# Patient Record
Sex: FEMALE | Race: WHITE | NOT HISPANIC OR LATINO | Employment: UNEMPLOYED | URBAN - METROPOLITAN AREA
[De-identification: names, ages, dates, MRNs, and addresses within clinical notes are randomized per-mention and may not be internally consistent; named-entity substitution may affect disease eponyms.]

---

## 2017-01-10 ENCOUNTER — HOSPITAL ENCOUNTER (OUTPATIENT)
Dept: RADIOLOGY | Facility: HOSPITAL | Age: 57
Discharge: HOME | End: 2017-01-10
Attending: INTERNAL MEDICINE
Payer: MEDICAID

## 2017-01-10 ENCOUNTER — OFFICE VISIT (OUTPATIENT)
Dept: INTERNAL MEDICINE | Facility: CLINIC | Age: 57
End: 2017-01-10
Payer: MEDICAID

## 2017-01-10 VITALS
BODY MASS INDEX: 35.71 KG/M2 | SYSTOLIC BLOOD PRESSURE: 110 MMHG | WEIGHT: 209.19 LBS | TEMPERATURE: 99 F | HEART RATE: 98 BPM | HEIGHT: 64 IN | OXYGEN SATURATION: 98 % | DIASTOLIC BLOOD PRESSURE: 84 MMHG

## 2017-01-10 DIAGNOSIS — J40 BRONCHITIS: Primary | ICD-10-CM

## 2017-01-10 DIAGNOSIS — J40 BRONCHITIS: ICD-10-CM

## 2017-01-10 PROCEDURE — 99999 PR PBB SHADOW E&M-EST. PATIENT-LVL III: CPT | Mod: PBBFAC,,, | Performed by: PHYSICIAN ASSISTANT

## 2017-01-10 PROCEDURE — 71020 XR CHEST PA AND LATERAL: CPT | Mod: TC

## 2017-01-10 PROCEDURE — 71020 XR CHEST PA AND LATERAL: CPT | Mod: 26,,, | Performed by: RADIOLOGY

## 2017-01-10 PROCEDURE — 99213 OFFICE O/P EST LOW 20 MIN: CPT | Mod: S$PBB,,, | Performed by: PHYSICIAN ASSISTANT

## 2017-01-10 RX ORDER — ALBUTEROL SULFATE 90 UG/1
2 AEROSOL, METERED RESPIRATORY (INHALATION) EVERY 6 HOURS PRN
Qty: 18 G | Refills: 0 | Status: SHIPPED | OUTPATIENT
Start: 2017-01-10 | End: 2017-02-22 | Stop reason: SDUPTHER

## 2017-01-10 RX ORDER — MONTELUKAST SODIUM 10 MG/1
10 TABLET ORAL NIGHTLY
Qty: 30 TABLET | Refills: 0 | Status: SHIPPED | OUTPATIENT
Start: 2017-01-10 | End: 2017-02-09

## 2017-01-10 RX ORDER — EPINEPHRINE 0.3 MG/.3ML
INJECTION INTRAMUSCULAR
COMMUNITY
Start: 2017-01-03 | End: 2018-08-16

## 2017-01-10 NOTE — PROGRESS NOTES
Subjective:       Patient ID: Monica Joy is a 56 y.o. female.    Chief Complaint: URI and Cough    URI    This is a new problem. The current episode started 1 to 4 weeks ago. The problem has been waxing and waning. There has been no fever. Associated symptoms include chest pain, congestion, coughing and wheezing. Pertinent negatives include no abdominal pain, headaches, rhinorrhea, sinus pain, sore throat or swollen glands. She has tried nothing for the symptoms.     Review of Systems   Constitutional: Negative for chills, fatigue and fever.   HENT: Positive for congestion. Negative for ear discharge, postnasal drip, rhinorrhea and sore throat.    Respiratory: Positive for cough and wheezing. Negative for chest tightness and shortness of breath.    Cardiovascular: Positive for chest pain.   Gastrointestinal: Negative for abdominal pain.   Neurological: Negative for headaches.       Objective:      Physical Exam   Constitutional: She appears well-developed and well-nourished. No distress.   HENT:   Head: Normocephalic and atraumatic.   Right Ear: Tympanic membrane and ear canal normal.   Left Ear: Tympanic membrane and ear canal normal.   Nose: Mucosal edema and rhinorrhea present.   Mouth/Throat: No posterior oropharyngeal edema or posterior oropharyngeal erythema. No tonsillar exudate.   Neck: Neck supple.   Cardiovascular: Normal rate and regular rhythm.    Pulmonary/Chest: Effort normal. No respiratory distress. She has wheezes. She has no rales. She exhibits no tenderness.   Abdominal: Soft. There is no tenderness.   Lymphadenopathy:     She has no cervical adenopathy.   Skin: She is not diaphoretic.   Nursing note and vitals reviewed.      Assessment:       1. Bronchitis        Plan:       Bronchitis  -     X-Ray Chest PA And Lateral; Future; Expected date: 1/10/17    Other orders  -     albuterol 90 mcg/actuation inhaler; Inhale 2 puffs into the lungs every 6 (six) hours as needed for Wheezing.  Dispense:  18 g; Refill: 0  -     montelukast (SINGULAIR) 10 mg tablet; Take 1 tablet (10 mg total) by mouth every evening.  Dispense: 30 tablet; Refill: 0

## 2017-01-10 NOTE — MR AVS SNAPSHOT
[facility]  [address] [place] [id number]  Phone: [phone]  Fax: [phone]                  Monica Joy   1/10/2017 11:20 AM   Office Visit    Description:  Female : 1960   Provider:  Felix Aquino III, PA-C   Department:  [facility]           Reason for Visit     URI     Cough           Diagnoses this Visit        Comments    Bronchitis    -  Primary            To Do List           Goals (5 Years of Data)     None       These Medications        Disp Refills Start End    albuterol 90 mcg/actuation inhaler 18 g 0 1/10/2017 1/10/2018    Inhale 2 puffs into the lungs every 6 (six) hours as needed for Wheezing. - Inhalation    Pharmacy: [facility] [address] [place] [address] AT [place] [facility] Ph #: [phone]       montelukast (SINGULAIR) 10 mg tablet 30 tablet 0 1/10/2017 2017    Take 1 tablet (10 mg total) by mouth every evening. - Oral    Pharmacy: [facility] [address] [place] [address] AVE  AT [place] [facility] [place] Ph #: [phone]         [facility] On Call     [facility] On Call Nurse Care Line -  Assistance  Registered nurses in the [facility] Center provide clinical advisement, health education, appointment booking, and other advisory services.  Call for this free service at [phone].             Medications           Message regarding Medications     Verify the changes and/or additions to your medication regime listed below are the same as discussed with your clinician today.  If any of these changes or additions are incorrect, please notify your healthcare provider.        START taking these NEW medications        Refills    albuterol 90 mcg/actuation inhaler 0    Sig: Inhale 2 puffs into the lungs every 6 (six) hours as needed for Wheezing.    Class: Normal    Route: Inhalation    montelukast (SINGULAIR) 10 mg tablet 0    Sig: Take 1 tablet (10 mg total)  "by mouth every evening.    Class: Normal    Route: Oral      STOP taking these medications     levoFLOXacin (LEVAQUIN) 500 MG tablet Take 1 tablet (500 mg total) by mouth once daily.           Verify that the below list of medications is an accurate representation of the medications you are currently taking.  If none reported, the list may be blank. If incorrect, please contact your healthcare provider. Carry this list with you in case of emergency.           Current Medications     cyanocobalamin, vitamin B-12, (VITAMIN B-12) 1,000 mcg Subl Place under the tongue.    albuterol 90 mcg/actuation inhaler Inhale 2 puffs into the lungs every 6 (six) hours as needed for Wheezing.    EPIPEN 2-RAIMUNDO 0.3 mg/0.3 mL AtIn     meloxicam (MOBIC) 15 MG tablet TAKE 1 TABLET BY MOUTH DAILY    montelukast (SINGULAIR) 10 mg tablet Take 1 tablet (10 mg total) by mouth every evening.           Clinical Reference Information           Vital Signs - Last Recorded  Most recent update: 1/10/2017 11:26 AM by Nathalie Nava MA    BP Pulse Temp Ht    110/84 (BP Location: Left arm, Patient Position: Sitting, BP Method: Manual) 98 99.2 °F (37.3 °C) (Tympanic) 5' 4" (1.626 m)    Wt SpO2 BMI    94.9 kg (209 lb 3.5 oz) 98% 35.91 kg/m2      Blood Pressure          Most Recent Value    BP  110/84      Allergies as of 1/10/2017     Amoxicillin    Medrol [Methylprednisolone]    Oxycodone    Percodan Maude    Ultram  [Tramadol]    Augmentin [Amoxicillin-pot Clavulanate]    Sulfa (Sulfonamide Antibiotics)    Doxycycline    Lortab  [Hydrocodone-acetaminophen]    Percocet  [Oxycodone-acetaminophen]    Shellfish Containing Products    Tetracycline    Vibramycin  [Doxycycline Calcium]    Codeine    Hydrocodone      Immunizations Administered on Date of Encounter - 1/10/2017     None      Orders Placed During Today's Visit     Future Labs/Procedures Expected by Expires    X-Ray Chest PA And Lateral  1/10/2017 1/10/2018      Instructions      Continue " with new medicines until gone. May take tylenol PRN fever. Increase fluids and rest. Call the clinic if not better in 3 to 5 days. Suggest togo to the Emergency Room if symptoms get much worse. Otherwise follow up with your PCP as scheduled.

## 2017-01-10 NOTE — PATIENT INSTRUCTIONS
Continue with new medicines until gone. May take tylenol PRN fever. Increase fluids and rest. Call the clinic if not better in 3 to 5 days. Suggest togo to the Emergency Room if symptoms get much worse. Otherwise follow up with your PCP as scheduled.

## 2017-02-22 ENCOUNTER — OFFICE VISIT (OUTPATIENT)
Dept: INTERNAL MEDICINE | Facility: CLINIC | Age: 57
End: 2017-02-22
Payer: MEDICAID

## 2017-02-22 VITALS
TEMPERATURE: 100 F | OXYGEN SATURATION: 96 % | HEIGHT: 64 IN | WEIGHT: 212.5 LBS | HEART RATE: 90 BPM | BODY MASS INDEX: 36.28 KG/M2 | DIASTOLIC BLOOD PRESSURE: 82 MMHG | SYSTOLIC BLOOD PRESSURE: 122 MMHG

## 2017-02-22 DIAGNOSIS — J06.9 VIRAL UPPER RESPIRATORY TRACT INFECTION: Primary | ICD-10-CM

## 2017-02-22 PROCEDURE — 99213 OFFICE O/P EST LOW 20 MIN: CPT | Mod: S$PBB,,, | Performed by: PHYSICIAN ASSISTANT

## 2017-02-22 PROCEDURE — 99213 OFFICE O/P EST LOW 20 MIN: CPT | Mod: PBBFAC | Performed by: PHYSICIAN ASSISTANT

## 2017-02-22 PROCEDURE — 99999 PR PBB SHADOW E&M-EST. PATIENT-LVL III: CPT | Mod: PBBFAC,,, | Performed by: PHYSICIAN ASSISTANT

## 2017-02-22 RX ORDER — PROMETHAZINE HYDROCHLORIDE AND DEXTROMETHORPHAN HYDROBROMIDE 6.25; 15 MG/5ML; MG/5ML
5 SYRUP ORAL 3 TIMES DAILY PRN
Qty: 240 ML | Refills: 0 | Status: SHIPPED | OUTPATIENT
Start: 2017-02-22 | End: 2017-03-04

## 2017-02-22 RX ORDER — ALBUTEROL SULFATE 90 UG/1
2 AEROSOL, METERED RESPIRATORY (INHALATION) EVERY 6 HOURS PRN
Qty: 18 G | Refills: 0 | Status: SHIPPED | OUTPATIENT
Start: 2017-02-22 | End: 2018-08-16

## 2017-02-22 RX ORDER — MONTELUKAST SODIUM 10 MG/1
10 TABLET ORAL NIGHTLY
Qty: 30 TABLET | Refills: 5 | Status: SHIPPED | OUTPATIENT
Start: 2017-02-22 | End: 2018-08-16

## 2017-02-22 NOTE — MR AVS SNAPSHOT
[facility]  [address] [place] [address]  Phone: [phone]  Fax: [phone]                  Monica Joy   2017 10:40 AM   Office Visit    Description:  Female : 1960   Provider:  Felix Aquino III, PA-C   Department:  [facility]           Reason for Visit     flu like symptoms           Diagnoses this Visit        Comments    Viral upper respiratory tract infection    -  Primary            To Do List           Goals (5 Years of Data)     None       These Medications        Disp Refills Start End    montelukast (SINGULAIR) 10 mg tablet 30 tablet 5 2017     Take 1 tablet (10 mg total) by mouth every evening. - Oral    [facility] [address] [facility] [place] [facility] [place] Ph #: [phone]       albuterol 90 mcg/actuation inhaler 18 g 0 2017    Inhale 2 puffs into the lungs every 6 (six) hours as needed for Wheezing. - Inhalation    Pharm[facility] [address] [facility] [place] [facility] [place] [facility] #: [phone]       promethazine-dextromethorphan (PROMETHAZINE-DM) 6.25-15 mg/5 mL Syrp 240 mL 0 2017 3/4/2017    Take 5 mLs by mouth 3 (three) times daily as needed. - Oral    Pharmacy: [facility] [address] [facility] [place] [facility] [place] Ph #: [phone]         [facility]     [facility] On Call Nurse Care Line -  Assistance  Registered nurses in the [facility] provide clinical advisement, health education, appointment booking, and other advisory services.  Call for this free service at [phone].             Medications           Message regarding Medications     Verify the changes and/or additions to your medication regime listed below are the same as discussed with your clinician today.  If any of these changes or additions are incorrect, please notify your  "healthcare provider.        START taking these NEW medications        Refills    montelukast (SINGULAIR) 10 mg tablet 5    Sig: Take 1 tablet (10 mg total) by mouth every evening.    Class: Normal    Route: Oral    promethazine-dextromethorphan (PROMETHAZINE-DM) 6.25-15 mg/5 mL Syrp 0    Sig: Take 5 mLs by mouth 3 (three) times daily as needed.    Class: Normal    Route: Oral           Verify that the below list of medications is an accurate representation of the medications you are currently taking.  If none reported, the list may be blank. If incorrect, please contact your healthcare provider. Carry this list with you in case of emergency.           Current Medications     cyanocobalamin, vitamin B-12, (VITAMIN B-12) 1,000 mcg Subl Place under the tongue.    meloxicam (MOBIC) 15 MG tablet TAKE 1 TABLET BY MOUTH DAILY    albuterol 90 mcg/actuation inhaler Inhale 2 puffs into the lungs every 6 (six) hours as needed for Wheezing.    EPIPEN 2-RAIMUNDO 0.3 mg/0.3 mL AtIn     montelukast (SINGULAIR) 10 mg tablet Take 1 tablet (10 mg total) by mouth every evening.    promethazine-dextromethorphan (PROMETHAZINE-DM) 6.25-15 mg/5 mL Syrp Take 5 mLs by mouth 3 (three) times daily as needed.           Clinical Reference Information           Your Vitals Were     BP Pulse Temp Height    122/82 (BP Location: Left arm, Patient Position: Sitting, BP Method: Manual) 90 99.5 °F (37.5 °C) (Tympanic) 5' 4" (1.626 m)    Weight SpO2 BMI    96.4 kg (212 lb 8.4 oz) 96% 36.48 kg/m2      Blood Pressure          Most Recent Value    BP  122/82      Allergies as of 2/22/2017     Amoxicillin    Medrol [Methylprednisolone]    Oxycodone    Percodan Maude    Ultram  [Tramadol]    Augmentin [Amoxicillin-pot Clavulanate]    Levaquin [Levofloxacin]    Sulfa (Sulfonamide Antibiotics)    Doxycycline    Lortab  [Hydrocodone-acetaminophen]    Percocet  [Oxycodone-acetaminophen]    Shellfish Containing Products    Tetracycline    Vibramycin  [Doxycycline " Calcium]    Codeine    Hydrocodone      Immunizations Administered on Date of Encounter - 2/22/2017     None      Instructions      Continue with antibiotics and new medicines until gone. May take tylenol PRN fever. Increase fluids and rest. Call the clinic if not better in 3 to 5 days. Suggest togo to the Emergency Room if symptoms get much worse. Otherwise follow up with your PCP as scheduled.        Language Assistance Services     ATTENTION: Language assistance services are available, free of charge. Please call [phone].      ATENCIÓN: Si habla kathy, tiene a raman disposición servicios gratuitos de asistencia lingüística. Llame al [phone].     GERMAIN Ý: N?u b?n nói Ti?ng Vi?t, có các d?ch v? h? tr? ngôn ng? mi?n phí dành cho b?n. G?i s? [phone].         [facility] complies with applicable Federal civil rights laws and does not discriminate on the basis of race, color, national origin, age, disability, or sex.

## 2017-02-22 NOTE — PROGRESS NOTES
Subjective:       Patient ID: Monica Joy is a 56 y.o. female.    Chief Complaint: flu like symptoms    URI    This is a new problem. The current episode started in the past 7 days. The problem has been waxing and waning. The maximum temperature recorded prior to her arrival was 100.4 - 100.9 F. Associated symptoms include congestion, coughing, headaches, rhinorrhea, sneezing, a sore throat and swollen glands. Pertinent negatives include no abdominal pain, chest pain or ear pain. She has tried nothing for the symptoms.     Review of Systems   Constitutional: Positive for chills, fatigue and fever.   HENT: Positive for congestion, rhinorrhea, sneezing and sore throat. Negative for dental problem, ear pain, facial swelling, nosebleeds, postnasal drip, sinus pressure and trouble swallowing.    Respiratory: Positive for cough. Negative for chest tightness and shortness of breath.    Cardiovascular: Negative for chest pain.   Gastrointestinal: Negative for abdominal pain.   Neurological: Positive for headaches.       Objective:      Physical Exam   Constitutional: She appears well-developed and well-nourished. No distress.   HENT:   Head: Normocephalic and atraumatic.   Right Ear: Tympanic membrane and ear canal normal.   Left Ear: Tympanic membrane and ear canal normal.   Nose: Mucosal edema and rhinorrhea present.   Mouth/Throat: Oropharynx is clear and moist. No tonsillar exudate.   Neck: Neck supple.   Cardiovascular: Normal rate and regular rhythm.  Exam reveals no gallop and no friction rub.    No murmur heard.  Pulmonary/Chest: Effort normal and breath sounds normal. No respiratory distress. She has no wheezes. She has no rales. She exhibits no tenderness.   Lymphadenopathy:     She has no cervical adenopathy.   Skin: She is not diaphoretic.   Nursing note and vitals reviewed.      Assessment:       1. Viral upper respiratory tract infection        Plan:       Viral upper respiratory tract infection    Other  orders  -     montelukast (SINGULAIR) 10 mg tablet; Take 1 tablet (10 mg total) by mouth every evening.  Dispense: 30 tablet; Refill: 5  -     albuterol 90 mcg/actuation inhaler; Inhale 2 puffs into the lungs every 6 (six) hours as needed for Wheezing.  Dispense: 18 g; Refill: 0  -     promethazine-dextromethorphan (PROMETHAZINE-DM) 6.25-15 mg/5 mL Syrp; Take 5 mLs by mouth 3 (three) times daily as needed.  Dispense: 240 mL; Refill: 0

## 2017-03-20 ENCOUNTER — HOSPITAL ENCOUNTER (OUTPATIENT)
Dept: RADIOLOGY | Facility: HOSPITAL | Age: 57
Discharge: HOME | End: 2017-03-20
Attending: INTERNAL MEDICINE
Payer: MEDICAID

## 2017-03-20 ENCOUNTER — OFFICE VISIT (OUTPATIENT)
Dept: INTERNAL MEDICINE | Facility: CLINIC | Age: 57
End: 2017-03-20
Payer: MEDICAID

## 2017-03-20 VITALS
BODY MASS INDEX: 35.98 KG/M2 | TEMPERATURE: 99 F | DIASTOLIC BLOOD PRESSURE: 88 MMHG | HEART RATE: 84 BPM | SYSTOLIC BLOOD PRESSURE: 122 MMHG | WEIGHT: 210.75 LBS | OXYGEN SATURATION: 97 % | HEIGHT: 64 IN

## 2017-03-20 DIAGNOSIS — R06.2 WHEEZING: Primary | ICD-10-CM

## 2017-03-20 DIAGNOSIS — R06.2 WHEEZING: ICD-10-CM

## 2017-03-20 DIAGNOSIS — J40 BRONCHITIS: ICD-10-CM

## 2017-03-20 PROCEDURE — 99213 OFFICE O/P EST LOW 20 MIN: CPT | Mod: S$PBB,,, | Performed by: PHYSICIAN ASSISTANT

## 2017-03-20 PROCEDURE — 71020 XR CHEST PA AND LATERAL: CPT | Mod: 26,,, | Performed by: RADIOLOGY

## 2017-03-20 PROCEDURE — 99999 PR PBB SHADOW E&M-EST. PATIENT-LVL IV: CPT | Mod: PBBFAC,,, | Performed by: PHYSICIAN ASSISTANT

## 2017-03-20 PROCEDURE — 71020 XR CHEST PA AND LATERAL: CPT | Mod: TC

## 2017-03-20 RX ORDER — PREDNISONE 10 MG/1
TABLET ORAL
Qty: 18 TABLET | Refills: 0 | Status: SHIPPED | OUTPATIENT
Start: 2017-03-20 | End: 2017-04-05

## 2017-03-20 NOTE — PROGRESS NOTES
Subjective:       Patient ID: Monica Joy is a 56 y.o. female.    Chief Complaint: URI    Cough   This is a new problem. The current episode started 1 to 4 weeks ago. The problem has been gradually worsening. The problem occurs every few minutes. The cough is non-productive. Associated symptoms include shortness of breath and wheezing. Pertinent negatives include no chest pain, chills, fever, headaches, nasal congestion or postnasal drip. Nothing aggravates the symptoms. She has tried a beta-agonist inhaler and OTC cough suppressant (singular) for the symptoms. The treatment provided mild relief. Her past medical history is significant for bronchitis and pneumonia.     Review of Systems   Constitutional: Negative for chills and fever.   HENT: Positive for congestion. Negative for postnasal drip.    Respiratory: Positive for cough, chest tightness, shortness of breath and wheezing.    Cardiovascular: Negative for chest pain.   Gastrointestinal: Negative for abdominal pain.   Neurological: Negative for headaches.       Objective:      Physical Exam   Constitutional: She appears well-developed and well-nourished.   HENT:   Head: Normocephalic and atraumatic.   Right Ear: External ear normal.   Left Ear: External ear normal.   Nose: Nose normal.   Mouth/Throat: Oropharynx is clear and moist. No oropharyngeal exudate.   Neck: Neck supple.   Cardiovascular: Normal rate and regular rhythm.    Pulmonary/Chest: She has wheezes. She has rhonchi.   Lymphadenopathy:     She has no cervical adenopathy.   Nursing note and vitals reviewed.      Assessment:       1. Wheezing    2. Bronchitis        Plan:       Wheezing  -     X-Ray Chest PA And Lateral; Future; Expected date: 3/20/17  -     Complete PFT with bronchodilator; Future  -     Ambulatory consult to Pulmonology    Bronchitis  -     X-Ray Chest PA And Lateral; Future; Expected date: 3/20/17  -     Ambulatory consult to Pulmonology    Other orders  -     predniSONE  (DELTASONE) 10 MG tablet; Take 3 daily for 3 days, then 2 daily for three days, then 1 daily for three days.  Dispense: 18 tablet; Refill: 0       Patient feels like she is not allergic to steroids and desires to try them again. She understands the risk and understands the consequences of a potential reaction.

## 2017-03-20 NOTE — MR AVS SNAPSHOT
[facility]  [address] [facility] [address]  Phone: [phone]  Fax: [phone]                  Monica Joy   3/20/2017 1:00 PM   Office Visit    Description:  Female : 1960   Provider:  Felix Aquino III, PA-C   Department:  [facility]           Reason for Visit     URI           Diagnoses this Visit        Comments    Wheezing    -  Primary     Bronchitis                To Do List           Future Appointments        Provider Department Dept Phone    2017 2:20 PM Karen Villafana DO [facility] [phone]    2017 3:20 PM PULMONARY LAB, [facility] [phone]    2017 8:00 AM Issac Gautam MD [facility] - Pulmonary Services [phone]      Goals (5 Years of Data)     None       These Medications        Disp Refills Start End    predniSONE (DELTASONE) 10 MG tablet 18 tablet 0 3/20/2017     Take 3 daily for 3 days, then 2 daily for three days, then 1 daily for three days.    [facility] [address] [place] [address] AT [facility] Ph #: [phone]         [facility] On Call     [facility] On Call Nurse Care Line - 24/7 Assistance  Registered nurses in the [facility] On Call Center provide clinical advisement, health education, appointment booking, and other advisory services.  Call for this free service at [phone].             Medications           Message regarding Medications     Verify the changes and/or additions to your medication regime listed below are the same as discussed with your clinician today.  If any of these changes or additions are incorrect, please notify your healthcare provider.        START taking these NEW medications        Refills    predniSONE (DELTASONE) 10 MG tablet 0    Sig: Take 3 daily for 3 days, then 2 daily for three days, then 1 daily for three days.    Class: Normal           Verify that the below list of  "medications is an accurate representation of the medications you are currently taking.  If none reported, the list may be blank. If incorrect, please contact your healthcare provider. Carry this list with you in case of emergency.           Current Medications     albuterol 90 mcg/actuation inhaler Inhale 2 puffs into the lungs every 6 (six) hours as needed for Wheezing.    cyanocobalamin, vitamin B-12, (VITAMIN B-12) 1,000 mcg Subl Place under the tongue.    EPIPEN 2-RAIMUNDO 0.3 mg/0.3 mL AtIn     meloxicam (MOBIC) 15 MG tablet TAKE 1 TABLET BY MOUTH DAILY    montelukast (SINGULAIR) 10 mg tablet Take 1 tablet (10 mg total) by mouth every evening.    predniSONE (DELTASONE) 10 MG tablet Take 3 daily for 3 days, then 2 daily for three days, then 1 daily for three days.           Clinical Reference Information           Your Vitals Were     BP Pulse Temp Height    122/88 (BP Location: Left arm, Patient Position: Sitting, BP Method: Manual) 84 99.1 °F (37.3 °C) (Tympanic) 5' 4" (1.626 m)    Weight SpO2 BMI    95.6 kg (210 lb 12.2 oz) 97% 36.18 kg/m2      Blood Pressure          Most Recent Value    BP  122/88      Allergies as of 3/20/2017     Amoxicillin    Medrol [Methylprednisolone]    Oxycodone    Percodan Maude    Ultram  [Tramadol]    Augmentin [Amoxicillin-pot Clavulanate]    Levaquin [Levofloxacin]    Sulfa (Sulfonamide Antibiotics)    Doxycycline    Lortab  [Hydrocodone-acetaminophen]    Percocet  [Oxycodone-acetaminophen]    Shellfish Containing Products    Tetracycline    Vibramycin  [Doxycycline Calcium]    Codeine    Hydrocodone      Immunizations Administered on Date of Encounter - 3/20/2017     None      Orders Placed During Today's Visit      Normal Orders This Visit    Ambulatory consult to Pulmonology     Future Labs/Procedures Expected by Expires    X-Ray Chest PA And Lateral  3/20/2017 3/20/2018    Complete PFT with bronchodilator  As directed 3/20/2018      Instructions      Continue with new medicines " until gone. May take tylenol PRN fever. Increase fluids and rest. Call the clinic if not better in 3 to 5 days. Suggest togo to the Emergency Room if symptoms get much worse. Otherwise follow up with your PCP as scheduled.        Language Assistance Services     ATTENTION: Language assistance services are available, free of charge. Please call [phone].      ATENCIÓN: Si habla español, tiene a raman disposición servicios gratuitos de asistencia lingüística. Llame al [phone].     GERMAIN Ý: N?u b?n nói Ti?ng Vi?t, có các d?ch v? h? tr? ngôn ng? mi?n phí dành cho b?n. G?i s? [phone].         [facility] complies with applicable Federal civil rights laws and does not discriminate on the basis of race, color, national origin, age, disability, or sex.

## 2017-04-05 ENCOUNTER — PROCEDURE VISIT (OUTPATIENT)
Dept: PULMONOLOGY | Facility: CLINIC | Age: 57
End: 2017-04-05
Payer: MEDICAID

## 2017-04-05 ENCOUNTER — OFFICE VISIT (OUTPATIENT)
Dept: INTERNAL MEDICINE | Facility: CLINIC | Age: 57
End: 2017-04-05
Payer: MEDICAID

## 2017-04-05 ENCOUNTER — HOSPITAL ENCOUNTER (OUTPATIENT)
Dept: RADIOLOGY | Facility: HOSPITAL | Age: 57
Discharge: HOME | End: 2017-04-05
Attending: INTERNAL MEDICINE
Payer: MEDICAID

## 2017-04-05 VITALS
HEIGHT: 64 IN | OXYGEN SATURATION: 97 % | HEART RATE: 96 BPM | WEIGHT: 216.06 LBS | SYSTOLIC BLOOD PRESSURE: 128 MMHG | DIASTOLIC BLOOD PRESSURE: 86 MMHG | TEMPERATURE: 100 F | BODY MASS INDEX: 36.89 KG/M2

## 2017-04-05 DIAGNOSIS — R07.9 RIGHT-SIDED CHEST PAIN: ICD-10-CM

## 2017-04-05 DIAGNOSIS — R07.9 RIGHT-SIDED CHEST PAIN: Primary | ICD-10-CM

## 2017-04-05 DIAGNOSIS — R05.9 COUGH: ICD-10-CM

## 2017-04-05 DIAGNOSIS — R06.2 WHEEZING: ICD-10-CM

## 2017-04-05 PROCEDURE — 94726 PLETHYSMOGRAPHY LUNG VOLUMES: CPT | Mod: PBBFAC

## 2017-04-05 PROCEDURE — 94726 PLETHYSMOGRAPHY LUNG VOLUMES: CPT | Mod: 26,S$PBB,, | Performed by: INTERNAL MEDICINE

## 2017-04-05 PROCEDURE — 94729 DIFFUSING CAPACITY: CPT | Mod: PBBFAC

## 2017-04-05 PROCEDURE — 94060 EVALUATION OF WHEEZING: CPT | Mod: PBBFAC

## 2017-04-05 PROCEDURE — 99213 OFFICE O/P EST LOW 20 MIN: CPT | Mod: S$PBB,,, | Performed by: INTERNAL MEDICINE

## 2017-04-05 PROCEDURE — 94060 EVALUATION OF WHEEZING: CPT | Mod: 26,S$PBB,, | Performed by: INTERNAL MEDICINE

## 2017-04-05 PROCEDURE — 94729 DIFFUSING CAPACITY: CPT | Mod: 26,S$PBB,, | Performed by: INTERNAL MEDICINE

## 2017-04-05 PROCEDURE — 71020 XR CHEST PA AND LATERAL: CPT | Mod: 26,,, | Performed by: RADIOLOGY

## 2017-04-05 PROCEDURE — 99999 PR PBB SHADOW E&M-EST. PATIENT-LVL III: CPT | Mod: PBBFAC,,, | Performed by: INTERNAL MEDICINE

## 2017-04-05 RX ORDER — KETOROLAC TROMETHAMINE 30 MG/ML
60 INJECTION, SOLUTION INTRAMUSCULAR; INTRAVENOUS
Status: COMPLETED | OUTPATIENT
Start: 2017-04-05 | End: 2017-04-05

## 2017-04-05 RX ADMIN — KETOROLAC TROMETHAMINE 60 MG: 60 INJECTION, SOLUTION INTRAMUSCULAR at 04:04

## 2017-04-05 NOTE — PATIENT INSTRUCTIONS
Pleurisy  You have pain in your chest. Your healthcare provider has told you that you have pleurisy, or pleuritis. Pleurisy is swelling (inflammation) of the pleura. The pleura are two layers of thin smooth tissue that surround the lungs and line the chest.  What are the symptoms of pleurisy?     Pleurisy is inflammation of the pleura. The pleura cover the lungs and line the chest.   Pleurisy usually causes sharp chest pain. It is usually worse when you take a deep breath, cough, or sneeze.  What causes pleurisy?  Many things can cause pleurisy. A common cause is a viral infection like the flu or pneumonia. Serious lung problems that can cause it include:  · A blood clot in the lung (pulmonary embolism)  · Air between the pleura (pneumothorax)  Serious heart problems that can cause pleurisy include:  · Heart attack  · Inflammation of the covering of the heart (pericarditis)  How is pleurisy diagnosed?  Your healthcare provider examines you and asks you about your symptoms and health history. He or she will first check you for the serious causes of chest pain. You may have:  · Lab tests  · Imaging tests such as chest X-ray, CT scan, or ultrasound  · EKG  How is pleurisy treated?  Treatment depends on what is causing the pleurisy. Serious conditions are treated in the hospital. You may need medicines to decrease the inflammation and pain.     Call 911  Call 911 if any of these occur:  · Trouble breathing  · Chest pain that gets worse  Call your healthcare provider  Call your healthcare provider right away if you have:  · A fever of 100.4°F (38°C) or higher, or as directed by your healthcare provider   Date Last Reviewed: 11/1/2016  © 0207-9967 [employer]. [address] [place] [address], [place] [address]. All rights reserved. This information is not intended as a substitute for professional medical care. Always follow your healthcare professional's instructions.

## 2017-04-05 NOTE — MR AVS SNAPSHOT
[facility]  [address] [facility] [address]  Phone: [phone]  Fax: [phone]                  Monica Joy   2017 2:20 PM   Office Visit    Description:  Female : 1960   Provider:  Karen Villafana DO   Department:  [facility]           Reason for Visit     Establish Care           Diagnoses this Visit        Comments    Right-sided chest pain    -  Primary     Cough                To Do List           Future Appointments        Provider Department Dept Phone    2017 8:00 AM Issac Gautam MD [facility] [phone]      Goals (5 Years of Data)     None      [facility]     [facility] Nurse Care Line -  Assistance  Unless otherwise directed by your provider, please contact [facility], our nurse care line that is available for  assistance.     Registered nurses in the [facility] On Call Center provide: appointment scheduling, clinical advisement, health education, and other advisory services.  Call: [phone] (toll free)               Medications           Message regarding Medications     Verify the changes and/or additions to your medication regime listed below are the same as discussed with your clinician today.  If any of these changes or additions are incorrect, please notify your healthcare provider.        These medications were administered today        Dose Freq    ketorolac injection 60 mg 60 mg Clinic/HOD 1 time    Sig: Inject 2 mLs (60 mg total) into the muscle one time.    Class: Normal    Route: Intramuscular      STOP taking these medications     predniSONE (DELTASONE) 10 MG tablet Take 3 daily for 3 days, then 2 daily for three days, then 1 daily for three days.           Verify that the below list of medications is an accurate representation of the medications you are currently taking.  If none reported, the list may be blank. If incorrect, please contact your healthcare  "provider. Carry this list with you in case of emergency.           Current Medications     albuterol 90 mcg/actuation inhaler Inhale 2 puffs into the lungs every 6 (six) hours as needed for Wheezing.    cyanocobalamin, vitamin B-12, (VITAMIN B-12) 1,000 mcg Subl Place under the tongue.    EPIPEN 2-RAIMUNDO 0.3 mg/0.3 mL AtIn     meloxicam (MOBIC) 15 MG tablet TAKE 1 TABLET BY MOUTH DAILY    montelukast (SINGULAIR) 10 mg tablet Take 1 tablet (10 mg total) by mouth every evening.           Clinical Reference Information           Your Vitals Were     BP Pulse Temp Height Weight SpO2    128/86 (BP Location: Left arm, Patient Position: Sitting, BP Method: Manual) 96 99.8 °F (37.7 °C) (Tympanic) 5' 4" (1.626 m) 98 kg (216 lb 0.8 oz) 97%    BMI                37.09 kg/m2          Blood Pressure          Most Recent Value    BP  128/86      Allergies as of 4/5/2017     Amoxicillin    Medrol [Methylprednisolone]    Oxycodone    Percodan Maude    Ultram  [Tramadol]    Augmentin [Amoxicillin-pot Clavulanate]    Levaquin [Levofloxacin]    Sulfa (Sulfonamide Antibiotics)    Doxycycline    Lortab  [Hydrocodone-acetaminophen]    Percocet  [Oxycodone-acetaminophen]    Shellfish Containing Products    Tetracycline    Vibramycin  [Doxycycline Calcium]    Codeine    Hydrocodone      Immunizations Administered on Date of Encounter - 4/5/2017     None      Orders Placed During Today's Visit     Future Labs/Procedures Expected by Expires    X-Ray Chest PA And Lateral  4/5/2017 4/5/2018      Administrations This Visit     ketorolac injection 60 mg     Admin Date Action Dose Route Administered By             04/05/2017 Given 60 mg Intramuscular Larrine EDI Garcia LPN                      Instructions      Pleurisy  You have pain in your chest. Your healthcare provider has told you that you have pleurisy, or pleuritis. Pleurisy is swelling (inflammation) of the pleura. The pleura are two layers of thin smooth tissue that surround the lungs and " line the chest.  What are the symptoms of pleurisy?     Pleurisy is inflammation of the pleura. The pleura cover the lungs and line the chest.   Pleurisy usually causes sharp chest pain. It is usually worse when you take a deep breath, cough, or sneeze.  What causes pleurisy?  Many things can cause pleurisy. A common cause is a viral infection like the flu or pneumonia. Serious lung problems that can cause it include:  · A blood clot in the lung (pulmonary embolism)  · Air between the pleura (pneumothorax)  Serious heart problems that can cause pleurisy include:  · Heart attack  · Inflammation of the covering of the heart (pericarditis)  How is pleurisy diagnosed?  Your healthcare provider examines you and asks you about your symptoms and health history. He or she will first check you for the serious causes of chest pain. You may have:  · Lab tests  · Imaging tests such as chest X-ray, CT scan, or ultrasound  · EKG  How is pleurisy treated?  Treatment depends on what is causing the pleurisy. Serious conditions are treated in the hospital. You may need medicines to decrease the inflammation and pain.     Call 911  Call 911 if any of these occur:  · Trouble breathing  · Chest pain that gets worse  Call your healthcare provider  Call your healthcare provider right away if you have:  · A fever of 100.4°F (38°C) or higher, or as directed by your healthcare provider   Date Last Reviewed: 11/1/2016  © 3952-8467 [employer]. [address] [place] [address]. All rights reserved. This information is not intended as a substitute for professional medical care. Always follow your healthcare professional's instructions.             Language Assistance Services     ATTENTION: Language assistance services are available, free of charge. Please call [phone].      ATENCIÓN: Si habla kathy, tiene a raman disposición servicios gratuitos de asistencia lingüística. Llame al [phone].     [facility] Ý: N?u b?n  nói Ti?ng Vi?t, có các d?ch v? h? tr? ngôn ng? mi?n phí dành cho b?n. G?i s? [phone].         [facility] complies with applicable Federal civil rights laws and does not discriminate on the basis of race, color, national origin, age, disability, or sex.

## 2017-04-11 ENCOUNTER — OFFICE VISIT (OUTPATIENT)
Dept: INTERNAL MEDICINE | Facility: CLINIC | Age: 57
End: 2017-04-11
Payer: MEDICAID

## 2017-04-11 VITALS
DIASTOLIC BLOOD PRESSURE: 82 MMHG | HEIGHT: 64 IN | HEART RATE: 85 BPM | WEIGHT: 216.94 LBS | OXYGEN SATURATION: 96 % | SYSTOLIC BLOOD PRESSURE: 130 MMHG | TEMPERATURE: 98 F | BODY MASS INDEX: 37.04 KG/M2

## 2017-04-11 DIAGNOSIS — M54.31 SCIATICA OF RIGHT SIDE: Primary | ICD-10-CM

## 2017-04-11 PROCEDURE — 99999 PR PBB SHADOW E&M-EST. PATIENT-LVL V: CPT | Mod: PBBFAC,,, | Performed by: PHYSICIAN ASSISTANT

## 2017-04-11 PROCEDURE — 99215 OFFICE O/P EST HI 40 MIN: CPT | Mod: PBBFAC | Performed by: PHYSICIAN ASSISTANT

## 2017-04-11 PROCEDURE — 99213 OFFICE O/P EST LOW 20 MIN: CPT | Mod: S$PBB,,, | Performed by: PHYSICIAN ASSISTANT

## 2017-04-11 RX ORDER — CYCLOBENZAPRINE HCL 10 MG
10 TABLET ORAL 3 TIMES DAILY PRN
Qty: 30 TABLET | Refills: 0 | Status: SHIPPED | OUTPATIENT
Start: 2017-04-11 | End: 2017-04-18

## 2017-04-11 RX ORDER — NABUMETONE 500 MG/1
500 TABLET, FILM COATED ORAL 2 TIMES DAILY
Qty: 30 TABLET | Refills: 0 | Status: SHIPPED | OUTPATIENT
Start: 2017-04-11 | End: 2017-04-18 | Stop reason: SDUPTHER

## 2017-04-11 NOTE — PROGRESS NOTES
Subjective:       Patient ID: Monica Joy is a 56 y.o. female.    Chief Complaint: Back Pain    Back Pain   This is a recurrent problem. The current episode started in the past 7 days. The problem occurs constantly. The problem has been gradually worsening since onset. The pain is present in the lumbar spine and sacro-iliac. The quality of the pain is described as aching and burning. The pain radiates to the right knee and right thigh. The pain is moderate. The pain is the same all the time. The symptoms are aggravated by position. Stiffness is present all day. Associated symptoms include leg pain, numbness, tingling and weakness. Pertinent negatives include no fever. Risk factors include obesity, menopause and sedentary lifestyle. She has tried NSAIDs for the symptoms. The treatment provided mild relief.     Review of Systems   Constitutional: Negative for chills, fatigue and fever.   Musculoskeletal: Positive for back pain.   Neurological: Positive for tingling, weakness and numbness.       Objective:      Physical Exam   Constitutional: She appears well-developed and well-nourished. No distress.   Cardiovascular: Normal rate and regular rhythm.    Pulmonary/Chest: Effort normal and breath sounds normal.   Musculoskeletal:        Lumbar back: She exhibits decreased range of motion, tenderness, pain and spasm.        Back:    Skin: She is not diaphoretic.   Nursing note and vitals reviewed.      Assessment:       1. Sciatica of right side        Plan:       Sciatica of right side  -     Cancel: Ambulatory Referral to Physical/Occupational Therapy  -     Cancel: Ambulatory Referral to Physical/Occupational Therapy  -     Ambulatory Referral to Physical/Occupational Therapy    Other orders  -     nabumetone (RELAFEN) 500 MG tablet; Take 1 tablet (500 mg total) by mouth 2 (two) times daily.  Dispense: 30 tablet; Refill: 0  -     cyclobenzaprine (FLEXERIL) 10 MG tablet; Take 1 tablet (10 mg total) by mouth 3  (three) times daily as needed for Muscle spasms.  Dispense: 30 tablet; Refill: 0

## 2017-04-11 NOTE — PATIENT INSTRUCTIONS
Continue with your medicine until gone. Rest and use warm moist heat as needed. Follow up if not better in 2 weeks. Suggest to go to the ER if you become much worse.

## 2017-04-11 NOTE — MR AVS SNAPSHOT
[facility]  [address] [facility] [address]  Phone: [phone]  Fax: [phone]                  Monica Joy   2017 11:00 AM   Office Visit    Description:  Female : 1960   Provider:  Felix Aquino III, PA-C   Department:  [facility]           Reason for Visit     Back Pain           Diagnoses this Visit        Comments    Sciatica of right side    -  Primary            To Do List           Future Appointments        Provider Department Dept Phone    2017 8:00 AM Issac Gautam MD [facility] [phone]      Goals (5 Years of Data)     None       These Medications        Disp Refills Start End    nabumetone (RELAFEN) 500 MG tablet 30 tablet 0 2017     Take 1 tablet (500 mg total) by mouth 2 (two) times daily. - Oral    Pharmacy: [facility] [address] [place] [address] AT [address] Ph #: [phone]       cyclobenzaprine (FLEXERIL) 10 MG tablet 30 tablet 0 2017    Take 1 tablet (10 mg total) by mouth 3 (three) times daily as needed for Muscle spasms. - Oral    Pharmacy: [facility] [address] [place] [address] AT [address] Ph #: [phone]         [facility]     [facility] Nurse Care Line -  Assistance  Unless otherwise directed by your provider, please contact [facility], our nurse care line that is available for 24/ assistance.     Registered nurses in the [facility] Center provide: appointment scheduling, clinical advisement, health education, and other advisory services.  Call: [phone] (toll free)               Medications           Message regarding Medications     Verify the changes and/or additions to your medication regime listed below are the same as discussed with your clinician today.  If any of these changes or additions are incorrect, please notify your healthcare  "provider.        START taking these NEW medications        Refills    nabumetone (RELAFEN) 500 MG tablet 0    Sig: Take 1 tablet (500 mg total) by mouth 2 (two) times daily.    Class: Normal    Route: Oral    cyclobenzaprine (FLEXERIL) 10 MG tablet 0    Sig: Take 1 tablet (10 mg total) by mouth 3 (three) times daily as needed for Muscle spasms.    Class: Normal    Route: Oral      STOP taking these medications     meloxicam (MOBIC) 15 MG tablet TAKE 1 TABLET BY MOUTH DAILY           Verify that the below list of medications is an accurate representation of the medications you are currently taking.  If none reported, the list may be blank. If incorrect, please contact your healthcare provider. Carry this list with you in case of emergency.           Current Medications     albuterol 90 mcg/actuation inhaler Inhale 2 puffs into the lungs every 6 (six) hours as needed for Wheezing.    cyanocobalamin, vitamin B-12, (VITAMIN B-12) 1,000 mcg Subl Place under the tongue.    cyclobenzaprine (FLEXERIL) 10 MG tablet Take 1 tablet (10 mg total) by mouth 3 (three) times daily as needed for Muscle spasms.    EPIPEN 2-RAIMUNDO 0.3 mg/0.3 mL AtIn     montelukast (SINGULAIR) 10 mg tablet Take 1 tablet (10 mg total) by mouth every evening.    nabumetone (RELAFEN) 500 MG tablet Take 1 tablet (500 mg total) by mouth 2 (two) times daily.           Clinical Reference Information           Your Vitals Were     BP Pulse Temp Height    130/82 (BP Location: Right arm, Patient Position: Sitting, BP Method: Manual) 85 98.3 °F (36.8 °C) (Tympanic) 5' 4" (1.626 m)    Weight SpO2 BMI    98.4 kg (216 lb 14.9 oz) 96% 37.24 kg/m2      Blood Pressure          Most Recent Value    BP  130/82      Allergies as of 4/11/2017     Amoxicillin    Medrol [Methylprednisolone]    Oxycodone    Percodan Maude    Ultram  [Tramadol]    Augmentin [Amoxicillin-pot Clavulanate]    Levaquin [Levofloxacin]    Sulfa (Sulfonamide Antibiotics)    Doxycycline    Lortab  " [Hydrocodone-acetaminophen]    Percocet  [Oxycodone-acetaminophen]    Shellfish Containing Products    Tetracycline    Vibramycin  [Doxycycline Calcium]    Codeine    Hydrocodone      Immunizations Administered on Date of Encounter - 4/11/2017     None      Orders Placed During Today's Visit      Normal Orders This Visit    Ambulatory Referral to Physical/Occupational Therapy       Instructions    Continue with your medicine until gone. Rest and use warm moist heat as needed. Follow up if not better in 2 weeks. Suggest to go to the ER if you become much worse.        Language Assistance Services     ATTENTION: Language assistance services are available, free of charge. Please call [phone].      ATENCIÓN: Si habla kathy, tiene a raman disposición servicios gratuitos de asistencia lingüística. Llame al [phone].     CHÚ Ý: N?u b?n nói Ti?ng Vi?t, có các d?ch v? h? tr? ngôn ng? mi?n phí dành cho b?n. G?i s? [phone].         [facility] complies with applicable Federal civil rights laws and does not discriminate on the basis of race, color, national origin, age, disability, or sex.

## 2017-04-13 ENCOUNTER — OFFICE VISIT (OUTPATIENT)
Dept: PULMONOLOGY | Facility: CLINIC | Age: 57
End: 2017-04-13
Payer: MEDICAID

## 2017-04-13 ENCOUNTER — TELEPHONE (OUTPATIENT)
Dept: PULMONOLOGY | Facility: CLINIC | Age: 57
End: 2017-04-13

## 2017-04-13 VITALS
HEART RATE: 82 BPM | WEIGHT: 216.94 LBS | RESPIRATION RATE: 18 BRPM | BODY MASS INDEX: 37.04 KG/M2 | DIASTOLIC BLOOD PRESSURE: 63 MMHG | HEIGHT: 64 IN | OXYGEN SATURATION: 98 % | SYSTOLIC BLOOD PRESSURE: 110 MMHG

## 2017-04-13 DIAGNOSIS — J45.20 MILD INTERMITTENT ASTHMA WITHOUT COMPLICATION: Primary | ICD-10-CM

## 2017-04-13 DIAGNOSIS — Z88.9 MULTIPLE ALLERGIES: Chronic | ICD-10-CM

## 2017-04-13 DIAGNOSIS — G47.30 SLEEP-DISORDERED BREATHING: ICD-10-CM

## 2017-04-13 PROBLEM — J45.40 MODERATE PERSISTENT ASTHMA WITHOUT COMPLICATION: Status: ACTIVE | Noted: 2017-04-13

## 2017-04-13 PROBLEM — J30.89 NON-SEASONAL ALLERGIC RHINITIS: Status: ACTIVE | Noted: 2017-04-13

## 2017-04-13 PROBLEM — J30.89 NON-SEASONAL ALLERGIC RHINITIS: Status: RESOLVED | Noted: 2017-04-13 | Resolved: 2017-04-13

## 2017-04-13 PROCEDURE — 99999 PR PBB SHADOW E&M-EST. PATIENT-LVL III: CPT | Mod: PBBFAC,,, | Performed by: INTERNAL MEDICINE

## 2017-04-13 PROCEDURE — 99205 OFFICE O/P NEW HI 60 MIN: CPT | Mod: S$PBB,,, | Performed by: INTERNAL MEDICINE

## 2017-04-13 PROCEDURE — 99213 OFFICE O/P EST LOW 20 MIN: CPT | Mod: PBBFAC | Performed by: INTERNAL MEDICINE

## 2017-04-13 RX ORDER — FLUTICASONE FUROATE AND VILANTEROL 100; 25 UG/1; UG/1
1 POWDER RESPIRATORY (INHALATION) DAILY
Qty: 30 EACH | Refills: 11 | Status: SHIPPED | OUTPATIENT
Start: 2017-04-13 | End: 2018-08-16

## 2017-04-13 RX ORDER — LEVOCETIRIZINE DIHYDROCHLORIDE 5 MG/1
5 TABLET, FILM COATED ORAL NIGHTLY
Qty: 30 TABLET | Refills: 11 | Status: SHIPPED | OUTPATIENT
Start: 2017-04-13 | End: 2018-08-16

## 2017-04-13 NOTE — PATIENT INSTRUCTIONS
Fluticasone; Vilanterol inhalation powder  What is this medicine?  FLUTICASONE; VILANTEROL (floo TIK a sone; vye LORENZO ter ol) inhalation is a combination of two medicines that decrease inflammation and help to open up the airways of your lungs. It is for chronic obstructive pulmonary disease (COPD), including chronic bronchitis or emphysema. It is also used for asthma in adults to help control symptoms. Do NOT use for an acute asthma attack or COPD attack.  How should I use this medicine?  This medicine is inhaled through the mouth. It is used once per day. Follow the directions on the prescription label. Do not use a spacer device with this inhaler. Take your medicine at regular intervals. Do not take your medicine more often than directed. Do not stop taking except on your doctor's advice. Make sure that you are using your inhaler correctly. Ask you doctor or health care provider if you have any questions.  A special MedGuide will be given to you by the pharmacist with each prescription and refill. Be sure to read this information carefully each time.  Talk to your pediatrician regarding the use of this medicine in children. Special care may be needed. This medicine is not approved for use in children under 18 years of age.  What side effects may I notice from receiving this medicine?  Side effects that you should report to your doctor or health care professional as soon as possible:  · allergic reactions like skin rash or hives, swelling of the face, lips, or tongue  · breathing problems right after inhaling your medicine  · changes in vision  · chest pain  · fast, irregular heartbeat  · feeling faint or lightheaded, falls  · fever or chills  · nausea, vomiting  · tiredness  Side effects that usually do not require medical attention (Report these to your doctor or health care professional if they continue or are bothersome.):  · cough  · headache  · nervousness  · sore throat  · tremor  What may interact with  this medicine?  Do not take this medicine with any of the following medications:  · cisapride  · dofetilide  · dronedarone  · MAOIs like Carbex, Eldepryl, Marplan, Nardil, and Parnate  · pimozide  · thioridazine  · ziprasidone  This medicine may also interact with the following medications:  · antiviral medicines for HIV or AIDS  · beta-blockers like metoprolol and propranolol  · certain medicines for depression, anxiety, or psychotic disturbances  · diuretics  · medicines for colds  · medicines for fungal infections like ketoconazole and itraconazole  · other medicines for breathing problems  · other medicines that prolong the QT interval (cause an abnormal heart rhythm)  What if I miss a dose?  If you miss a dose, use it as soon as you can. If it is almost time for your next dose, use only that dose and continue with your regular schedule. Do not use double or extra doses.  Where should I keep my medicine?  Keep out of the reach of children.  Store at room temperature between 15 and 30 degrees C (59 and 86 degrees F). Store in a dry place away from direct heat or sunlight. Throw away 6 weeks after you remove the inhaler from the foil tray, or after the dose indicator reads 0, whichever comes first. Throw away any unopened packages after the expiration date.  What should I tell my health care provider before I take this medicine?  They need to know if you have any of these conditions:  · bone problems  · immune system problems  · diabetes  · heart disease or irregular heartbeat  · high blood pressure  · infection  · pheochromocytoma  · seizures  · thyroid disease  · an unusual or allergic reaction to fluticasone, vilanterol, milk proteins, corticosteroids, other medicines, foods, dyes, or preservatives  · pregnant or trying to get pregnant  · breast-feeding  What should I watch for while using this medicine?  Visit your doctor or health care professional for regular checkups. Tell your doctor or health care  professional if your symptoms do not get better.  If your symptoms get worse or if you need your short-acting inhalers more often, call your doctor right away. Do not use this medicine more than every 24 hours.  If you are going to have surgery tell your doctor or health care professional that you are using this medicine. Try not to come in contact with people with the chicken pox or measles. If you do, call your doctor.  Date Last Reviewed:   NOTE:This sheet is a summary. It may not cover all possible information. If you have questions about this medicine, talk to your doctor, pharmacist, or health care provider. Copyright© 2016 [employer]

## 2017-04-13 NOTE — ASSESSMENT & PLAN NOTE
Asthma ROS: not taking medications regularly as instructed, no medication side effects noted, no significant ongoing wheezing or shortness of breath.   New concerns: None.   Exam: appears well, vitals normal, no respiratory distress, acyanotic, normal RR, chest clear, no wheezing, crepitations, rhonchi, normal symmetric air entry.   Assessment:  Asthma poorly controlled.   Plan: START BREO. CONTINUE ALBUTEROL. Orders as documented in EMR.Re evaluate in 1 month

## 2017-04-13 NOTE — ASSESSMENT & PLAN NOTE
levocetirizine (XYZAL) 5 MG tablet; Take 1 tablet (5 mg total) by mouth every evening.  Dispense: 30 tablet; Refill: 11

## 2017-04-13 NOTE — TELEPHONE ENCOUNTER
----- Message from Nj Hdz sent at 4/13/2017  2:48 PM CDT -----  [facility]

## 2017-04-13 NOTE — ASSESSMENT & PLAN NOTE
My recommendation at this point would be to set up a home sleep study through [employer].  We have discussed weight loss and how this may improve his situation.  .

## 2017-04-13 NOTE — PROGRESS NOTES
New patient    Subjective:      Patient ID: Monica Joy is a 56 y.o. female.    Patient Active Problem List   Diagnosis    Severe obesity (BMI 35.0-35.9 with comorbidity)    OA (osteoarthritis)    Chronic urticaria    Peripheral arterial occlusive disease    Polymenorrhea    Mild intermittent asthma without complication    Multiple allergies    Sleep-disordered breathing       Problem list has been reviewed.    she has been referred by Karen Villafana DO for evaluation and management for   Chief Complaint   Patient presents with    Wheezing       Chief Complaint: Wheezing      HPI:  She reports episodes of bronchitis since she was an infant. Episodes occur at least once per year in the fall or spring. She reports associated low grade fever, wheezing, non productive cough and hoarseness. She reports usual treatment with antibiotics , inhalers and glucocorticoids.  9 - 10 years ago  she developed anaphylactic reaction to a spinal steroid injection. She reports that she was recently treated with oral steroids in conjunction with oral antihistamine to which she did not react. PFT and CXR reviewed with pateint who voiced understanding. Both PFT and CXR are normal. Bronchial challenge with methacholine discussed with patient as a confirmatory test for asthma. Pateint decline the test stating the listed potential complications of the test. She has multiple allergies to seafood, multiple pharmaceutical products. She has a history of chronic urticaria and pernicious anemia.  Patient does have new pets. Patient does not have a history of asthma. Patient does have a history of environmental allergens. Patient has traveled recently. Patient does not have a history of smoking. Patient has had a previous chest x-ray. Patient has not had a PPD done.       Current Disease Severity  weekly daytime asthma symptoms.   weekly nighttime asthma symptoms.   less than or equal to 2 days per week.   Number of urgent/emergent  visit in last year: 0  Current limitations in activity from asthma: none.   Number of days of school or work missed in the last month: not applicable.     Asthma Classification (General Symptom Frequency):  Mild Intermittent (< 2 x wk)  Mild Persistent (> 2 x wk, < daily)  Moderate Persistent (daily; almost daily inhaler)  Severe Persistent (continual; limited activities)    Snoring / Sleep Apnea:     Patient has observed restless sleep, loud snoring.   Patient reports non restful' sleep.  she reports morning headache.   shedenies impairment of activity during wakefulness.  she reports day time napping ; duration 2 Hours  [place] sleepiness score was 4.  Neck circumference is 15.  she reports recent weight gain.  Mallampati score 3  Cardiovascular risk factors: Obesity  Bed time is 1100  Wake time is 0700  Sleep onset is within  45 Minutes.  Sleep maintenance difficulties related to frequent night time awakening and difficulty falling asleep  Wake after sleep onset occurs more than five times a night.  Nocturia occurs two times a night,   Sleep aids : No  Dry mouth : Yes,   Sleep walking: No,   Sleep talking : No,   Sleep eating:No  Vivid Dreams : Yes,   Cataplexy : No,   Hypnogogic hallucinations:  No    COMORBIDITIES:  BP Readings from Last 3 Encounters:   04/13/17 110/63   04/11/17 130/82   04/05/17 128/86       CARDIAC RISK FACTORS:  obesity      [place] Questionnaire (validated KAM screening questionnaire)  Positive -- Snoring/apnea  Positive -- Fatigue    Body mass index is 37.24 kg/(m^2).  (>25 is overweight, >30 is obese)  Blood Pressure = normal blood pressure    (PreHTN 120-139/80-89, Stg1 140-159/90-99, Stg2 >160/>100)  [place] = two of three KAM categories are positive (high risk is 2-3 positive categories)     [place] Sleepiness Scale   EPWORTH SLEEPINESS SCALE 4/13/2017   Sitting and reading 1   Watching TV 0   Sitting, inactive in a public place (e.g. a theatre or a meeting) 0   As a passenger in a car  for an hour without a break 3   Lying down to rest in the afternoon when circumstances permit 0   Sitting and talking to someone 0   Sitting quietly after a lunch without alcohol 0   In a car, while stopped for a few minutes in traffic 0   Total score 4       Reference: Pipe SMITH. A new method for measuring daytime sleepiness: The [place]  Sleepiness Scale. Sleep ; 14(6):540-5.    STOP-Bang Questionnaire (validated KAM screening questionnaire)  Negative unless checked off.  [x] Snoring    [x]  Tired/Fatigued/Sleepy  [] Obstruction (apneas/choking)  [] Pressure (HTN)  [x] BMI >35  [x] Age >50  [] Neck >40 cm  [] Gender male   STOP-Bang = 4 (low risk 0-2,high risk 3-8)    References:   STOP Questionnaire   A Tool to Screen Patients for Obstructive Sleep Apnea: EDGARDO NielsonR.C.P.C., RAYNA Ontiveros.B.B.S., Chayo Ballard M.D.,Isabella Roy, Ph.D., RAYNA Bean.B.B.S.,_ Reggie Delcid.,_ Nicanor Crespo M.D., Lane Dahl, F.R.C.P.C.; Anesthesiology 2008; 108:812-21 Copyright © 2008, the American Society of Anesthesiologists, Inc. [employer]      Neck circumference 36 cm [?KAM risk if >43cm (17in) male or >41cm (15.5 in) female]    Sleep position Supine = frequent    Non-supine = frequent  Mouth breathing during sleep - possibly   Previous Report Reviewed: office notes and radiology reports     Past Medical History: The following portions of the patient's history were reviewed and updated as appropriate:   She  has a past surgical history that includes Thyroidectomy, partial; Wrist surgery; Joint replacement; Fracture surgery;  section; Tubal ligation; and Novasure Endometrial Ablation (2016).  Her family history includes Atrial fibrillation in her mother; Diabetes in her brother and father; Heart disease in her brother and father; Hyperlipidemia in her father; Stroke in her father. There is no history of Breast cancer, Colon cancer, or Ovarian  cancer.  She  reports that she has never smoked. She has never used smokeless tobacco. She reports that she does not drink alcohol or use illicit drugs.  She has a current medication list which includes the following prescription(s): albuterol, cyanocobalamin (vitamin b-12), cyclobenzaprine, epipen 2-yojana, montelukast, nabumetone, fluticasone-vilanterol, and levocetirizine.  She is allergic to amoxicillin; medrol [methylprednisolone]; oxycodone; percodan filipe; ultram  [tramadol]; augmentin [amoxicillin-pot clavulanate]; levaquin [levofloxacin]; sulfa (sulfonamide antibiotics); doxycycline; lortab  [hydrocodone-acetaminophen]; percocet  [oxycodone-acetaminophen]; shellfish containing products; tetracycline; vibramycin  [doxycycline calcium]; codeine; and hydrocodone..    Review of Systems   Constitutional: Positive for weight gain and fatigue. Negative for fever and night sweats.   HENT: Positive for sore throat, trouble swallowing and congestion. Negative for nosebleeds, rhinorrhea and hearing loss.    Eyes: Negative for redness.   Respiratory: Positive for snoring, cough, wheezing and dyspnea on extertion.    Cardiovascular: Negative for chest pain, palpitations and leg swelling.   Genitourinary: Negative for hematuria.   Musculoskeletal: Positive for arthralgias, back pain and myalgias.   Skin: Negative for rash.   Gastrointestinal: Negative for nausea, vomiting, abdominal pain and acid reflux.   Neurological: Negative for dizziness and headaches.   Hematological: Negative for adenopathy. Excessive bruising.   Psychiatric/Behavioral: Negative for confusion. The patient is not nervous/anxious.       Occupational History:  Retired . Denies occupational exposure to asbestos, silica and petrochemicals.    Avocational Exposures:  None currently    Pet Exposures:  She has no pets. She is allergic to cats and birds.    Objective:     Vitals:    04/13/17 0802   BP: 110/63   Pulse: 82   Resp: 18   SpO2: 98%  "  Weight: 98.4 kg (216 lb 14.9 oz)   Height: 5' 4" (1.626 m)     body mass index is 37.24 kg/(m^2).     Physical Exam   Constitutional: She is oriented to person, place, and time. She appears well-developed and well-nourished.   HENT:   Head: Normocephalic and atraumatic.   Mallampati 3   Eyes: EOM are normal. Pupils are equal, round, and reactive to light.   Neck: Normal range of motion. No tracheal deviation present. No thyromegaly present.   15"   Cardiovascular: Normal rate and regular rhythm.    Pulmonary/Chest: Effort normal. No respiratory distress. She has no wheezes.   Abdominal: Soft. Bowel sounds are normal. She exhibits no distension.   Musculoskeletal: Normal range of motion. She exhibits no edema or deformity.   Neurological: She is alert and oriented to person, place, and time. No cranial nerve deficit. Coordination normal.   Skin: Skin is warm and dry.   Psychiatric: She has a normal mood and affect. Her behavior is normal.   Vitals reviewed.      Personal Diagnostic Review    Pulmonary function tests: FEV1: 2.38  (88 % predicted), FVC:  3.21 (93 % predicted), FEV1/FVC:  74, T.38 (109 % predicted), RV/TLVC: 40 (109 % predicted), DLCO: 21.0 (83 % predicted):Normal      X-Ray Chest PA And Lateral: Findings: The cardiac and mediastinal silhouettes are within normal limits.   The lungs are clear bilaterally. Mild thoracic scoliosis noted    Assessment:     1. Mild intermittent asthma without complication Active   2. Multiple allergies Active   3. Sleep-disordered breathing Active     Outpatient Encounter Prescriptions as of 2017   Medication Sig Dispense Refill    albuterol 90 mcg/actuation inhaler Inhale 2 puffs into the lungs every 6 (six) hours as needed for Wheezing. 18 g 0    cyanocobalamin, vitamin B-12, (VITAMIN B-12) 1,000 mcg Subl Place under the tongue.      cyclobenzaprine (FLEXERIL) 10 MG tablet Take 1 tablet (10 mg total) by mouth 3 (three) times daily as needed for Muscle " spasms. 30 tablet 0    EPIPEN 2-RAIMUNDO 0.3 mg/0.3 mL AtIn       montelukast (SINGULAIR) 10 mg tablet Take 1 tablet (10 mg total) by mouth every evening. 30 tablet 5    nabumetone (RELAFEN) 500 MG tablet Take 1 tablet (500 mg total) by mouth 2 (two) times daily. 30 tablet 0    fluticasone-vilanterol (BREO ELLIPTA) 100-25 mcg/dose diskus inhaler Inhale 1 puff into the lungs once daily. 30 each 11    levocetirizine (XYZAL) 5 MG tablet Take 1 tablet (5 mg total) by mouth every evening. 30 tablet 11    [DISCONTINUED] meloxicam (MOBIC) 15 MG tablet TAKE 1 TABLET BY MOUTH DAILY       No facility-administered encounter medications on file as of 4/13/2017.      Orders Placed This Encounter   Procedures    Home Sleep Studies     Standing Status:   Future     Standing Expiration Date:   4/14/2018       Plan:     Discussed diagnosis, its evaluation, treatment and usual course. All questions answered.    Mild intermittent asthma without complication  Asthma ROS: not taking medications regularly as instructed, no medication side effects noted, no significant ongoing wheezing or shortness of breath.   New concerns: None.   Exam: appears well, vitals normal, no respiratory distress, acyanotic, normal RR, chest clear, no wheezing, crepitations, rhonchi, normal symmetric air entry.   Assessment:  Asthma poorly controlled.   Plan: START BREO. CONTINUE ALBUTEROL. Orders as documented in EMR.Re evaluate in 1 month    Multiple allergies  levocetirizine (XYZAL) 5 MG tablet; Take 1 tablet (5 mg total) by mouth every evening.  Dispense: 30 tablet; Refill: 11          Sleep-disordered breathing    My recommendation at this point would be to set up a home sleep study through [facility].  We have discussed weight loss and how this may improve his situation.  .      TIME SPENT WITH PATIENT: Time spent: 60 minutes in face to face  discussion concerning diagnosis, prognosis, review of lab and test results, benefits of treatment as well as  management of disease, counseling of patient and coordination of care between various health  care providers . Greater than half the time spent was used for coordination of care and counseling of patient.     Return in about 1 month (around 5/13/2017) for Asthma, Allergies.

## 2017-04-13 NOTE — LETTER
April 13, 2017      Karen Villafana DO  [address] [place] [address] [address] [id number]           [facility]  [address] [place] [address] [address]  [place] [address]  Phone: [phone]  Fax: [phone]          Patient: Monica Joy   MR Number: [id number]   YOB: 1960   Date of Visit: 4/13/2017       Dear Dr. Karen Villafana:    Thank you for referring Monica Joy to me for evaluation. Attached you will find relevant portions of my assessment and plan of care.    If you have questions, please do not hesitate to call me. I look forward to following Monica Joy along with you.    Sincerely,    Issac Gautam MD    Enclosure  CC:  No Recipients    If you would like to receive this communication electronically, please contact [email or web address] or [phone] to request more information on [employer] access.    For providers and/or their staff who would like to refer a patient to [facility], please contact us through our one-stop-shop provider referral line, Chandler Rowland, at [phone].    If you feel you have received this communication in error or would no longer like to receive these types of communications, please e-mail [email or web address]

## 2017-04-16 LAB
POST FEF 25 75: 2.46 L/S (ref 1.95–3.19)
POST FET 100: 9.42 S
POST FEV1 FVC: 79 %
POST FEV1: 2.52 L (ref 2.41–3)
POST FIF 50: 4.26 L/S
POST FVC: 3.21 L (ref 3.11–3.8)
POST PEF: 5.12 L/S (ref 5.71–7.43)
PRE DLCO: 21.04 ML/MMHG/MIN (ref 21.31–29.6)
PRE ERV: 0.5 L
PRE FEF 25 75: 1.8 L/S (ref 1.95–3.19)
PRE FET 100: 10.66 S
PRE FEV1 FVC: 74 %
PRE FEV1: 2.38 L (ref 2.41–3)
PRE FIF 50: 4.11 L/S
PRE FRC PL: 2.63 L (ref 1.76–2.71)
PRE FVC: 3.21 L (ref 3.11–3.8)
PRE KROGHS K: 4.14 1/MIN
PRE PEF: 5.9 L/S (ref 5.71–7.43)
PRE RV: 2.13 L (ref 1.45–2.15)
PRE SVC: 3.25 L
PRE TLC: 5.38 L (ref 4.55–5.32)
PREDICTED DLCO: 25.45 ML/MMHG/MIN (ref 21.31–29.6)
PREDICTED FEV1 FVC: 79.12 % (ref 74.22–84.02)
PREDICTED FEV1: 2.7 L (ref 2.41–3)
PREDICTED FRC N2: 2.24 L (ref 1.76–2.71)
PREDICTED FRC PL: 2.24 L (ref 1.76–2.71)
PREDICTED FVC: 3.45 L (ref 3.11–3.8)
PREDICTED RV: 1.8 L (ref 1.45–2.15)
PREDICTED SVC: 3.11 L
PREDICTED TLC: 4.93 L (ref 4.55–5.32)
PROVOCATION PROTOCOL: ABNORMAL

## 2017-04-18 ENCOUNTER — OFFICE VISIT (OUTPATIENT)
Dept: INTERNAL MEDICINE | Facility: CLINIC | Age: 57
End: 2017-04-18
Payer: MEDICAID

## 2017-04-18 VITALS
BODY MASS INDEX: 37.23 KG/M2 | TEMPERATURE: 99 F | DIASTOLIC BLOOD PRESSURE: 78 MMHG | SYSTOLIC BLOOD PRESSURE: 110 MMHG | HEIGHT: 64 IN | HEART RATE: 92 BPM | WEIGHT: 218.06 LBS | OXYGEN SATURATION: 97 %

## 2017-04-18 DIAGNOSIS — M54.30 SCIATICA, UNSPECIFIED LATERALITY: Primary | ICD-10-CM

## 2017-04-18 PROCEDURE — 99213 OFFICE O/P EST LOW 20 MIN: CPT | Mod: PBBFAC | Performed by: PHYSICIAN ASSISTANT

## 2017-04-18 PROCEDURE — 99213 OFFICE O/P EST LOW 20 MIN: CPT | Mod: S$PBB,,, | Performed by: PHYSICIAN ASSISTANT

## 2017-04-18 PROCEDURE — 99999 PR PBB SHADOW E&M-EST. PATIENT-LVL III: CPT | Mod: PBBFAC,,, | Performed by: PHYSICIAN ASSISTANT

## 2017-04-18 RX ORDER — NABUMETONE 500 MG/1
500 TABLET, FILM COATED ORAL 2 TIMES DAILY
Qty: 30 TABLET | Refills: 0 | Status: SHIPPED | OUTPATIENT
Start: 2017-04-18 | End: 2017-04-24 | Stop reason: SDUPTHER

## 2017-04-18 RX ORDER — GABAPENTIN 300 MG/1
300 CAPSULE ORAL NIGHTLY
Qty: 30 CAPSULE | Refills: 3 | Status: SHIPPED | OUTPATIENT
Start: 2017-04-18 | End: 2017-05-15

## 2017-04-18 NOTE — MR AVS SNAPSHOT
[facility]  [address] [facility] [address]  Phone: [phone]  Fax: [phone]                  Monica Joy   2017 3:00 PM   Office Visit    Description:  Female : 1960   Provider:  Felix Aquino III, PA-C   Department:  [facility]           Reason for Visit     Knee Pain           Diagnoses this Visit        Comments    Sciatica, unspecified laterality    -  Primary            To Do List           Future Appointments        Provider Department Dept Phone    5/15/2017 3:00 PM Issac Gautam MD [facility] [phone]      Goals (5 Years of Data)     None       These Medications        Disp Refills Start End    nabumetone (RELAFEN) 500 MG tablet 30 tablet 0 2017     Take 1 tablet (500 mg total) by mouth 2 (two) times daily. - Oral    Pharmacy: [email or web address] [address] [place] [address] [facility] [place] [address] #: [phone]       gabapentin (NEURONTIN) 300 MG capsule 30 capsule 3 2017    Take 1 capsule (300 mg total) by mouth every evening. - Oral    Pharmacy: [facility] [address] [place] [address] AVE  [address] #: [phone]         [facility]     [facility] On [facility] Nurse Care Line - 24/7 Assistance  Unless otherwise directed by your provider, please contact [facility], our nurse care line that is available for 24/7 assistance.     Registered nurses in the [facility] provide: appointment scheduling, clinical advisement, health education, and other advisory services.  Call: [phone] (toll free)               Medications           Message regarding Medications     Verify the changes and/or additions to your medication regime listed below are the same as discussed with your clinician today.  If any of these changes or additions are incorrect, please notify your healthcare provider.        START taking  "these NEW medications        Refills    gabapentin (NEURONTIN) 300 MG capsule 3    Sig: Take 1 capsule (300 mg total) by mouth every evening.    Class: Normal    Route: Oral      STOP taking these medications     cyclobenzaprine (FLEXERIL) 10 MG tablet Take 1 tablet (10 mg total) by mouth 3 (three) times daily as needed for Muscle spasms.           Verify that the below list of medications is an accurate representation of the medications you are currently taking.  If none reported, the list may be blank. If incorrect, please contact your healthcare provider. Carry this list with you in case of emergency.           Current Medications     cyanocobalamin, vitamin B-12, (VITAMIN B-12) 1,000 mcg Subl Place under the tongue.    nabumetone (RELAFEN) 500 MG tablet Take 1 tablet (500 mg total) by mouth 2 (two) times daily.    albuterol 90 mcg/actuation inhaler Inhale 2 puffs into the lungs every 6 (six) hours as needed for Wheezing.    EPIPEN 2-RAIMUNDO 0.3 mg/0.3 mL AtIn     fluticasone-vilanterol (BREO ELLIPTA) 100-25 mcg/dose diskus inhaler Inhale 1 puff into the lungs once daily.    gabapentin (NEURONTIN) 300 MG capsule Take 1 capsule (300 mg total) by mouth every evening.    levocetirizine (XYZAL) 5 MG tablet Take 1 tablet (5 mg total) by mouth every evening.    montelukast (SINGULAIR) 10 mg tablet Take 1 tablet (10 mg total) by mouth every evening.           Clinical Reference Information           Your Vitals Were     BP Pulse Temp Height    110/78 (BP Location: Left arm, Patient Position: Sitting, BP Method: Manual) 92 98.7 °F (37.1 °C) (Tympanic) 5' 4" (1.626 m)    Weight SpO2 BMI    98.9 kg (218 lb 0.6 oz) 97% 37.43 kg/m2      Blood Pressure          Most Recent Value    BP  110/78      Allergies as of 4/18/2017     Amoxicillin    Medrol [Methylprednisolone]    Oxycodone    Percodan Maude    Ultram  [Tramadol]    Augmentin [Amoxicillin-pot Clavulanate]    Levaquin [Levofloxacin]    Sulfa (Sulfonamide Antibiotics)    " Doxycycline    Lortab  [Hydrocodone-acetaminophen]    Percocet  [Oxycodone-acetaminophen]    Shellfish Containing Products    Tetracycline    Vibramycin  [Doxycycline Calcium]    Codeine    Hydrocodone      Immunizations Administered on Date of Encounter - 4/18/2017     None      Language Assistance Services     ATTENTION: Language assistance services are available, free of charge. Please call [phone].      ATENCIÓN: Si habla español, tiene a raman disposición servicios gratuitos de asistencia lingüística. Llame al [phone].     CHÚ Ý: N?u b?n nói Ti?ng Vi?t, có các d?ch v? h? tr? ngôn ng? mi?n phí dành cho b?n. G?i s? [phone].         [facility] complies with applicable Federal civil rights laws and does not discriminate on the basis of race, color, national origin, age, disability, or sex.

## 2017-04-19 NOTE — PROGRESS NOTES
Subjective:       Patient ID: Monica Joy is a 56 y.o. female.    Chief Complaint: Knee Pain (right)    Knee Pain    There was no injury mechanism. The pain is present in the left hip, left thigh and left leg. The quality of the pain is described as aching. The pain is moderate. The pain has been constant since onset. Associated symptoms include numbness and tingling. Pertinent negatives include no inability to bear weight, loss of motion, loss of sensation or muscle weakness. The symptoms are aggravated by movement and palpation. She has tried NSAIDs (flexeril) for the symptoms. The treatment provided no relief.     Review of Systems   Constitutional: Negative for chills, fatigue and fever.   Respiratory: Negative for chest tightness and shortness of breath.    Cardiovascular: Negative for chest pain.   Gastrointestinal: Negative for abdominal pain.   Neurological: Positive for tingling and numbness.       Objective:      Physical Exam   Constitutional: She appears well-developed and well-nourished. No distress.   Cardiovascular: Normal rate and regular rhythm.    Pulmonary/Chest: Effort normal and breath sounds normal.   Musculoskeletal:        Right knee: Tenderness found.        Lumbar back: She exhibits tenderness. She exhibits no swelling and no edema.        Legs:  Skin: She is not diaphoretic.   Nursing note and vitals reviewed.      Assessment:       1. Sciatica, unspecified laterality        Plan:       Sciatica, unspecified laterality  persistent problem    Other orders  -     nabumetone (RELAFEN) 500 MG tablet; Take 1 tablet (500 mg total) by mouth 2 (two) times daily.  Dispense: 30 tablet; Refill: 0  -     gabapentin (NEURONTIN) 300 MG capsule; Take 1 capsule (300 mg total) by mouth every evening.  Dispense: 30 capsule; Refill: 3    Suggest to go to PT as already prescribed in the last visit. Go to the ER if things get worse.

## 2017-04-21 ENCOUNTER — TELEPHONE (OUTPATIENT)
Dept: INTERNAL MEDICINE | Facility: CLINIC | Age: 57
End: 2017-04-21

## 2017-04-21 NOTE — TELEPHONE ENCOUNTER
----- Message from Jayro Bustamante sent at 4/21/2017  4:18 PM CDT -----  Contact: Pt  Pt request refill on Cyclobenzaprine and Nabumetone, pt informed that the message may not be seen until Monday, ...    [facility] [id number] - [place], [place] - [address]  [address] [facility] [address]  Phone: [phone] Fax: [phone]    Please contact pt at [phone]

## 2017-04-21 NOTE — TELEPHONE ENCOUNTER
----- Message from Lana Maldonado sent at 4/21/2017  8:05 AM CDT -----  Contact: pt   Pt needs a refill  eyclodenza prime 10mg and nabmetone 500mg,, [facility] [address], Please call pt back at [phone]

## 2017-04-21 NOTE — TELEPHONE ENCOUNTER
Patient notified priyanka is gone for the day. She is going to [place]. She will call Monday morning with a pharmacy over there and we can send the rx.

## 2017-04-24 ENCOUNTER — TELEPHONE (OUTPATIENT)
Dept: INTERNAL MEDICINE | Facility: CLINIC | Age: 57
End: 2017-04-24

## 2017-04-24 RX ORDER — CYCLOBENZAPRINE HCL 10 MG
10 TABLET ORAL 3 TIMES DAILY PRN
Qty: 30 TABLET | Refills: 0 | Status: SHIPPED | OUTPATIENT
Start: 2017-04-24 | End: 2017-05-04

## 2017-04-24 RX ORDER — NABUMETONE 500 MG/1
500 TABLET, FILM COATED ORAL 2 TIMES DAILY
Qty: 30 TABLET | Refills: 0 | Status: SHIPPED | OUTPATIENT
Start: 2017-04-24 | End: 2017-05-15

## 2017-04-24 NOTE — TELEPHONE ENCOUNTER
----- Message from Mita Menendez sent at 4/24/2017  9:43 AM CDT -----  Contact: pt- [phone]  Still in [place] and need medication refilled [facility] in [place] (anti-inflamatory)  Pt is waiting on RX to leave town.

## 2017-04-25 ENCOUNTER — OFFICE VISIT (OUTPATIENT)
Dept: SLEEP MEDICINE | Facility: CLINIC | Age: 57
End: 2017-04-25
Payer: MEDICAID

## 2017-04-25 DIAGNOSIS — G47.33 OSA (OBSTRUCTIVE SLEEP APNEA): ICD-10-CM

## 2017-04-25 PROCEDURE — 99211 OFF/OP EST MAY X REQ PHY/QHP: CPT | Mod: PBBFAC,PO

## 2017-04-25 PROCEDURE — 95806 SLEEP STUDY UNATT&RESP EFFT: CPT | Mod: PBBFAC,PO | Performed by: INTERNAL MEDICINE

## 2017-04-25 PROCEDURE — 99499 UNLISTED E&M SERVICE: CPT | Mod: S$PBB,,, | Performed by: INTERNAL MEDICINE

## 2017-04-25 PROCEDURE — 99999 PR PBB SHADOW E&M-EST. PATIENT-LVL I: CPT | Mod: PBBFAC,,,

## 2017-04-25 PROCEDURE — 95806 SLEEP STUDY UNATT&RESP EFFT: CPT | Mod: 26,S$PBB,, | Performed by: INTERNAL MEDICINE

## 2017-04-25 NOTE — PROGRESS NOTES
Assessment and Recommendations  Monitor duration was 7 hours 58 minutes.  Excluded respiratory signal 0 minutes excluded O2 signal 1 minute.  Lowest oxygen saturation was 82%.  Average heart rate was 90 bpm. Maximum heart rate 174 bpm. This is likely artifact.  Snoring was recorded above 50 dB for 100% of the time.  Apnea-hypopnea index/respiratory event index : 6.1 events per hour. Total events 49.  This is based on the AASM 3% scoring rule.  AHI based on the CMS 4% rule was 3.6 events per hour.  Obstructive sleep apnea is detected based on AASM 3% scoring rule.  Because of insurance payor requirements, repeat testing will be required to satisfy 4% rule (AHI of 5.0 with 30  events).  Patient's clinical pretest is high. She also has vivid dreams and possible insomnia based on difficulty falling  asleep.  Consider in lab polysomnography or Repeat Home Sleep Study with 2 Night Protocol.  Weight loss and exercise

## 2017-04-25 NOTE — MR AVS SNAPSHOT
[facility]  [address] [facility] [place] [address]  Phone: [phone]                  Monica Quinteroy   2017 8:00 AM   Office Visit    Description:  Female : 1960   Provider:  SARWAT GARCIA   Department:  [facility]           Diagnoses this Visit        Comments    Sleep-disordered breathing                To Do List           Future Appointments        Provider Department Dept Phone    5/15/2017 3:00 PM Issac Gautam MD [facility] - Pulmonary Services [phone]      Goals (5 Years of Data)     None      [facility]     [facility] Nurse Care Line -  Assistance  Unless otherwise directed by your provider, please contact [facility], our nurse care line that is available for  assistance.     Registered nurses in the [facility] Center provide: appointment scheduling, clinical advisement, health education, and other advisory services.  Call: [phone] (toll free)               Medications           Message regarding Medications     Verify the changes and/or additions to your medication regime listed below are the same as discussed with your clinician today.  If any of these changes or additions are incorrect, please notify your healthcare provider.             Verify that the below list of medications is an accurate representation of the medications you are currently taking.  If none reported, the list may be blank. If incorrect, please contact your healthcare provider. Carry this list with you in case of emergency.           Current Medications     albuterol 90 mcg/actuation inhaler Inhale 2 puffs into the lungs every 6 (six) hours as needed for Wheezing.    cyanocobalamin, vitamin B-12, (VITAMIN B-12) 1,000 mcg Subl Place under the tongue.    cyclobenzaprine (FLEXERIL) 10 MG tablet Take 1 tablet (10 mg total) by mouth 3 (three) times daily as needed for Muscle spasms.    EPIPEN 2-RAIMUNDO 0.3 mg/0.3 mL AtIn     fluticasone-vilanterol  (BREO ELLIPTA) 100-25 mcg/dose diskus inhaler Inhale 1 puff into the lungs once daily.    gabapentin (NEURONTIN) 300 MG capsule Take 1 capsule (300 mg total) by mouth every evening.    levocetirizine (XYZAL) 5 MG tablet Take 1 tablet (5 mg total) by mouth every evening.    montelukast (SINGULAIR) 10 mg tablet Take 1 tablet (10 mg total) by mouth every evening.    nabumetone (RELAFEN) 500 MG tablet Take 1 tablet (500 mg total) by mouth 2 (two) times daily.           Clinical Reference Information           Allergies as of 4/25/2017     Amoxicillin    Medrol [Methylprednisolone]    Oxycodone    Percodan Maude    Ultram  [Tramadol]    Augmentin [Amoxicillin-pot Clavulanate]    Levaquin [Levofloxacin]    Sulfa (Sulfonamide Antibiotics)    Doxycycline    Lortab  [Hydrocodone-acetaminophen]    Percocet  [Oxycodone-acetaminophen]    Shellfish Containing Products    Tetracycline    Vibramycin  [Doxycycline Calcium]    Codeine    Hydrocodone      Immunizations Administered on Date of Encounter - 4/25/2017     None      Orders Placed During Today's Visit      Normal Orders This Visit    Home Sleep Studies       Language Assistance Services     ATTENTION: Language assistance services are available, free of charge. Please call [phone].      ATENCIÓN: Si habla kathy, tiene a raman disposición servicios gratuitos de asistencia lingüística. Llame al [phone].     GERMAIN Ý: N?u b?n nói Ti?ng Vi?t, có các d?ch v? h? tr? ngôn ng? mi?n phí dành cho b?n. G?i s? [phone].         [facility] complies with applicable Federal civil rights laws and does not discriminate on the basis of race, color, national origin, age, disability, or sex.

## 2017-05-12 ENCOUNTER — TELEPHONE (OUTPATIENT)
Dept: PULMONOLOGY | Facility: CLINIC | Age: 57
End: 2017-05-12

## 2017-05-15 ENCOUNTER — OFFICE VISIT (OUTPATIENT)
Dept: PULMONOLOGY | Facility: CLINIC | Age: 57
End: 2017-05-15
Payer: MEDICAID

## 2017-05-15 VITALS
DIASTOLIC BLOOD PRESSURE: 62 MMHG | HEART RATE: 112 BPM | BODY MASS INDEX: 37.63 KG/M2 | HEIGHT: 64 IN | SYSTOLIC BLOOD PRESSURE: 120 MMHG | RESPIRATION RATE: 18 BRPM | WEIGHT: 220.44 LBS | OXYGEN SATURATION: 94 %

## 2017-05-15 DIAGNOSIS — E66.01 SEVERE OBESITY (BMI 35.0-35.9 WITH COMORBIDITY): Chronic | ICD-10-CM

## 2017-05-15 DIAGNOSIS — Z88.9 MULTIPLE ALLERGIES: ICD-10-CM

## 2017-05-15 DIAGNOSIS — J45.20 MILD INTERMITTENT ASTHMA WITHOUT COMPLICATION: Primary | Chronic | ICD-10-CM

## 2017-05-15 DIAGNOSIS — G47.30 SLEEP-DISORDERED BREATHING: Chronic | ICD-10-CM

## 2017-05-15 PROCEDURE — 99213 OFFICE O/P EST LOW 20 MIN: CPT | Mod: PBBFAC | Performed by: INTERNAL MEDICINE

## 2017-05-15 PROCEDURE — 99999 PR PBB SHADOW E&M-EST. PATIENT-LVL III: CPT | Mod: PBBFAC,,, | Performed by: INTERNAL MEDICINE

## 2017-05-15 PROCEDURE — 99214 OFFICE O/P EST MOD 30 MIN: CPT | Mod: S$PBB,,, | Performed by: INTERNAL MEDICINE

## 2017-05-15 NOTE — ASSESSMENT & PLAN NOTE
Asthma ROS: not taking medications regularly as instructed, no medication side effects noted, no significant ongoing wheezing or shortness of breath.   New concerns: None.   Exam: appears well, vitals normal, no respiratory distress, acyanotic, normal RR, chest clear, no wheezing, crepitations, rhonchi, normal symmetric air entry.   Assessment:  Asthma poorly controlled.   Plan: STOP BREO. CONTINUE ALBUTEROL. Orders as documented in EMR.

## 2017-05-15 NOTE — ASSESSMENT & PLAN NOTE
Continue levocetirizine (XYZAL) 5 MG tablet; Take 1 tablet (5 mg total) by mouth every evening.  Dispense: 30 tablet; Refill: 11

## 2017-05-15 NOTE — PATIENT INSTRUCTIONS
Lung Anatomy  Your lungs take air in to give your body oxygen, which the body needs to work. Your lungs, like all the tissues in your body, are made up of billions of tiny specialized cells. Old lung cells die and are replaced by new, identical lung cells. This natural process helps ensure healthy lungs.    Date Last Reviewed: 11/1/2016  © 0845-6637 [employer]. [address] [place] [address]. All rights reserved. This information is not intended as a substitute for professional medical care. Always follow your healthcare professional's instructions.

## 2017-05-15 NOTE — PROGRESS NOTES
Established patient.     Subjective:      Patient ID: Monica Joy is a 56 y.o. female.    Patient Active Problem List   Diagnosis    Severe obesity (BMI 35.0-35.9 with comorbidity)    OA (osteoarthritis)    Chronic urticaria    Peripheral arterial occlusive disease    Polymenorrhea    Mild intermittent asthma without complication    Multiple allergies    Sleep disorder breathing       Problem list has been reviewed.    Chief Complaint: Asthma      HPI:  Her asthma is well controlled. She is currently not on a controller.   Her current respiratory therapy regimen is albuterol inhaler or BREO which provides relief. Her insurance refuses to pay for her BREO. She has not used BREO. She is adherent with his regimen.  Patient does have new pets. Patient does not have a history of asthma. Patient does have a history of environmental allergens. Patient has traveled recently. Patient does not have a history of smoking. Home sleep study revealed marginal KAM with AHI of 6.1 / hour of sleep. I will encourage weight loss at this time..     Past Medical History: The following portions of the patient's history were reviewed and updated as appropriate:   She  has a past surgical history that includes Thyroidectomy, partial; Wrist surgery; Joint replacement; Fracture surgery;  section; Tubal ligation; and Novasure Endometrial Ablation (2016).  Her family history includes Atrial fibrillation in her mother; Diabetes in her brother and father; Heart disease in her brother and father; Hyperlipidemia in her father; Stroke in her father. There is no history of Breast cancer, Colon cancer, or Ovarian cancer.  She  reports that she has never smoked. She has never used smokeless tobacco. She reports that she does not drink alcohol or use illicit drugs.  She has a current medication list which includes the following prescription(s): albuterol, cyanocobalamin (vitamin b-12), epipen 2-yojana, fluticasone-vilanterol,  "levocetirizine, and montelukast.  She is allergic to amoxicillin; medrol [methylprednisolone]; oxycodone; percodan filipe; ultram  [tramadol]; augmentin [amoxicillin-pot clavulanate]; levaquin [levofloxacin]; sulfa (sulfonamide antibiotics); doxycycline; lortab  [hydrocodone-acetaminophen]; percocet  [oxycodone-acetaminophen]; shellfish containing products; tetracycline; vibramycin  [doxycycline calcium]; codeine; and hydrocodone..    Review of Systems   Constitutional: Positive for weight gain and fatigue. Negative for fever and night sweats.   HENT: Positive for sore throat, trouble swallowing and congestion. Negative for nosebleeds, rhinorrhea and hearing loss.    Eyes: Negative for redness.   Respiratory: Positive for snoring, cough, wheezing and dyspnea on extertion.    Cardiovascular: Negative for chest pain, palpitations and leg swelling.   Genitourinary: Negative for hematuria.   Musculoskeletal: Positive for arthralgias, back pain and myalgias.   Skin: Negative for rash.   Gastrointestinal: Negative for nausea, vomiting, abdominal pain and acid reflux.   Neurological: Negative for dizziness and headaches.   Hematological: Negative for adenopathy. Excessive bruising.   Psychiatric/Behavioral: Negative for confusion. The patient is not nervous/anxious.       Objective:     Vitals:    05/15/17 1518   BP: 120/62   Pulse: (!) 112   Resp: 18   SpO2: (!) 94%   Weight: 100 kg (220 lb 7.4 oz)   Height: 5' 4" (1.626 m)   PainSc: 0-No pain     body mass index is 37.84 kg/(m^2).     Physical Exam   Constitutional: She is oriented to person, place, and time. She appears well-developed and well-nourished.   HENT:   Head: Normocephalic and atraumatic.   Mallampati 3   Eyes: EOM are normal. Pupils are equal, round, and reactive to light.   Neck: Normal range of motion. No tracheal deviation present. No thyromegaly present.   15"   Cardiovascular: Normal rate and regular rhythm.    Pulmonary/Chest: Effort normal. No " respiratory distress. She has no wheezes.   Abdominal: Soft. Bowel sounds are normal. She exhibits no distension.   Musculoskeletal: Normal range of motion. She exhibits no edema or deformity.   Neurological: She is alert and oriented to person, place, and time. No cranial nerve deficit. Coordination normal.   Skin: Skin is warm and dry.   Psychiatric: She has a normal mood and affect. Her behavior is normal.   Vitals reviewed.      Personal Diagnostic Review      Home sleep study:  Monitor duration was 7 hours 58 minutes.  Excluded respiratory signal 0 minutes excluded O2 signal 1 minute.  Lowest oxygen saturation was 82%.  Average heart rate was 90 bpm. Maximum heart rate 174 bpm. This is likely artifact.  Snoring was recorded above 50 dB for 100% of the time.  Apnea-hypopnea index/respiratory event index : 6.1 events per hour. Total events 49.  This is based on the AASM 3% scoring rule.  AHI based on the CMS 4% rule was 3.6 events per hour.  Obstructive sleep apnea is detected based on AASM 3% scoring rule.  Because of insurance pay or requirements, repeat testing will be required to satisfy 4% rule (AHI of 5.0 with 30  Events).    Pulmonary function tests: FEV1: 2.38  (88 % predicted), FVC:  3.21 (93 % predicted), FEV1/FVC:  74, T.38 (109 % predicted), RV/TLVC: 40 (109 % predicted), DLCO: 21.0 (83 % predicted):Normal      X-Ray Chest PA And Lateral: Findings: The cardiac and mediastinal silhouettes are within normal limits.   The lungs are clear bilaterally. Mild thoracic scoliosis noted    Assessment:     1. Mild intermittent asthma without complication Stable   2. Sleep-disordered breathing Stable   3. Severe obesity (BMI 35.0-35.9 with comorbidity) Stable   4. Multiple allergies      Outpatient Encounter Prescriptions as of 5/15/2017   Medication Sig Dispense Refill    albuterol 90 mcg/actuation inhaler Inhale 2 puffs into the lungs every 6 (six) hours as needed for Wheezing. 18 g 0    cyanocobalamin,  vitamin B-12, (VITAMIN B-12) 1,000 mcg Subl Place under the tongue.      EPIPEN 2-RAIMUNDO 0.3 mg/0.3 mL AtIn       fluticasone-vilanterol (BREO ELLIPTA) 100-25 mcg/dose diskus inhaler Inhale 1 puff into the lungs once daily. 30 each 11    levocetirizine (XYZAL) 5 MG tablet Take 1 tablet (5 mg total) by mouth every evening. 30 tablet 11    montelukast (SINGULAIR) 10 mg tablet Take 1 tablet (10 mg total) by mouth every evening. 30 tablet 5    [DISCONTINUED] gabapentin (NEURONTIN) 300 MG capsule Take 1 capsule (300 mg total) by mouth every evening. 30 capsule 3    [DISCONTINUED] nabumetone (RELAFEN) 500 MG tablet Take 1 tablet (500 mg total) by mouth 2 (two) times daily. 30 tablet 0     No facility-administered encounter medications on file as of 5/15/2017.      No orders of the defined types were placed in this encounter.      Plan:     Discussed diagnosis, its evaluation, treatment and usual course. All questions answered.    Mild intermittent asthma without complication  Asthma ROS: not taking medications regularly as instructed, no medication side effects noted, no significant ongoing wheezing or shortness of breath.   New concerns: None.   Exam: appears well, vitals normal, no respiratory distress, acyanotic, normal RR, chest clear, no wheezing, crepitations, rhonchi, normal symmetric air entry.   Assessment:  Asthma poorly controlled.   Plan: STOP BREO. CONTINUE ALBUTEROL. Orders as documented in EMR.    Severe obesity (BMI 35.0-35.9 with comorbidity)  Complications associated with obesity reviewed. These include but are not limited to HTN, CAD,CHF, Respiratory disease, Insulin resistance syndrome, hyperlipidemia, atrial fibrillation, cancer  (endometrial, breast, kidney, colorectal, esophageal, renal, pancreatic and gall bladder cancer),GERD and NAFLD. Weight loss approaches reviewd with patient. Combination modality that includes eating behaviour changes, moderate excercise, adjuncvtive pharmacological  therapy reviewed.  Patient expressed and verbalized understanding.    Multiple allergies  Continue levocetirizine (XYZAL) 5 MG tablet; Take 1 tablet (5 mg total) by mouth every evening.  Dispense: 30 tablet; Refill: 11    Sleep disorder breathing  Mild KAM. AHI 6.1 events per hour. Weight los encouraged evaluate in 1 year.     TIME SPENT WITH PATIENT: Time spent: 45 minutes in face to face  discussion concerning diagnosis, prognosis, review of lab and test results, benefits of treatment as well as management of disease, counseling of patient and coordination of care between various health  care providers . Greater than half the time spent was used for coordination of care and counseling of patient.     Return in about 6 months (around 11/15/2017).

## 2017-06-22 ENCOUNTER — OFFICE VISIT (OUTPATIENT)
Dept: URGENT CARE | Facility: CLINIC | Age: 57
End: 2017-06-22
Payer: MEDICAID

## 2017-06-22 ENCOUNTER — PATIENT MESSAGE (OUTPATIENT)
Dept: URGENT CARE | Facility: CLINIC | Age: 57
End: 2017-06-22

## 2017-06-22 VITALS
RESPIRATION RATE: 18 BRPM | OXYGEN SATURATION: 98 % | BODY MASS INDEX: 35.89 KG/M2 | TEMPERATURE: 99 F | DIASTOLIC BLOOD PRESSURE: 76 MMHG | WEIGHT: 223.31 LBS | HEART RATE: 94 BPM | SYSTOLIC BLOOD PRESSURE: 120 MMHG | HEIGHT: 66 IN

## 2017-06-22 DIAGNOSIS — N39.0 ACUTE UTI: Primary | ICD-10-CM

## 2017-06-22 PROBLEM — D51.0 PERNICIOUS ANEMIA: Status: ACTIVE | Noted: 2017-06-22

## 2017-06-22 PROBLEM — M25.559 HIP PAIN: Status: ACTIVE | Noted: 2017-06-22

## 2017-06-22 PROBLEM — D17.9 LIPOMA: Status: ACTIVE | Noted: 2017-06-22

## 2017-06-22 PROBLEM — Z86.010 HX OF COLONIC POLYPS: Status: ACTIVE | Noted: 2017-06-22

## 2017-06-22 PROBLEM — Z86.0100 HX OF COLONIC POLYPS: Status: ACTIVE | Noted: 2017-06-22

## 2017-06-22 LAB
BILIRUB SERPL-MCNC: NEGATIVE MG/DL
BLOOD URINE, POC: ABNORMAL
COLOR, POC UA: ABNORMAL
GLUCOSE UR QL STRIP: NORMAL
KETONES UR QL STRIP: NEGATIVE
LEUKOCYTE ESTERASE URINE, POC: ABNORMAL
NITRITE, POC UA: NEGATIVE
PH, POC UA: 5
PROTEIN, POC: ABNORMAL
SPECIFIC GRAVITY, POC UA: 1.01
UROBILINOGEN, POC UA: NORMAL

## 2017-06-22 PROCEDURE — 81002 URINALYSIS NONAUTO W/O SCOPE: CPT | Mod: PBBFAC,PO | Performed by: NURSE PRACTITIONER

## 2017-06-22 PROCEDURE — 99999 PR PBB SHADOW E&M-EST. PATIENT-LVL III: CPT | Mod: PBBFAC,,, | Performed by: NURSE PRACTITIONER

## 2017-06-22 PROCEDURE — 99213 OFFICE O/P EST LOW 20 MIN: CPT | Mod: PBBFAC,PO | Performed by: NURSE PRACTITIONER

## 2017-06-22 PROCEDURE — 99214 OFFICE O/P EST MOD 30 MIN: CPT | Mod: S$PBB,,, | Performed by: NURSE PRACTITIONER

## 2017-06-22 PROCEDURE — 87086 URINE CULTURE/COLONY COUNT: CPT

## 2017-06-22 RX ORDER — MELOXICAM 15 MG/1
TABLET ORAL
Refills: 5 | COMMUNITY
Start: 2017-04-20 | End: 2018-08-16

## 2017-06-22 RX ORDER — NITROFURANTOIN 25; 75 MG/1; MG/1
100 CAPSULE ORAL 2 TIMES DAILY
Qty: 14 CAPSULE | Refills: 0 | Status: SHIPPED | OUTPATIENT
Start: 2017-06-22 | End: 2017-06-29

## 2017-06-22 NOTE — PATIENT INSTRUCTIONS
"  Bladder Infection, Female (Adult)    Urine is normally doesn't have any bacteria in it. But bacteria can get into the urinary tract from the skin around the rectum. Or they can travel in the blood from elsewhere in the body. Once they are in your urinary tract, they can cause infection in the urethra (urethritis), the bladder (cystitis), or the kidneys (pyelonephritis).  The most common place for an infection is in the bladder. This is called a bladder infection. This is one of the most common infections in women. Most bladder infections are easily treated. They are not serious unless the infection spreads to the kidney.  The phrases "bladder infection," "UTI," and "cystitis" are often used to describe the same thing. But they are not always the same. Cystitis is an inflammation of the bladder. The most common cause of cystitis is an infection.  Symptoms  The infection causes inflammation in the urethra and bladder. This causes many of the symptoms. The most common symptoms of a bladder infection are:  · Pain or burning when urinating  · Having to urinate more often than usual  · Urgent need to urinate  · Only a small amount of urine comes out  · Blood in urine  · Abdominal discomfort. This is usually in the lower abdomen above the pubic bone.  · Cloudy urine  · Strong- or bad-smelling urine  · Unable to urinate (urinary retention)  · Unable to hold urine in (urinary incontinence)  · Fever  · Loss of appetite  · Confusion (in older adults)  Causes  Bladder infections are not contagious. You can't get one from someone else, from a toilet seat, or from sharing a bath.  The most common cause of bladder infections is bacteria from the bowels. The bacteria get onto the skin around the opening of the urethra. From there, they can get into the urine and travel up to the bladder, causing inflammation and infection. This usually happens because of:  · Wiping improperly after urinating. Always wipe from front to " back.  · Bowel incontinence  · Pregnancy  · Procedures such as having a catheter inserted  · Older age  · Not emptying your bladder. This can allow bacteria a chance to grow in your urine.  · Dehydration  · Constipation  · Sex  · Use of a diaphragm for birth control   Treatment  Bladder infections are diagnosed by a urine test. They are treated with antibiotics and usually clear up quickly without complications. Treatment helps prevent a more serious kidney infection.  Medicines  Medicines can help in the treatment of a bladder infection:  · Take antibiotics until they are used up, even if you feel better. It is important to finish them to make sure the infection has cleared.  · You can use acetaminophen or ibuprofen for pain, fever, or discomfort, unless another medicine was prescribed. If you have chronic liver or kidney disease, talk with your healthcare provider before using these medicines. Also talk with your provider if you've ever had a stomach ulcer or gastrointestinal bleeding, or are taking blood-thinner medicines.  · If you are given phenazopydridine to reduce burning with urination, it will cause your urine to become a bright orange color. This can stain clothing.  Care and prevention  These self-care steps can help prevent future infections:  · Drink plenty of fluids to prevent dehydration and flush out your bladder. Do this unless you must restrict fluids for other health reasons, or your doctor told you not to.  · Proper cleaning after going to the bathroom is important. Wipe from front to back after using the toilet to prevent the spread of bacteria.  · Urinate more often. Don't try to hold urine in for a long time.  · Wear loose-fitting clothes and cotton underwear. Avoid tight-fitting pants.  · Improve your diet and prevent constipation. Eat more fresh fruit and vegetables, and fiber, and less junk and fatty foods.  · Avoid sex until your symptoms are gone.  · Avoid caffeine, alcohol, and spicy  foods. These can irritate your bladder.  · Urinate right after intercourse to flush out your bladder.  · If you use birth control pills and have frequent bladder infections, discuss it with your doctor.  Follow-up care  Call your healthcare provider if all symptoms are not gone after 3 days of treatment. This is especially important if you have repeat infections.  If a culture was done, you will be told if your treatment needs to be changed. If directed, you can call to find out the results.  If X-rays were done, you will be told if the results will affect your treatment.  Call 911  Call 911 if any of the following occur:  · Trouble breathing  · Hard to wake up or confusion  · Fainting or loss of consciousness  · Rapid heart rate  When to seek medical advice  Call your healthcare provider right away if any of these occur:  · Fever of 100.4ºF (38.0ºC) or higher, or as directed by your healthcare provider  · Symptoms are not better by the third day of treatment  · Back or belly (abdominal) pain that gets worse  · Repeated vomiting, or unable to keep medicine down  · Weakness or dizziness  · Vaginal discharge  · Pain, redness, or swelling in the outer vaginal area (labia)  Date Last Reviewed: 10/1/2016  © 1617-1059 [employer]. [address] [place] [address], [place] [address]. All rights reserved. This information is not intended as a substitute for professional medical care. Always follow your healthcare professional's instructions.        Nitrofurantoin tablets or capsules  What is this medicine?  NITROFURANTOIN (sumit viveros) is an antibiotic. It is used to treat urinary tract infections.  How should I use this medicine?  Take this medicine by mouth with a glass of water. Follow the directions on the prescription label. Take this medicine with food or milk. Take your doses at regular intervals. Do not take your medicine more often than directed. Do not stop taking except on your doctor's  advice.  Talk to your pediatrician regarding the use of this medicine in children. While this drug may be prescribed for selected conditions, precautions do apply.  What side effects may I notice from receiving this medicine?  Side effects that you should report to your doctor or health care professional as soon as possible:  · allergic reactions like skin rash or hives, swelling of the face, lips, or tongue  · chest pain  · cough  · difficulty breathing  · dizziness, drowsiness  · fever or infection  · joint aches or pains  · pale or blue-tinted skin  · redness, blistering, peeling or loosening of the skin, including inside the mouth  · tingling, burning, pain, or numbness in hands or feet  · unusual bleeding or bruising  · unusually weak or tired  · yellowing of eyes or skin  Side effects that usually do not require medical attention (report to your doctor or health care professional if they continue or are bothersome):  · dark urine  · diarrhea  · headache  · loss of appetite  · nausea or vomiting  · temporary hair loss  What may interact with this medicine?  · antacids containing magnesium trisilicate  · probenecid  · quinolone antibiotics like ciprofloxacin, lomefloxacin, norfloxacin and ofloxacin  · sulfinpyrazone  What if I miss a dose?  If you miss a dose, take it as soon as you can. If it is almost time for your next dose, take only that dose. Do not take double or extra doses.  Where should I keep my medicine?  Keep out of the reach of children.  Store at room temperature between 15 and 30 degrees C (59 and 86 degrees F). Protect from light. Throw away any unused medicine after the expiration date.  What should I tell my health care provider before I take this medicine?  They need to know if you have any of these conditions:  · anemia  · diabetes  · glucose-6-phosphate dehydrogenase deficiency  · kidney disease  · liver disease  · lung disease  · other chronic illness  · an unusual or allergic reaction to  nitrofurantoin, other antibiotics, other medicines, foods, dyes or preservatives  · pregnant or trying to get pregnant  · breast-feeding  What should I watch for while using this medicine?  Tell your doctor or health care professional if your symptoms do not improve or if you get new symptoms. Drink several glasses of water a day. If you are taking this medicine for a long time, visit your doctor for regular checks on your progress.  If you are diabetic, you may get a false positive result for sugar in your urine with certain brands of urine tests. Check with your doctor.  Date Last Reviewed:   NOTE:This sheet is a summary. It may not cover all possible information. If you have questions about this medicine, talk to your doctor, pharmacist, or health care provider. Copyright© 2016 [employer]

## 2017-06-22 NOTE — PROGRESS NOTES
Subjective:       Patient ID: Monica Joy is a 56 y.o. female.    Chief Complaint: Urinary Frequency and Dysuria    Urinary Frequency    This is a new problem. The current episode started in the past 7 days. The problem has been waxing and waning. The quality of the pain is described as burning. There has been no fever. Associated symptoms include frequency and bubble bath use. Pertinent negatives include no chills, discharge, flank pain, hematuria, hesitancy, nausea, urgency or vomiting. She has tried nothing for the symptoms. There is no history of recurrent UTIs.   Dysuria    Associated symptoms include frequency and bubble bath use. Pertinent negatives include no chills, discharge, flank pain, hematuria, hesitancy, nausea, urgency or vomiting. There is no history of recurrent UTIs.     Review of Systems   Constitutional: Negative for chills, diaphoresis, fatigue and fever.   Gastrointestinal: Negative for nausea and vomiting.   Genitourinary: Positive for dysuria, frequency and pelvic pain. Negative for difficulty urinating, flank pain, hematuria, hesitancy, urgency, vaginal bleeding, vaginal discharge and vaginal pain.   Musculoskeletal: Negative for back pain and myalgias.   Neurological: Negative for dizziness and weakness.       Objective:      Physical Exam   Constitutional: She is oriented to person, place, and time. She appears well-developed and well-nourished. No distress.   Abdominal: Soft. Normal appearance. There is no tenderness. There is no CVA tenderness.   Neurological: She is alert and oriented to person, place, and time.   Skin: Skin is warm and dry. She is not diaphoretic.       Assessment:       1. Acute UTI        Plan:   Monica was seen today for urinary frequency and dysuria.    Diagnoses and all orders for this visit:    Acute UTI  -     POCT urine dipstick without microscope  -     nitrofurantoin, macrocrystal-monohydrate, (MACROBID) 100 MG capsule; Take 1 capsule (100 mg total) by  mouth 2 (two) times daily.  -     Urine culture      -     Diagnosis and treatment discussed, AVS provided  -     Follow up with PCP or ER immediately for worsening, new or no improvement of symptoms.   -     Patient understands and agrees with plan

## 2017-06-23 LAB — BACTERIA UR CULT: NO GROWTH

## 2017-06-26 ENCOUNTER — OFFICE VISIT (OUTPATIENT)
Dept: URGENT CARE | Facility: CLINIC | Age: 57
End: 2017-06-26
Payer: MEDICAID

## 2017-06-26 ENCOUNTER — LAB VISIT (OUTPATIENT)
Dept: LAB | Facility: HOSPITAL | Age: 57
End: 2017-06-26
Attending: NURSE PRACTITIONER
Payer: MEDICAID

## 2017-06-26 VITALS
SYSTOLIC BLOOD PRESSURE: 130 MMHG | TEMPERATURE: 100 F | OXYGEN SATURATION: 96 % | BODY MASS INDEX: 37.34 KG/M2 | HEART RATE: 88 BPM | DIASTOLIC BLOOD PRESSURE: 78 MMHG | HEIGHT: 65 IN | WEIGHT: 224.13 LBS

## 2017-06-26 DIAGNOSIS — R10.9 FLANK PAIN: Primary | ICD-10-CM

## 2017-06-26 DIAGNOSIS — R60.9 SWELLING: ICD-10-CM

## 2017-06-26 DIAGNOSIS — R39.11 URINARY HESITANCY: ICD-10-CM

## 2017-06-26 DIAGNOSIS — R10.9 FLANK PAIN: ICD-10-CM

## 2017-06-26 DIAGNOSIS — R10.30 LOWER ABDOMINAL PAIN: ICD-10-CM

## 2017-06-26 LAB
ALBUMIN SERPL BCP-MCNC: 3.6 G/DL
ALP SERPL-CCNC: 112 U/L
ALT SERPL W/O P-5'-P-CCNC: 37 U/L
ANION GAP SERPL CALC-SCNC: 11 MMOL/L
AST SERPL-CCNC: 25 U/L
BACTERIA #/AREA URNS HPF: ABNORMAL /HPF
BASOPHILS # BLD AUTO: 0.02 K/UL
BASOPHILS NFR BLD: 0.2 %
BILIRUB SERPL-MCNC: 0.4 MG/DL
BILIRUB SERPL-MCNC: ABNORMAL MG/DL
BILIRUB UR QL STRIP: NEGATIVE
BLOOD URINE, POC: ABNORMAL
BNP SERPL-MCNC: <10 PG/ML
BUN SERPL-MCNC: 10 MG/DL
CALCIUM SERPL-MCNC: 9.3 MG/DL
CHLORIDE SERPL-SCNC: 107 MMOL/L
CLARITY UR: CLEAR
CO2 SERPL-SCNC: 24 MMOL/L
COLOR UR: YELLOW
COLOR, POC UA: YELLOW
CREAT SERPL-MCNC: 0.8 MG/DL
DIFFERENTIAL METHOD: NORMAL
EOSINOPHIL # BLD AUTO: 0.2 K/UL
EOSINOPHIL NFR BLD: 2.5 %
ERYTHROCYTE [DISTWIDTH] IN BLOOD BY AUTOMATED COUNT: 14.1 %
EST. GFR  (AFRICAN AMERICAN): >60 ML/MIN/1.73 M^2
EST. GFR  (NON AFRICAN AMERICAN): >60 ML/MIN/1.73 M^2
GLUCOSE SERPL-MCNC: 109 MG/DL
GLUCOSE UR QL STRIP: NEGATIVE
GLUCOSE UR QL STRIP: NORMAL
HCT VFR BLD AUTO: 38.9 %
HGB BLD-MCNC: 13.4 G/DL
HGB UR QL STRIP: NEGATIVE
KETONES UR QL STRIP: ABNORMAL
KETONES UR QL STRIP: NEGATIVE
LEUKOCYTE ESTERASE UR QL STRIP: ABNORMAL
LEUKOCYTE ESTERASE URINE, POC: 2
LYMPHOCYTES # BLD AUTO: 2.2 K/UL
LYMPHOCYTES NFR BLD: 24.7 %
MCH RBC QN AUTO: 29.6 PG
MCHC RBC AUTO-ENTMCNC: 34.4 %
MCV RBC AUTO: 86 FL
MICROSCOPIC COMMENT: ABNORMAL
MONOCYTES # BLD AUTO: 0.7 K/UL
MONOCYTES NFR BLD: 7.9 %
NEUTROPHILS # BLD AUTO: 5.9 K/UL
NEUTROPHILS NFR BLD: 64.7 %
NITRITE UR QL STRIP: NEGATIVE
NITRITE, POC UA: ABNORMAL
PH UR STRIP: 8 [PH] (ref 5–8)
PH, POC UA: 8
PLATELET # BLD AUTO: 206 K/UL
PMV BLD AUTO: 10.1 FL
POTASSIUM SERPL-SCNC: 3.9 MMOL/L
PROT SERPL-MCNC: 7.2 G/DL
PROT UR QL STRIP: NEGATIVE
PROTEIN, POC: ABNORMAL
RBC # BLD AUTO: 4.53 M/UL
RBC #/AREA URNS HPF: 3 /HPF (ref 0–4)
SODIUM SERPL-SCNC: 142 MMOL/L
SP GR UR STRIP: 1.01 (ref 1–1.03)
SPECIFIC GRAVITY, POC UA: 1
SQUAMOUS #/AREA URNS HPF: 25 /HPF
URN SPEC COLLECT METH UR: ABNORMAL
UROBILINOGEN, POC UA: NORMAL
WBC # BLD AUTO: 9.04 K/UL
WBC #/AREA URNS HPF: 6 /HPF (ref 0–5)
YEAST URNS QL MICRO: ABNORMAL

## 2017-06-26 PROCEDURE — 80053 COMPREHEN METABOLIC PANEL: CPT | Mod: PO

## 2017-06-26 PROCEDURE — 83880 ASSAY OF NATRIURETIC PEPTIDE: CPT

## 2017-06-26 PROCEDURE — 99213 OFFICE O/P EST LOW 20 MIN: CPT | Mod: S$PBB,,, | Performed by: NURSE PRACTITIONER

## 2017-06-26 PROCEDURE — 36415 COLL VENOUS BLD VENIPUNCTURE: CPT | Mod: PO

## 2017-06-26 PROCEDURE — 99999 PR PBB SHADOW E&M-EST. PATIENT-LVL V: CPT | Mod: PBBFAC,,, | Performed by: NURSE PRACTITIONER

## 2017-06-26 PROCEDURE — 85025 COMPLETE CBC W/AUTO DIFF WBC: CPT | Mod: PO

## 2017-06-26 NOTE — PROGRESS NOTES
"Subjective:       Patient ID: Monica Joy is a 56 y.o. female.    Chief Complaint: Back Pain ("fluid in both hands and right leg, lower abd discomfort SOB")    Patient presents with lower abdominal pain, flank pain, and urinary hesitancy.  Reported that she's currently on treatment for UTI from 6/22/17.  No growth of bacteria noted from culture on 6/22/2017.   Reported some mild swelling to right upper and lower arm and some shortness of breath.  Denies any chest pain or palpitations.       Back Pain   This is a recurrent problem. The current episode started 1 to 4 weeks ago. The problem is unchanged. The pain is present in the costovertebral angle. Quality: soreness. The pain does not radiate. The pain is mild. Associated symptoms include abdominal pain (lower abdominal pain ) and pelvic pain. Pertinent negatives include no bladder incontinence, dysuria, fever, numbness or weakness. Treatments tried: antibiotics. The treatment provided mild relief.     Review of Systems   Constitutional: Negative for chills and fever.   Eyes: Negative for discharge and redness.   Respiratory: Positive for shortness of breath. Negative for cough and chest tightness.    Gastrointestinal: Positive for abdominal pain (lower abdominal pain ). Negative for diarrhea and nausea.   Genitourinary: Positive for pelvic pain. Negative for bladder incontinence and dysuria.   Musculoskeletal: Positive for back pain and joint swelling.   Neurological: Negative for weakness and numbness.   Psychiatric/Behavioral: Negative for agitation and confusion.       Objective:      Physical Exam   Constitutional: She is oriented to person, place, and time. Vital signs are normal. She appears well-developed and well-nourished.   HENT:   Head: Normocephalic and atraumatic.   Neck: Normal range of motion.   Cardiovascular: Normal rate and regular rhythm.    Pulmonary/Chest: Effort normal and breath sounds normal.   Abdominal: Soft. There is tenderness in " the suprapubic area. There is CVA tenderness.   Musculoskeletal: Normal range of motion. She exhibits edema (right knee). She exhibits no tenderness.   Neurological: She is alert and oriented to person, place, and time.   Skin: Skin is warm.   Psychiatric: She has a normal mood and affect. Her behavior is normal.       Assessment:       1. Flank pain    2. Lower abdominal pain    3. Swelling    4. Urinary hesitancy        Plan:         Flank pain  -     POCT urinalysis, dipstick or tablet reag  -     CBC auto differential; Future; Expected date: 06/26/2017  -     Comprehensive metabolic panel; Future; Expected date: 06/26/2017  -     URINALYSIS  -     Urine culture  -     US Retroperitoneal Complete (Kidney and; Future; Expected date: 06/26/2017  -     URINALYSIS    Lower abdominal pain  -     CBC auto differential; Future; Expected date: 06/26/2017  -     Comprehensive metabolic panel; Future; Expected date: 06/26/2017  -     URINALYSIS  -     Urine culture  -     URINALYSIS    Swelling  -     Brain natriuretic peptide; Future; Expected date: 06/26/2017  -     US Retroperitoneal Complete (Kidney and; Future; Expected date: 06/26/2017    Urinary hesitancy  -     US Retroperitoneal Complete (Kidney and; Future; Expected date: 06/26/2017    Other orders  -     Urinalysis Microscopic        Follow up in ER if symptoms worsen.  Instructed to patient to continue current medications until further noted.  Instructed that I will inform her of lab reports either through the patient portal or by phone.

## 2017-06-27 ENCOUNTER — TELEPHONE (OUTPATIENT)
Dept: URGENT CARE | Facility: CLINIC | Age: 57
End: 2017-06-27

## 2017-06-27 ENCOUNTER — HOSPITAL ENCOUNTER (OUTPATIENT)
Dept: RADIOLOGY | Facility: HOSPITAL | Age: 57
Discharge: HOME | End: 2017-06-27
Attending: NURSE PRACTITIONER
Payer: MEDICAID

## 2017-06-27 DIAGNOSIS — R60.9 SWELLING: ICD-10-CM

## 2017-06-27 DIAGNOSIS — R39.11 URINARY HESITANCY: ICD-10-CM

## 2017-06-27 DIAGNOSIS — R10.9 FLANK PAIN: ICD-10-CM

## 2017-06-27 PROCEDURE — 76770 US EXAM ABDO BACK WALL COMP: CPT | Mod: 26,,, | Performed by: RADIOLOGY

## 2017-06-27 PROCEDURE — 76770 US EXAM ABDO BACK WALL COMP: CPT | Mod: TC,PO

## 2017-06-27 NOTE — TELEPHONE ENCOUNTER
Spoke with pt informed her of ultrasound results was normal but a benign fatty tumor was found and ms mercado would like pt to follow up with dr hyatt. Dr hyatt 1st opening is 7/19/17 at 1pm. I reached out to dr hyatt office and was informed that dr hyatt nurse was in a room but send a message to dr hyatt staff due to insurance the work in need to be approved by dr hyatt. Message sent to dr hyatt nurse for a work in. Pt informed that someone from dr hyatt office will give her a call to schedule appt. Pt stated understanding.

## 2017-06-28 LAB
BACTERIA UR CULT: NORMAL
BACTERIA UR CULT: NORMAL

## 2017-07-05 ENCOUNTER — OFFICE VISIT (OUTPATIENT)
Dept: INTERNAL MEDICINE | Facility: CLINIC | Age: 57
End: 2017-07-05
Payer: MEDICAID

## 2017-07-05 VITALS
TEMPERATURE: 99 F | SYSTOLIC BLOOD PRESSURE: 122 MMHG | WEIGHT: 223.56 LBS | DIASTOLIC BLOOD PRESSURE: 82 MMHG | HEART RATE: 80 BPM | BODY MASS INDEX: 37.25 KG/M2 | HEIGHT: 65 IN

## 2017-07-05 DIAGNOSIS — H92.03 EAR PAIN, BILATERAL: ICD-10-CM

## 2017-07-05 DIAGNOSIS — N28.89 RIGHT RENAL MASS: ICD-10-CM

## 2017-07-05 DIAGNOSIS — R30.0 DYSURIA: Primary | ICD-10-CM

## 2017-07-05 LAB
BACTERIA #/AREA URNS HPF: ABNORMAL /HPF
BILIRUB UR QL STRIP: NEGATIVE
CLARITY UR: CLEAR
COLOR UR: YELLOW
GLUCOSE UR QL STRIP: NEGATIVE
HGB UR QL STRIP: ABNORMAL
KETONES UR QL STRIP: NEGATIVE
LEUKOCYTE ESTERASE UR QL STRIP: ABNORMAL
MICROSCOPIC COMMENT: ABNORMAL
NITRITE UR QL STRIP: NEGATIVE
PH UR STRIP: 7 [PH] (ref 5–8)
PROT UR QL STRIP: ABNORMAL
RBC #/AREA URNS HPF: 8 /HPF (ref 0–4)
SP GR UR STRIP: 1.01 (ref 1–1.03)
SQUAMOUS #/AREA URNS HPF: 9 /HPF
URN SPEC COLLECT METH UR: ABNORMAL
WBC #/AREA URNS HPF: 10 /HPF (ref 0–5)

## 2017-07-05 PROCEDURE — 87086 URINE CULTURE/COLONY COUNT: CPT

## 2017-07-05 PROCEDURE — 99214 OFFICE O/P EST MOD 30 MIN: CPT | Mod: PBBFAC | Performed by: PHYSICIAN ASSISTANT

## 2017-07-05 PROCEDURE — 99999 PR PBB SHADOW E&M-EST. PATIENT-LVL IV: CPT | Mod: PBBFAC,,, | Performed by: PHYSICIAN ASSISTANT

## 2017-07-05 PROCEDURE — 99214 OFFICE O/P EST MOD 30 MIN: CPT | Mod: S$PBB,,, | Performed by: PHYSICIAN ASSISTANT

## 2017-07-05 PROCEDURE — 81000 URINALYSIS NONAUTO W/SCOPE: CPT

## 2017-07-05 RX ORDER — ACETAMINOPHEN 500 MG
500 TABLET ORAL EVERY 6 HOURS PRN
COMMUNITY
End: 2018-11-21

## 2017-07-05 RX ORDER — NITROFURANTOIN (MACROCRYSTALS) 100 MG/1
100 CAPSULE ORAL EVERY 12 HOURS
Qty: 20 CAPSULE | Refills: 0 | Status: SHIPPED | OUTPATIENT
Start: 2017-07-05 | End: 2017-07-15

## 2017-07-05 RX ORDER — CETIRIZINE HYDROCHLORIDE, PSEUDOEPHEDRINE HYDROCHLORIDE 5; 120 MG/1; MG/1
1 TABLET, FILM COATED, EXTENDED RELEASE ORAL DAILY PRN
Qty: 20 TABLET | Refills: 0 | Status: SHIPPED | OUTPATIENT
Start: 2017-07-05 | End: 2017-09-02 | Stop reason: SDUPTHER

## 2017-07-05 NOTE — PROGRESS NOTES
Subjective:       Patient ID: Monica Joy is a 56 y.o. female.    Chief Complaint: f/u ultrasound and sascha uti    Patient is a 55 yo female who was seen at an urgent care facility for abdominal pain. She was apparently found to have a UTI and a abdominal ultrasound was ordered. It showed an angiomyolipoma. She was told by urgent care to follow up with me regarding this.       Dysuria    This is a recurrent problem. The current episode started in the past 7 days. The problem occurs intermittently. The problem has been waxing and waning. The quality of the pain is described as burning. The pain is mild. There has been no fever. She is sexually active. There is no history of pyelonephritis. Associated symptoms include frequency and hematuria. Pertinent negatives include no chills, discharge, flank pain or nausea. She has tried antibiotics for the symptoms. There is no history of diabetes mellitus, kidney stones or recurrent UTIs.     Past Medical History:   Diagnosis Date    Arthritis     Asthma 2013    Thyroid disease        Past Surgical History:   Procedure Laterality Date     SECTION      FRACTURE SURGERY      JOINT REPLACEMENT      Novasure Endometrial Ablation  2016    THYROIDECTOMY, PARTIAL      TUBAL LIGATION      WRIST SURGERY         Family History   Problem Relation Age of Onset    Atrial fibrillation Mother     Diabetes Father     Heart disease Father     Hyperlipidemia Father     Stroke Father     Heart disease Brother     Diabetes Brother     Anuerysm Brother     Breast cancer Neg Hx     Colon cancer Neg Hx     Ovarian cancer Neg Hx        Social History     Social History    Marital status:      Spouse name: N/A    Number of children: 2    Years of education: N/A     Occupational History    Not on file.     Social History Main Topics    Smoking status: Never Smoker    Smokeless tobacco: Never Used    Alcohol use No      Comment: on rare holidays     Drug use: No    Sexual activity: Yes     Partners: Male     Birth control/ protection: None     Other Topics Concern    Not on file     Social History Narrative    No narrative on file       Review of patient's allergies indicates:   Allergen Reactions    Amoxicillin Rash and Swelling     Rash with throat swelling    Medrol [methylprednisolone]     Oxycodone Shortness Of Breath    Percodan filipe Shortness Of Breath    Ultram  [tramadol] Shortness Of Breath    Augmentin [amoxicillin-pot clavulanate] Itching and Rash    Levaquin [levofloxacin] Hives    Sulfa (sulfonamide antibiotics) Itching and Rash    Doxycycline Swelling     Other reaction(s): Rash    Lortab  [hydrocodone-acetaminophen]      Other reaction(s): Hives  Other reaction(s): Difficulty breathing    Percocet  [oxycodone-acetaminophen]      Other reaction(s): Shortness of breath    Shellfish containing products      Other reaction(s): Shortness of breath  Other reaction(s): Hives    Tetracycline Swelling    Vibramycin  [doxycycline calcium]      Other reaction(s): Hives  Other reaction(s): Difficulty breathing    Codeine Palpitations     Other reaction(s): Shortness of breath  Other reaction(s): Hives    Hydrocodone Rash         Current Outpatient Prescriptions:     acetaminophen (TYLENOL) 500 MG tablet, Take 500 mg by mouth every 6 (six) hours as needed for Pain., Disp: , Rfl:     cyanocobalamin, vitamin B-12, (VITAMIN B-12) 1,000 mcg Subl, Place under the tongue., Disp: , Rfl:     albuterol 90 mcg/actuation inhaler, Inhale 2 puffs into the lungs every 6 (six) hours as needed for Wheezing., Disp: 18 g, Rfl: 0    cetirizine-pseudoephedrine 5-120 mg Tb12, Take 1 tablet by mouth daily as needed., Disp: 20 tablet, Rfl: 0    EPIPEN 2-RAIMUNDO 0.3 mg/0.3 mL AtIn, , Disp: , Rfl:     fluticasone-vilanterol (BREO ELLIPTA) 100-25 mcg/dose diskus inhaler, Inhale 1 puff into the lungs once daily., Disp: 30 each, Rfl: 11    levocetirizine  "(XYZAL) 5 MG tablet, Take 1 tablet (5 mg total) by mouth every evening., Disp: 30 tablet, Rfl: 11    meloxicam (MOBIC) 15 MG tablet, TK 1 T PO D, Disp: , Rfl: 5    montelukast (SINGULAIR) 10 mg tablet, Take 1 tablet (10 mg total) by mouth every evening., Disp: 30 tablet, Rfl: 5    nitrofurantoin (MACRODANTIN) 100 MG capsule, Take 1 capsule (100 mg total) by mouth every 12 (twelve) hours., Disp: 20 capsule, Rfl: 0    /82 (BP Location: Right arm, Patient Position: Sitting, BP Method: Manual)   Pulse 80   Temp 99.3 °F (37.4 °C) (Tympanic)   Ht 5' 5" (1.651 m)   Wt 101.4 kg (223 lb 8.7 oz)   BMI 37.20 kg/m²   Review of Systems   Constitutional: Negative for chills, fatigue and fever.   Respiratory: Negative for chest tightness and shortness of breath.    Cardiovascular: Negative for chest pain.   Gastrointestinal: Positive for abdominal pain. Negative for nausea.   Genitourinary: Positive for dysuria, frequency and hematuria. Negative for flank pain.       Objective:      Physical Exam   Constitutional: She appears well-developed and well-nourished. No distress.   Cardiovascular: Normal rate and regular rhythm.  Exam reveals no gallop and no friction rub.    No murmur heard.  Pulmonary/Chest: Effort normal and breath sounds normal. No respiratory distress. She has no wheezes. She has no rales. She exhibits no tenderness.   Abdominal: Soft. There is tenderness in the periumbilical area and suprapubic area. There is no rigidity, no rebound, no guarding, no CVA tenderness, no tenderness at McBurney's point and negative Bucio's sign.   Skin: She is not diaphoretic.   Nursing note and vitals reviewed.      Assessment:       1. Dysuria    2. Right renal mass    3. Ear pain, bilateral        Plan:       Dysuria  -     Urinalysis  -     Urine culture    Right renal mass ( likely non-cancer at this point. I will discuss with Urology the next appropriate step. Patient is allergic to most contrast, and is also " allergic to prednisone).   -     Ambulatory referral to Urology    Ear pain, bilateral    Other orders  -     Urinalysis Microscopic  -     cetirizine-pseudoephedrine 5-120 mg Tb12; Take 1 tablet by mouth daily as needed.  Dispense: 20 tablet; Refill: 0  -     nitrofurantoin (MACRODANTIN) 100 MG capsule; Take 1 capsule (100 mg total) by mouth every 12 (twelve) hours.  Dispense: 20 capsule; Refill: 0

## 2017-07-06 LAB — BACTERIA UR CULT: NORMAL

## 2017-07-10 ENCOUNTER — PATIENT MESSAGE (OUTPATIENT)
Dept: INTERNAL MEDICINE | Facility: CLINIC | Age: 57
End: 2017-07-10

## 2017-07-10 ENCOUNTER — TELEPHONE (OUTPATIENT)
Dept: INTERNAL MEDICINE | Facility: CLINIC | Age: 57
End: 2017-07-10

## 2017-07-10 DIAGNOSIS — R93.429 ABNORMAL ULTRASOUND OF KIDNEY: Primary | ICD-10-CM

## 2017-07-10 NOTE — TELEPHONE ENCOUNTER
Please add referral to [facility] so I can send her information today. Thanks    There is no [facility] listed in [facility].

## 2017-07-10 NOTE — TELEPHONE ENCOUNTER
----- Message from Niels Pritchard sent at 7/10/2017 11:28 AM CDT -----  Contact: [phone]   Pt would like consult with the nurse about Urology appt .  Please call back at [phone].  Thanks-[facility]

## 2017-07-11 ENCOUNTER — PATIENT MESSAGE (OUTPATIENT)
Dept: INTERNAL MEDICINE | Facility: CLINIC | Age: 57
End: 2017-07-11

## 2017-07-19 ENCOUNTER — OFFICE VISIT (OUTPATIENT)
Dept: INTERNAL MEDICINE | Facility: CLINIC | Age: 57
End: 2017-07-19
Payer: MEDICAID

## 2017-07-19 VITALS
SYSTOLIC BLOOD PRESSURE: 122 MMHG | WEIGHT: 223.75 LBS | OXYGEN SATURATION: 98 % | BODY MASS INDEX: 42.24 KG/M2 | HEIGHT: 61 IN | TEMPERATURE: 98 F | DIASTOLIC BLOOD PRESSURE: 78 MMHG | HEART RATE: 88 BPM

## 2017-07-19 DIAGNOSIS — N28.89 RIGHT RENAL MASS: Primary | ICD-10-CM

## 2017-07-19 DIAGNOSIS — R10.9 RIGHT FLANK PAIN: ICD-10-CM

## 2017-07-19 DIAGNOSIS — R30.0 DYSURIA: ICD-10-CM

## 2017-07-19 LAB
BACTERIA #/AREA URNS AUTO: ABNORMAL /HPF
BILIRUB UR QL STRIP: NEGATIVE
CLARITY UR REFRACT.AUTO: ABNORMAL
COLOR UR AUTO: YELLOW
GLUCOSE UR QL STRIP: NEGATIVE
HGB UR QL STRIP: ABNORMAL
KETONES UR QL STRIP: NEGATIVE
LEUKOCYTE ESTERASE UR QL STRIP: ABNORMAL
MICROSCOPIC COMMENT: ABNORMAL
NITRITE UR QL STRIP: NEGATIVE
PH UR STRIP: 5 [PH] (ref 5–8)
PROT UR QL STRIP: NEGATIVE
RBC #/AREA URNS AUTO: 0 /HPF (ref 0–4)
SP GR UR STRIP: 1.02 (ref 1–1.03)
SQUAMOUS #/AREA URNS AUTO: 5 /HPF
URN SPEC COLLECT METH UR: ABNORMAL
UROBILINOGEN UR STRIP-ACNC: NEGATIVE EU/DL
WBC #/AREA URNS AUTO: 15 /HPF (ref 0–5)

## 2017-07-19 PROCEDURE — 99213 OFFICE O/P EST LOW 20 MIN: CPT | Mod: S$PBB,,, | Performed by: INTERNAL MEDICINE

## 2017-07-19 PROCEDURE — 99999 PR PBB SHADOW E&M-EST. PATIENT-LVL III: CPT | Mod: PBBFAC,,, | Performed by: INTERNAL MEDICINE

## 2017-07-19 PROCEDURE — 81001 URINALYSIS AUTO W/SCOPE: CPT

## 2017-07-19 PROCEDURE — 99213 OFFICE O/P EST LOW 20 MIN: CPT | Mod: PBBFAC | Performed by: INTERNAL MEDICINE

## 2017-07-19 NOTE — PROGRESS NOTES
Monica TRAN Benita  56 y.o.  White female    Chief Complaint   Patient presents with    Follow-up     ultrasound       HPI:  Presents to the clinic to follow up on a recent ultrasound. The ultrasound shows an 8mm hyperechoic lesion involving the upper pole of the right kidney suspicious for an angiomyolipoma. She has right flank pain and dysuria. She was recently treated for a UTI. She does not have an appointment with urology yet.       PMH: Reviewed    MEDS: Reviewed med card    ALLERGIES: Reviewed allergy card    PE: Reviewed vitals  GENERAL: Alert and oriented, no acute distress  HEART: Regular rate  LUNGS: Unlabored respirations  BACK: No CVA tenderness      ASSESSMENT/PLAN:    Monica was seen today for follow-up.    Diagnoses and all orders for this visit:    Right renal mass  -     Recommend evaluation by urology    Right flank pain    Dysuria  -     Urinalysis    RTC: As needed

## 2017-07-20 ENCOUNTER — PATIENT MESSAGE (OUTPATIENT)
Dept: INTERNAL MEDICINE | Facility: CLINIC | Age: 57
End: 2017-07-20

## 2017-07-21 ENCOUNTER — TELEPHONE (OUTPATIENT)
Dept: INTERNAL MEDICINE | Facility: CLINIC | Age: 57
End: 2017-07-21

## 2017-07-21 DIAGNOSIS — R82.90 ABNORMAL URINALYSIS: Primary | ICD-10-CM

## 2017-07-24 ENCOUNTER — LAB VISIT (OUTPATIENT)
Dept: LAB | Facility: HOSPITAL | Age: 57
End: 2017-07-24
Attending: INTERNAL MEDICINE
Payer: MEDICAID

## 2017-07-24 DIAGNOSIS — R82.90 ABNORMAL URINALYSIS: ICD-10-CM

## 2017-07-24 PROCEDURE — 87086 URINE CULTURE/COLONY COUNT: CPT

## 2017-07-26 ENCOUNTER — TELEPHONE (OUTPATIENT)
Dept: INTERNAL MEDICINE | Facility: CLINIC | Age: 57
End: 2017-07-26

## 2017-07-26 LAB — BACTERIA UR CULT: NO GROWTH

## 2017-07-28 ENCOUNTER — TELEPHONE (OUTPATIENT)
Dept: INTERNAL MEDICINE | Facility: CLINIC | Age: 57
End: 2017-07-28

## 2017-07-28 ENCOUNTER — PATIENT MESSAGE (OUTPATIENT)
Dept: INTERNAL MEDICINE | Facility: CLINIC | Age: 57
End: 2017-07-28

## 2017-07-28 NOTE — TELEPHONE ENCOUNTER
----- Message from Annabel Lopez sent at 7/26/2017  9:18 AM CDT -----  Pt at [phone]//states nurse called her and she is returning the call//was told she needs to give a specimen//she came in on Monday/7-24-17/and gave specimen//please call/josé antonio/simeon

## 2017-07-28 NOTE — TELEPHONE ENCOUNTER
Patient given results for urine culture. No growth in 4 days. Patient instructed to keep her appt on 8/21/2017 with urology.

## 2017-08-01 NOTE — TELEPHONE ENCOUNTER
UA cx done 07/24 shows no growth. Are there any other treatment options that's needed before her appointment with urology?

## 2017-08-02 RX ORDER — BUTALBITAL, ASPIRIN, AND CAFFEINE 325; 50; 40 MG/1; MG/1; MG/1
1 CAPSULE ORAL EVERY 4 HOURS PRN
OUTPATIENT
Start: 2017-08-02 | End: 2017-09-01

## 2017-08-03 ENCOUNTER — TELEPHONE (OUTPATIENT)
Dept: INTERNAL MEDICINE | Facility: CLINIC | Age: 57
End: 2017-08-03

## 2017-08-03 RX ORDER — BUTALBITAL, ASPIRIN, AND CAFFEINE 325; 50; 40 MG/1; MG/1; MG/1
1 CAPSULE ORAL EVERY 4 HOURS PRN
Qty: 30 CAPSULE | Refills: 1 | Status: SHIPPED | OUTPATIENT
Start: 2017-08-03 | End: 2017-09-02

## 2017-08-03 NOTE — TELEPHONE ENCOUNTER
Urine culture is negative and indicates no infection, therefore antibiotics will not be prescribed. Patient should increase her water intake and she can take AZO for discomfort until she gets in to see urology.   If symptoms have changed or worsened she can submit another urine sample or come in to be seen.

## 2017-09-05 RX ORDER — CETIRIZINE HYDROCHLORIDE, PSEUDOEPHEDRINE HYDROCHLORIDE 5; 120 MG/1; MG/1
1 TABLET, FILM COATED, EXTENDED RELEASE ORAL DAILY PRN
Qty: 20 TABLET | Refills: 0 | Status: SHIPPED | OUTPATIENT
Start: 2017-09-05 | End: 2017-09-15

## 2017-09-21 ENCOUNTER — TELEPHONE (OUTPATIENT)
Dept: INTERNAL MEDICINE | Facility: CLINIC | Age: 57
End: 2017-09-21

## 2017-09-21 NOTE — TELEPHONE ENCOUNTER
----- Message from Ani Rosas sent at 9/21/2017  3:29 PM CDT -----  Contact: Pt  Pt called and stated she needed to speak to the nurse. She stated she has pain in the left shoulder blade and she needs to be squeezed into the schedule tomorrow 9/22/17. She can be reached at [phone].    Thanks,  TF

## 2017-09-22 ENCOUNTER — HOSPITAL ENCOUNTER (OUTPATIENT)
Dept: RADIOLOGY | Facility: HOSPITAL | Age: 57
Discharge: HOME | End: 2017-09-22
Attending: PHYSICIAN ASSISTANT
Payer: MEDICAID

## 2017-09-22 ENCOUNTER — OFFICE VISIT (OUTPATIENT)
Dept: INTERNAL MEDICINE | Facility: CLINIC | Age: 57
End: 2017-09-22
Payer: MEDICAID

## 2017-09-22 VITALS
BODY MASS INDEX: 43.03 KG/M2 | WEIGHT: 227.94 LBS | HEIGHT: 61 IN | TEMPERATURE: 99 F | DIASTOLIC BLOOD PRESSURE: 80 MMHG | OXYGEN SATURATION: 94 % | SYSTOLIC BLOOD PRESSURE: 128 MMHG | HEART RATE: 93 BPM
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
## 2017-09-22 DIAGNOSIS — M25.512 ACUTE PAIN OF LEFT SHOULDER: ICD-10-CM

## 2017-09-22 DIAGNOSIS — M25.512 ACUTE PAIN OF LEFT SHOULDER: Primary | ICD-10-CM

## 2017-09-22 DIAGNOSIS — M25.572 ACUTE LEFT ANKLE PAIN: ICD-10-CM

## 2017-09-22 PROCEDURE — 73030 X-RAY EXAM OF SHOULDER: CPT | Mod: 26,LT,, | Performed by: RADIOLOGY

## 2017-09-22 PROCEDURE — 73030 X-RAY EXAM OF SHOULDER: CPT | Mod: TC,LT

## 2017-09-22 PROCEDURE — 72050 X-RAY EXAM NECK SPINE 4/5VWS: CPT | Mod: 26,,, | Performed by: RADIOLOGY

## 2017-09-22 PROCEDURE — 3008F BODY MASS INDEX DOCD: CPT | Mod: ,,, | Performed by: PHYSICIAN ASSISTANT

## 2017-09-22 PROCEDURE — 73610 X-RAY EXAM OF ANKLE: CPT | Mod: TC,LT

## 2017-09-22 PROCEDURE — 99213 OFFICE O/P EST LOW 20 MIN: CPT | Mod: S$PBB,,, | Performed by: PHYSICIAN ASSISTANT

## 2017-09-22 PROCEDURE — 99999 PR PBB SHADOW E&M-EST. PATIENT-LVL IV: CPT | Mod: PBBFAC,,, | Performed by: PHYSICIAN ASSISTANT

## 2017-09-22 PROCEDURE — 73590 X-RAY EXAM OF LOWER LEG: CPT | Mod: 26,LT,, | Performed by: RADIOLOGY

## 2017-09-22 PROCEDURE — 73590 X-RAY EXAM OF LOWER LEG: CPT | Mod: TC,LT

## 2017-09-22 PROCEDURE — 72050 X-RAY EXAM NECK SPINE 4/5VWS: CPT | Mod: TC

## 2017-09-22 PROCEDURE — 99214 OFFICE O/P EST MOD 30 MIN: CPT | Mod: PBBFAC,25 | Performed by: PHYSICIAN ASSISTANT

## 2017-09-22 PROCEDURE — 73610 X-RAY EXAM OF ANKLE: CPT | Mod: 26,LT,, | Performed by: RADIOLOGY

## 2017-09-22 RX ORDER — NABUMETONE 500 MG/1
500 TABLET, FILM COATED ORAL 2 TIMES DAILY
Qty: 30 TABLET | Refills: 0 | Status: SHIPPED | OUTPATIENT
Start: 2017-09-22 | End: 2017-10-27 | Stop reason: SDUPTHER

## 2017-09-22 RX ORDER — CYCLOBENZAPRINE HCL 10 MG
10 TABLET ORAL 3 TIMES DAILY PRN
Qty: 30 TABLET | Refills: 0 | Status: SHIPPED | OUTPATIENT
Start: 2017-09-22 | End: 2017-10-02

## 2017-09-22 RX ORDER — BUTALBITAL, ASPIRIN, AND CAFFEINE 325; 50; 40 MG/1; MG/1; MG/1
1 CAPSULE ORAL
COMMUNITY
End: 2018-08-16

## 2017-09-22 NOTE — PROGRESS NOTES
Subjective:       Patient ID: Monica Joy is a 56 y.o. female.    Chief Complaint: Shoulder Pain    Shoulder Pain    The pain is present in the neck, left shoulder, left lower leg and left ankle. This is a new problem. The current episode started in the past 7 days. There has been a history of trauma. The problem occurs constantly. The problem has been unchanged. The quality of the pain is described as aching. Associated symptoms include a limited range of motion. Pertinent negatives include no fever, inability to bear weight, stiffness or tingling. The symptoms are aggravated by activity. Treatments tried: mobic.     Review of Systems   Constitutional: Negative for chills, fatigue and fever.   Respiratory: Negative for chest tightness and shortness of breath.    Cardiovascular: Negative for chest pain.   Gastrointestinal: Negative for abdominal pain.   Musculoskeletal: Positive for neck pain and neck stiffness. Negative for stiffness.   Neurological: Negative for tingling.       Objective:      Physical Exam   Constitutional: She appears well-developed and well-nourished. No distress.   HENT:   Head: Normocephalic and atraumatic.   Cardiovascular: Normal rate and regular rhythm.  Exam reveals no gallop and no friction rub.    No murmur heard.  Pulmonary/Chest: Effort normal and breath sounds normal. No respiratory distress. She has no wheezes. She has no rales. She exhibits no tenderness.   Musculoskeletal:        Left shoulder: She exhibits decreased range of motion, tenderness, pain and spasm.        Left lower leg: She exhibits bony tenderness and swelling.        Left foot: There is tenderness and swelling.   Skin: She is not diaphoretic.   Nursing note and vitals reviewed.      Assessment:       1. Acute pain of left shoulder    2. Acute left ankle pain    3. Fall, initial encounter        Plan:       Acute pain of left shoulder  -     X-Ray Cervical Spine Complete 5 view; Future; Expected date:  09/22/2017  -     X-Ray Shoulder 2 or More Views Left; Future; Expected date: 09/22/2017    Acute left ankle pain  -     X-Ray Tibia Fibula 2 View Left; Future; Expected date: 09/22/2017  -     X-Ray Ankle Complete Left; Future; Expected date: 09/22/2017    Fall, initial encounter  -     X-Ray Cervical Spine Complete 5 view; Future; Expected date: 09/22/2017  -     X-Ray Shoulder 2 or More Views Left; Future; Expected date: 09/22/2017  -     X-Ray Tibia Fibula 2 View Left; Future; Expected date: 09/22/2017  -     X-Ray Ankle Complete Left; Future; Expected date: 09/22/2017    Other orders  -     nabumetone (RELAFEN) 500 MG tablet; Take 1 tablet (500 mg total) by mouth 2 (two) times daily.  Dispense: 30 tablet; Refill: 0  -     cyclobenzaprine (FLEXERIL) 10 MG tablet; Take 1 tablet (10 mg total) by mouth 3 (three) times daily as needed for Muscle spasms.  Dispense: 30 tablet; Refill: 0

## 2017-10-27 RX ORDER — NABUMETONE 500 MG/1
TABLET, FILM COATED ORAL
Qty: 30 TABLET | Refills: 0 | Status: SHIPPED | OUTPATIENT
Start: 2017-10-27 | End: 2018-08-16

## 2018-01-23 ENCOUNTER — OFFICE VISIT (OUTPATIENT)
Dept: URGENT CARE | Facility: CLINIC | Age: 58
End: 2018-01-23
Payer: MEDICAID

## 2018-01-23 VITALS
BODY MASS INDEX: 37.81 KG/M2 | TEMPERATURE: 98 F | SYSTOLIC BLOOD PRESSURE: 138 MMHG | OXYGEN SATURATION: 96 % | HEIGHT: 65 IN | HEART RATE: 76 BPM | DIASTOLIC BLOOD PRESSURE: 88 MMHG | WEIGHT: 226.94 LBS

## 2018-01-23 DIAGNOSIS — J06.9 VIRAL URI: Primary | ICD-10-CM

## 2018-01-23 PROCEDURE — 99213 OFFICE O/P EST LOW 20 MIN: CPT | Mod: PBBFAC,PO | Performed by: PHYSICIAN ASSISTANT

## 2018-01-23 PROCEDURE — 3008F BODY MASS INDEX DOCD: CPT | Mod: ,,, | Performed by: PHYSICIAN ASSISTANT

## 2018-01-23 PROCEDURE — 99214 OFFICE O/P EST MOD 30 MIN: CPT | Mod: S$PBB,,, | Performed by: PHYSICIAN ASSISTANT

## 2018-01-23 PROCEDURE — 99999 PR PBB SHADOW E&M-EST. PATIENT-LVL III: CPT | Mod: PBBFAC,,, | Performed by: PHYSICIAN ASSISTANT

## 2018-01-23 NOTE — PROGRESS NOTES
"Subjective:      Patient ID: Monica Joy is a 57 y.o. female.    Chief Complaint: Cough (x1 day)    Patient concerned about RSV as she has a 1mth old new grandson with a heart condition      Cough   This is a new problem. The current episode started yesterday (last night). The cough is non-productive ("croup-like"). Associated symptoms include chills, rhinorrhea, a sore throat (itchy) and wheezing (improved after inhaler). Pertinent negatives include no fever, headaches or shortness of breath. Treatments tried: Anti-histamine. The treatment provided mild relief. Her past medical history is significant for asthma. There is no history of bronchitis, COPD or pneumonia.     Review of Systems   Constitutional: Positive for chills. Negative for diaphoresis and fever.   HENT: Positive for congestion, rhinorrhea, sore throat (itchy) and trouble swallowing (hoarseness).    Respiratory: Positive for cough and wheezing (improved after inhaler). Negative for shortness of breath.    Gastrointestinal: Negative for abdominal pain, constipation, diarrhea, nausea and vomiting.   Musculoskeletal:        Mild body aches   Neurological: Negative for dizziness (last week, improved), light-headedness and headaches.       Objective:   /88 (BP Location: Right arm, Patient Position: Sitting, BP Method: Medium (Manual))   Pulse 76   Temp 97.8 °F (36.6 °C) (Tympanic)   Ht 5' 5" (1.651 m)   Wt 103 kg (226 lb 15.4 oz)   SpO2 96%   BMI 37.77 kg/m²   Physical Exam   Constitutional: She appears well-developed and well-nourished. She does not appear ill. No distress.   HENT:   Head: Normocephalic and atraumatic.   Right Ear: Tympanic membrane and ear canal normal. Tympanic membrane is not erythematous. No middle ear effusion.   Left Ear: Tympanic membrane and ear canal normal. Tympanic membrane is not erythematous.  No middle ear effusion.   Nose: Nose normal. Right sinus exhibits no maxillary sinus tenderness and no frontal sinus " tenderness. Left sinus exhibits no maxillary sinus tenderness and no frontal sinus tenderness.   Mouth/Throat: Uvula is midline and oropharynx is clear and moist.   Cardiovascular: Normal rate, regular rhythm and normal heart sounds.    No murmur heard.  Pulmonary/Chest: Effort normal and breath sounds normal. No respiratory distress. She has no decreased breath sounds. She has no wheezes. She has no rhonchi. She has no rales.   Lymphadenopathy:     She has no cervical adenopathy.   Skin: Skin is warm and dry. No rash noted. She is not diaphoretic.   Psychiatric: She has a normal mood and affect. Her speech is normal and behavior is normal. Thought content normal.     Assessment:      1. Viral URI       Plan:   Viral URI    Explain to patient that we can swab for RSV, however, it typically does not affect adults in the same way it does children  + RSV diagnosis would not change the course of treatment or my recommendations.   Do not have flu swabs present in clinic, advise patient if she begins with fever to let us know and will send in Tamiflu  Avoid contact with , if she must be around him, she is to wear mask and avoid holding him    Advised patient based on time frame and symptomology this is likely a viral upper respiratory infection.  It does not require antibiotics at this time.    Will treat symptoms with OTC meds.  If no improvement, or if condition worsens, follow up with PCP.     Gave handout on viral URI.  Printed AVS and reviewed treatment plan in detail.    Discussed worsening signs/symptoms and when to return to clinic or go to ED.   Patient expresses understanding and agrees with treatment plan.

## 2018-01-23 NOTE — PATIENT INSTRUCTIONS
Viral Upper Respiratory Illness (Adult)  You have a viral upper respiratory illness (URI), which is another term for the common cold. This illness is contagious during the first few days. It is spread through the air by coughing and sneezing. It may also be spread by direct contact (touching the sick person and then touching your own eyes, nose, or mouth). Frequent handwashing will decrease risk of spread. Most viral illnesses go away within 7 to 10 days with rest and simple home remedies. Sometimes the illness may last for several weeks. Antibiotics will not kill a virus, and they are generally not prescribed for this condition.    Home care  · If symptoms are severe, rest at home for the first 2 to 3 days. When you resume activity, don't let yourself get too tired.  · Avoid being exposed to cigarette smoke (yours or others).  · You may use acetaminophen or ibuprofen to control pain and fever, unless another medicine was prescribed. (Note: If you have chronic liver or kidney disease, have ever had a stomach ulcer or gastrointestinal bleeding, or are taking blood-thinning medicines, talk with your healthcare provider before using these medicines.) Aspirin should never be given to anyone under 18 years of age who is ill with a viral infection or fever. It may cause severe liver or brain damage.  · Your appetite may be poor, so a light diet is fine. Avoid dehydration by drinking 6 to 8 glasses of fluids per day (water, soft drinks, juices, tea, or soup). Extra fluids will help loosen secretions in the nose and lungs.  · Over-the-counter cold medicines will not shorten the length of time youre sick, but they may be helpful for the following symptoms: cough, sore throat, and nasal and sinus congestion. (Note: Do not use decongestants if you have high blood pressure.)  Follow-up care  Follow up with your healthcare provider, or as advised.  When to seek medical advice  Call your healthcare provider right away if any  of these occur:  · Cough with lots of colored sputum (mucus)  · Severe headache; face, neck, or ear pain  · Difficulty swallowing due to throat pain  · Fever of 100.4°F (38°C)  Call 911, or get immediate medical care  Call emergency services right away if any of these occur:  · Chest pain, shortness of breath, wheezing, or difficulty breathing  · Coughing up blood  · Inability to swallow due to throat pain  Date Last Reviewed: 9/13/2015  © 9716-3168 [employer]. [address] [place] [address], [place] [address]. All rights reserved. This information is not intended as a substitute for professional medical care. Always follow your healthcare professional's instructions.    Rest.  Use warm compresses on sinuses for relief several times a day.  Increase fluid intake to at least 64 ounces of water per day to keep secretions thin and loose.  Normal saline nasal wash or Flonase to irrigate sinuses and for congestion/runny nose.  Use a cool mist vaporizer or humidifier in home to help relieve congestion.    Practice good handwashing.  Zyrtec-D or Claritin-D to help dry mucus and post nasal drip.  Mucinex or Mucinex DM for cough and chest congestion.  Avoid decongestants (Sudafed,Mucinex-D,Claritin-D, etc) if you have hypertension.  Tylenol or Ibuprofen for fever, headache and body aches.  Warm salt water gargles and lozenges for throat comfort.  Chloraseptic spray or lozenges for throat comfort.  See PCP if symptoms fail to improve.   Go to ER if you develop fever of 103 or higher, chest pain, shortness of breath, upper back pain, stiff neck or severe headache.

## 2018-05-28 ENCOUNTER — PATIENT OUTREACH (OUTPATIENT)
Dept: ADMINISTRATIVE | Facility: HOSPITAL | Age: 58
End: 2018-05-28

## 2018-07-20 DIAGNOSIS — Z12.39 BREAST CANCER SCREENING: ICD-10-CM

## 2018-07-27 ENCOUNTER — OFFICE VISIT (OUTPATIENT)
Dept: OPHTHALMOLOGY | Facility: CLINIC | Age: 58
End: 2018-07-27
Payer: MEDICAID

## 2018-07-27 DIAGNOSIS — Z01.00 VISIT FOR EYE AND VISION EXAM: Primary | ICD-10-CM

## 2018-07-27 DIAGNOSIS — Z13.5 GLAUCOMA SCREENING: ICD-10-CM

## 2018-07-27 DIAGNOSIS — H52.7 REFRACTIVE ERROR: ICD-10-CM

## 2018-07-27 PROCEDURE — 92015 DETERMINE REFRACTIVE STATE: CPT | Mod: ,,, | Performed by: OPTOMETRIST

## 2018-07-27 PROCEDURE — 99999 PR PBB SHADOW E&M-EST. PATIENT-LVL I: CPT | Mod: PBBFAC,,, | Performed by: OPTOMETRIST

## 2018-07-27 PROCEDURE — 99211 OFF/OP EST MAY X REQ PHY/QHP: CPT | Mod: PBBFAC | Performed by: OPTOMETRIST

## 2018-07-27 PROCEDURE — 92004 COMPRE OPH EXAM NEW PT 1/>: CPT | Mod: S$PBB,,, | Performed by: OPTOMETRIST

## 2018-07-27 NOTE — LETTER
July 27, 2018      Karen Villafana DO  [address] [place] [address] [address] [place] [id number]           [facility]  [address] [place] [address] [address] [place] [address]  Phone: [phone]  Fax: [phone]          Patient: Monica Joy   MR Number: [id number]   YOB: 1960   Date of Visit: 7/27/2018       Dear Dr. Karen Villafana:    Thank you for referring Monica Joy to me for evaluation. Attached you will find relevant portions of my assessment and plan of care.    If you have questions, please do not hesitate to call me. I look forward to following Monica Joy along with you.    Sincerely,    EDI Hodge, OD    Enclosure  CC:  No Recipients    If you would like to receive this communication electronically, please contact [email or web address] or [phone] to request more information on [employer] access.    For providers and/or their staff who would like to refer a patient to [facility], please contact us through our one-stop-shop provider referral line, Chandler Rowland, at [phone].    If you feel you have received this communication in error or would no longer like to receive these types of communications, please e-mail [email or web address]

## 2018-07-27 NOTE — PROGRESS NOTES
HPI     New Patient  Screening for glaucoma  RE  Uses OTC Readers +2.00    Last edited by Oneil Lyn MA on 7/27/2018  3:03 PM. (History)            Assessment /Plan     For exam results, see Encounter Report.    Visit for eye and vision exam    Glaucoma screening    Refractive error      OH OK OU.  Refractive error with presbyopia = spec Rx versus OTC readers.  RTC one year.

## 2018-08-10 ENCOUNTER — PATIENT OUTREACH (OUTPATIENT)
Dept: ADMINISTRATIVE | Facility: HOSPITAL | Age: 58
End: 2018-08-10

## 2018-08-16 ENCOUNTER — OFFICE VISIT (OUTPATIENT)
Dept: URGENT CARE | Facility: CLINIC | Age: 58
End: 2018-08-16
Payer: MEDICAID

## 2018-08-16 VITALS
DIASTOLIC BLOOD PRESSURE: 64 MMHG | TEMPERATURE: 99 F | HEART RATE: 88 BPM | SYSTOLIC BLOOD PRESSURE: 136 MMHG | OXYGEN SATURATION: 96 % | RESPIRATION RATE: 18 BRPM | BODY MASS INDEX: 38.2 KG/M2 | WEIGHT: 229.25 LBS | HEIGHT: 65 IN

## 2018-08-16 DIAGNOSIS — H65.93 BILATERAL SEROUS OTITIS MEDIA, UNSPECIFIED CHRONICITY: Primary | ICD-10-CM

## 2018-08-16 PROCEDURE — 99214 OFFICE O/P EST MOD 30 MIN: CPT | Mod: PBBFAC,PO | Performed by: FAMILY MEDICINE

## 2018-08-16 PROCEDURE — 99999 PR PBB SHADOW E&M-EST. PATIENT-LVL IV: CPT | Mod: PBBFAC,,, | Performed by: FAMILY MEDICINE

## 2018-08-16 PROCEDURE — 99214 OFFICE O/P EST MOD 30 MIN: CPT | Mod: S$PBB,,, | Performed by: FAMILY MEDICINE

## 2018-08-16 NOTE — PROGRESS NOTES
"Subjective:       Patient ID: Monica Joy is a 57 y.o. female.    Chief Complaint: rigth ear pain and jaw pain x 3-weeks    /64 (BP Location: Right arm, Patient Position: Sitting)   Pulse 88   Temp 98.6 °F (37 °C) (Tympanic)   Resp 18   Ht 5' 5" (1.651 m)   Wt 104 kg (229 lb 4.5 oz)   SpO2 96%   BMI 38.15 kg/m²     HPI  Patient presented with symptoms described in chief complaint. Taking zyrtec. States she is allergic to steroids- hives/rash    Review of Systems   HENT: Positive for ear pain and hearing loss.    Respiratory: Positive for wheezing.    Neurological: Positive for dizziness.       Objective:      Physical Exam   Constitutional: She is oriented to person, place, and time. She appears well-developed and well-nourished. No distress.   HENT:   Head: Normocephalic and atraumatic.   Mouth/Throat: No oropharyngeal exudate.   TM- clear effusion bilateral, no erythema   Eyes: EOM are normal. Pupils are equal, round, and reactive to light. Right eye exhibits no discharge. Left eye exhibits no discharge.   Neck: Normal range of motion. Neck supple.   Cardiovascular: Normal rate, regular rhythm and normal heart sounds.   No murmur heard.  Pulmonary/Chest: Effort normal and breath sounds normal. She has no wheezes. She has no rales.   Musculoskeletal: Normal range of motion.   Lymphadenopathy:     She has cervical adenopathy (right).   Neurological: She is alert and oriented to person, place, and time.   Skin: Skin is warm and dry. She is not diaphoretic.   Nursing note and vitals reviewed.      Assessment:       1. Bilateral serous otitis media, unspecified chronicity        Plan:     Monica was seen today for rigth ear pain and jaw pain x 3-weeks.    Diagnoses and all orders for this visit:    Bilateral serous otitis media, unspecified chronicity  -     Ambulatory referral to Allergy      Instructions  1. Continue zyrtec  2. Refer to allergy clinic  3. OTC acetaminophen or ibuprofen for ear pain  4. " Follow up with PCP    Discussed with pt/family all information and results pertaining to this visit. Discussed diagnosis and plan of treatment.  All questions and concerns were addressed at this time. Pt/family expresses understanding of information and instructions.  Care and follow up instruction provided.

## 2018-08-16 NOTE — PATIENT INSTRUCTIONS
1. Continue zyrtec  2. Refer to allergy clinic  3. OTC acetaminophen or ibuprofen for ear pain   3. Follow up with PCP      Earache, No Infection (Adult)  Earaches can happen without an infection. This occurs when air and fluid build up behind the eardrum causing a feeling of fullness and discomfort and reduced hearing. This is called otitis media with effusion (OME) or serous otitis media. It means there is fluid in the middle ear. It is not the same as acute otitis media, which is typically from infection.  OME can happen when you have a cold if congestion blocks the passage that drains the middle ear. This passage is called the eustachian tube. OME may also occur with nasal allergies or after a bacterial middle ear infection.    The pain or discomfort may come and go. You may hear clicking or popping sounds when you chew or swallow. You may feel that your balance is off. Or you may hear ringing in the ear.  It often takes from several weeks up to 3 months for the fluid to clear on its own. Oral pain relievers and ear drops help if there is pain. Decongestants and antihistamines sometimes help. Antibiotics don't help since there is no infection. Your doctor may prescribe a nasal spray to help reduce swelling in the nose and eustachian tube. This can allow the ear to drain.  If your OME doesn't improve after 3 months, surgery may be used to drain the fluid and insert a small tube in the eardrum to allow continued drainage.  Because the middle ear fluid can become infected, it is important to watch for signs of an ear infection which may develop later. These signs include increased ear pain, fever, or drainage from the ear.  Home care  The following guidelines will help you care for yourself at home:  · You may use over-the-counter medicine as directed to control pain, unless another medicine was prescribed. If you have chronic liver or kidney disease or ever had a stomach ulcer or GI bleeding, talk with your doctor  before using these medicines. Aspirin should never be used in anyone under 18 years of age who is ill with a fever. It may cause severe liver damage.  · You may use over-the-counter decongestants such as phenylephrine or pseudoephedrine. But they are not always helpful. Don't use nasal spray decongestants more than 3 days. Longer use can make congestion worse. Prescription nasal sprays from your doctor don't typically have those restrictions.  · Antihistamines may help if you are also having allergy symptoms.  · You may use medicines such as guaifenesin to thin mucus and promote drainage.  Follow-up care  Follow up with your healthcare provider or as advised if you are not feeling better after 3 days.  When to seek medical advice  Call your healthcare provider right away if any of the following occur:  · Your ear pain gets worse or does not start to improve   · Fever of 100.4°F (38°C) or higher, or as directed by your healthcare provider  · Fluid or blood draining from the ear  · Headache or sinus pain  · Stiff neck  · Unusual drowsiness or confusion  Date Last Reviewed: 10/1/2016  © 9092-0497 [employer]. [address] [place] [address], [place] [address]. All rights reserved. This information is not intended as a substitute for professional medical care. Always follow your healthcare professional's instructions.

## 2018-09-21 ENCOUNTER — PATIENT OUTREACH (OUTPATIENT)
Dept: ADMINISTRATIVE | Facility: HOSPITAL | Age: 58
End: 2018-09-21

## 2018-09-21 NOTE — PROGRESS NOTES
I have Contacted patient as a reminder to schedule her pap smear. Patient will call to schedule appt

## 2018-11-13 ENCOUNTER — OFFICE VISIT (OUTPATIENT)
Dept: OPHTHALMOLOGY | Facility: CLINIC | Age: 58
End: 2018-11-13
Payer: MEDICAID

## 2018-11-13 DIAGNOSIS — B30.9 VIRAL CONJUNCTIVITIS OF BOTH EYES: Primary | ICD-10-CM

## 2018-11-13 PROCEDURE — 99212 OFFICE O/P EST SF 10 MIN: CPT | Mod: PBBFAC,PO | Performed by: OPTOMETRIST

## 2018-11-13 PROCEDURE — 92012 INTRM OPH EXAM EST PATIENT: CPT | Mod: S$PBB,,, | Performed by: OPTOMETRIST

## 2018-11-13 PROCEDURE — 99999 PR PBB SHADOW E&M-EST. PATIENT-LVL II: CPT | Mod: PBBFAC,,, | Performed by: OPTOMETRIST

## 2018-11-13 NOTE — PROGRESS NOTES
HPI     Last [facility] exam 07/27/2018  Chief complaint: irritation OU  Onset: 4 days ago   Both eyes are red   Matting   Blurred vision   Light sensitive   Patient went to [facility] on yesterday and was prescribed   Cromolyn Sodium 4%  Patient states drops burn and has only used 2 times     Last edited by Oneil Lyn MA on 11/13/2018  3:45 PM. (History)            Assessment /Plan     For exam results, see Encounter Report.    Viral conjunctivitis of both eyes  -     naphazoline-pheniramine 0.025-0.3% (NAPHCON-A) 0.025-0.3 % ophthalmic solution; Place 1 drop into both eyes 4 (four) times daily. for 14 days; Refill: 0      Contagious precautions given.  RTC prn.

## 2018-11-21 ENCOUNTER — CLINICAL SUPPORT (OUTPATIENT)
Dept: CARDIOLOGY | Facility: CLINIC | Age: 58
End: 2018-11-21
Attending: INTERNAL MEDICINE
Payer: MEDICAID

## 2018-11-21 ENCOUNTER — OFFICE VISIT (OUTPATIENT)
Dept: INTERNAL MEDICINE | Facility: CLINIC | Age: 58
End: 2018-11-21
Payer: MEDICAID

## 2018-11-21 VITALS
HEART RATE: 85 BPM | DIASTOLIC BLOOD PRESSURE: 90 MMHG | TEMPERATURE: 97 F | HEIGHT: 65 IN | BODY MASS INDEX: 39.12 KG/M2 | SYSTOLIC BLOOD PRESSURE: 140 MMHG | WEIGHT: 234.81 LBS | OXYGEN SATURATION: 96 %

## 2018-11-21 DIAGNOSIS — E66.9 OBESITY (BMI 30-39.9): ICD-10-CM

## 2018-11-21 DIAGNOSIS — R03.0 ELEVATED BLOOD PRESSURE READING IN OFFICE WITHOUT DIAGNOSIS OF HYPERTENSION: ICD-10-CM

## 2018-11-21 DIAGNOSIS — I73.9 CLAUDICATION OF BOTH LOWER EXTREMITIES: ICD-10-CM

## 2018-11-21 DIAGNOSIS — Z23 IMMUNIZATION DUE: ICD-10-CM

## 2018-11-21 DIAGNOSIS — Z00.00 ANNUAL PHYSICAL EXAM: Primary | ICD-10-CM

## 2018-11-21 DIAGNOSIS — Z12.11 COLON CANCER SCREENING: ICD-10-CM

## 2018-11-21 LAB — VASCULAR ANKLE BRACHIAL INDEX (ABI) LEFT: 1.17 (ref 0.9–1.2)

## 2018-11-21 PROCEDURE — 99999 PR PBB SHADOW E&M-EST. PATIENT-LVL IV: CPT | Mod: PBBFAC,,, | Performed by: INTERNAL MEDICINE

## 2018-11-21 PROCEDURE — 90471 IMMUNIZATION ADMIN: CPT | Mod: PBBFAC

## 2018-11-21 PROCEDURE — 99396 PREV VISIT EST AGE 40-64: CPT | Mod: S$PBB,,, | Performed by: INTERNAL MEDICINE

## 2018-11-21 PROCEDURE — 99214 OFFICE O/P EST MOD 30 MIN: CPT | Mod: PBBFAC,25 | Performed by: INTERNAL MEDICINE

## 2018-11-21 PROCEDURE — 93922 UPR/L XTREMITY ART 2 LEVELS: CPT | Mod: PBBFAC | Performed by: INTERNAL MEDICINE

## 2018-11-21 RX ORDER — SODIUM, POTASSIUM,MAG SULFATES 17.5-3.13G
SOLUTION, RECONSTITUTED, ORAL ORAL
Qty: 354 ML | Refills: 0 | Status: SHIPPED | OUTPATIENT
Start: 2018-11-21 | End: 2018-11-29 | Stop reason: SDUPTHER

## 2018-11-21 RX ORDER — B-COMPLEX WITH VITAMIN C
1 TABLET ORAL DAILY
COMMUNITY
End: 2019-03-19

## 2018-11-21 NOTE — PATIENT INSTRUCTIONS

## 2018-11-21 NOTE — PROGRESS NOTES
Subjective:       Patient ID: Monica Joy is a 57 y.o. female.    Chief Complaint: Annual Exam    Monica oJy  57 y.o. White female     Patient presents with:  Annual Exam    HPI: Here today for an annual physical.   Her b/p is slightly elevated. She does not have a history of HTN.   Review of her chart shows PVD. She states she was tested for this in the past. She does admit to pain in her lower extremities with walking that improve with rest.   She is a non smoker.   She is not on prescription medication.   She takes OTC supplements.     Colonoscopy due on 2017  Pap Smear with HPV Cotest due on 2017  TETANUS VACCINE due on 2018  Mammogram due on 2018  Influenza Vaccine due on 2019  Lipid Panel due on 2021  Hepatitis C Screening Completed    Past Medical History:  No date: Arthritis  2013: Asthma  No date: Thyroid disease    Past Surgical History:  2016: ABLATION ENDOMETRIAL THERMAL- NOVASURE; N/A      Comment:  Performed by Faith Mendoza MD at [facility] OR   SECTION  2014: COLONOSCOPY; N/A      Comment:  Performed by Vasu Dobson III, MD at [facility] ENDO  FRACTURE SURGERY  2016: [id number]      Comment:  Performed by Faith Mendoza MD at [facility] OR  JOINT REPLACEMENT  2016: Novasure Endometrial Ablation  THYROIDECTOMY, PARTIAL  TUBAL LIGATION  WRIST SURGERY    Review of patient's family history indicates:  Problem: Atrial fibrillation      Relation: Mother          Age of Onset: (Not Specified)  Problem: Diabetes      Relation: Father          Age of Onset: (Not Specified)  Problem: Heart disease      Relation: Father          Age of Onset: (Not Specified)  Problem: Hyperlipidemia      Relation: Father          Age of Onset: (Not Specified)  Problem: Stroke      Relation: Father          Age of Onset: (Not Specified)  Problem: Heart disease      Relation: Brother          Age of Onset: (Not Specified)  Problem: Diabetes       Relation: Brother          Age of Onset: (Not Specified)  Problem: Anuerysm      Relation: Brother          Age of Onset: (Not Specified)  Problem: Breast cancer      Relation: Neg Hx          Age of Onset: (Not Specified)  Problem: Colon cancer      Relation: Neg Hx          Age of Onset: (Not Specified)  Problem: Ovarian cancer      Relation: Neg Hx          Age of Onset: (Not Specified)      Current Outpatient Medications on File Prior to Visit:  B-complex with vitamin C (Z-BEC OR EQUIV) tablet, Take 1 tablet by mouth once daily., Disp: , Rfl:   cyanocobalamin, vitamin B-12, (VITAMIN B-12) 1,000 mcg Subl, Place under the tongue., Disp: , Rfl:   naphazoline-pheniramine 0.025-0.3% (NAPHCON-A) 0.025-0.3 % ophthalmic solution, Place 1 drop into both eyes 4 (four) times daily. for 14 days, Disp: , Rfl: 0    Allergies:  Review of patient's allergies indicates:   -- Amoxicillin -- Rash and Swelling    --  Rash with throat swelling   -- Codeine -- Palpitations, Rash and Swelling    --  Other reaction(s): Shortness of breath             Other reaction(s): Hives             Rash and throat swelling   -- Hydrocodone -- Rash and Swelling   -- Iodine and iodide containing products -- Rash and Swelling   -- Medrol (methylprednisolone)    -- Oxycodone -- Shortness Of Breath, Rash and                            Swelling   -- Oxycodone hcl-oxycodone-asa -- Rash and Swelling   -- Percodan filipe -- Shortness Of Breath   -- Tetracyclines -- Rash and Swelling   -- Tramadol -- Shortness Of Breath, Rash and                            Swelling   -- Augmentin (amoxicillin-pot clavulanate) -- Itching and Rash   -- Levaquin (levofloxacin) -- Hives   -- Sulfa (sulfonamide antibiotics) -- Itching and Rash   -- Doxycycline -- Swelling    --  Other reaction(s): Rash   -- Lortab  (hydrocodone-acetaminophen)     --  Other reaction(s): Hives             Other reaction(s): Difficulty breathing   -- Percocet  (oxycodone-acetaminophen)     --  Other  reaction(s): Shortness of breath   -- Shellfish containing products     --  Other reaction(s): Shortness of breath             Other reaction(s): Hives   -- Tetracycline -- Swelling   -- Vibramycin  (doxycycline calcium)     --  Other reaction(s): Hives             Other reaction(s): Difficulty breathing                Review of Systems   Constitutional: Negative for fever and unexpected weight change.   Eyes:        Dry eyes   Respiratory: Negative for cough and shortness of breath.    Cardiovascular: Negative for chest pain and leg swelling.   Gastrointestinal: Negative for abdominal pain, blood in stool, constipation and diarrhea.   Genitourinary: Negative for difficulty urinating and dysuria.   Musculoskeletal: Negative for gait problem.   Neurological: Negative for dizziness and headaches.   Psychiatric/Behavioral: Negative for sleep disturbance.       Objective:      Physical Exam   Constitutional: She is oriented to person, place, and time. She appears well-developed and well-nourished. No distress.   Eyes: No scleral icterus.   Neck: No tracheal deviation present. No thyromegaly present.   Cardiovascular: Normal rate, regular rhythm and normal heart sounds. Exam reveals decreased pulses.   Pulmonary/Chest: Effort normal and breath sounds normal. No respiratory distress. She has no wheezes. She has no rales.   Abdominal: Soft. Bowel sounds are normal.   Musculoskeletal: She exhibits no edema.   Lymphadenopathy:     She has no cervical adenopathy.   Neurological: She is alert and oriented to person, place, and time.   Skin: Skin is warm and dry.   Psychiatric: She has a normal mood and affect.   Vitals reviewed.      Assessment:       1. Annual physical exam    2. Elevated blood pressure reading in office without diagnosis of hypertension    3. Claudication of both lower extremities    4. Obesity (BMI 30-39.9)    5. Colon cancer screening    6. Immunization due        Plan:       Monica was seen today for annual  exam.    Diagnoses and all orders for this visit:    Annual physical exam  -     Counseled regarding age appropriate screenings and immunizations  -     Counseled regarding lifestyle modifications  -     CBC auto differential; Future  -     TSH; Future  -     Lipid panel; Future  -     Comprehensive metabolic panel; Future    Elevated blood pressure reading in office without diagnosis of hypertension  -     Repeat b/p 134/94  -     Lifestyle modifications discussed  -     Monitor    Claudication of both lower extremities  -     Cardiology Lab ROSMERY Resting, Lower Extremities; Future    Obesity (BMI 30-39.9)  -     Lifestyle modifications discussed    Colon cancer screening  -     Case request GI: COLONOSCOPY  -     sodium,potassium,mag sulfates (SUPREP BOWEL PREP KIT) 17.5-3.13-1.6 gram SolR; Take as directed.    Immunization due  -     (In Office Administered) Tdap Vaccine    Recommend mammogram and Pap smear.     RTC in 4 weeks for b/p recheck.

## 2018-11-26 ENCOUNTER — LAB VISIT (OUTPATIENT)
Dept: LAB | Facility: HOSPITAL | Age: 58
End: 2018-11-26
Payer: MEDICAID

## 2018-11-26 DIAGNOSIS — Z00.00 ANNUAL PHYSICAL EXAM: ICD-10-CM

## 2018-11-26 LAB
ALBUMIN SERPL BCP-MCNC: 3.4 G/DL
ALP SERPL-CCNC: 103 U/L
ALT SERPL W/O P-5'-P-CCNC: 24 U/L
ANION GAP SERPL CALC-SCNC: 7 MMOL/L
AST SERPL-CCNC: 21 U/L
BASOPHILS # BLD AUTO: 0.02 K/UL
BASOPHILS NFR BLD: 0.2 %
BILIRUB SERPL-MCNC: 0.5 MG/DL
BUN SERPL-MCNC: 13 MG/DL
CALCIUM SERPL-MCNC: 9.3 MG/DL
CHLORIDE SERPL-SCNC: 105 MMOL/L
CHOLEST SERPL-MCNC: 180 MG/DL
CHOLEST/HDLC SERPL: 3.2 {RATIO}
CO2 SERPL-SCNC: 26 MMOL/L
CREAT SERPL-MCNC: 0.7 MG/DL
DIFFERENTIAL METHOD: NORMAL
EOSINOPHIL # BLD AUTO: 0.2 K/UL
EOSINOPHIL NFR BLD: 1.6 %
ERYTHROCYTE [DISTWIDTH] IN BLOOD BY AUTOMATED COUNT: 14.4 %
EST. GFR  (AFRICAN AMERICAN): >60 ML/MIN/1.73 M^2
EST. GFR  (NON AFRICAN AMERICAN): >60 ML/MIN/1.73 M^2
GLUCOSE SERPL-MCNC: 107 MG/DL
HCT VFR BLD AUTO: 40.1 %
HDLC SERPL-MCNC: 56 MG/DL
HDLC SERPL: 31.1 %
HGB BLD-MCNC: 13.1 G/DL
IMM GRANULOCYTES # BLD AUTO: 0.03 K/UL
IMM GRANULOCYTES NFR BLD AUTO: 0.3 %
LDLC SERPL CALC-MCNC: 99.4 MG/DL
LYMPHOCYTES # BLD AUTO: 2 K/UL
LYMPHOCYTES NFR BLD: 19.8 %
MCH RBC QN AUTO: 28.2 PG
MCHC RBC AUTO-ENTMCNC: 32.7 G/DL
MCV RBC AUTO: 86 FL
MONOCYTES # BLD AUTO: 0.7 K/UL
MONOCYTES NFR BLD: 7.2 %
NEUTROPHILS # BLD AUTO: 7.3 K/UL
NEUTROPHILS NFR BLD: 70.9 %
NONHDLC SERPL-MCNC: 124 MG/DL
NRBC BLD-RTO: 0 /100 WBC
PLATELET # BLD AUTO: 247 K/UL
PMV BLD AUTO: 10.8 FL
POTASSIUM SERPL-SCNC: 4.2 MMOL/L
PROT SERPL-MCNC: 7.4 G/DL
RBC # BLD AUTO: 4.65 M/UL
SODIUM SERPL-SCNC: 138 MMOL/L
TRIGL SERPL-MCNC: 123 MG/DL
TSH SERPL DL<=0.005 MIU/L-ACNC: 0.45 UIU/ML
WBC # BLD AUTO: 10.22 K/UL

## 2018-11-26 PROCEDURE — 80053 COMPREHEN METABOLIC PANEL: CPT

## 2018-11-26 PROCEDURE — 36415 COLL VENOUS BLD VENIPUNCTURE: CPT

## 2018-11-26 PROCEDURE — 80061 LIPID PANEL: CPT

## 2018-11-26 PROCEDURE — 85025 COMPLETE CBC W/AUTO DIFF WBC: CPT

## 2018-11-26 PROCEDURE — 84443 ASSAY THYROID STIM HORMONE: CPT

## 2018-11-28 ENCOUNTER — HOSPITAL ENCOUNTER (OUTPATIENT)
Dept: RADIOLOGY | Facility: HOSPITAL | Age: 58
Discharge: HOME | End: 2018-11-28
Attending: INTERNAL MEDICINE
Payer: MEDICAID

## 2018-11-28 DIAGNOSIS — Z12.39 BREAST CANCER SCREENING: ICD-10-CM

## 2018-11-28 PROCEDURE — 77063 BREAST TOMOSYNTHESIS BI: CPT | Mod: 26,,, | Performed by: RADIOLOGY

## 2018-11-28 PROCEDURE — 77067 SCR MAMMO BI INCL CAD: CPT | Mod: 26,,, | Performed by: RADIOLOGY

## 2018-11-28 PROCEDURE — 77063 BREAST TOMOSYNTHESIS BI: CPT | Mod: TC

## 2018-11-29 DIAGNOSIS — Z12.11 COLON CANCER SCREENING: ICD-10-CM

## 2018-11-29 RX ORDER — POLYETHYLENE GLYCOL 3350, SODIUM CHLORIDE, SODIUM BICARBONATE, POTASSIUM CHLORIDE 420; 11.2; 5.72; 1.48 G/4L; G/4L; G/4L; G/4L
POWDER, FOR SOLUTION ORAL
Qty: 4000 ML | Refills: 0 | Status: ON HOLD | OUTPATIENT
Start: 2018-11-29 | End: 2019-01-31 | Stop reason: CLARIF

## 2018-12-04 ENCOUNTER — TELEPHONE (OUTPATIENT)
Dept: ENDOSCOPY | Facility: HOSPITAL | Age: 58
End: 2018-12-04

## 2018-12-04 NOTE — TELEPHONE ENCOUNTER
Endoscopy Scheduling Questionnaire:    Call Type:Outgoing call    1. Have you been admitted overnight to the hospital in the past 3 months? no  2. Do you get CP and SOB while walking up a flight of stairs? no  3. Have you had a stent placed in the past 12 months? no  4. Have you had a stroke or heart attack in the past 6 months? no  5. Have you had any chest pain in the past 3 months? no      If so, have you been evaluated by your PCP or Cardiologist? no  6. Do you take weight loss medications? no  7. Have you been diagnosed with Diverticulitis within the past 3 months? no  8. Are you having any GI symptoms that you feel need to be evaluated prior to your procedure? no  9. Are you on dialysis? no  10. Are you diabetic? no  11. Do you have any other health issues that you feel might limit your ability to safely have the procedure and/or sedation? no  12. Is the patient over 81 yo? no        If so, has the patient been seen by their PCP or GI in the last 3 months? N/A       -I have reviewed the last colonoscopy for recommendations regarding surveillance and bowel prep  Yes  -I have reviewed the patient's medications and allergies. She is not on high risk medications and will require cardiac clearance. A clearance request NA  -I have verified the pharmacy information. The prep being used is [facility]. The patient's prep instructions were sent via mail..    Date Endoscopy Scheduled: Date: january 17, 2018  Or  Date Gastro office visit Scheduled: NA

## 2018-12-10 ENCOUNTER — OFFICE VISIT (OUTPATIENT)
Dept: OBSTETRICS AND GYNECOLOGY | Facility: CLINIC | Age: 58
End: 2018-12-10
Payer: MEDICAID

## 2018-12-10 ENCOUNTER — LAB VISIT (OUTPATIENT)
Dept: LAB | Facility: HOSPITAL | Age: 58
End: 2018-12-10
Attending: OBSTETRICS & GYNECOLOGY
Payer: MEDICAID

## 2018-12-10 VITALS
DIASTOLIC BLOOD PRESSURE: 90 MMHG | BODY MASS INDEX: 39.37 KG/M2 | HEIGHT: 65 IN | SYSTOLIC BLOOD PRESSURE: 136 MMHG | WEIGHT: 236.31 LBS

## 2018-12-10 DIAGNOSIS — Z01.419 ENCOUNTER FOR GYNECOLOGICAL EXAMINATION WITHOUT ABNORMAL FINDING: Primary | ICD-10-CM

## 2018-12-10 DIAGNOSIS — L65.9 HAIR LOSS DISORDER: ICD-10-CM

## 2018-12-10 LAB — PROGEST SERPL-MCNC: 0.1 NG/ML

## 2018-12-10 PROCEDURE — 87624 HPV HI-RISK TYP POOLED RSLT: CPT

## 2018-12-10 PROCEDURE — 99396 PREV VISIT EST AGE 40-64: CPT | Mod: TH,S$PBB,, | Performed by: OBSTETRICS & GYNECOLOGY

## 2018-12-10 PROCEDURE — 84402 ASSAY OF FREE TESTOSTERONE: CPT

## 2018-12-10 PROCEDURE — 99999 PR PBB SHADOW E&M-EST. PATIENT-LVL III: CPT | Mod: PBBFAC,,, | Performed by: OBSTETRICS & GYNECOLOGY

## 2018-12-10 PROCEDURE — 99213 OFFICE O/P EST LOW 20 MIN: CPT | Mod: PBBFAC | Performed by: OBSTETRICS & GYNECOLOGY

## 2018-12-10 PROCEDURE — 82672 ASSAY OF ESTROGEN: CPT

## 2018-12-10 PROCEDURE — 88175 CYTOPATH C/V AUTO FLUID REDO: CPT

## 2018-12-10 PROCEDURE — 84144 ASSAY OF PROGESTERONE: CPT

## 2018-12-11 ENCOUNTER — PATIENT MESSAGE (OUTPATIENT)
Dept: OBSTETRICS AND GYNECOLOGY | Facility: HOSPITAL | Age: 58
End: 2018-12-11

## 2018-12-11 ENCOUNTER — PATIENT MESSAGE (OUTPATIENT)
Dept: OBSTETRICS AND GYNECOLOGY | Facility: CLINIC | Age: 58
End: 2018-12-11

## 2018-12-12 LAB — ESTROGEN SERPL-MCNC: 125 PG/ML

## 2018-12-12 NOTE — PROGRESS NOTES
"CC: Well woman exam    Monica Joy is a 57 y.o. female  presents for a well woman exam.  LMP: No LMP recorded. Patient has had an ablation..  No issues, problems, or complaints. Desires hormone check  *pt currently on "progesterone radiance cream" she got at [facility]; started it due to noticing increased hair loss & thinning    Past Medical History:   Diagnosis Date    Arthritis     Asthma 2013    Thyroid disease      Past Surgical History:   Procedure Laterality Date    ABLATION ENDOMETRIAL THERMAL- NOVASURE N/A 2016    [facility] OR     SECTION      COLONOSCOPY N/A 2014    [facility] by Vasu Dobson III, [facility] at [facility] ENDO    FRACTURE SURGERY      [id number]  2016    [facility] OR    JOINT REPLACEMENT      Novasure Endometrial Ablation  2016    THYROIDECTOMY, PARTIAL      TUBAL LIGATION      WRIST SURGERY       Social History     Socioeconomic History    Marital status:      Spouse name: None    Number of children: 2    Years of education: None    Highest education level: None   Social Needs    Financial resource strain: None    Food insecurity - worry: None    Food insecurity - inability: None    Transportation needs - medical: None    Transportation needs - non-medical: None   Occupational History    None   Tobacco Use    Smoking status: Never Smoker    Smokeless tobacco: Never Used   Substance and Sexual Activity    Alcohol use: No     Comment: on rare holidays    Drug use: No    Sexual activity: Yes     Partners: Male     Birth control/protection: None   Other Topics Concern    None   Social History Narrative    None     Family History   Problem Relation Age of Onset    Atrial fibrillation Mother     Diabetes Father     Heart disease Father     Hyperlipidemia Father     Stroke Father     Heart disease Brother     Diabetes Brother     " "Anuerysm Brother     Breast cancer Neg Hx     Colon cancer Neg Hx     Ovarian cancer Neg Hx      OB History      Para Term  AB Living    2 2 2     2    SAB TAB Ectopic Multiple Live Births                       Current Outpatient Medications:     B-complex with vitamin C (Z-BEC OR EQUIV) tablet, Take 1 tablet by mouth once daily., Disp: , Rfl:     cyanocobalamin, vitamin B-12, (VITAMIN B-12) 1,000 mcg Subl, Place under the tongue., Disp: , Rfl:     peg-electrolyte soln (NULYTELY WITH FLAVOR PACKS) 420 gram SolR, Take as directed, Disp: 4000 mL, Rfl: 0    GYNECOLOGY HISTORY:  No abnormal pap/std    DATA REVIEWED:  Last pap: normal Date:   Last mmg: normal Date:   Last colonoscopy: scheduled for 2019      BP (!) 136/90   Ht 5' 5" (1.651 m)   Wt 107.2 kg (236 lb 5.3 oz)   BMI 39.33 kg/m²     ROS:  GENERAL: Denies weight gain or weight loss. Feeling well overall.   SKIN: Denies rash or lesions.   HEAD: Denies head injury or headache.   NODES: Denies enlarged lymph nodes.   CHEST: Denies chest pain or shortness of breath.   CARDIOVASCULAR: Denies palpitations or left sided chest pain.   ABDOMEN: No abdominal pain, constipation, diarrhea, nausea, vomiting or rectal bleeding.   URINARY: No frequency, dysuria, hematuria, or burning on urination.  REPRODUCTIVE: See HPI.   BREASTS: The patient denies pain, lumps, or nipple discharge.   HEMATOLOGIC: No easy bruisability or excessive bleeding.   MUSCULOSKELETAL: Denies joint pain or swelling.   NEUROLOGIC: Denies syncope or weakness.   PSYCHIATRIC: Denies depression, anxiety or mood swings.    PHYSICAL EXAM:    APPEARANCE: Well nourished, well developed, in no acute distress.  AFFECT: WNL, alert and oriented x 3  SKIN: No acne or hirsutism  NECK: Neck symmetric without masses or thyromegaly  NODES: No inguinal, cervical, axillary, or femoral lymph node enlargement  CHEST: Good respiratory effect  ABDOMEN: Soft.  No tenderness or masses.  No " hepatosplenomegaly.  No hernias.  BREASTS: Symmetrical, no skin changes or visible lesions.  No palpable masses, nipple discharge bilaterally.  PELVIC: Normal external genitalia without lesions.  Normal hair distribution.  Adequate perineal body, normal urethral meatus.  Vagina atrophic without lesions or discharge.  Cervix pink, without lesions, discharge or tenderness.  No significant cystocele or rectocele.  Bimanual exam shows uterus to be normal size, regular, mobile and nontender.  Adnexa without masses or tenderness.   EXTREMITIES: No edema.    Encounter for gynecological examination without abnormal finding  -     Liquid-based pap smear, screening  -     HPV High Risk Genotypes, PCR    Hair loss disorder  -     Testosterone, free; Future; Expected date: 12/10/2018  -     ESTROGENS, TOTAL; Future; Expected date: 12/10/2018  -     Progesterone; Future; Expected date: 12/10/2018    Patient was counseled today on A.C.S. Pap guidelines (q3) and recommendations for yearly pelvic exams, yearly mammograms starting age 40, and clinical breast exams; to see her PCP for other health maintenance.

## 2018-12-13 ENCOUNTER — PATIENT MESSAGE (OUTPATIENT)
Dept: OBSTETRICS AND GYNECOLOGY | Facility: CLINIC | Age: 58
End: 2018-12-13

## 2018-12-13 LAB — TESTOST FREE SERPL-MCNC: 2 PG/ML

## 2018-12-14 LAB
HPV HR 12 DNA CVX QL NAA+PROBE: NEGATIVE
HPV16 AG SPEC QL: NEGATIVE
HPV18 DNA SPEC QL NAA+PROBE: NEGATIVE

## 2018-12-18 ENCOUNTER — PATIENT MESSAGE (OUTPATIENT)
Dept: OBSTETRICS AND GYNECOLOGY | Facility: HOSPITAL | Age: 58
End: 2018-12-18

## 2019-01-11 ENCOUNTER — OFFICE VISIT (OUTPATIENT)
Dept: URGENT CARE | Facility: CLINIC | Age: 59
End: 2019-01-11
Payer: MEDICAID

## 2019-01-11 ENCOUNTER — HOSPITAL ENCOUNTER (OUTPATIENT)
Dept: RADIOLOGY | Facility: HOSPITAL | Age: 59
Discharge: HOME | End: 2019-01-11
Attending: NURSE PRACTITIONER
Payer: MEDICAID

## 2019-01-11 VITALS
HEART RATE: 80 BPM | DIASTOLIC BLOOD PRESSURE: 94 MMHG | TEMPERATURE: 98 F | BODY MASS INDEX: 37.53 KG/M2 | OXYGEN SATURATION: 98 % | SYSTOLIC BLOOD PRESSURE: 146 MMHG | RESPIRATION RATE: 18 BRPM | WEIGHT: 225.5 LBS

## 2019-01-11 DIAGNOSIS — R07.81 RIB PAIN ON LEFT SIDE: ICD-10-CM

## 2019-01-11 DIAGNOSIS — H93.8X3 EAR FULLNESS, BILATERAL: Primary | ICD-10-CM

## 2019-01-11 PROCEDURE — 71100 XR RIBS 2 VIEW LEFT: ICD-10-PCS | Mod: 26,LT,, | Performed by: RADIOLOGY

## 2019-01-11 PROCEDURE — 71100 X-RAY EXAM RIBS UNI 2 VIEWS: CPT | Mod: 26,LT,, | Performed by: RADIOLOGY

## 2019-01-11 PROCEDURE — 99999 PR PBB SHADOW E&M-EST. PATIENT-LVL IV: ICD-10-PCS | Mod: PBBFAC,,,

## 2019-01-11 PROCEDURE — 99214 PR OFFICE/OUTPT VISIT, EST, LEVL IV, 30-39 MIN: ICD-10-PCS | Mod: S$PBB,,, | Performed by: FAMILY MEDICINE

## 2019-01-11 PROCEDURE — 99214 OFFICE O/P EST MOD 30 MIN: CPT | Mod: S$PBB,,, | Performed by: FAMILY MEDICINE

## 2019-01-11 PROCEDURE — 99214 OFFICE O/P EST MOD 30 MIN: CPT | Mod: PBBFAC,25,PO

## 2019-01-11 PROCEDURE — 71100 X-RAY EXAM RIBS UNI 2 VIEWS: CPT | Mod: TC,PO,LT

## 2019-01-11 PROCEDURE — 99999 PR PBB SHADOW E&M-EST. PATIENT-LVL IV: CPT | Mod: PBBFAC,,,

## 2019-01-11 NOTE — PROGRESS NOTES
Subjective:       Patient ID: Monica Joy is a 58 y.o. female.    Chief Complaint: Chest Pain and Ear Fullness    59 yo presents to Urgent Care with reports of left rib pain from hitting a wall 2 days ago.        Review of Systems   Constitutional: Negative for fatigue and fever.   HENT:        Ear fullness chronic   Respiratory: Negative for shortness of breath, wheezing and stridor.    Cardiovascular: Negative for chest pain, palpitations and leg swelling.   Genitourinary: Negative for difficulty urinating.   Musculoskeletal: Positive for back pain.   Skin: Negative for color change.   Allergic/Immunologic: Negative for environmental allergies.   Neurological: Negative for dizziness and light-headedness.   Psychiatric/Behavioral: Negative for agitation.       Objective:      Physical Exam   Constitutional: She is oriented to person, place, and time. She appears well-developed and well-nourished.   HENT:   Head: Normocephalic.   Right Ear: A middle ear effusion is present.   Left Ear: A middle ear effusion is present.   Cardiovascular: Normal rate.   Pulmonary/Chest: Effort normal. Chest wall is not dull to percussion. She exhibits tenderness. She exhibits no mass, no bony tenderness, no laceration, no crepitus, no edema, no deformity, no swelling and no retraction.       Neurological: She is alert and oriented to person, place, and time.   Skin: Skin is warm.   Psychiatric: She has a normal mood and affect.   Vitals reviewed.      Assessment:       1. Ear fullness, bilateral    2. Rib pain on left side        Plan:         Monica was seen today for chest pain and ear fullness.    Diagnoses and all orders for this visit:    Ear fullness, bilateral  -     Ambulatory referral to ENT    Rib pain on left side  Comments:  symptomatic treatment  Orders:  -     X-Ray Ribs 2 View Left; Future    Xray wnl.  Follow prescribed treatment plan as directed.  Stay hydrated and rest.  Report to ER if symptoms worsen.  Follow up  with PCP in 2-3 days or sooner if symptoms do not improve.

## 2019-01-11 NOTE — PATIENT INSTRUCTIONS
Anatomy of the Ear    The ear is a complex and delicate organ. It collects sound waves so you can hear the world around you. The ear also has a second function--it helps you keep your balance. Your ear can be divided into 3 parts. The outer ear and middle ear help collect and amplify sound. The inner ear converts sound waves to messages that are sent to the brain. The inner ear also senses the movement and position of your head and body so you can maintain your balance and see clearly, even when you change positions.  The mastoid bone surrounds the middle ear. The external ear collects sound waves. The ear canal carries sound waves to the eardrum. The eardrum vibrates from sound waves, setting the middle ear bones in motion. The middle ear bones (ossicles) vibrate, transmitting sound waves to the inner ear. When the ear is healthy, air pressure remains balanced in the middle ear. The eustachian tube helps control air pressure in the middle ear. The semicircular canals help maintain balance. The vestibular nerve carries balance signals to the brain. The auditory nerve carries sound signals to the brain. The cochlea picks up sound waves and makes nerve signals.     Date Last Reviewed: 10/1/2016  © 6220-3752 [employer]. [address] [place] [address], [place] [address]. All rights reserved. This information is not intended as a substitute for professional medical care. Always follow your healthcare professional's instructions.

## 2019-01-15 ENCOUNTER — TELEPHONE (OUTPATIENT)
Dept: ENDOSCOPY | Facility: HOSPITAL | Age: 59
End: 2019-01-15

## 2019-01-31 ENCOUNTER — ANESTHESIA (OUTPATIENT)
Dept: ENDOSCOPY | Facility: HOSPITAL | Age: 59
End: 2019-01-31
Payer: MEDICAID

## 2019-01-31 ENCOUNTER — ANESTHESIA EVENT (OUTPATIENT)
Dept: ENDOSCOPY | Facility: HOSPITAL | Age: 59
End: 2019-01-31
Payer: MEDICAID

## 2019-01-31 ENCOUNTER — HOSPITAL ENCOUNTER (OUTPATIENT)
Facility: HOSPITAL | Age: 59
Discharge: HOME | End: 2019-01-31
Attending: FAMILY MEDICINE | Admitting: FAMILY MEDICINE
Payer: MEDICAID

## 2019-01-31 VITALS
RESPIRATION RATE: 20 BRPM | BODY MASS INDEX: 38.15 KG/M2 | OXYGEN SATURATION: 98 % | HEIGHT: 65 IN | HEART RATE: 92 BPM | TEMPERATURE: 98 F | SYSTOLIC BLOOD PRESSURE: 142 MMHG | DIASTOLIC BLOOD PRESSURE: 84 MMHG | WEIGHT: 229 LBS

## 2019-01-31 DIAGNOSIS — K63.5 POLYP OF COLON, UNSPECIFIED PART OF COLON, UNSPECIFIED TYPE: ICD-10-CM

## 2019-01-31 DIAGNOSIS — Z12.11 SPECIAL SCREENING FOR MALIGNANT NEOPLASMS, COLON: ICD-10-CM

## 2019-01-31 DIAGNOSIS — Z12.11 COLON CANCER SCREENING: Primary | ICD-10-CM

## 2019-01-31 DIAGNOSIS — Z86.010 HX OF COLONIC POLYPS: ICD-10-CM

## 2019-01-31 DIAGNOSIS — K57.30 DIVERTICULOSIS OF COLON: ICD-10-CM

## 2019-01-31 PROCEDURE — 27201012 HC FORCEPS, HOT/COLD, DISP: Performed by: FAMILY MEDICINE

## 2019-01-31 PROCEDURE — 88305 TISSUE EXAM BY PATHOLOGIST: CPT | Performed by: PATHOLOGY

## 2019-01-31 PROCEDURE — 63600175 PHARM REV CODE 636 W HCPCS: Performed by: NURSE ANESTHETIST, CERTIFIED REGISTERED

## 2019-01-31 PROCEDURE — 37000009 HC ANESTHESIA EA ADD 15 MINS: Performed by: FAMILY MEDICINE

## 2019-01-31 PROCEDURE — 45380 COLONOSCOPY AND BIOPSY: CPT | Performed by: FAMILY MEDICINE

## 2019-01-31 PROCEDURE — 45380 PR COLONOSCOPY,BIOPSY: ICD-10-PCS | Mod: ,,, | Performed by: FAMILY MEDICINE

## 2019-01-31 PROCEDURE — 45380 COLONOSCOPY AND BIOPSY: CPT | Mod: ,,, | Performed by: FAMILY MEDICINE

## 2019-01-31 PROCEDURE — 37000008 HC ANESTHESIA 1ST 15 MINUTES: Performed by: FAMILY MEDICINE

## 2019-01-31 PROCEDURE — 88305 TISSUE EXAM BY PATHOLOGIST: CPT | Mod: 26,,, | Performed by: PATHOLOGY

## 2019-01-31 PROCEDURE — 00811 ANES LWR INTST NDSC NOS: CPT | Performed by: FAMILY MEDICINE

## 2019-01-31 PROCEDURE — 25000003 PHARM REV CODE 250: Performed by: NURSE ANESTHETIST, CERTIFIED REGISTERED

## 2019-01-31 PROCEDURE — 88305 TISSUE SPECIMEN TO PATHOLOGY - SURGERY: ICD-10-PCS | Mod: 26,,, | Performed by: PATHOLOGY

## 2019-01-31 PROCEDURE — 25000003 PHARM REV CODE 250: Performed by: FAMILY MEDICINE

## 2019-01-31 RX ORDER — PROPOFOL 10 MG/ML
VIAL (ML) INTRAVENOUS
Status: DISCONTINUED | OUTPATIENT
Start: 2019-01-31 | End: 2019-01-31

## 2019-01-31 RX ORDER — LIDOCAINE HYDROCHLORIDE 10 MG/ML
INJECTION INFILTRATION; PERINEURAL
Status: DISCONTINUED | OUTPATIENT
Start: 2019-01-31 | End: 2019-01-31

## 2019-01-31 RX ORDER — SODIUM CHLORIDE 0.9 % (FLUSH) 0.9 %
3 SYRINGE (ML) INJECTION
Status: CANCELLED | OUTPATIENT
Start: 2019-01-31

## 2019-01-31 RX ORDER — SODIUM CHLORIDE, SODIUM LACTATE, POTASSIUM CHLORIDE, CALCIUM CHLORIDE 600; 310; 30; 20 MG/100ML; MG/100ML; MG/100ML; MG/100ML
INJECTION, SOLUTION INTRAVENOUS CONTINUOUS
Status: DISCONTINUED | OUTPATIENT
Start: 2019-01-31 | End: 2019-01-31 | Stop reason: HOSPADM

## 2019-01-31 RX ADMIN — PROPOFOL 80 MG: 10 INJECTION, EMULSION INTRAVENOUS at 02:01

## 2019-01-31 RX ADMIN — PROPOFOL 20 MG: 10 INJECTION, EMULSION INTRAVENOUS at 02:01

## 2019-01-31 RX ADMIN — SODIUM CHLORIDE, SODIUM LACTATE, POTASSIUM CHLORIDE, AND CALCIUM CHLORIDE: .6; .31; .03; .02 INJECTION, SOLUTION INTRAVENOUS at 01:01

## 2019-01-31 RX ADMIN — LIDOCAINE HYDROCHLORIDE 50 MG: 10 INJECTION, SOLUTION INFILTRATION; PERINEURAL at 02:01

## 2019-01-31 NOTE — PLAN OF CARE
MD at bedside. Discussed procedure results and plan of care with patient and family. Vital signs stable.

## 2019-01-31 NOTE — [employer] PATIENT INSTRUCTIONS
Discharge Summary/Instructions after an Endoscopic Procedure  Patient Name: Monica Joy  Patient MRN: [id number]  Patient YOB: 1960 Thursday, January 31, 2019 Scout Rivera MD  RESTRICTIONS:  During your procedure today, you received medications for sedation.  These   medications may affect your judgment, balance and coordination.  Therefore,   for 24 hours, you have the following restrictions:   - DO NOT drive a car, operate machinery, make legal/financial decisions,   sign important papers or drink alcohol.    ACTIVITY:  Today: no heavy lifting, straining or running due to procedural   sedation/anesthesia.  The following day: return to full activity including work.  DIET:  Eat and drink normally unless instructed otherwise.     TREATMENT FOR COMMON SIDE EFFECTS:  - Mild abdominal pain, nausea, belching, bloating or excessive gas:  rest,   eat lightly and use a heating pad.  - Sore Throat: treat with throat lozenges and/or gargle with warm salt   water.  - Because air was used during the procedure, expelling large amounts of air   from your rectum or belching is normal.  - If a bowel prep was taken, you may not have a bowel movement for 1-3 days.    This is normal.  SYMPTOMS TO WATCH FOR AND REPORT TO YOUR PHYSICIAN:  1. Abdominal pain or bloating, other than gas cramps.  2. Chest pain.  3. Back pain.  4. Signs of infection such as: chills or fever occurring within 24 hours   after the procedure.  5. Rectal bleeding, which would show as bright red, maroon, or black stools.   (A tablespoon of blood from the rectum is not serious, especially if   hemorrhoids are present.)  6. Vomiting.  7. Weakness or dizziness.  GO DIRECTLY TO THE NEAREST EMERGENCY ROOM IF YOU HAVE ANY OF THE FOLLOWING:      Difficulty breathing              Chills and/or fever over 101 F   Persistent vomiting and/or vomiting blood   Severe abdominal pain   Severe chest pain   Black, tarry stools   Bleeding- more than one  tablespoon   Any other symptom or condition that you feel may need urgent attention  Your doctor recommends these additional instructions:  If any biopsies were taken, your doctors clinic will contact you in 1 to 2   weeks with any results.  - Patient has a contact number available for emergencies.  The signs and   symptoms of potential delayed complications were discussed with the   patient.  Return to normal activities tomorrow.  Written discharge   instructions were provided to the patient.   - Resume previous diet.   - Continue present medications.   - Await pathology results.   - Repeat colonoscopy in 5 years for surveillance.   - Telephone my office for pathology results in 1 week.   - Discharge patient to home (via wheelchair).  For questions, problems or results please call your physician Scout Rivera MD at Work:  [phone]  If you have any questions about the above instructions, call the GI   department at [phone] or call the endoscopy unit at [phone]   from 7am until 3 pm.  [facility] [place], EMERGENCY ROOM PHONE NUMBER:   [phone]  IF A COMPLICATION OR EMERGENCY SITUATION ARISES AND YOU ARE UNABLE TO REACH   YOUR PHYSICIAN - GO DIRECTLY TO THE EMERGENCY ROOM.  I have read or have had read to me these discharge instructions for my   procedure and have received a written copy.  I understand these   instructions and will follow-up with my physician if I have any questions.     __________________________________       _____________________________________  Nurse Signature                                          Patient/Designated   Responsible Party Signature  Scout Rivera MD  1/31/2019 2:21:21 PM  This report has been verified and signed electronically.  [employer]

## 2019-01-31 NOTE — DISCHARGE SUMMARY
Endoscopy Discharge Summary      Admit Date: 1/31/2019    Discharge Date and Time:  1/31/2019 2:23 PM    Attending Physician: Scout Rivera MD     Discharge Physician: Scout Rivera MD     Principal Admitting Diagnoses: Colon cancer screening         Discharge Diagnosis: The primary encounter diagnosis was Colon cancer screening. Diagnoses of Special screening for malignant neoplasms, colon, Hx of colonic polyps, Polyp of colon, unspecified part of colon, unspecified type, and Diverticulosis of colon were also pertinent to this visit.     Discharged Condition: Good    Indication for Admission: Colon cancer screening     Hospital Course: Patient was admitted for an inpatient procedure and tolerated the procedure well with no complications.    Significant Diagnostic Studies: Colonoscopy with cold biopsy polypectomy    Pathology (if any):  Specimen (12h ago, onward)    Start     Ordered    01/31/19 1414  Specimen to Pathology - Surgery  Once     Comments:  #1 Ascending colon polyp#2 Transverse colon polyps     Start Status   01/31/19 1414 Collected (01/31/19 1421)       01/31/19 1418          Estimated Blood Loss: 1 ml.    Discussed with: patient and family.    Disposition: Home.    Follow Up/Patient Instructions:   Current Discharge Medication List      CONTINUE these medications which have NOT CHANGED    Details   B-complex with vitamin C (Z-BEC OR EQUIV) tablet Take 1 tablet by mouth once daily.      cyanocobalamin, vitamin B-12, (VITAMIN B-12) 1,000 mcg Subl Place under the tongue.    Associated Diagnoses: Bronchitis             Discharge Procedure Orders   Diet general     Call MD for:  temperature >100.4     Call MD for:  persistent nausea and vomiting     Call MD for:  severe uncontrolled pain     Call MD for:  difficulty breathing, headache or visual disturbances     Call MD for:  redness, tenderness, or signs of infection (pain, swelling, redness, odor or green/yellow discharge around incision site)      Call MD for:  hives     Call MD for:  persistent dizziness or light-headedness     No dressing needed       Follow-up Information     Scout Rivera MD. Call in 1 week.    Specialty:  Family Medicine  Why:  To receive pathology results.  Contact information:  [address] [facility] [address] [phone]

## 2019-01-31 NOTE — ANESTHESIA PREPROCEDURE EVALUATION
01/31/2019  Monica Joy is a 58 y.o., female.    Anesthesia Evaluation    I have reviewed the Patient Summary Reports.    I have reviewed the Nursing Notes.   I have reviewed the Medications.     Review of Systems  Anesthesia Hx:  No problems with previous Anesthesia  Denies Family Hx of Anesthesia complications.   Denies Personal Hx of Anesthesia complications.   Social:  No Alcohol Use, Non-Smoker    Hematology/Oncology:     Oncology Normal    -- Anemia:   Cardiovascular:   Denies Hypertension.  Denies MI.   Denies CABG/stent.         Pulmonary:   Denies COPD. Asthma mild and asymptomatic  Denies Sleep Apnea.    Renal/:  Renal/ Normal     Hepatic/GI:   Bowel Prep. Denies GERD. Denies Liver Disease.  Denies Hepatitis.    Musculoskeletal:   Arthritis     Neurological:   Denies CVA. Denies Seizures.    Endocrine:  Endocrine Normal        Physical Exam  General:  Obesity    Airway/Jaw/Neck:  Airway Findings: Mouth Opening: Normal Tongue: Normal  General Airway Assessment: Adult  Mallampati: II      Dental:  Dental Findings: In tact   Chest/Lungs:  Chest/Lungs Findings: Clear to auscultation, Normal Respiratory Rate     Heart/Vascular:  Heart Findings: Rate: Normal  Rhythm: Regular Rhythm  Sounds: Normal             Anesthesia Plan  Type of Anesthesia, risks & benefits discussed:  Anesthesia Type:  MAC  Patient's Preference:   Intra-op Monitoring Plan: standard ASA monitors  Intra-op Monitoring Plan Comments:   Post Op Pain Control Plan:   Post Op Pain Control Plan Comments:   Induction:   IV  Beta Blocker:  Patient is not currently on a Beta-Blocker (No further documentation required).       Informed Consent: Patient understands risks and agrees with Anesthesia plan.  Questions answered. Anesthesia consent signed with patient.  ASA Score: 2     Day of Surgery Review of History & Physical: I have  interviewed and examined the patient. I have reviewed the patient's H&P dated:  There are no significant changes.  H&P update referred to the surgeon.         Ready For Surgery From Anesthesia Perspective.

## 2019-01-31 NOTE — ANESTHESIA POSTPROCEDURE EVALUATION
"Anesthesia Post Evaluation    Patient: Monica Joy    Procedure(s) Performed: Procedure(s) (LRB):  COLONOSCOPY (N/A)    Final Anesthesia Type: MAC  Patient location during evaluation: PACU  Patient participation: Yes- Able to Participate  Level of consciousness: awake  Post-procedure vital signs: reviewed and stable  Pain management: adequate  Airway patency: patent  PONV status at discharge: No PONV  Anesthetic complications: no      Cardiovascular status: blood pressure returned to baseline and hemodynamically stable  Respiratory status: unassisted, room air and spontaneous ventilation  Hydration status: euvolemic  Follow-up not needed.        Visit Vitals  /72   Pulse 98   Temp 36.8 °C (98.2 °F) (Tympanic)   Resp 14   Ht 5' 5" (1.651 m)   Wt 103.9 kg (229 lb)   SpO2 96%   Breastfeeding? No   BMI 38.11 kg/m²       Pain/Cliff Score: No Data Recorded      "

## 2019-01-31 NOTE — OR NURSING
D/C instructions given to pt and spouse. Demonstrated understanding by teach back method. Pt getting dressed.

## 2019-01-31 NOTE — DISCHARGE INSTRUCTIONS
Diverticulosis    Diverticulosis means that small pouches have formed in the wall of your large intestine (colon). Most often, this problem causes no symptoms and is common as people age. But the pouches in the colon are at risk of becoming infected. When this happens, the condition is called diverticulitis. Although most people with diverticulosis never develop diverticulitis, it is still not uncommon. Rectal bleeding can also occur and in less common situations, a type of colon inflammation called colitis.  While most people do not have symptoms, some people with diverticulosis may have:  · Abdominal cramps and pain  · Bloating  · Constipation  · Change in bowel habits  Causes  The exact cause of diverticulosis (and diverticulitis) has not been proved, but a few things are associated with the condition:  · Low-fiber diet  · Constipation  · Lack of exercise  Your healthcare provider will talk with you about how to manage your condition. Diet changes may be all that are needed to help control diverticulosis and prevent progression to diverticulitis. If you develop diverticulitis, you will likely need other treatments.  Home care  You may be told to take fiber supplements daily. Fiber adds bulk to the stool so that it passes through the colon more easily. Stool softeners may be recommended. You may also be given medications for pain relief. Be sure to take all medications as directed.  In the past, people were told to avoid corn, nuts, and seeds. This is no longer necessary.  Follow these guidelines when caring for yourself at home:  · Eat unprocessed foods that are high in fiber. Whole grains, fruits, and vegetables are good choices.  · Drink 6 to 8 glasses of water every day unless your healthcare provider has you limit how much fluid you should have.  · Watch for changes in your bowel movements. Tell your provider if you notice any changes.  · Begin an exercise program. Ask your provider how to get started.  Generally, walking is the best.  · Get plenty of rest and sleep.  Follow-up care  Follow up with your healthcare provider, or as advised. Regular visits may be needed to check on your health. Sometimes special procedures such as colonoscopy, are needed after an episode of diverticulitis or blooding. Be sure to keep all your appointments.  If a stool sample was taken, or cultures were done, you should be told if they are positive, or if your treatment needs to be changed. You can call as directed for the results.  If X-rays were done, a radiologist will look at them. You will be told if there is a change in your treatment.  If antibiotics were prescribed, be sure to finish them all.  When to seek medical advice  Call your healthcare provider right away if any of these occur:  · Fever of 100.4°F (38°C) or higher, or as directed by your healthcare provider  · Severe cramps in the lower left side of the abdomen or pain that is getting worse  · Tenderness in the lower left side of the abdomen or worsening pain throughout the abdomen  · Diarrhea or constipation that doesn't get better within 24 hours  · Nausea and vomiting  · Bleeding from the rectum  Call 911  Call emergency services if any of the following occur:  · Trouble breathing  · Confusion  · Very drowsy or trouble awakening  · Fainting or loss of consciousness  · Rapid heart rate  · Chest pain  Date Last Reviewed: 12/30/2015 © 2000-2017 [employer]. [address] [place] [address]. All rights reserved. This information is not intended as a substitute for professional medical care. Always follow your healthcare professional's instructions.        Understanding Colon and Rectal Polyps    The colon (also called the large intestine) is a muscular tube that forms the last part of the digestive tract. It absorbs water and stores food waste. The colon is about 4 to 6 feet long. The rectum is the last 6 inches of the colon. The colon and rectum  have a smooth lining composed of millions of cells. Changes in these cells can lead to growths in the colon that can become cancerous and should be removed. Multiple tests are available to screen for colon cancer, but the colonoscopy is the most recommended test. During colonoscopy, these polyps can be removed. How often you need this test depends on many things including your condition, your family history, symptoms, and what the findings were at the previous colonoscopy.   When the colon lining changes  Changes that happen in the cells that line the colon or rectum can lead to growths called polyps. Over a period of years, polyps can turn cancerous. Removing polyps early may prevent cancer from ever forming.  Polyps  Polyps are fleshy clumps of tissue that form on the lining of the colon or rectum. Small polyps are usually benign (not cancerous). However, over time, cells in a polyp can change and become cancerous. Certain types of polyps known as adenomatous polyps are premalignant. The risk for invasive cancer increases with the size of the polyp and certain cell and gene features. This means that they can become cancerous if they're not removed. Hyperplastic polyps are benign. They can grow quite large and not turn cancerous.   Cancer  Almost all colorectal cancers start when polyp cells begin growing abnormally. As a cancerous tumor grows, it may involve more and more of the colon or rectum. In time, cancer can also grow beyond the colon or rectum and spread to nearby organs or to glands called lymph nodes. The cells can also travel to other parts of the body. This is known as metastasis. The earlier a cancerous tumor is removed, the better the chance of preventing its spread.    Date Last Reviewed: 8/1/2016  © 2577-7743 [employer]. [address] [place] [address], [place] [address]. All rights reserved. This information is not intended as a substitute for professional medical care. Always follow your  healthcare professional's instructions.

## 2019-01-31 NOTE — H&P
Short Stay Endoscopy History and Physical    PCP - Karen Villafana, DO    Procedure - Colonoscopy  ASA - 2  Mallampati - per anesthesia  History of Anesthesia problems - no  Family history Anesthesia problems -  no     HPI:  This is a 58 y.o. female here for evaluation of :   Active Hospital Problems    Diagnosis  POA    *Colon cancer screening [Z12.11]  Not Applicable    Hx of colonic polyps [Z86.010]  Not Applicable      Resolved Hospital Problems   No resolved problems to display.         Health Maintenance       Date Due Completion Date    Aspirin/Antiplatelet Therapy 1979 ---    Colonoscopy 2017    Influenza Vaccine 2019 (Originally 2018) 2016    Mammogram 2020    Pap Smear with HPV Cotest 12/10/2021 12/10/2018    Lipid Panel 2023    TETANUS VACCINE 2028          Screening - yes  History of polyps - yes  Diarrhea - no  Anemia - no  Blood in stools - no  Abdominal pain - no  Other - no    ROS:  CONSTITUTIONAL: Denies weight change,  fatigue, fevers, chills, night sweats.  CARDIOVASCULAR: Denies chest pain, shortness of breath, orthopnea and edema.  RESPIRATORY: Denies cough, hemoptysis, dyspnea, and wheezing.  GI: See HPI.    Medical History:   Past Medical History:   Diagnosis Date    Arthritis     Asthma 2013    Thyroid disease        Surgical History:   Past Surgical History:   Procedure Laterality Date    ABLATION ENDOMETRIAL THERMAL- NOVASURE N/A 2016    [facility]formed [facility]     SECTION      COLONOSCOPY N/A 2014    Performed by Vasu Dobson III, MD at [facility] ENDO    FRACTURE SURGERY      [id number]  2016    [facility] at [facility] OR    JOINT REPLACEMENT      Novasure Endometrial Ablation  2016    THYROIDECTOMY, PARTIAL      TUBAL LIGATION      WRIST SURGERY         Family History:   Family History   Problem Relation  Age of Onset    Atrial fibrillation Mother     Diabetes Father     Heart disease Father     Hyperlipidemia Father     Stroke Father     Heart disease Brother     Diabetes Brother     Anuerysm Brother     Breast cancer Neg Hx     Colon cancer Neg Hx     Ovarian cancer Neg Hx        Social History:   Social History     Tobacco Use    Smoking status: Never Smoker    Smokeless tobacco: Never Used   Substance Use Topics    Alcohol use: No     Comment: on rare holidays    Drug use: No       Allergies:   Review of patient's allergies indicates:   Allergen Reactions    Amoxicillin Rash and Swelling     Rash with throat swelling    Codeine Palpitations, Rash and Swelling     Other reaction(s): Shortness of breath  Other reaction(s): Hives  Rash and throat swelling    Hydrocodone Rash and Swelling    Iodine and iodide containing products Rash and Swelling    Medrol [methylprednisolone]     Oxycodone Shortness Of Breath, Rash and Swelling    Oxycodone hcl-oxycodone-asa Rash and Swelling    Percodan filipe Shortness Of Breath    Tetracyclines Rash and Swelling    Tramadol Shortness Of Breath, Rash and Swelling    Augmentin [amoxicillin-pot clavulanate] Itching and Rash    Levaquin [levofloxacin] Hives    Sulfa (sulfonamide antibiotics) Itching and Rash    Doxycycline Swelling     Other reaction(s): Rash    Lortab  [hydrocodone-acetaminophen]      Other reaction(s): Hives  Other reaction(s): Difficulty breathing    Percocet  [oxycodone-acetaminophen]      Other reaction(s): Shortness of breath    Shellfish containing products      Other reaction(s): Shortness of breath  Other reaction(s): Hives    Tetracycline Swelling    Vibramycin  [doxycycline calcium]      Other reaction(s): Hives  Other reaction(s): Difficulty breathing       Medications:   No current facility-administered medications on file prior to encounter.      Current Outpatient Medications on File Prior to Encounter   Medication Sig  Dispense Refill    B-complex with vitamin C (Z-BEC OR EQUIV) tablet Take 1 tablet by mouth once daily.      cyanocobalamin, vitamin B-12, (VITAMIN B-12) 1,000 mcg Subl Place under the tongue.         Physical Exam:  Vital Signs: see nurses notes.  General Appearance: Well appearing in no acute distress  ENT: OP clear  Chest: CTA B  CV: RRR, no m/r/g  Abd: s/nt/nd/nabs  Ext: no edema    Labs:Reviewed    IMP:  Active Hospital Problems    Diagnosis  POA    *Colon cancer screening [Z12.11]  Not Applicable    Hx of colonic polyps [Z86.010]  Not Applicable      Resolved Hospital Problems   No resolved problems to display.         Plan:   I have explained the risks and benefits of colonoscopy to the patient including but not limited to bleeding, perforation, infection, and death. The patient wishes to proceed.

## 2019-01-31 NOTE — TRANSFER OF CARE
"Anesthesia Transfer of Care Note    Patient: Monica Joy    Procedure(s) Performed: Procedure(s) (LRB):  COLONOSCOPY (N/A)    Patient location: PACU    Anesthesia Type: MAC    Transport from OR: Transported from OR on room air with adequate spontaneous ventilation    Post pain: adequate analgesia    Post assessment: no apparent anesthetic complications and tolerated procedure well    Post vital signs: stable    Level of consciousness: awake    Nausea/Vomiting: no nausea/vomiting    Complications: none    Transfer of care protocol was followed      Last vitals:   Visit Vitals  /72   Pulse 98   Temp 36.8 °C (98.2 °F) (Tympanic)   Resp 14   Ht 5' 5" (1.651 m)   Wt 103.9 kg (229 lb)   SpO2 96%   Breastfeeding? No   BMI 38.11 kg/m²     "

## 2019-01-31 NOTE — ANESTHESIA RELEASE NOTE
"Anesthesia Release from PACU Note    Patient: Monica Joy    Procedure(s) Performed: Procedure(s) (LRB):  COLONOSCOPY (N/A)    Anesthesia type: MAC    Post pain: Adequate analgesia    Post assessment: no apparent anesthetic complications, tolerated procedure well and no evidence of recall    Last Vitals:   Visit Vitals  /72   Pulse 98   Temp 36.8 °C (98.2 °F) (Tympanic)   Resp 14   Ht 5' 5" (1.651 m)   Wt 103.9 kg (229 lb)   SpO2 96%   Breastfeeding? No   BMI 38.11 kg/m²       Post vital signs: stable    Level of consciousness: awake    Nausea/Vomiting: no nausea/no vomiting    Complications: none    Airway Patency: patent    Respiratory: unassisted, spontaneous ventilation, room air    Cardiovascular: stable and blood pressure at baseline    Hydration: euvolemic  "

## 2019-02-05 ENCOUNTER — PATIENT MESSAGE (OUTPATIENT)
Dept: INTERNAL MEDICINE | Facility: CLINIC | Age: 59
End: 2019-02-05

## 2019-02-06 NOTE — PROGRESS NOTES
Dear Karen Villafana, ,    I recently cared for Monica Joy and performed an endoscopy.  Tissue was sent for pathology evaluation and I will have a letter written to ask the patient to repeat the colonoscopy in 5 years.  The pathology showed that there was adenomatous tissue present.  Thank you for allowing me to participate in the care of your patient.  Please call me for any questions that you might have.      Dr. Scout Rivera  [phone] cell  [phone] office      NURSING STAFF:Please  inform the patient that I reviewed the recent pathology obtained at the time of colonoscopy.    The results showed that there was adenomatous tissue present which is benign and based on that, I recommend that the patient have a repeat colonoscopy performed in 5 years.     If the patient has MyChart, this message has been sent to them.  Confirm that they read the note.  If not, copy the information and print a letter to send to the patient at this time.  Confirm that a notation to the PCP was done.      Dear Monica Joy,    This is to inform you that I have reviewed your recent colonoscopy pathology.  The results showed that you had adenomatous tissue present which is benign and based on that, I recommend that you have a repeat colonoscopy performed in 5 years.      Dr. Scout Rivera  [phone]

## 2019-02-06 NOTE — TELEPHONE ENCOUNTER
These are resulted by the provider that performs the procedure. I see he sent her a message and she viewed it on 2/6.

## 2019-02-21 ENCOUNTER — CLINICAL SUPPORT (OUTPATIENT)
Dept: AUDIOLOGY | Facility: CLINIC | Age: 59
End: 2019-02-21
Payer: MEDICAID

## 2019-02-21 ENCOUNTER — OFFICE VISIT (OUTPATIENT)
Dept: OTOLARYNGOLOGY | Facility: CLINIC | Age: 59
End: 2019-02-21
Payer: MEDICAID

## 2019-02-21 VITALS
SYSTOLIC BLOOD PRESSURE: 145 MMHG | HEART RATE: 83 BPM | TEMPERATURE: 98 F | WEIGHT: 232.81 LBS | BODY MASS INDEX: 38.74 KG/M2 | DIASTOLIC BLOOD PRESSURE: 87 MMHG

## 2019-02-21 DIAGNOSIS — H65.491 CHRONIC MEE (MIDDLE EAR EFFUSION), RIGHT: Primary | ICD-10-CM

## 2019-02-21 DIAGNOSIS — H69.91 DYSFUNCTION OF RIGHT EUSTACHIAN TUBE: ICD-10-CM

## 2019-02-21 DIAGNOSIS — H90.3 SENSORINEURAL HEARING LOSS, BILATERAL: Primary | ICD-10-CM

## 2019-02-21 DIAGNOSIS — H69.93 DYSFUNCTION OF BOTH EUSTACHIAN TUBES: ICD-10-CM

## 2019-02-21 DIAGNOSIS — H93.13 TINNITUS, BILATERAL: ICD-10-CM

## 2019-02-21 PROCEDURE — 99999 PR PBB SHADOW E&M-EST. PATIENT-LVL I: ICD-10-PCS | Mod: PBBFAC,,, | Performed by: AUDIOLOGIST-HEARING AID FITTER

## 2019-02-21 PROCEDURE — 92567 TYMPANOMETRY: CPT | Mod: PBBFAC,PN | Performed by: AUDIOLOGIST-HEARING AID FITTER

## 2019-02-21 PROCEDURE — 99204 PR OFFICE/OUTPT VISIT, NEW, LEVL IV, 45-59 MIN: ICD-10-PCS | Mod: S$PBB,,, | Performed by: PHYSICIAN ASSISTANT

## 2019-02-21 PROCEDURE — 99999 PR PBB SHADOW E&M-EST. PATIENT-LVL III: ICD-10-PCS | Mod: PBBFAC,,, | Performed by: PHYSICIAN ASSISTANT

## 2019-02-21 PROCEDURE — 92553 AUDIOMETRY AIR & BONE: CPT | Mod: PBBFAC,PN | Performed by: AUDIOLOGIST-HEARING AID FITTER

## 2019-02-21 PROCEDURE — 99211 OFF/OP EST MAY X REQ PHY/QHP: CPT | Mod: PBBFAC,PN,25 | Performed by: AUDIOLOGIST-HEARING AID FITTER

## 2019-02-21 PROCEDURE — 99999 PR PBB SHADOW E&M-EST. PATIENT-LVL III: CPT | Mod: PBBFAC,,, | Performed by: PHYSICIAN ASSISTANT

## 2019-02-21 PROCEDURE — 99999 PR PBB SHADOW E&M-EST. PATIENT-LVL I: CPT | Mod: PBBFAC,,, | Performed by: AUDIOLOGIST-HEARING AID FITTER

## 2019-02-21 PROCEDURE — 99213 OFFICE O/P EST LOW 20 MIN: CPT | Mod: PBBFAC,PN,27,25 | Performed by: PHYSICIAN ASSISTANT

## 2019-02-21 PROCEDURE — 99204 OFFICE O/P NEW MOD 45 MIN: CPT | Mod: S$PBB,,, | Performed by: PHYSICIAN ASSISTANT

## 2019-02-21 RX ORDER — LEVOCETIRIZINE DIHYDROCHLORIDE 5 MG/1
5 TABLET, FILM COATED ORAL NIGHTLY
Qty: 30 TABLET | Refills: 11 | Status: SHIPPED | OUTPATIENT
Start: 2019-02-21 | End: 2020-03-19

## 2019-02-21 RX ORDER — FLUTICASONE PROPIONATE 50 MCG
2 SPRAY, SUSPENSION (ML) NASAL 2 TIMES DAILY
Qty: 9.9 ML | Refills: 11 | Status: SHIPPED | OUTPATIENT
Start: 2019-02-21 | End: 2019-08-13

## 2019-02-21 RX ORDER — METHYLPREDNISOLONE 4 MG/1
TABLET ORAL
Qty: 1 PACKAGE | Refills: 0 | Status: SHIPPED | OUTPATIENT
Start: 2019-02-21 | End: 2019-03-12

## 2019-02-21 NOTE — LETTER
February 21, 2019      Cristina Carranza NP  [address] [facility] [address]           [facility]  [address]  [place] [address]  Phone: [phone]  Fax: [phone]          Patient: Monica Joy   MR Number: [id number]   YOB: 1960   Date of Visit: 2/21/2019       Dear Cristina Carranza:    Thank you for referring Monica Joy to me for evaluation. Attached you will find relevant portions of my assessment and plan of care.    If you have questions, please do not hesitate to call me. I look forward to following Monica Joy along with you.    Sincerely,    Mary Rodrigez PA-C    Enclosure  CC:  No Recipients    If you would like to receive this communication electronically, please contact [email or web address] or [phone] to request more information on [employer] Link access.    For providers and/or their staff who would like to refer a patient to [facility], please contact us through our one-stop-shop provider referral line, [facility], at [phone].    If you feel you have received this communication in error or would no longer like to receive these types of communications, please e-mail [email or web address]

## 2019-02-21 NOTE — PROGRESS NOTES
Referring Provider:    Cristina Carranza Np  [address] [place] [address]  Subjective:   Patient: Monica Joy [id number], :1960   Visit date:2019 8:46 AM    Chief Complaint:  Other (f/u audio)    HPI:  Monica is a 58 y.o. female who I was asked to see in consultation for evaluation of the following issue(s):    Patient presented to clinic for the first time on 19 referred by her PCP for chronic SEEMA. She reports years of feeling fullness in her ears with intermittent popping that is worse in the right. She denies otalgia. She c/o muffled hearing. She has a long hx of seasonal allergies for which she has taken otc Zyrtec prn without relief. She has not had allergy testing in the past. She c/o nasal congestion, denies rhinorrhea. She denies any recent illness, fever(s), or cough. No other relieving factors.     To note: Pt has a hx of rash/ focal swelling with spinal steroid injection many years ago, unsure if she has any rxn to oral steroid(s). Believes she has taken a Medrol Dosepak in the past w/o issues.     Review of Systems:  -     Allergic/Immunologic: has No Known Allergies..  -     Constitutional: Current temp:         Allergy/Imm: is allergic to amoxicillin; codeine; hydrocodone; iodine and iodide containing products; medrol [methylprednisolone]; oxycodone; oxycodone hcl-oxycodone-asa; percodan filipe; tetracyclines; tramadol; augmentin [amoxicillin-pot clavulanate]; levaquin [levofloxacin]; sulfa (sulfonamide antibiotics); doxycycline; lortab  [hydrocodone-acetaminophen]; percocet  [oxycodone-acetaminophen]; shellfish containing products; tetracycline; and vibramycin  [doxycycline calcium].    Her meds, allergies, medical, surgical, social & family histories were reviewed & updated:  -     She has a current medication list which includes the following prescription(s): b-complex with vitamin c, cyanocobalamin (vitamin b-12), fluticasone, levocetirizine, and methylprednisolone.  -      She  has a past medical history of Arthritis, Asthma (2013), and Thyroid disease.   -     She does not have any pertinent problems on file.   -     She  has a past surgical history that includes Thyroidectomy, partial; Wrist surgery; Joint replacement; Fracture surgery;  section; Tubal ligation; Novasure Endometrial Ablation (2016); and Colonoscopy (N/A, 2019).  -     She  reports that  has never smoked. she has never used smokeless tobacco. She reports that she does not drink alcohol or use drugs.  -     Her family history includes Anuerysm in her brother and mother; Atrial fibrillation in her mother; Diabetes in her brother and father; Heart disease in her brother and father; Hyperlipidemia in her father; Kidney disease in her father; Stroke in her father.  -     She is allergic to amoxicillin; codeine; hydrocodone; iodine and iodide containing products; medrol [methylprednisolone]; oxycodone; oxycodone hcl-oxycodone-asa; percodan filipe; tetracyclines; tramadol; augmentin [amoxicillin-pot clavulanate]; levaquin [levofloxacin]; sulfa (sulfonamide antibiotics); doxycycline; lortab  [hydrocodone-acetaminophen]; percocet  [oxycodone-acetaminophen]; shellfish containing products; tetracycline; and vibramycin  [doxycycline calcium].    Objective:     Physical Exam:  Vitals:  BP (!) 145/87   Pulse 83   Temp 97.8 °F (36.6 °C) (Tympanic)   Wt 105.6 kg (232 lb 12.9 oz)   BMI 38.74 kg/m²   General appearance:  Well developed, well nourished    Eyes:  Extraocular motions intact, PERRL    Communication:  no hoarseness, no dysphonia    Ears: Right TM is with moderate bulge, clear fluid present + bubbles, intact. R EAC WNL. L TM is with mild retraction, clear, intact.   Nose:  No masses/lesions of external nose, nasal mucosa, septum, and turbinates were within normal limits.  Mouth:  No mass/lesion of lips, teeth, gums, hard/soft palate, tongue, tonsils, or oropharynx.    Cardiovascular:  No pedal  edema; Radial Pulses +2     Neck & Lymphatics:  No cervical lymphadenopathy, no neck mass/crepitus/ asymmetry, trachea is midline, no thyroid enlargement/tenderness/mass.    Psych: Oriented x3,  Alert with normal mood and affect.     Respiration/Chest:  Symmetric expansion during respiration, normal respiratory effort.    Skin:  Warm and intact. No ulcerations of face, scalp, neck.    Audiogram:          Assessment & Plan:   Monica was seen today for other.    Diagnoses and all orders for this visit:    Chronic SEEMA (middle ear effusion), right    Dysfunction of both eustachian tubes    Other orders  -     levocetirizine (XYZAL) 5 MG tablet; Take 1 tablet (5 mg total) by mouth every evening.  -     fluticasone (FLONASE) 50 mcg/actuation nasal spray; 2 sprays (100 mcg total) by Each Nare route 2 (two) times daily.  -     methylPREDNISolone (MEDROL DOSEPACK) 4 mg tablet; use as directed    Issued Medrol Dosepak, advised patient to take with food and plenty of water and to please notify myself or staff if she experiences any adverse rxn and if so to d/c medication. She voice agreeing and understanding.   Start nasal saline rinses BID - [facility] handout given in clinic.   Trial with Xyzal QHS  Flonase BID  Recheck in 3 weeks, if no improvement will plan for PET to R ear.     We discussed her medical conditions, treatments and plan.  Monica should return to clinic if any issues arise (symptoms worsen or persist), otherwise we will see her back in the clinic In 3 weeks.    Over 25 minutes were spent in face to face contact with the patient with greater than 50% spent discussing their diagnosis and coordination of care.     Thank you for allowing me to participate in the care of Monica.    Mary Rodrigez PA-C

## 2019-02-21 NOTE — PATIENT INSTRUCTIONS
PLEASE PERFORM SINUS RINSES 3-4 TIMES DAILY UNTIL YOUR NEXT VISIT.          DIRECTIONS FOR SINUS SALINE RINSE To see demonstration: Enter http://www.youtube.com/watch?v=[email or web address] into the browser address box, or go to You tube, and under the search box, enter sinus rinse. Click on [facility] Sinus Rinse Video    Step 1    Step 1 Please wash your hands. Fill the clean bottle with the designated volume of warm distilled water, filtered water or previously boiled water. You may warm the water in a microwave but we recommend that you warm it in increments of five seconds. This is to avoid excessive heating of the water and damage to the device or scalding your nasal passage.    Step 2    Step 2 Cut the SINUS RINSE mixture packet at the corner and pour its contents into the bottle. Tighten the cap & tube on the bottle securely, place one finger over the tip of the cap and shake the bottle gently to dissolve the mixture.      Step 3    Step 3 Standing in front of a sink, bend forward to your comfort level and tilt your head down. Keeping your mouth open without holding your breath, place cap snugly against your nasal passage and SQUEEZE BOTTLE GENTLY until the solution starts draining from the OPPOSITE nasal passage or from your mouth. Keep squeezing the bottle GENTLY until at least 1/4 to 1/2 (60 to 120 mL) of the bottle is used for a proper rinse. Do not swallow the solution.    Step 4    Blow your nose gently, without pinching your nose completely because this will apply pressure on the    eardrums. If tolerable, sniff in any residual solution remaining in the nasal passage once or twice prior to    blowing your nose as this may clean out the posterior nasopharyngeal area (the area at the back of your    nasal passage). Some solution will reach the back of the throat, so please spit it out. To help improve    drainage of any residual solution, blow your nose gently while tilting your head to the opposite side  of    the nasal passage that you just rinsed.    Step 5    Now repeat steps 3 & 4 for your other nasal passage.    Step 6 Air dry the Sinus Rinse bottle, cap, and tube on a clean paper towel, a glass plate to store the bottle cap and tube. If there is any solution leftover, please discard it. We recommend you make a fresh solution each time you rinse. Rinse 5 times each day, OR as directed by your physician. Warnings: DO NOT RINSE IF NASAL PASSAGE IS COMPLETELY BLOCKED OR IF YOU HAVE AN EAR INFECTION OR BLOCKED EARS. If you have had ear surgery, please contact your physician prior to irrigation. If you experience any pressure in your ears, stop the rinse and get further directions from your physician or contact our office during regular business hours. To avoid any ear discomfort: Heat the solution to lukewarm, do not use hot, boiling or cold water. Keep your mouth open. Do not hold your breath and if possible make the sound VERONIQUE....VERONIQUE... Make sure to take the position as shown. Gently squeeze 1/4 of the bottle at a time (60mL / 2 ounces). Stop the rinse if you feel any solution sensation near the ears. You may rinse with a partially blocked nasal passage. Please do not use for any other purposes. Please rinse at least ONE HOUR PRIOR to bedtime, in order to avoid any residual solution dripping in the throat.    >> Before using the SINUS RINSE kit, please inspect the cap, tube and bottle carefully for wear and    tear. If any of the components appear discolored or cracked, please contact [employer] to obtain a    replacement. You must follow the cleaning instructions provided in this brochure or cleaning instruction    card prior to each use.    >> The SINUS RINSE bottle and mixture are to be used only for nasalirrigation. Do not use for any other    purposes.    >> We recommend that you use the rinse ONE HOUR PRIOR to bedtime in order to avoid any residual    solution dripping in the throat.    Tips to avoid ear  discomfort while rinsing    If you have had ear surgery, please contact your physician prior to irrigation. Do not use if you have an    ear infection or blocked ears. Rinse with lukewarm water. Keep your mouth open. Do not hold your    breath while rinsing. While rinsing, make sure to tilt your. Gently squeeze the bottle while rinsing; do    not squeeze the bottle very forcefully. Stop the rinse if you feel a sensation of fluid near your ears.    Tips to avoid unexpected drainage after rinsing    In rare situations, especially if you have had sinus surgery, the saline solution can pool in the sinus    cavities and nasal passages and then drip from your nostrils hours after rinsing. To avoid this harmless    but annoying inconvenience, take one extra step after rinsing: lean forward, tilt your head sideways and    gently blow your nose. Then, tilt your head to the other side and blow again. You may need to repeat    this several times. This will help rid your nasal passages of any excess mucus and remaining saline    solution. If you find yourself experiencing delayed drainage often, do not rinse right before leaving your    house or going to bed.

## 2019-02-21 NOTE — PROGRESS NOTES
Referring Provider:Karen Villafana MD    Monica Joy was seen 02/21/2019 for an audiological evaluation.  Patient complains of fluctuating ETD. She reports always being told at the doctor that she has fluid behind her ear drums. She does not take any medications regularly for ETD. She reports bilateral tinnitus that sounds like crickets. She has a family hx of hearing loss (I.e., mother, grandmother, grandson). Denies otalgia and vertigo.    Results reveal a mild-to-moderate sensorineural hearing loss 250-8000 Hz for the right ear, and a borderline normal, to moderate sensorineural hearing loss 250-8000 Hz for the left ear.   Speech could not be completed due to chaz voice issues.   Tympanograms were Type A, with slight pressure for the right ear and Type A, normal for the left ear.    Patient was counseled on the above findings.    Recommendations:  1. ENT  2. Audiograms as needed to monitor SNHL AU.  3. Possible trial with hearing aid for the right ear. Patient not interested at this time.

## 2019-03-11 NOTE — PROGRESS NOTES
Referring Provider:    No referring provider defined for this encounter.  Subjective:   Patient: Monica Joy [id number], :1960   Visit date:3/12/2019 8:46 AM    Chief Complaint:  Other (follow up possible tube placement.)    HPI:  Monica is a 58 y.o. female who I was asked to see in consultation for evaluation of the following issue(s):    Patient presented to clinic for the first time on 19 referred by her PCP for chronic SEEMA. She reports years of feeling fullness in her ears with intermittent popping that is worse in the right. She denies otalgia. She c/o muffled hearing. She has a long hx of seasonal allergies for which she has taken otc Zyrtec prn without relief. She has not had allergy testing in the past. She c/o nasal congestion, denied rhinorrhea. No recent illness, fever(s), or cough. Pt was tx with Medrol, Xyzal, and Flonase for R SEEMA. She returned to clinic on 19 and reported no relief.  Bilateral ear pressure and hearing loss.  Fluctuating in nature.  No TMJ tenderness.  No grinding teeth.     Review of Systems:  -     Allergic/Immunologic: has No Known Allergies..  -     Constitutional: Current temp:         Allergy/Imm: is allergic to amoxicillin; codeine; hydrocodone; iodine and iodide containing products; medrol [methylprednisolone]; oxycodone; oxycodone hcl-oxycodone-asa; percodan filipe; tetracyclines; tramadol; augmentin [amoxicillin-pot clavulanate]; levaquin [levofloxacin]; sulfa (sulfonamide antibiotics); doxycycline; lortab  [hydrocodone-acetaminophen]; percocet  [oxycodone-acetaminophen]; shellfish containing products; tetracycline; and vibramycin  [doxycycline calcium].    Her meds, allergies, medical, surgical, social & family histories were reviewed & updated:  -     She has a current medication list which includes the following prescription(s): b-complex with vitamin c, cyanocobalamin (vitamin b-12), fluticasone, and levocetirizine.  -     She  has a past medical history  "of Arthritis, Asthma (2013), and Thyroid disease.   -     She does not have any pertinent problems on file.   -     She  has a past surgical history that includes Thyroidectomy, partial; Wrist surgery; Joint replacement; Fracture surgery;  section; Tubal ligation; Novasure Endometrial Ablation (2016); and Colonoscopy (N/A, 2019).  -     She  reports that  has never smoked. she has never used smokeless tobacco. She reports that she does not drink alcohol or use drugs.  -     Her family history includes Anuerysm in her brother and mother; Atrial fibrillation in her mother; Diabetes in her brother and father; Heart disease in her brother and father; Hyperlipidemia in her father; Kidney disease in her father; Stroke in her father.  -     She is allergic to amoxicillin; codeine; hydrocodone; iodine and iodide containing products; medrol [methylprednisolone]; oxycodone; oxycodone hcl-oxycodone-asa; percodan filipe; tetracyclines; tramadol; augmentin [amoxicillin-pot clavulanate]; levaquin [levofloxacin]; sulfa (sulfonamide antibiotics); doxycycline; lortab  [hydrocodone-acetaminophen]; percocet  [oxycodone-acetaminophen]; shellfish containing products; tetracycline; and vibramycin  [doxycycline calcium].    Objective:     Physical Exam:  Vitals:  BP (!) 133/93   Pulse 90   Ht 5' 5.5" (1.664 m)   Wt 105.1 kg (231 lb 11.3 oz)   BMI 37.97 kg/m²   General appearance:  Well developed, well nourished    Eyes:  Extraocular motions intact, PERRL    Communication:  no hoarseness, no dysphonia    Right Ear:  No mass/lesion of auricle. The external auditory canals is without erythema or discharge. Pneumatic otoscopy of the tympanic membrane revealed no perforation no SEEMA. Clinical speech reception thresholds grossly normal.  Left Ear:  No mass/lesion of auricle. The external auditory canals is without erythema or discharge. Pneumatic otoscopy of the tympanic membrane revealed no perforation and no SEEMA. " Clinical speech reception thresholds grossly normal    Nose:  No masses/lesions of external nose, nasal mucosa, septum, and turbinates were within normal limits.  Mouth:  No mass/lesion of lips, teeth, gums, hard/soft palate, tongue, tonsils, or oropharynx.    Cardiovascular:  No pedal edema; Radial Pulses +2     Neck & Lymphatics:  No cervical lymphadenopathy, no neck mass/crepitus/ asymmetry, trachea is midline, no thyroid enlargement/tenderness/mass.    Psych: Oriented x3,  Alert with normal mood and affect.     Respiration/Chest:  Symmetric expansion during respiration, normal respiratory effort.    Skin:  Warm and intact. No ulcerations of face, scalp, neck.          Assessment & Plan:   Monica was seen today for other.    Diagnoses and all orders for this visit:    Dysfunction of both eustachian tubes       Tubes AU  Patient reported immediate subjective improvement in hearing and pressure after placement  6 months    Patient: Monica TRAN [facility] [id number], :1960  Procedure date:3/12/2019  Patient's medications, allergies, past medical, surgical, social and family histories were reviewed and updated as appropriate.  Chief Complaint:  Other (follow up possible tube placement.)    HPI:  Monica is a 58 y.o. female with history of eustachian tube dysfunction.      Procedure: Risks, benefits, and alternatives of the procedure were discussed with the patient, and consent was obtained to perform a tympanostomy, with ventilation tube placement for both ears.  The procedure required a high level of expertise and use of an operating microscope and multiple micro-instruments.     With the patient in the supine position, the operating microscope was used to examine both ears with the appropriate sized ear speculum.  A variety of sterile, micro-instruments were utilized to remove the cerumen atraumatically.  We applied phenol topically (local analgesic) to the tympanic membrane.  After adequate anesthesia was obtained, the  tympanic membrane was incised radially in the appropriate location.  We suctioned the middle ear through the incision. A ventilation tube was placed (position & patency confirmed), antibiotic ear drops were placed and the same procedure was performed on the other side.  I performed the procedure. The patient tolerated the procedure well and there were no complications.    Findings:   Right ear :  EAC was normal, and the middle ear had no significant fluid present.    Left ear  :   EAC was normal, and the middle ear had no significant fluid present.    Felix Marquez MD.

## 2019-03-12 ENCOUNTER — OFFICE VISIT (OUTPATIENT)
Dept: OTOLARYNGOLOGY | Facility: CLINIC | Age: 59
End: 2019-03-12
Payer: MEDICAID

## 2019-03-12 VITALS
SYSTOLIC BLOOD PRESSURE: 133 MMHG | DIASTOLIC BLOOD PRESSURE: 93 MMHG | HEART RATE: 90 BPM | BODY MASS INDEX: 37.23 KG/M2 | HEIGHT: 66 IN | WEIGHT: 231.69 LBS

## 2019-03-12 DIAGNOSIS — H69.93 DYSFUNCTION OF BOTH EUSTACHIAN TUBES: Primary | ICD-10-CM

## 2019-03-12 PROCEDURE — 99999 PR PBB SHADOW E&M-EST. PATIENT-LVL III: CPT | Mod: PBBFAC,,, | Performed by: OTOLARYNGOLOGY

## 2019-03-12 PROCEDURE — 69433 PR CREATE EARDRUM OPENING,LOCAL ANESTH: ICD-10-PCS | Mod: 50,S$PBB,, | Performed by: OTOLARYNGOLOGY

## 2019-03-12 PROCEDURE — 99999 PR PBB SHADOW E&M-EST. PATIENT-LVL III: ICD-10-PCS | Mod: PBBFAC,,, | Performed by: OTOLARYNGOLOGY

## 2019-03-12 PROCEDURE — 99214 OFFICE O/P EST MOD 30 MIN: CPT | Mod: 25,S$PBB,, | Performed by: OTOLARYNGOLOGY

## 2019-03-12 PROCEDURE — 69433 CREATE EARDRUM OPENING: CPT | Mod: 50,PBBFAC | Performed by: OTOLARYNGOLOGY

## 2019-03-12 PROCEDURE — 99213 OFFICE O/P EST LOW 20 MIN: CPT | Mod: PBBFAC,25 | Performed by: OTOLARYNGOLOGY

## 2019-03-12 PROCEDURE — 99214 PR OFFICE/OUTPT VISIT, EST, LEVL IV, 30-39 MIN: ICD-10-PCS | Mod: 25,S$PBB,, | Performed by: OTOLARYNGOLOGY

## 2019-03-12 PROCEDURE — 69433 CREATE EARDRUM OPENING: CPT | Mod: 50,S$PBB,, | Performed by: OTOLARYNGOLOGY

## 2019-03-12 RX ORDER — OFLOXACIN 3 MG/ML
5 SOLUTION AURICULAR (OTIC) 2 TIMES DAILY
Qty: 5 ML | Refills: 0 | Status: SHIPPED | OUTPATIENT
Start: 2019-03-12 | End: 2019-03-17

## 2019-03-14 ENCOUNTER — PATIENT MESSAGE (OUTPATIENT)
Dept: OTOLARYNGOLOGY | Facility: CLINIC | Age: 59
End: 2019-03-14

## 2019-03-19 ENCOUNTER — OFFICE VISIT (OUTPATIENT)
Dept: OTOLARYNGOLOGY | Facility: CLINIC | Age: 59
End: 2019-03-19
Payer: MEDICAID

## 2019-03-19 ENCOUNTER — LAB VISIT (OUTPATIENT)
Dept: LAB | Facility: HOSPITAL | Age: 59
End: 2019-03-19
Attending: PHYSICIAN ASSISTANT
Payer: MEDICAID

## 2019-03-19 VITALS — HEIGHT: 66 IN | BODY MASS INDEX: 37.61 KG/M2 | WEIGHT: 234 LBS

## 2019-03-19 DIAGNOSIS — H90.3 SNHL (SENSORY-NEURAL HEARING LOSS), ASYMMETRICAL: ICD-10-CM

## 2019-03-19 DIAGNOSIS — H90.3 SNHL (SENSORY-NEURAL HEARING LOSS), ASYMMETRICAL: Primary | ICD-10-CM

## 2019-03-19 LAB
CREAT SERPL-MCNC: 0.7 MG/DL
EST. GFR  (AFRICAN AMERICAN): >60 ML/MIN/1.73 M^2
EST. GFR  (NON AFRICAN AMERICAN): >60 ML/MIN/1.73 M^2

## 2019-03-19 PROCEDURE — 99999 PR PBB SHADOW E&M-EST. PATIENT-LVL III: ICD-10-PCS | Mod: PBBFAC,,, | Performed by: OTOLARYNGOLOGY

## 2019-03-19 PROCEDURE — 99024 PR POST-OP FOLLOW-UP VISIT: ICD-10-PCS | Mod: S$PBB,,, | Performed by: OTOLARYNGOLOGY

## 2019-03-19 PROCEDURE — 99213 OFFICE O/P EST LOW 20 MIN: CPT | Mod: PBBFAC | Performed by: OTOLARYNGOLOGY

## 2019-03-19 PROCEDURE — 36415 COLL VENOUS BLD VENIPUNCTURE: CPT

## 2019-03-19 PROCEDURE — 99024 POSTOP FOLLOW-UP VISIT: CPT | Mod: S$PBB,,, | Performed by: OTOLARYNGOLOGY

## 2019-03-19 PROCEDURE — 99999 PR PBB SHADOW E&M-EST. PATIENT-LVL III: CPT | Mod: PBBFAC,,, | Performed by: OTOLARYNGOLOGY

## 2019-03-19 PROCEDURE — 82565 ASSAY OF CREATININE: CPT

## 2019-03-19 NOTE — PROGRESS NOTES
"Referring Provider:    No referring provider defined for this encounter.  Subjective:   Patient: Monica Joy [id number], :1960   Visit date:3/19/2019 8:46 AM    Chief Complaint:  Other (possible tube replacement in the right ear)    HPI:  Monica is a 58 y.o. female who I was asked to see in consultation for evaluation of the following issue(s):    Patient presented to clinic for the first time on 19 referred by her PCP for chronic SEEMA. She reports years of feeling fullness in her ears with intermittent popping that is worse in the right. She denies otalgia. She c/o muffled hearing. She has a long hx of seasonal allergies for which she has taken otc Zyrtec prn without relief. She has not had allergy testing in the past. She c/o nasal congestion, denied rhinorrhea. No recent illness, fever(s), or cough. Pt was tx with Medrol, Xyzal, and Flonase for R SEEMA. She returned to clinic on 19 and reported no relief.  Bilateral ear pressure and hearing loss.  Fluctuating in nature.  No TMJ tenderness.  No grinding teeth. She underwent bilateral PET placement in clinic, tolerated well and with relief in pressure. Pt returned to clinic on 19 c/o returned pressure AD. No otorrhea. Continued HL AD, this is intermittent and relieved when she is "able to blow her nose and make her ear pop". No other new ssx.     Review of Systems:  -     Allergic/Immunologic: has No Known Allergies..  -     Constitutional: Current temp:         Allergy/Imm: is allergic to amoxicillin; codeine; hydrocodone; iodine and iodide containing products; medrol [methylprednisolone]; oxycodone; oxycodone hcl-oxycodone-asa; percodan filipe; tetracyclines; tramadol; augmentin [amoxicillin-pot clavulanate]; levaquin [levofloxacin]; sulfa (sulfonamide antibiotics); doxycycline; lortab  [hydrocodone-acetaminophen]; percocet  [oxycodone-acetaminophen]; shellfish containing products; tetracycline; and vibramycin  [doxycycline calcium].    Her " "meds, allergies, medical, surgical, social & family histories were reviewed & updated:  -     She has a current medication list which includes the following prescription(s): cyanocobalamin (vitamin b-12), fluticasone, and levocetirizine.  -     She  has a past medical history of Arthritis, Asthma (2013), and Thyroid disease.   -     She does not have any pertinent problems on file.   -     She  has a past surgical history that includes Thyroidectomy, partial; Wrist surgery; Joint replacement; Fracture surgery;  section; Tubal ligation; Novasure Endometrial Ablation (2016); and Colonoscopy (N/A, 2019).  -     She  reports that  has never smoked. she has never used smokeless tobacco. She reports that she does not drink alcohol or use drugs.  -     Her family history includes Anuerysm in her brother and mother; Atrial fibrillation in her mother; Diabetes in her brother and father; Heart disease in her brother and father; Hyperlipidemia in her father; Kidney disease in her father; Stroke in her father.  -     She is allergic to amoxicillin; codeine; hydrocodone; iodine and iodide containing products; medrol [methylprednisolone]; oxycodone; oxycodone hcl-oxycodone-asa; percodan filipe; tetracyclines; tramadol; augmentin [amoxicillin-pot clavulanate]; levaquin [levofloxacin]; sulfa (sulfonamide antibiotics); doxycycline; lortab  [hydrocodone-acetaminophen]; percocet  [oxycodone-acetaminophen]; shellfish containing products; tetracycline; and vibramycin  [doxycycline calcium].    Objective:     Physical Exam:  Vitals:  Ht 5' 5.5" (1.664 m)   Wt 106.1 kg (234 lb)   BMI 38.35 kg/m²   General appearance:  Well developed, well nourished    Eyes:  Extraocular motions intact, PERRL    Communication:  no hoarseness, no dysphonia    Right Ear:  R PET crooked, trying to extrude. TM and EAC WNL. No fluid or d/c present.    Left Ear:  L PET in place in TM. TM and EAC WNL.     Nose:  No masses/lesions of " external nose, nasal mucosa, septum, and turbinates were within normal limits.  Mouth:  No mass/lesion of lips, teeth, gums, hard/soft palate, tongue, tonsils, or oropharynx.    Cardiovascular:  No pedal edema; Radial Pulses +2     Neck & Lymphatics:  No cervical lymphadenopathy, no neck mass/crepitus/ asymmetry, trachea is midline, no thyroid enlargement/tenderness/mass.    Psych: Oriented x3,  Alert with normal mood and affect.     Respiration/Chest:  Symmetric expansion during respiration, normal respiratory effort.    Skin:  Warm and intact. No ulcerations of face, scalp, neck.          Assessment & Plan:   Monica was seen today for other.    Diagnoses and all orders for this visit:    SNHL (sensory-neural hearing loss), asymmetrical  -     MRI IAC/Temporal Bones W W/O Contrast; Future  -     CREATININE, SERUM; Future       Removed R PET, pt tolerated well, however, no relief in pressure or HL AD.   Will obtain MRI of IAC for asymmetric SNHL. I will contact pt with results.     Felix Marquez MD.

## 2019-08-13 ENCOUNTER — PATIENT MESSAGE (OUTPATIENT)
Dept: AUDIOLOGY | Facility: CLINIC | Age: 59
End: 2019-08-13

## 2019-08-13 ENCOUNTER — OFFICE VISIT (OUTPATIENT)
Dept: OTOLARYNGOLOGY | Facility: CLINIC | Age: 59
End: 2019-08-13
Payer: MEDICAID

## 2019-08-13 VITALS
WEIGHT: 237.44 LBS | SYSTOLIC BLOOD PRESSURE: 113 MMHG | BODY MASS INDEX: 39.56 KG/M2 | TEMPERATURE: 98 F | HEART RATE: 84 BPM | HEIGHT: 65 IN | DIASTOLIC BLOOD PRESSURE: 75 MMHG

## 2019-08-13 DIAGNOSIS — H92.12 OTORRHEA OF LEFT EAR: Primary | ICD-10-CM

## 2019-08-13 PROCEDURE — 99213 OFFICE O/P EST LOW 20 MIN: CPT | Mod: PBBFAC | Performed by: PHYSICIAN ASSISTANT

## 2019-08-13 PROCEDURE — 87070 CULTURE OTHR SPECIMN AEROBIC: CPT

## 2019-08-13 PROCEDURE — 99999 PR PBB SHADOW E&M-EST. PATIENT-LVL III: ICD-10-PCS | Mod: PBBFAC,,, | Performed by: PHYSICIAN ASSISTANT

## 2019-08-13 PROCEDURE — 99213 OFFICE O/P EST LOW 20 MIN: CPT | Mod: S$PBB,,, | Performed by: PHYSICIAN ASSISTANT

## 2019-08-13 PROCEDURE — 99213 PR OFFICE/OUTPT VISIT, EST, LEVL III, 20-29 MIN: ICD-10-PCS | Mod: S$PBB,,, | Performed by: PHYSICIAN ASSISTANT

## 2019-08-13 PROCEDURE — 99999 PR PBB SHADOW E&M-EST. PATIENT-LVL III: CPT | Mod: PBBFAC,,, | Performed by: PHYSICIAN ASSISTANT

## 2019-08-13 RX ORDER — OFLOXACIN 3 MG/ML
3 SOLUTION AURICULAR (OTIC) 2 TIMES DAILY
Qty: 5 ML | Refills: 0 | Status: SHIPPED | OUTPATIENT
Start: 2019-08-13 | End: 2019-08-14

## 2019-08-13 NOTE — PROGRESS NOTES
Subjective:       Patient ID: Monica Joy is a 58 y.o. female.    Chief Complaint: Ear Drainage (left ) and Otalgia (left )    Ms. Joy is a very pleasant 57 yo female here to see me today for persistent left ear drainage. She is followed by Dr. Marquez and Mary. She had PET placed 3/19/19. She feels that both her ears are stopped up. Denies runny nose or congestion.     Review of Systems   Constitutional: Negative for chills, fatigue, fever and unexpected weight change.   HENT: Positive for ear discharge and ear pain. Negative for congestion, dental problem, facial swelling, hearing loss, nosebleeds, postnasal drip, rhinorrhea, sinus pressure, sneezing, sore throat, tinnitus, trouble swallowing and voice change.    Eyes: Negative for redness, itching and visual disturbance.   Respiratory: Negative for cough, choking, shortness of breath and wheezing.    Cardiovascular: Negative for chest pain and palpitations.   Gastrointestinal: Negative for abdominal pain.        No reflux.   Musculoskeletal: Negative for gait problem.   Skin: Negative for rash.   Neurological: Negative for dizziness, light-headedness and headaches.       Objective:      Physical Exam   Constitutional: She is oriented to person, place, and time. She appears well-developed and well-nourished. No distress.   HENT:   Head: Normocephalic and atraumatic.   Right Ear: External ear and ear canal normal. Tympanic membrane is retracted.   Left Ear: Tympanic membrane, external ear and ear canal normal. No drainage (copious, (fluid sent for culture) suctioned).   Nose: Nose normal. No mucosal edema, rhinorrhea, nasal deformity or septal deviation. No epistaxis. Right sinus exhibits no maxillary sinus tenderness and no frontal sinus tenderness. Left sinus exhibits no maxillary sinus tenderness and no frontal sinus tenderness.   Mouth/Throat: Uvula is midline, oropharynx is clear and moist and mucous membranes are normal. Mucous membranes are not pale  and not dry. No dental caries. No oropharyngeal exudate or posterior oropharyngeal erythema.   No PET in right TM   Eyes: Pupils are equal, round, and reactive to light. Conjunctivae, EOM and lids are normal. Right eye exhibits no chemosis. Left eye exhibits no chemosis. Right conjunctiva is not injected. Left conjunctiva is not injected. No scleral icterus. Right eye exhibits normal extraocular motion and no nystagmus. Left eye exhibits normal extraocular motion and no nystagmus.   Neck: Trachea normal and phonation normal. No tracheal tenderness present. No tracheal deviation present. No thyroid mass and no thyromegaly present.   Cardiovascular: Intact distal pulses.   Pulmonary/Chest: Effort normal. No stridor. No respiratory distress.   Abdominal: She exhibits no distension.   Lymphadenopathy:        Head (right side): No submental, no submandibular, no preauricular, no posterior auricular and no occipital adenopathy present.        Head (left side): No submental, no submandibular, no preauricular, no posterior auricular and no occipital adenopathy present.     She has no cervical adenopathy.   Neurological: She is alert and oriented to person, place, and time. No cranial nerve deficit.   Skin: Skin is warm and dry. No rash noted. No erythema.   Psychiatric: She has a normal mood and affect. Her behavior is normal.       Assessment:       1. Otorrhea of left ear        Plan:       Otorrhea: pt empirically started on ofloxacin ( previously tolerated) Will have her RTC next week for recheck. May need right PET replaced. Would consider oral antibiotics if not improved in next few days.     ETD: We had a long discussion regarding the anatomy and function of the eustachian tube.  We discussed that the eustachian tube acts as a pump to keep the appropriate amount of pressure behind the ear drum.  I gave the patient a prescription for a nasal steroid spray to be used on a daily basis, and we discussed that it will take  2-3 weeks of daily use to achieve maximal effectiveness. She is unable to tolerate Flonase.

## 2019-08-14 ENCOUNTER — PATIENT MESSAGE (OUTPATIENT)
Dept: OTOLARYNGOLOGY | Facility: CLINIC | Age: 59
End: 2019-08-14

## 2019-08-14 ENCOUNTER — TELEPHONE (OUTPATIENT)
Dept: OTOLARYNGOLOGY | Facility: CLINIC | Age: 59
End: 2019-08-14

## 2019-08-14 RX ORDER — CIPROFLOXACIN 0.5 MG/.25ML
4 SOLUTION/ DROPS AURICULAR (OTIC) 2 TIMES DAILY
Qty: 14 EACH | Refills: 0 | Status: SHIPPED | OUTPATIENT
Start: 2019-08-14 | End: 2019-08-21

## 2019-08-15 LAB — BACTERIA SPEC AEROBE CULT: NORMAL

## 2019-08-16 ENCOUNTER — PATIENT MESSAGE (OUTPATIENT)
Dept: OTOLARYNGOLOGY | Facility: CLINIC | Age: 59
End: 2019-08-16

## 2019-08-19 ENCOUNTER — PATIENT MESSAGE (OUTPATIENT)
Dept: OTOLARYNGOLOGY | Facility: CLINIC | Age: 59
End: 2019-08-19

## 2019-08-20 ENCOUNTER — TELEPHONE (OUTPATIENT)
Dept: OTOLARYNGOLOGY | Facility: CLINIC | Age: 59
End: 2019-08-20

## 2019-08-20 NOTE — TELEPHONE ENCOUNTER
Called pt and she wanted to schedule to be seen Friday at [place] I informed her that it would have to be with an MD and she verbalized understanding and was okay with being seen by dr Pugh.        ----- Message from Mary Cortez sent at 8/20/2019 12:34 PM CDT -----  Contact: Self  Patient requesting a call back regarding scope. She states that she was advised she would need a scope done. Please call Monica back at [phone]

## 2019-08-23 ENCOUNTER — PROCEDURE VISIT (OUTPATIENT)
Dept: OTOLARYNGOLOGY | Facility: CLINIC | Age: 59
End: 2019-08-23
Payer: MEDICAID

## 2019-08-23 VITALS
HEART RATE: 89 BPM | HEIGHT: 65 IN | TEMPERATURE: 98 F | SYSTOLIC BLOOD PRESSURE: 144 MMHG | BODY MASS INDEX: 39.3 KG/M2 | DIASTOLIC BLOOD PRESSURE: 96 MMHG | WEIGHT: 235.88 LBS

## 2019-08-23 DIAGNOSIS — H92.12 OTORRHEA OF LEFT EAR: Primary | ICD-10-CM

## 2019-08-23 DIAGNOSIS — H90.3 SNHL (SENSORY-NEURAL HEARING LOSS), ASYMMETRICAL: ICD-10-CM

## 2019-08-23 DIAGNOSIS — H69.93 DYSFUNCTION OF BOTH EUSTACHIAN TUBES: ICD-10-CM

## 2019-08-23 PROCEDURE — 99213 PR OFFICE/OUTPT VISIT, EST, LEVL III, 20-29 MIN: ICD-10-PCS | Mod: S$PBB,,, | Performed by: ORTHOPAEDIC SURGERY

## 2019-08-23 PROCEDURE — 99213 OFFICE O/P EST LOW 20 MIN: CPT | Mod: S$PBB,,, | Performed by: ORTHOPAEDIC SURGERY

## 2019-08-23 RX ORDER — CEFDINIR 300 MG/1
300 CAPSULE ORAL 2 TIMES DAILY
Qty: 20 CAPSULE | Refills: 0 | Status: SHIPPED | OUTPATIENT
Start: 2019-08-23 | End: 2019-09-02

## 2019-08-23 RX ORDER — NEOMYCIN SULFATE, POLYMYXIN B SULFATE AND HYDROCORTISONE 10; 3.5; 1 MG/ML; MG/ML; [USP'U]/ML
3 SUSPENSION/ DROPS AURICULAR (OTIC) 3 TIMES DAILY
Qty: 10 ML | Refills: 1 | Status: SHIPPED | OUTPATIENT
Start: 2019-08-23 | End: 2019-09-18 | Stop reason: ALTCHOICE

## 2019-08-23 NOTE — PROGRESS NOTES
Subjective:       Patient ID: Monica Joy is a 58 y.o. female.    Chief Complaint: Follow-up (1 wk F/U with Possible Tube Placement)    Ms. Joy is a very pleasant 57 yo female here to see me today for persistent left ear drainage. She is followed by Dr. Marquez and Mary. She had PET placed 3/19/19 in both ears, then had right PET removed due to pain and discomfort. She feels that both her ears are stopped up. Denies runny nose or congestion.  She has been using floxin ear drops to her left ear, and says that her drainage has decreased but not fully resolved.    Review of Systems   Constitutional: Negative for chills, fatigue, fever and unexpected weight change.   HENT: Positive for ear discharge and ear pain. Negative for congestion, dental problem, facial swelling, hearing loss, nosebleeds, postnasal drip, rhinorrhea, sinus pressure, sneezing, sore throat, tinnitus, trouble swallowing and voice change.    Eyes: Negative for redness, itching and visual disturbance.   Respiratory: Negative for cough, choking, shortness of breath and wheezing.    Cardiovascular: Negative for chest pain and palpitations.   Gastrointestinal: Negative for abdominal pain.        No reflux.   Musculoskeletal: Negative for gait problem.   Skin: Negative for rash.   Neurological: Negative for dizziness, light-headedness and headaches.       Objective:      Physical Exam   Constitutional: She is oriented to person, place, and time. She appears well-developed and well-nourished. No distress.   HENT:   Head: Normocephalic and atraumatic.   Right Ear: External ear and ear canal normal. Tympanic membrane is not retracted.   Left Ear: External ear and ear canal normal. There is drainage (scant, in EAC, PET surrounded by granulation tissue). A middle ear effusion (mucoid) is present.   Nose: Nose normal. No mucosal edema, rhinorrhea, nasal deformity or septal deviation. No epistaxis. Right sinus exhibits no maxillary sinus tenderness and  no frontal sinus tenderness. Left sinus exhibits no maxillary sinus tenderness and no frontal sinus tenderness.   Mouth/Throat: Uvula is midline, oropharynx is clear and moist and mucous membranes are normal. Mucous membranes are not pale and not dry. No dental caries. No oropharyngeal exudate or posterior oropharyngeal erythema.   No PET in right TM   Eyes: Pupils are equal, round, and reactive to light. Conjunctivae, EOM and lids are normal. Right eye exhibits no chemosis. Left eye exhibits no chemosis. Right conjunctiva is not injected. Left conjunctiva is not injected. No scleral icterus. Right eye exhibits normal extraocular motion and no nystagmus. Left eye exhibits normal extraocular motion and no nystagmus.   Neck: Trachea normal and phonation normal. No tracheal tenderness present. No tracheal deviation present. No thyroid mass and no thyromegaly present.   Cardiovascular: Intact distal pulses.   Pulmonary/Chest: Effort normal. No stridor. No respiratory distress.   Abdominal: She exhibits no distension.   Lymphadenopathy:        Head (right side): No submental, no submandibular, no preauricular, no posterior auricular and no occipital adenopathy present.        Head (left side): No submental, no submandibular, no preauricular, no posterior auricular and no occipital adenopathy present.     She has no cervical adenopathy.   Neurological: She is alert and oriented to person, place, and time. No cranial nerve deficit.   Skin: Skin is warm and dry. No rash noted. No erythema.   Psychiatric: She has a normal mood and affect. Her behavior is normal.       Assessment:       1. Otorrhea of left ear    2. SNHL (sensory-neural hearing loss), asymmetrical    3. Dysfunction of both eustachian tubes        Plan:       1.  Left otorrhea:  Rx for cortisporin (insurance will not cover ciprodex) and omnicef sent to her pharmacy.  RTC 3 weeks with audiogram.  2.  SNHL:  MRI ordered by Mary pending.  3.  Bilateral ETD:   Likely contributing to persistent otorrhea, right TM now normal, no indication to replace PET.

## 2019-09-18 ENCOUNTER — OFFICE VISIT (OUTPATIENT)
Dept: OTOLARYNGOLOGY | Facility: CLINIC | Age: 59
End: 2019-09-18
Payer: MEDICAID

## 2019-09-18 ENCOUNTER — CLINICAL SUPPORT (OUTPATIENT)
Dept: AUDIOLOGY | Facility: CLINIC | Age: 59
End: 2019-09-18
Payer: MEDICAID

## 2019-09-18 ENCOUNTER — TELEPHONE (OUTPATIENT)
Dept: OTOLARYNGOLOGY | Facility: CLINIC | Age: 59
End: 2019-09-18

## 2019-09-18 VITALS
HEART RATE: 67 BPM | DIASTOLIC BLOOD PRESSURE: 86 MMHG | BODY MASS INDEX: 39.4 KG/M2 | WEIGHT: 236.75 LBS | SYSTOLIC BLOOD PRESSURE: 131 MMHG

## 2019-09-18 DIAGNOSIS — Z96.22 PATENT PRESSURE EQUALIZATION TUBE: ICD-10-CM

## 2019-09-18 DIAGNOSIS — H90.3 ASYMMETRICAL SENSORINEURAL HEARING LOSS: Primary | ICD-10-CM

## 2019-09-18 DIAGNOSIS — H90.3 SNHL (SENSORY-NEURAL HEARING LOSS), ASYMMETRICAL: Primary | ICD-10-CM

## 2019-09-18 DIAGNOSIS — H69.93 DYSFUNCTION OF BOTH EUSTACHIAN TUBES: ICD-10-CM

## 2019-09-18 DIAGNOSIS — H93.13 TINNITUS, BILATERAL: ICD-10-CM

## 2019-09-18 PROCEDURE — 99999 PR PBB SHADOW E&M-EST. PATIENT-LVL III: CPT | Mod: PBBFAC,,, | Performed by: PHYSICIAN ASSISTANT

## 2019-09-18 PROCEDURE — 99999 PR PBB SHADOW E&M-EST. PATIENT-LVL III: ICD-10-PCS | Mod: PBBFAC,,, | Performed by: PHYSICIAN ASSISTANT

## 2019-09-18 PROCEDURE — 99213 OFFICE O/P EST LOW 20 MIN: CPT | Mod: S$PBB,,, | Performed by: PHYSICIAN ASSISTANT

## 2019-09-18 PROCEDURE — 99213 OFFICE O/P EST LOW 20 MIN: CPT | Mod: PBBFAC,25 | Performed by: PHYSICIAN ASSISTANT

## 2019-09-18 PROCEDURE — 92567 TYMPANOMETRY: CPT | Mod: PBBFAC | Performed by: AUDIOLOGIST-HEARING AID FITTER

## 2019-09-18 PROCEDURE — 99213 PR OFFICE/OUTPT VISIT, EST, LEVL III, 20-29 MIN: ICD-10-PCS | Mod: S$PBB,,, | Performed by: PHYSICIAN ASSISTANT

## 2019-09-18 PROCEDURE — 92557 COMPREHENSIVE HEARING TEST: CPT | Mod: PBBFAC | Performed by: AUDIOLOGIST-HEARING AID FITTER

## 2019-09-18 RX ORDER — FLUTICASONE PROPIONATE 50 MCG
2 SPRAY, SUSPENSION (ML) NASAL DAILY
Qty: 1 BOTTLE | Refills: 12 | Status: SHIPPED | OUTPATIENT
Start: 2019-09-18 | End: 2021-02-22 | Stop reason: CLARIF

## 2019-09-18 RX ORDER — OFLOXACIN 3 MG/ML
3 SOLUTION AURICULAR (OTIC) 2 TIMES DAILY
Qty: 5 ML | Refills: 0 | Status: SHIPPED | OUTPATIENT
Start: 2019-09-18 | End: 2019-09-28

## 2019-09-18 NOTE — TELEPHONE ENCOUNTER
Tried calling pharmacy to see if Ofloxacin EYE drops are covered but was placed on hold for over 10 minutes.  Will try again tomorrow.

## 2019-09-18 NOTE — TELEPHONE ENCOUNTER
Received notification from [facility] that Ofloxcin 0.3% otic soln 5 ml her plan does not cover. Can call [phone] to initiate a prior authorization or call pharmacy to change medication. Rx phone [phone].

## 2019-09-18 NOTE — PROGRESS NOTES
"Subjective:       Patient ID: Monica Joy is a 58 y.o. female.    Chief Complaint: Follow-up (review audio) and Ear Drainage    Ms. Joy is a very pleasant 59 yo female here to see me today for follow up of  left ear drainage. She is followed by Dr. Marquez and Mary. She had PET placed 3/19/19 in both ears, then had right PET removed due to pain and discomfort. She feels that  her left ears are stopped up. Denies runny nose or congestion.  She continues to have popping " feels like bubbles" in left ear    Review of Systems   Constitutional: Negative for chills, fatigue, fever and unexpected weight change.   HENT: Negative for congestion, dental problem, ear discharge, ear pain, facial swelling, hearing loss, nosebleeds, postnasal drip, rhinorrhea, sinus pressure, sneezing, sore throat, tinnitus, trouble swallowing and voice change.    Eyes: Negative for redness, itching and visual disturbance.   Respiratory: Negative for cough, choking, shortness of breath and wheezing.    Cardiovascular: Negative for chest pain and palpitations.   Gastrointestinal: Negative for abdominal pain.        No reflux.   Musculoskeletal: Negative for gait problem.   Skin: Negative for rash.   Neurological: Negative for dizziness, light-headedness and headaches.       Objective:      Physical Exam   Constitutional: She is oriented to person, place, and time. She appears well-developed and well-nourished. No distress.   HENT:   Head: Normocephalic and atraumatic.   Right Ear: Tympanic membrane, external ear and ear canal normal.   Left Ear: Tympanic membrane, external ear and ear canal normal.   Nose: Nose normal. No mucosal edema, rhinorrhea, nasal deformity or septal deviation. No epistaxis. Right sinus exhibits no maxillary sinus tenderness and no frontal sinus tenderness. Left sinus exhibits no maxillary sinus tenderness and no frontal sinus tenderness.   Mouth/Throat: Uvula is midline, oropharynx is clear and moist and mucous " membranes are normal. Mucous membranes are not pale and not dry. No dental caries. No oropharyngeal exudate or posterior oropharyngeal erythema.   PET intact, no drainage, no effusion   Eyes: Pupils are equal, round, and reactive to light. Conjunctivae, EOM and lids are normal. Right eye exhibits no chemosis. Left eye exhibits no chemosis. Right conjunctiva is not injected. Left conjunctiva is not injected. No scleral icterus. Right eye exhibits normal extraocular motion and no nystagmus. Left eye exhibits normal extraocular motion and no nystagmus.   Neck: Trachea normal and phonation normal. No tracheal tenderness present. No tracheal deviation present. No thyroid mass and no thyromegaly present.   Cardiovascular: Intact distal pulses.   Pulmonary/Chest: Effort normal. No stridor. No respiratory distress.   Abdominal: She exhibits no distension.   Lymphadenopathy:        Head (right side): No submental, no submandibular, no preauricular, no posterior auricular and no occipital adenopathy present.        Head (left side): No submental, no submandibular, no preauricular, no posterior auricular and no occipital adenopathy present.     She has no cervical adenopathy.   Neurological: She is alert and oriented to person, place, and time. No cranial nerve deficit.   Skin: Skin is warm and dry. No rash noted. No erythema.   Psychiatric: She has a normal mood and affect. Her behavior is normal.       Assessment:       1. SNHL (sensory-neural hearing loss), asymmetrical    2. Dysfunction of both eustachian tubes    3. Patent pressure equalization tube        Plan:       We had a long discussion regarding the anatomy and function of the eustachian tube.  We discussed that the eustachian tube acts as a pump to keep the appropriate amount of pressure behind the ear drum.  I gave the patient a prescription for a nasal steroid spray to be used on a daily basis, and we discussed that it will take 2-3 weeks of daily use to achieve  maximal effectiveness. She has tolerated Flonase in past.    Her tube is intact with no active drainage. RTC in 6 months or sooner if needed.

## 2019-09-18 NOTE — PROGRESS NOTES
"Referring provider: ROBER Britty was seen 09/18/2019 for an audiological evaluation prior to ENT.  Patient has been treated for persistent left ear drainage. She had PET placed 3/19/19 in both ears, then had right PET removed due to pain and discomfort. Patient has left PE tube.  She continues to have popping "feels like bubbles" in left ear.  No drainage.  Patient has history of SNHL and tinnitus.  Her last audiogram was 02/2019.    Results reveal a mild-to-moderate sensorineural hearing loss 250-8000 Hz for the right ear, and a mild-to-moderate sensorineural hearing loss 250-8000 Hz for the left ear.   Speech Reception Thresholds were 30 dBHL for the right ear and 35 dBHL for the left ear.   Word recognition scores were good for the right ear and excellent for the left ear.   Tympanograms were Type A, normal for the right ear and Type B large volume consistent with patent PE tube for the left ear.    Patient was counseled on the above findings.    Recommendations:  1. ENT              "

## 2019-09-19 NOTE — TELEPHONE ENCOUNTER
Spoke with pharmacist and Ofloxacin eye drops are covered.  He will change the Rx to Floxin eye drops to be used in the ears

## 2020-03-17 ENCOUNTER — TELEPHONE (OUTPATIENT)
Dept: FAMILY MEDICINE | Facility: CLINIC | Age: 60
End: 2020-03-17

## 2020-03-17 ENCOUNTER — TELEPHONE (OUTPATIENT)
Dept: OTOLARYNGOLOGY | Facility: CLINIC | Age: 60
End: 2020-03-17

## 2020-03-17 NOTE — TELEPHONE ENCOUNTER
----- Message from Haley Hector PA-C sent at 3/17/2020 11:02 AM CDT -----  I never prescribed this medication for her and I dont see it on her medication list    georges hector pa-c  ----- Message -----  From: Samuel Hitchcock LPN  Sent: 3/17/2020  10:45 AM CDT  To: Haley Hector PA-C    Patient agreed to reschedule her 6 mo f/u visit. She would like her albuterol inhaler refilled if possible. She states that's the reason she was coming in. Her appointment has been rescheduled to next month.

## 2020-03-17 NOTE — TELEPHONE ENCOUNTER
----- Message from Samuel Hitchcock LPN sent at 3/17/2020 11:06 AM CDT -----  Hello patient would like to know if Dr. Aquino can refill her albuterol. She's unable to get a appointment at this time with ENT d/t coronavirus scheduling. She states that Dr. Aquino has refilled it for her in the past. Thanks.

## 2020-03-19 RX ORDER — LEVOCETIRIZINE DIHYDROCHLORIDE 5 MG/1
TABLET, FILM COATED ORAL
Qty: 30 TABLET | Refills: 11 | Status: SHIPPED | OUTPATIENT
Start: 2020-03-19 | End: 2020-08-07

## 2020-04-09 ENCOUNTER — PATIENT MESSAGE (OUTPATIENT)
Dept: OTOLARYNGOLOGY | Facility: CLINIC | Age: 60
End: 2020-04-09

## 2020-04-22 ENCOUNTER — TELEPHONE (OUTPATIENT)
Dept: INTERNAL MEDICINE | Facility: CLINIC | Age: 60
End: 2020-04-22

## 2020-04-22 ENCOUNTER — PATIENT MESSAGE (OUTPATIENT)
Dept: INTERNAL MEDICINE | Facility: CLINIC | Age: 60
End: 2020-04-22

## 2020-04-22 ENCOUNTER — OFFICE VISIT (OUTPATIENT)
Dept: INTERNAL MEDICINE | Facility: CLINIC | Age: 60
End: 2020-04-22
Payer: MEDICAID

## 2020-04-22 DIAGNOSIS — L98.9 SKIN LESION OF RIGHT LEG: Primary | ICD-10-CM

## 2020-04-22 PROCEDURE — 99213 PR OFFICE/OUTPT VISIT, EST, LEVL III, 20-29 MIN: ICD-10-PCS | Mod: 95,,, | Performed by: INTERNAL MEDICINE

## 2020-04-22 PROCEDURE — 99213 OFFICE O/P EST LOW 20 MIN: CPT | Mod: 95,,, | Performed by: INTERNAL MEDICINE

## 2020-04-22 NOTE — PROGRESS NOTES
Subjective:       Patient ID: Monica Joy is a 59 y.o. female.    Chief Complaint: Skin Problem    Monica Joy  59 y.o.  White female    Patient presents with:  Skin Problem    HPI: Presents for a video visit.   The patient location is: [place]  The chief complaint leading to consultation is: skin lesions x 2 on right leg  Visit type: audiovisual  Total time spent with patient: 8583-2752  Each patient to whom he or she provides medical services by telemedicine is:  (1) informed of the relationship between the physician and patient and the respective role of any other health care provider with respect to management of the patient; and (2) notified that he or she may decline to receive medical services by telemedicine and may withdraw from such care at any time.    Reports having two lesions on her right leg. One lesion has been present for months. It itches and bleeds. The other she just recently noticed but states it is two different shades of brown. It does not hurt, itch or bleed.     PMH: Reviewed    MEDS: Reviewed med card    ALLERGIES: Reviewed allergy card      Review of Systems   Skin: Positive for color change.       Objective:      Physical Exam   Constitutional: She is oriented to person, place, and time. She appears well-developed and well-nourished. No distress.   Pulmonary/Chest: Effort normal. No respiratory distress.   Neurological: She is alert and oriented to person, place, and time.   Skin:   Two right leg lesions: one red, circular with a central scab, no scaling noted, no active drainage; the other dark brown, circular with surrounding light brown, asymmetrical rim.    Psychiatric: She has a normal mood and affect. Her behavior is normal. Judgment and thought content normal.       Assessment:       1. Skin lesion of right leg        Plan:       Monica was seen today for skin problem.    Diagnoses and all orders for this visit:    Skin lesion of right leg  -     Ambulatory referral/consult  to Dermatology; Future    RTC in 2-3 months for an annual exam.

## 2020-04-22 NOTE — TELEPHONE ENCOUNTER
Hi,  is needing an appointment for evaluation of skin lesions to legs. Due to insurance and Covid-19 concerns, I am unable to schedule that appointment. The office would appreciate if someone could call an schedule an appointment for her. Thank you.

## 2020-05-06 ENCOUNTER — OFFICE VISIT (OUTPATIENT)
Dept: OTOLARYNGOLOGY | Facility: CLINIC | Age: 60
End: 2020-05-06
Payer: MEDICAID

## 2020-05-06 VITALS
DIASTOLIC BLOOD PRESSURE: 86 MMHG | TEMPERATURE: 98 F | BODY MASS INDEX: 39.18 KG/M2 | SYSTOLIC BLOOD PRESSURE: 138 MMHG | WEIGHT: 235.44 LBS | HEART RATE: 70 BPM

## 2020-05-06 DIAGNOSIS — H92.12 OTORRHEA OF LEFT EAR: Primary | ICD-10-CM

## 2020-05-06 DIAGNOSIS — R04.0 BLEEDING FROM THE NOSE: ICD-10-CM

## 2020-05-06 PROCEDURE — 99213 PR OFFICE/OUTPT VISIT, EST, LEVL III, 20-29 MIN: ICD-10-PCS | Mod: S$PBB,,, | Performed by: PHYSICIAN ASSISTANT

## 2020-05-06 PROCEDURE — 99213 OFFICE O/P EST LOW 20 MIN: CPT | Mod: S$PBB,,, | Performed by: PHYSICIAN ASSISTANT

## 2020-05-06 PROCEDURE — 99999 PR PBB SHADOW E&M-EST. PATIENT-LVL III: CPT | Mod: PBBFAC,,, | Performed by: PHYSICIAN ASSISTANT

## 2020-05-06 PROCEDURE — 99999 PR PBB SHADOW E&M-EST. PATIENT-LVL III: ICD-10-PCS | Mod: PBBFAC,,, | Performed by: PHYSICIAN ASSISTANT

## 2020-05-06 PROCEDURE — 99213 OFFICE O/P EST LOW 20 MIN: CPT | Mod: PBBFAC | Performed by: PHYSICIAN ASSISTANT

## 2020-05-06 RX ORDER — MUPIROCIN 20 MG/G
OINTMENT TOPICAL 2 TIMES DAILY
Qty: 15 G | Refills: 3 | Status: SHIPPED | OUTPATIENT
Start: 2020-05-06 | End: 2020-05-16

## 2020-05-06 RX ORDER — NEOMYCIN SULFATE, POLYMYXIN B SULFATE AND HYDROCORTISONE 10; 3.5; 1 MG/ML; MG/ML; [USP'U]/ML
3 SUSPENSION/ DROPS AURICULAR (OTIC) 3 TIMES DAILY
Qty: 10 ML | Refills: 0 | Status: SHIPPED | OUTPATIENT
Start: 2020-05-06 | End: 2021-02-22 | Stop reason: CLARIF

## 2020-05-06 NOTE — PROGRESS NOTES
Subjective:   Patient ID: Monica Joy is a 59 y.o. female.    Chief Complaint: Follow-up (6 month ear recheck)    Ms. Joy is a very pleasant 60 yo female here to see me today for tube check, ear drainage and ear itching. H/o PET to left ear. Also states that she has been using Flonase for allergy symptoms but had to stop due to nose bleeds.      Review of patient's allergies indicates:   Allergen Reactions    Amoxicillin Rash and Swelling     Rash with throat swelling    Codeine Palpitations, Rash and Swelling     Other reaction(s): Shortness of breath  Other reaction(s): Hives  Rash and throat swelling    Hydrocodone Rash and Swelling    Iodine and iodide containing products Rash and Swelling    Medrol [methylprednisolone]     Oxycodone Shortness Of Breath, Rash and Swelling    Oxycodone hcl-oxycodone-asa Rash and Swelling    Percodan filipe Shortness Of Breath    Tetracyclines Rash and Swelling    Tramadol Shortness Of Breath, Rash and Swelling    Augmentin [amoxicillin-pot clavulanate] Itching and Rash    Levaquin [levofloxacin] Hives    Sulfa (sulfonamide antibiotics) Itching and Rash    Doxycycline Swelling     Other reaction(s): Rash    Lortab  [hydrocodone-acetaminophen]      Other reaction(s): Hives  Other reaction(s): Difficulty breathing    Percocet  [oxycodone-acetaminophen]      Other reaction(s): Shortness of breath    Shellfish containing products      Other reaction(s): Shortness of breath  Other reaction(s): Hives    Tetracycline Swelling    Vibramycin  [doxycycline calcium]      Other reaction(s): Hives  Other reaction(s): Difficulty breathing           Review of Systems   Constitutional: Negative for chills, fatigue, fever and unexpected weight change.   HENT: Positive for congestion, ear discharge, ear pain and nosebleeds. Negative for dental problem, facial swelling, hearing loss, postnasal drip, rhinorrhea, sinus pressure, sneezing, sore throat, tinnitus, trouble  swallowing and voice change.    Eyes: Negative for redness, itching and visual disturbance.   Respiratory: Negative for cough, choking, shortness of breath and wheezing.    Cardiovascular: Negative for chest pain and palpitations.   Gastrointestinal: Negative for abdominal pain.        No reflux.   Musculoskeletal: Negative for gait problem.   Skin: Negative for rash.   Neurological: Negative for dizziness, light-headedness and headaches.         Objective:   /86   Pulse 70   Temp 97.7 °F (36.5 °C) (Oral)   Wt 106.8 kg (235 lb 7.2 oz)   BMI 39.18 kg/m²     Physical Exam   Constitutional: She is oriented to person, place, and time. She appears well-developed and well-nourished. No distress.   HENT:   Head: Normocephalic and atraumatic.   Right Ear: Tympanic membrane, external ear and ear canal normal.   Left Ear: Tympanic membrane, external ear and ear canal normal.   Nose: Nose normal. No mucosal edema, rhinorrhea, nasal deformity or septal deviation. No epistaxis. Right sinus exhibits no maxillary sinus tenderness and no frontal sinus tenderness. Left sinus exhibits no maxillary sinus tenderness and no frontal sinus tenderness.   Mouth/Throat: Uvula is midline, oropharynx is clear and moist and mucous membranes are normal. Mucous membranes are not pale and not dry. No dental caries. No oropharyngeal exudate or posterior oropharyngeal erythema.   PET intact, dried drainage and wax near tube opening   Eyes: Pupils are equal, round, and reactive to light. Conjunctivae, EOM and lids are normal. Right eye exhibits no chemosis. Left eye exhibits no chemosis. Right conjunctiva is not injected. Left conjunctiva is not injected. No scleral icterus. Right eye exhibits normal extraocular motion and no nystagmus. Left eye exhibits normal extraocular motion and no nystagmus.   Neck: Trachea normal and phonation normal. No tracheal tenderness present. No tracheal deviation present. No thyroid mass and no thyromegaly  present.   Cardiovascular: Intact distal pulses.   Pulmonary/Chest: Effort normal. No stridor. No respiratory distress.   Abdominal: She exhibits no distension.   Lymphadenopathy:        Head (right side): No submental, no submandibular, no preauricular, no posterior auricular and no occipital adenopathy present.        Head (left side): No submental, no submandibular, no preauricular, no posterior auricular and no occipital adenopathy present.     She has no cervical adenopathy.   Neurological: She is alert and oriented to person, place, and time. No cranial nerve deficit.   Skin: Skin is warm and dry. No rash noted. No erythema.   Psychiatric: She has a normal mood and affect. Her behavior is normal.            Assessment:     1. Otorrhea of left ear    2. Bleeding from the nose        Plan:     Otorrhea of left ear    Bleeding from the nose    Other orders  -     mupirocin (BACTROBAN) 2 % ointment; by Nasal route 2 (two) times daily. Apply to nose twice daily for 10 days  Dispense: 15 g; Refill: 3  -     neomycin-polymyxin-hydrocortisone (CORTISPORIN) 3.5-10,000-1 mg/mL-unit/mL-% otic suspension; Place 3 drops into the left ear 3 (three) times daily.  Dispense: 10 mL; Refill: 0  -     fluticasone (VERAMYST) 27.5 mcg/actuation nasal spray; 2 sprays by Nasal route once daily.  Dispense: 15.8 mL; Refill: 3      Otorrhea: none today but dried wax near opening. I have refilled her cortisporin.     Nose Bleed Instructions:  We had a long discussion regarding the importance of nasal moisture, and the use of a nasal saline spray or gel into nose four times daily to keep moist.    Bactroban ointment in nostril twice daily if ordered.  Do not sleep or sit for long periods of time under a ceiling fan or other source of aggressive airflow.  Use a humidifier in bedroom or any room in your home you spend long periods of time.  Engage in only light activity. No strenuous activity. No heavy lifting or straining.   Use a stool  softener to avoid straining.   No bending over at the hips. Keep nose above your heart at all times.  Sneeze with an open mouth to reduce pressure from nose.   Avoid foods or drinks hot in temperature for at least 48 hours then progress slowly.  Avoid hot steamy showers or baths for one week.  We changed her to sensimist.

## 2020-06-10 ENCOUNTER — OFFICE VISIT (OUTPATIENT)
Dept: DERMATOLOGY | Facility: CLINIC | Age: 60
End: 2020-06-10
Payer: MEDICAID

## 2020-06-10 DIAGNOSIS — D22.9 MULTIPLE NEVI: Primary | ICD-10-CM

## 2020-06-10 DIAGNOSIS — L91.8 ACROCHORDON: ICD-10-CM

## 2020-06-10 DIAGNOSIS — D48.5 NEOPLASM OF UNCERTAIN BEHAVIOR OF SKIN: ICD-10-CM

## 2020-06-10 DIAGNOSIS — D18.00 HEMANGIOMA, UNSPECIFIED SITE: ICD-10-CM

## 2020-06-10 PROCEDURE — 99212 OFFICE O/P EST SF 10 MIN: CPT | Mod: PBBFAC | Performed by: DERMATOLOGY

## 2020-06-10 PROCEDURE — 99203 PR OFFICE/OUTPT VISIT, NEW, LEVL III, 30-44 MIN: ICD-10-PCS | Mod: 25,S$PBB,, | Performed by: DERMATOLOGY

## 2020-06-10 PROCEDURE — 88305 TISSUE EXAM BY PATHOLOGIST: CPT | Mod: 26,,, | Performed by: PATHOLOGY

## 2020-06-10 PROCEDURE — 99999 PR PBB SHADOW E&M-EST. PATIENT-LVL II: ICD-10-PCS | Mod: PBBFAC,,, | Performed by: DERMATOLOGY

## 2020-06-10 PROCEDURE — 99999 PR PBB SHADOW E&M-EST. PATIENT-LVL II: CPT | Mod: PBBFAC,,, | Performed by: DERMATOLOGY

## 2020-06-10 PROCEDURE — 88305 TISSUE EXAM BY PATHOLOGIST: CPT | Performed by: PATHOLOGY

## 2020-06-10 PROCEDURE — 11102 TANGNTL BX SKIN SINGLE LES: CPT | Mod: S$PBB,,, | Performed by: DERMATOLOGY

## 2020-06-10 PROCEDURE — 11102 PR TANGENTIAL BIOPSY, SKIN, SINGLE LESION: ICD-10-PCS | Mod: S$PBB,,, | Performed by: DERMATOLOGY

## 2020-06-10 PROCEDURE — 11102 TANGNTL BX SKIN SINGLE LES: CPT | Mod: PBBFAC | Performed by: DERMATOLOGY

## 2020-06-10 PROCEDURE — 88305 TISSUE EXAM BY PATHOLOGIST: ICD-10-PCS | Mod: 26,,, | Performed by: PATHOLOGY

## 2020-06-10 PROCEDURE — 99203 OFFICE O/P NEW LOW 30 MIN: CPT | Mod: 25,S$PBB,, | Performed by: DERMATOLOGY

## 2020-06-10 NOTE — PATIENT INSTRUCTIONS
Shave Biopsy Wound Care    Your doctor has performed a shave biopsy today.  A band aid and vaseline ointment has been placed over the site.  This should remain in place for 24 hours.  It is recommended that you keep the area dry for the first 24 hours.  After 24 hours, you may remove the band aid and wash the area with warm soap and water and apply Vaseline jelly.  Many patients prefer to use Neosporin or Bacitracin ointment.  This is acceptable; however, know that you can develop an allergy to this medication even if you have used it safely for years.  It is important to keep the area moist.  Letting it dry out and get air slows healing time, and will worsen the scar.  Band aid is optional after first 24 hours.      If you notice increasing redness, tenderness, pain, or yellow drainage at the biopsy site, please notify your doctor.  These are signs of an infection.    If your biopsy site is bleeding, apply firm pressure for 15 minutes straight.  Repeat for another 15 minutes, if it is still bleeding.   If the surgical site continues to bleed, then please contact your doctor.      [facility]   DERMATOLOGY  [address] [address]   Dept: [phone]   Dept Fax: [phone]         Preventing Skin Cancer  Relaxing in the sun may feel good. But it isnt good for your skin. In fact, being exposed to the suns harmful rays is a major cause of skin cancer. This is a serious disease that can be life-threatening. People of all ages and backgrounds are at risk. But in most cases, skin cancer can be prevented.    Your Role in Prevention  You can act today to help prevent skin cancer. Start by avoiding the suns UV (ultraviolet) rays. And dont use tanning beds, which are no safer than the sun. Taking these steps can help keep you from getting skin cancer. It can also help prevent wrinkles and other sun-induced aging effects. Make sure your children also follow  these safeguards. Now is the time to start taking preventive steps against skin cancer.  When You Are Outdoors  Protect your skin when you go outdoors during the day. Take precautions whenever you go out to eat, run errands by car or on foot, or do any outdoor activity. There isnt just one easy way to protect your skin. Its best to follow all of these steps:  · Wear tightly woven clothing that covers your skin. Put on a wide-brimmed hat to protect your face, ears, and scalp.  · Watch the clock. Try to avoid the sun between 10 a.m. and 4 p.m., when it is strongest.  · Head for the shade or create your own. Use an umbrella when sitting or strolling.  · Know that the suns rays can reflect off sand, water, and snow. This can harm your skin. Take extra care when you are near reflective surfaces.  · Keep in mind that even when the weather is hazy or cloudy, your skin can be exposed to strong UV rays.  · Shield your skin with sunscreen. Also, apply sunscreen to your childrens skin.  Tips for Using Sunscreen  To help prevent skin cancer, choose the right sunscreen and use it correctly. Try the following tips:  · Choose a sunscreen that has a sun protection factor (SPF) of at least 15. For the best protection, an SPF of at least 30 is preferred. Also, choose a sunscreen labeled broad spectrum. This will shield you from both UVA and UVB (ultraviolet A and B) rays.  · If one brand irritates your skin, try another, particularly ones without fragrance.  · Use a water-resistant sunscreen if swimming or sweating.  · Reapply sunscreen every 2 hours. If youre active, do this more often.  · Cover any sun-exposed skin, from your face to your feet. Dont forget your ears and your lips.  · Know that while sunscreen helps protect you, it isnt enough. You should also wear protective clothing. And try to stay out of the sun as much as you can, especially from 10 a.m. to 4 p.m.  © 9664-9326 [employer]. [address] [place] [address]  [place] [address]. All rights reserved. This information is not intended as a substitute for professional medical care. Always follow your healthcare professional's instructions.

## 2020-06-10 NOTE — PROGRESS NOTES
Subjective:       Patient ID:  Monica Joy is a 59 y.o. female who presents for   Chief Complaint   Patient presents with    Skin Check     mole behind knee     History of Present Illness: The patient presents with chief complaint of mole.  Location: right posterior knee  Duration: 7 months   Signs/Symptoms: itching    Prior treatments: none    The patient denies personal history of skin cancer.  Son c/ hx of BCC of the lip at 9 y /o.                Review of Systems   Constitutional: Negative for fever and chills.   Gastrointestinal: Negative for nausea and vomiting.   Skin: Positive for activity-related sunscreen use. Negative for daily sunscreen use and recent sunburn.   Hematologic/Lymphatic: Does not bruise/bleed easily.        Objective:    Physical Exam   Constitutional: She appears well-developed and well-nourished. No distress.   Neurological: She is alert and oriented to person, place, and time. She is not disoriented.   Psychiatric: She has a normal mood and affect.   Skin:   Areas Examined (abnormalities noted in diagram):   Scalp / Hair Palpated and Inspected  Head / Face Inspection Performed  Neck Inspection Performed  Chest / Axilla Inspection Performed  Abdomen Inspection Performed  Genitals / Buttocks / Groin Inspection Performed  Back Inspection Performed  RUE Inspected  LUE Inspection Performed  RLE Inspected  LLE Inspection Performed  Nails and Digits Inspection Performed              Diagram Legend     Erythematous scaling macule/papule c/w actinic keratosis       Vascular papule c/w angioma      Pigmented verrucoid papule/plaque c/w seborrheic keratosis      Yellow umbilicated papule c/w sebaceous hyperplasia      Irregularly shaped tan macule c/w lentigo     1-2 mm smooth white papules consistent with Milia      Movable subcutaneous cyst with punctum c/w epidermal inclusion cyst      Subcutaneous movable cyst c/w pilar cyst      Firm pink to brown papule c/w dermatofibroma       Pedunculated fleshy papule(s) c/w skin tag(s)      Evenly pigmented macule c/w junctional nevus     Mildly variegated pigmented, slightly irregular-bordered macule c/w mildly atypical nevus      Flesh colored to evenly pigmented papule c/w intradermal nevus       Pink pearly papule/plaque c/w basal cell carcinoma      Erythematous hyperkeratotic cursted plaque c/w SCC      Surgical scar with no sign of skin cancer recurrence      Open and closed comedones      Inflammatory papules and pustules      Verrucoid papule consistent consistent with wart     Erythematous eczematous patches and plaques     Dystrophic onycholytic nail with subungual debris c/w onychomycosis     Umbilicated papule    Erythematous-base heme-crusted tan verrucoid plaque consistent with inflamed seborrheic keratosis     Erythematous Silvery Scaling Plaque c/w Psoriasis     See annotation      Assessment / Plan:      Pathology Orders:     Normal Orders This Visit    Specimen to Pathology, Dermatology     Questions:    Procedure Type:  Dermatology and skin neoplasms    Number of Specimens:  1    ------------------------:  -------------------------    Spec 1 Procedure:  Biopsy    Spec 1 Clinical Impression:  DF vs. other    Spec 1 Source:  right lower leg    Clinical Information:  see above        Multiple nevi  Reassurance given.  Discussed ABCDEF of melanoma and changes for patient to look for.  AAD Handout given. Discussed importance of daily use of sunscreen which is broad-spectrum and has a minimum SPF of 30.    Acrochordon  Hemangioma, unspecified site  Reassurance given.  Lesions are benign.    Neoplasm of uncertain behavior of skin  -     Specimen to Pathology, Dermatology  -     Shave biopsy(-ies) done of 1 site(s).   Patient informed to call for results within 2 weeks if have not received notification via telephone call or [facility]           Follow up for call for results.     PROCEDURE NOTE - SHAVE BIOPSY   Location: see above    After  risk, benefits, and alternatives were discussed with the patient, the patient agrees to the procedure by verbal informed consent.  The area(s) were cleansed with alcohol. 2 cc of lidocaine 1% with epinephrine was injected for local anesthesia into each lesion(s).  A sharp dermablade was used to remove part or all of the lesion(s).  The specimen(s) will be sent for tissue pathology.  Hemostasis was obtained with aluminum chloride and/or hyfrecation.  The area(s) were dressed with vaseline ointment and bandaged.  The patient tolerated the procedure well without adverse events.  Wound care instructions were given to the patient on the AVS.  The patient will be notified of pathology results once available. Results will also be available in Epic.

## 2020-06-12 LAB
FINAL PATHOLOGIC DIAGNOSIS: NORMAL
GROSS: NORMAL
MICROSCOPIC EXAM: NORMAL

## 2020-06-25 ENCOUNTER — PROCEDURE VISIT (OUTPATIENT)
Dept: DERMATOLOGY | Facility: CLINIC | Age: 60
End: 2020-06-25
Payer: MEDICAID

## 2020-06-25 DIAGNOSIS — C44.712 BASAL CELL CARCINOMA (BCC) OF RIGHT LOWER LEG: Primary | ICD-10-CM

## 2020-06-25 PROCEDURE — 17262 DSTRJ MAL LES T/A/L 1.1-2.0: CPT | Mod: S$PBB,,, | Performed by: DERMATOLOGY

## 2020-06-25 PROCEDURE — 17262 DSTRJ MAL LES T/A/L 1.1-2.0: CPT | Mod: PBBFAC | Performed by: DERMATOLOGY

## 2020-06-25 PROCEDURE — 17262 PR DESTR MALIG TRUNK,EXTREM 1.1-2 CM: ICD-10-PCS | Mod: S$PBB,,, | Performed by: DERMATOLOGY

## 2020-06-25 NOTE — PATIENT INSTRUCTIONS
Wound Care    A pressure dressing and vaseline ointment has been placed over the site.  This should remain in place for 48 hours.  It is recommended that you keep the area dry for the first 24 hours.  After 24 hours, you may remove the bandage and wash the area with warm soap and water, apply Vaseline, and keep covered with a bandage.  This should be repeated every 24 hours. Many patients prefer to use Neosporin or Bacitracin ointment.  This is acceptable; however, know that you can develop an allergy to this medication even if you have used it safely for years.  It is important to keep the area moist.  Letting it dry out and get air slows healing time, and will worsen the scar.  Keep the areas covered with a bandage for two weeks.       If you notice increasing redness, tenderness, pain, or yellow drainage at the site, please notify your doctor.  These are signs of an infection.    If your site is bleeding, apply firm pressure for 15 minutes straight.  Repeat for another 15 minutes, if it is still bleeding.   If the surgical site continues to bleed, then please contact your doctor.      [facility]   DERMATOLOGY  [address]   [place] [address]   Dept: [phone]   Dept Fax: [phone]

## 2020-06-25 NOTE — PROGRESS NOTES
Here for electrodesiccation and curettage of superficial and  nodular basal cell carcinoma on the right lower leg. Biopsy was done on 6/10/120:    Electrodessication and Curettage Procedure note:    Written and informed consent obtained. Lesional tissue marked and prepped with alcohol. Lesion anesthetized with 3 cc 1% lidocaine with epinephrine. Curettage and desiccation x 3 cycles to base. Aluminum chloride for hemostasis. Lesion size after primary curettage: 1.7 cm    Area bandaged and wound care explained. AVS given.       Follow up in about 2 months (around 8/25/2020).

## 2020-07-23 ENCOUNTER — LAB VISIT (OUTPATIENT)
Dept: LAB | Facility: HOSPITAL | Age: 60
End: 2020-07-23
Attending: INTERNAL MEDICINE
Payer: MEDICAID

## 2020-07-23 ENCOUNTER — OFFICE VISIT (OUTPATIENT)
Dept: INTERNAL MEDICINE | Facility: CLINIC | Age: 60
End: 2020-07-23
Payer: MEDICAID

## 2020-07-23 VITALS
HEIGHT: 65 IN | HEART RATE: 72 BPM | DIASTOLIC BLOOD PRESSURE: 70 MMHG | SYSTOLIC BLOOD PRESSURE: 122 MMHG | OXYGEN SATURATION: 97 % | WEIGHT: 235.25 LBS | TEMPERATURE: 99 F | BODY MASS INDEX: 39.2 KG/M2

## 2020-07-23 DIAGNOSIS — M25.50 MULTIPLE JOINT PAIN: ICD-10-CM

## 2020-07-23 DIAGNOSIS — R06.81 WITNESSED APNEIC SPELLS: ICD-10-CM

## 2020-07-23 DIAGNOSIS — R40.0 DAYTIME SOMNOLENCE: ICD-10-CM

## 2020-07-23 DIAGNOSIS — N28.1 BILATERAL RENAL CYSTS: ICD-10-CM

## 2020-07-23 DIAGNOSIS — R35.0 URINARY FREQUENCY: ICD-10-CM

## 2020-07-23 DIAGNOSIS — G47.30 SLEEP DISORDER BREATHING: Primary | ICD-10-CM

## 2020-07-23 DIAGNOSIS — M25.50 MULTIPLE JOINT PAIN: Primary | ICD-10-CM

## 2020-07-23 DIAGNOSIS — R06.83 SNORING: ICD-10-CM

## 2020-07-23 LAB
BACTERIA #/AREA URNS AUTO: NORMAL /HPF
BASOPHILS # BLD AUTO: 0.02 K/UL (ref 0–0.2)
BASOPHILS NFR BLD: 0.3 % (ref 0–1.9)
BILIRUB UR QL STRIP: NEGATIVE
CLARITY UR REFRACT.AUTO: CLEAR
COLOR UR AUTO: YELLOW
DIFFERENTIAL METHOD: ABNORMAL
EOSINOPHIL # BLD AUTO: 0.2 K/UL (ref 0–0.5)
EOSINOPHIL NFR BLD: 1.9 % (ref 0–8)
ERYTHROCYTE [DISTWIDTH] IN BLOOD BY AUTOMATED COUNT: 14.4 % (ref 11.5–14.5)
ERYTHROCYTE [SEDIMENTATION RATE] IN BLOOD BY WESTERGREN METHOD: 18 MM/HR (ref 0–20)
GLUCOSE UR QL STRIP: NEGATIVE
HCT VFR BLD AUTO: 44 % (ref 37–48.5)
HGB BLD-MCNC: 13.9 G/DL (ref 12–16)
HGB UR QL STRIP: NEGATIVE
IMM GRANULOCYTES # BLD AUTO: 0.02 K/UL (ref 0–0.04)
IMM GRANULOCYTES NFR BLD AUTO: 0.3 % (ref 0–0.5)
KETONES UR QL STRIP: NEGATIVE
LEUKOCYTE ESTERASE UR QL STRIP: ABNORMAL
LYMPHOCYTES # BLD AUTO: 2.3 K/UL (ref 1–4.8)
LYMPHOCYTES NFR BLD: 29.3 % (ref 18–48)
MCH RBC QN AUTO: 29.1 PG (ref 27–31)
MCHC RBC AUTO-ENTMCNC: 31.6 G/DL (ref 32–36)
MCV RBC AUTO: 92 FL (ref 82–98)
MICROSCOPIC COMMENT: NORMAL
MONOCYTES # BLD AUTO: 0.5 K/UL (ref 0.3–1)
MONOCYTES NFR BLD: 6.3 % (ref 4–15)
NEUTROPHILS # BLD AUTO: 5 K/UL (ref 1.8–7.7)
NEUTROPHILS NFR BLD: 61.9 % (ref 38–73)
NITRITE UR QL STRIP: NEGATIVE
NRBC BLD-RTO: 0 /100 WBC
PH UR STRIP: 6 [PH] (ref 5–8)
PLATELET # BLD AUTO: 221 K/UL (ref 150–350)
PMV BLD AUTO: 10.6 FL (ref 9.2–12.9)
PROT UR QL STRIP: NEGATIVE
RBC # BLD AUTO: 4.78 M/UL (ref 4–5.4)
RBC #/AREA URNS AUTO: 2 /HPF (ref 0–4)
SP GR UR STRIP: 1.01 (ref 1–1.03)
SQUAMOUS #/AREA URNS AUTO: 4 /HPF
URN SPEC COLLECT METH UR: ABNORMAL
WBC # BLD AUTO: 7.98 K/UL (ref 3.9–12.7)
WBC #/AREA URNS AUTO: 4 /HPF (ref 0–5)

## 2020-07-23 PROCEDURE — 99999 PR PBB SHADOW E&M-EST. PATIENT-LVL IV: CPT | Mod: PBBFAC,,, | Performed by: INTERNAL MEDICINE

## 2020-07-23 PROCEDURE — 85025 COMPLETE CBC W/AUTO DIFF WBC: CPT

## 2020-07-23 PROCEDURE — 36415 COLL VENOUS BLD VENIPUNCTURE: CPT

## 2020-07-23 PROCEDURE — 86431 RHEUMATOID FACTOR QUANT: CPT

## 2020-07-23 PROCEDURE — 86200 CCP ANTIBODY: CPT

## 2020-07-23 PROCEDURE — 84550 ASSAY OF BLOOD/URIC ACID: CPT

## 2020-07-23 PROCEDURE — 86140 C-REACTIVE PROTEIN: CPT

## 2020-07-23 PROCEDURE — 84443 ASSAY THYROID STIM HORMONE: CPT

## 2020-07-23 PROCEDURE — 99214 PR OFFICE/OUTPT VISIT, EST, LEVL IV, 30-39 MIN: ICD-10-PCS | Mod: S$PBB,,, | Performed by: INTERNAL MEDICINE

## 2020-07-23 PROCEDURE — 99999 PR PBB SHADOW E&M-EST. PATIENT-LVL IV: ICD-10-PCS | Mod: PBBFAC,,, | Performed by: INTERNAL MEDICINE

## 2020-07-23 PROCEDURE — 85651 RBC SED RATE NONAUTOMATED: CPT

## 2020-07-23 PROCEDURE — 81001 URINALYSIS AUTO W/SCOPE: CPT

## 2020-07-23 PROCEDURE — 99214 OFFICE O/P EST MOD 30 MIN: CPT | Mod: S$PBB,,, | Performed by: INTERNAL MEDICINE

## 2020-07-23 PROCEDURE — 99214 OFFICE O/P EST MOD 30 MIN: CPT | Mod: PBBFAC | Performed by: INTERNAL MEDICINE

## 2020-07-23 NOTE — PROGRESS NOTES
Subjective:       Patient ID: Monica Joy is a 59 y.o. female.    Chief Complaint: Annual Exam    Monica Joy  59 y.o. White female     Patient presents with:  Annual Exam    HPI: Here today for an annual physical. She has multiple concerns.   She has pain in multiple joints, primarily hands, wrists and knees. She wants to be checked for rheumatoid arthritis.   She snores and has been told by her  that she stops breathing. She is tired/sleepy during the day. She also reports headaches in the mornings.   She was found to have cysts on her kidneys. She saw an outside urologist but would like to see a nephrologist. She has discussed her situation with a doctor at [facility].   She has been having urinary frequency. She denies dysuria or hematuria.     Mammogram due on 11/28/2020  Lipid Panel due on 11/26/2023  TETANUS VACCINE due on 11/21/2028  Hepatitis C Screening Completed    Past Medical History:  Arthritis  9/6/2013: Asthma  Thyroid disease    Current Outpatient Medications on File Prior to Visit:  cyanocobalamin, vitamin B-12, (VITAMIN B-12) 1,000 mcg Subl, Place under the tongue., Disp: , Rfl:   fluticasone propionate (FLONASE) 50 mcg/actuation nasal spray, 2 sprays (100 mcg total) by Each Nostril route once daily., Disp: 1 Bottle, Rfl: 12  levocetirizine (XYZAL) 5 MG tablet, TAKE 1 TABLET BY MOUTH EVERY EVENING, Disp: 30 tablet, Rfl: 11  neomycin-polymyxin-hydrocortisone (CORTISPORIN) 3.5-10,000-1 mg/mL-unit/mL-% otic suspension, Place 3 drops into the left ear 3 (three) times daily., Disp: 10 mL, Rfl: 0    Allergies:  Review of patient's allergies indicates:   -- Amoxicillin -- Rash and Swelling    --  Rash with throat swelling   -- Codeine -- Palpitations, Rash and Swelling    --  Other reaction(s): Shortness of breath             Other reaction(s): Hives             Rash and throat swelling   -- Hydrocodone -- Rash and Swelling   -- Iodine and iodide containing products -- Rash and  Swelling   -- Medrol (methylprednisolone)    -- Oxycodone -- Shortness Of Breath, Rash and                            Swelling   -- Oxycodone hcl-oxycodone-asa -- Rash and Swelling   -- Percodan filipe -- Shortness Of Breath   -- Tetracyclines -- Rash and Swelling   -- Tramadol -- Shortness Of Breath, Rash and                            Swelling   -- Augmentin (amoxicillin-pot clavulanate) -- Itching and Rash   -- Levaquin (levofloxacin) -- Hives   -- Sulfa (sulfonamide antibiotics) -- Itching and Rash   -- Doxycycline -- Swelling    --  Other reaction(s): Rash   -- Lortab  (hydrocodone-acetaminophen)     --  Other reaction(s): Hives             Other reaction(s): Difficulty breathing   -- Percocet  (oxycodone-acetaminophen)     --  Other reaction(s): Shortness of breath   -- Shellfish containing products     --  Other reaction(s): Shortness of breath             Other reaction(s): Hives   -- Tetracycline -- Swelling   -- Vibramycin  (doxycycline calcium)     --  Other reaction(s): Hives             Other reaction(s): Difficulty breathing              Review of Systems   Constitutional: Positive for fatigue and unexpected weight change. Negative for activity change.   HENT: Positive for hearing loss. Negative for rhinorrhea and trouble swallowing.    Eyes: Negative for discharge and visual disturbance.   Respiratory: Positive for apnea. Negative for chest tightness and wheezing.    Cardiovascular: Positive for palpitations. Negative for chest pain.   Gastrointestinal: Negative for blood in stool, constipation, diarrhea and vomiting.   Endocrine: Positive for polyuria. Negative for polydipsia.   Genitourinary: Positive for frequency. Negative for difficulty urinating, dysuria, hematuria and menstrual problem.   Musculoskeletal: Positive for arthralgias and joint swelling. Negative for neck pain.   Neurological: Positive for headaches. Negative for weakness.   Psychiatric/Behavioral: Negative for confusion and dysphoric  mood.         Objective:      Physical Exam  Vitals signs reviewed.   Constitutional:       General: She is not in acute distress.     Appearance: She is well-developed.   Eyes:      General: No scleral icterus.  Cardiovascular:      Rate and Rhythm: Normal rate.   Pulmonary:      Effort: Pulmonary effort is normal. No respiratory distress.   Musculoskeletal:         General: Deformity (fingers) present.   Neurological:      Mental Status: She is alert and oriented to person, place, and time.   Psychiatric:         Behavior: Behavior normal.         Thought Content: Thought content normal.         Judgment: Judgment normal.         Assessment:       1. Multiple joint pain    2. Snoring    3. Witnessed apneic spells    4. Daytime somnolence    5. Bilateral renal cysts    6. Urinary frequency        Plan:       Monica was seen today for annual exam.    Diagnoses and all orders for this visit:    Multiple joint pain  -     Rheumatoid factor; Future  -     CYCLIC CITRUL PEPTIDE ANTIBODY, IGG; Future  -     Sedimentation rate; Future  -     C-Reactive Protein; Future  -     Uric acid; Future  -     TSH; Future  -     CBC auto differential; Future    Snoring  -     Polysomnogram (CPAP will be added if patient meets diagnostic criteria.); Future    Witnessed apneic spells  -     Polysomnogram (CPAP will be added if patient meets diagnostic criteria.); Future    Daytime somnolence  -     Polysomnogram (CPAP will be added if patient meets diagnostic criteria.); Future    Bilateral renal cysts  -     Ambulatory referral/consult to Nephrology; Future    Urinary frequency  -     Urinalysis    Labs today.

## 2020-07-24 LAB
CCP AB SER IA-ACNC: <0.5 U/ML
CRP SERPL-MCNC: 21.7 MG/L (ref 0–8.2)
RHEUMATOID FACT SERPL-ACNC: <10 IU/ML (ref 0–15)
TSH SERPL DL<=0.005 MIU/L-ACNC: 0.43 UIU/ML (ref 0.4–4)
URATE SERPL-MCNC: 6.6 MG/DL (ref 2.4–5.7)

## 2020-07-28 ENCOUNTER — TELEPHONE (OUTPATIENT)
Dept: PULMONOLOGY | Facility: CLINIC | Age: 60
End: 2020-07-28

## 2020-07-28 NOTE — TELEPHONE ENCOUNTER
----- Message from Gretel Bonilla sent at 7/28/2020 10:08 AM CDT -----  [facility]

## 2020-07-31 ENCOUNTER — HOSPITAL ENCOUNTER (OUTPATIENT)
Dept: RADIOLOGY | Facility: HOSPITAL | Age: 60
Discharge: HOME | End: 2020-07-31
Attending: INTERNAL MEDICINE
Payer: MEDICAID

## 2020-07-31 DIAGNOSIS — R10.11 RUQ PAIN: ICD-10-CM

## 2020-07-31 PROCEDURE — 76705 ECHO EXAM OF ABDOMEN: CPT | Mod: TC

## 2020-08-03 ENCOUNTER — PROCEDURE VISIT (OUTPATIENT)
Dept: SLEEP MEDICINE | Facility: CLINIC | Age: 60
End: 2020-08-03
Payer: MEDICAID

## 2020-08-03 DIAGNOSIS — G47.33 OSA (OBSTRUCTIVE SLEEP APNEA): Primary | ICD-10-CM

## 2020-08-03 DIAGNOSIS — R06.83 SNORING: ICD-10-CM

## 2020-08-03 DIAGNOSIS — R40.0 DAYTIME SOMNOLENCE: ICD-10-CM

## 2020-08-03 DIAGNOSIS — R06.81 WITNESSED APNEIC SPELLS: ICD-10-CM

## 2020-08-03 PROCEDURE — 95806 SLEEP STUDY UNATT&RESP EFFT: CPT | Mod: PBBFAC | Performed by: INTERNAL MEDICINE

## 2020-08-03 NOTE — Clinical Note
night study  MODERATE OBSTRUCTIVE SLEEP APNEA with overall AHI 27.5/hr ( 184 events): night #1  Oxygen desaturation: 77%. SpO2 between 70% to 79% for <1 min.  Patient snored 100% time above 50 .  Heart rate range: 66 bpm - 100 bpm  REC's:  Please refer to [facility] for prompt evaluation and management

## 2020-08-06 PROCEDURE — 95806 SLEEP STUDY UNATT&RESP EFFT: CPT | Mod: 26,S$PBB,, | Performed by: INTERNAL MEDICINE

## 2020-08-06 PROCEDURE — 95806 PR SLEEP STUDY, UNATTENDED, SIMUL RECORD HR/O2 SAT/RESP FLOW/RESP EFFT: ICD-10-PCS | Mod: 26,S$PBB,, | Performed by: INTERNAL MEDICINE

## 2020-08-06 NOTE — PROCEDURES
Home Sleep Studies    Date/Time: 8/3/2020 8:00 AM  Performed by: Charly Dill MD  Authorized by: Karen Villafana DO       night study  MODERATE OBSTRUCTIVE SLEEP APNEA with overall AHI 27.5/hr ( 184 events): night #1  Oxygen desaturation: 77%. SpO2 between 70% to 79% for <1 min.  Patient snored 100% time above 50 .  Heart rate range: 66 bpm - 100 bpm  REC's:  Please refer to [facility] for prompt evaluation and management

## 2020-08-27 ENCOUNTER — OFFICE VISIT (OUTPATIENT)
Dept: PULMONOLOGY | Facility: CLINIC | Age: 60
End: 2020-08-27
Payer: MEDICAID

## 2020-08-27 ENCOUNTER — TELEPHONE (OUTPATIENT)
Dept: PULMONOLOGY | Facility: CLINIC | Age: 60
End: 2020-08-27

## 2020-08-27 VITALS
DIASTOLIC BLOOD PRESSURE: 84 MMHG | HEIGHT: 65 IN | SYSTOLIC BLOOD PRESSURE: 128 MMHG | BODY MASS INDEX: 39.15 KG/M2 | WEIGHT: 235 LBS

## 2020-08-27 DIAGNOSIS — G47.33 OBSTRUCTIVE SLEEP APNEA: Primary | ICD-10-CM

## 2020-08-27 DIAGNOSIS — J45.20 MILD INTERMITTENT ASTHMA WITHOUT COMPLICATION: ICD-10-CM

## 2020-08-27 DIAGNOSIS — E66.01 SEVERE OBESITY (BMI 35.0-35.9 WITH COMORBIDITY): ICD-10-CM

## 2020-08-27 PROCEDURE — 99203 PR OFFICE/OUTPT VISIT, NEW, LEVL III, 30-44 MIN: ICD-10-PCS | Mod: 95,,, | Performed by: NURSE PRACTITIONER

## 2020-08-27 PROCEDURE — 99203 OFFICE O/P NEW LOW 30 MIN: CPT | Mod: 95,,, | Performed by: NURSE PRACTITIONER

## 2020-08-27 RX ORDER — ALBUTEROL SULFATE 90 UG/1
2 AEROSOL, METERED RESPIRATORY (INHALATION) EVERY 4 HOURS PRN
Qty: 18 G | Refills: 11 | Status: SHIPPED | OUTPATIENT
Start: 2020-08-27 | End: 2021-09-22 | Stop reason: SDUPTHER

## 2020-08-27 NOTE — ASSESSMENT & PLAN NOTE
Encouraged calorie reduction and 30 minutes of exercise daily. Discussed impact of obesity on general health.  No regular exercise, plan on joining a gym

## 2020-08-27 NOTE — ASSESSMENT & PLAN NOTE
7/31/2020 Home Sleep Study 1 night study   MODERATE OBSTRUCTIVE SLEEP APNEA with overall AHI 27.5/hr ( 184 events): night #1  Oxygen desaturation: 77%. SpO2 between 70% to 79% for <1 min.  Orders Auto CPAP 5-20 cm with patient requested Full face mask

## 2020-08-27 NOTE — PROGRESS NOTES
The patient location is: home   The chief complaint leading to consultation is: sleep apnea    Visit type: audiovisual    Face to Face time with patient: 0249 - 1458  20 minutes of total time spent on the encounter, which includes face to face time and non-face to face time preparing to see the patient (eg, review of tests), Obtaining and/or reviewing separately obtained history, Documenting clinical information in the electronic or other health record, Independently interpreting results (not separately reported) and communicating results to the patient/family/caregiver, or Care coordination (not separately reported).     Each patient to whom he or she provides medical services by telemedicine is:  (1) informed of the relationship between the physician and patient and the respective role of any other health care provider with respect to management of the patient; and (2) notified that he or she may decline to receive medical services by telemedicine and may withdraw from such care at any time.    Subjective:      Patient ID: Monica Joy is a 59 y.o. female.    Chief Complaint: Sleep Apnea    HPI: Monica Joy engaged in telemedicine visit follow up for obstructive sleep apnea with review of home sleep study 2020 revealed moderate obstructive sleep apnea with overall AHI 27.5/hour (184 events):  Night # 1  Prior Home Sleep Study 2017 no obstructive sleep apnea detectedwith AHI 4.6   Orders Auto CPAP 5-20 cm with patient requested Full face mask  [place] [address]    Previous Report Reviewed: lab reports and office notes     Past Medical History: The following portions of the patient's history were reviewed and updated as appropriate:   She  has a past surgical history that includes Thyroidectomy, partial; Wrist surgery; Joint replacement; Fracture surgery;  section; Tubal ligation; Novasure Endometrial Ablation (2016); and Colonoscopy (N/A, 2019).  Her family history includes Anuerysm in  her brother and mother; Atrial fibrillation in her mother; Diabetes in her brother and father; Heart disease in her brother and father; Hyperlipidemia in her father; Kidney disease in her father; Stroke in her father.  She  reports that she has never smoked. She has never used smokeless tobacco. She reports that she does not drink alcohol or use drugs.  She has a current medication list which includes the following prescription(s): albuterol, cyanocobalamin (vitamin b-12), fluticasone propionate, levocetirizine, and neomycin-polymyxin-hydrocortisone.  She is allergic to amoxicillin; codeine; hydrocodone; iodine and iodide containing products; medrol [methylprednisolone]; oxycodone; oxycodone hcl-oxycodone-asa; percodan filipe; tetracyclines; tramadol; augmentin [amoxicillin-pot clavulanate]; levaquin [levofloxacin]; sulfa (sulfonamide antibiotics); doxycycline; lortab  [hydrocodone-acetaminophen]; percocet  [oxycodone-acetaminophen]; shellfish containing products; tetracycline; and vibramycin  [doxycycline calcium]..    The following portions of the patient's history were reviewed and updated as appropriate: allergies, current medications, past family history, past medical history, past social history, past surgical history and problem list.    Review of Systems   Constitutional: Negative for fever, chills, weight loss, weight gain, activity change, appetite change, fatigue and night sweats.   HENT: Negative for postnasal drip, rhinorrhea, sinus pressure, voice change and congestion.    Eyes: Negative for redness and itching.   Respiratory: Negative for snoring, cough, sputum production, chest tightness, shortness of breath, wheezing, orthopnea, asthma nighttime symptoms, dyspnea on extertion, use of rescue inhaler and somnolence.    Cardiovascular: Negative.  Negative for chest pain, palpitations and leg swelling.   Genitourinary: Negative for difficulty urinating and hematuria.   Endocrine: Negative for cold  "intolerance and heat intolerance.    Musculoskeletal: Negative for arthralgias, gait problem, joint swelling and myalgias.   Skin: Negative.    Gastrointestinal: Negative for nausea, vomiting, abdominal pain and acid reflux.   Neurological: Negative for dizziness, weakness, light-headedness and headaches.   Hematological: Negative for adenopathy. No excessive bruising.   All other systems reviewed and are negative.     Objective:   /84   Ht 5' 5" (1.651 m)   Wt 106.6 kg (235 lb)   BMI 39.11 kg/m²   Physical Exam  Vitals signs and nursing note reviewed.   Constitutional:       General: She is awake.      Appearance: Normal appearance. She is well-developed and well-groomed. She is obese.   HENT:      Head: Normocephalic.   Eyes:      Conjunctiva/sclera: Conjunctivae normal.   Pulmonary:      Effort: Pulmonary effort is normal.   Neurological:      Mental Status: She is alert.   Psychiatric:         Mood and Affect: Mood normal.         Behavior: Behavior normal. Behavior is cooperative.         Thought Content: Thought content normal.         Judgment: Judgment normal.         Personal Diagnostic Review  7/31/2020 Home Sleep Study 1 night study   MODERATE OBSTRUCTIVE SLEEP APNEA with overall AHI 27.5/hr ( 184 events): night #1  Oxygen desaturation: 77%. SpO2 between 70% to 79% for <1 min.  Patient snored 100% time above 50 .  Heart rate range: 66 bpm - 100 bpm    Assessment:     1. Obstructive sleep apnea    2. Severe obesity (BMI 35.0-35.9 with comorbidity)    3. Mild intermittent asthma without complication        Orders Placed This Encounter   Procedures    CPAP FOR HOME USE     7/31/2020 Home Sleep Study 1 night study MODERATE OBSTRUCTIVE SLEEP APNEA with overall AHI 27.5/hr ( 184 events): night #1.Oxygen desaturation: 77%. SpO2 between 70% to 79% for <1 min.     Order Specific Question:   Type:     Answer:   Auto CPAP     Order Specific Question:   Auto CPAP pressure setting range (cmH20):     " Answer:   5-20     Order Specific Question:   Length of need (1-99 months):     Answer:   99     Order Specific Question:   Humidification:     Answer:   Heated     Order Specific Question:   Type of mask:     Answer:   FFM     Order Specific Question:   Headgear?     Answer:   Yes     Order Specific Question:   Tubing?     Answer:   Yes     Order Specific Question:   Humidifier chamber?     Answer:   Yes     Order Specific Question:   Chin strap?     Answer:   Yes     Order Specific Question:   Filters?     Answer:   Yes     Order Specific Question:   Cushions?     Answer:   Yes     Plan:     Problem List Items Addressed This Visit     Severe obesity (BMI 35.0-35.9 with comorbidity)     Encouraged calorie reduction and 30 minutes of exercise daily. Discussed impact of obesity on general health.  No regular exercise, plan on joining a gym          Obstructive sleep apnea - Primary     7/31/2020 Home Sleep Study 1 night study   MODERATE OBSTRUCTIVE SLEEP APNEA with overall AHI 27.5/hr ( 184 events): night #1  Oxygen desaturation: 77%. SpO2 between 70% to 79% for <1 min.  Orders Auto CPAP 5-20 cm with patient requested Full face mask             Relevant Orders    CPAP FOR HOME USE    Mild intermittent asthma without complication     Controlled. Off medication, would like rescue on hand. Out of rescue refills.         Relevant Medications    albuterol (VENTOLIN HFA) 90 mcg/actuation inhaler         (DME) - [facility]  Reviewed therapeutic goals for positive airway pressure therapy Auto CPAP  Ideal is usage 100% of nights for 6 - 8 hours per night. Minimum usage is 70% of night for at least 4 hours per night used.     Follow up in about 12 weeks (around 11/19/2020) for CPAP compliance download after initial set up.

## 2020-08-27 NOTE — PATIENT INSTRUCTIONS
What Are Snoring and Obstructive Sleep Apnea?  If youve ever had a stuffed-up nose, you know the feeling of trying to breathe through a very narrow passageway. This is what happens in your throat when you snore. While you sleep, structures in your throat partially block your air passage, making the passage narrow and hard to breathe through. If the entire passage becomes blocked and you cant breathe at all, you have sleep apnea.      Snoring Obstructive sleep apnea   Snoring  If your throat structures are too large or the muscles relax too much during sleep, the air passage may be partially blocked. As air from the nose or mouth passes around this blockage, the throat structures vibrate, causing the familiar sound of snoring. At times, this sound can be so loud that snorers wake up others, or even themselves, during the night. Snoring gets worse as more and more of the air passage is blocked.  Obstructive sleep apnea  If the structures completely block the throat, air cant flow to the lungs at all. This is called apnea (meaning no breathing). Since the lungs arent getting fresh air, the brain tells the body to wake up just enough to tighten the muscles and unblock the air passage. With a loud gasp, breathing begins again. This process may be repeated over and over again throughout the night, making your sleep fragmented with a lighter stage of sleep. Even though you do not remember waking up many times during the night to a lighter sleep, you feel tired the next day. The lack of sleep and fresh air can also strain your lungs, heart, and other organs, leading to problems such as high blood pressure, heart attack, or stroke.  Problems in the nose and jaw  Problems in the structure of the nose may obstruct breathing. A crooked (deviated) septum or swollen turbinates can make snoring worse or lead to apnea. Also, a receding jaw may make the tongue sit too far back, so its more likely to block the airway when  youre asleep.        Date Last Reviewed: 7/18/2015  © 8678-4645 [employer]. [address] [place] [address]. All rights reserved. This information is not intended as a substitute for professional medical care. Always follow your healthcare professional's instructions.        Continuous Positive Air Pressure (CPAP)     A mask over the nose gently directs air into the throat to keep the airway open.   Continuous positive air pressure (CPAP) uses gentle air pressure to hold the airway open. CPAP is often the most effective treatment for sleep apnea and severe snoring. It works very well for many people. But keep in mind that it can take several adjustments before the setup is right for you.  How CPAP works  The CPAP machine  is a small portable pump beside the bed. The pump sends air through a hose, which is held over your nose and mouth by a mask. Mild air pressure is gently pushed through your airway. The air pressure nudges sagging tissues aside. This widens the airway so you can breathe better. CPAP may be combined with other kinds of therapy for sleep apnea.  Types of air pressure treatments  There are different types of CPAP. Your doctor or CPAP technician will help you decide which type is best for you:  · Basic CPAP keeps the pressure constant all night long.  · A bilevel device (BiPAP) provides more pressure when you breathe in and less when you breathe out. A BiPAP machine also may be set to provide automatic breaths to maintain breathing if you stop breathing while sleeping.  · An autoCPAP device automatically adjusts pressure throughout the night and in response to changes such as body position, sleep stage, and snoring.  Date Last Reviewed: 8/10/2015  © 4207-8224 [employer]. [address] [place], [place] [address]. All rights reserved. This information is not intended as a substitute for professional medical care. Always follow your healthcare professional's  instructions.

## 2020-08-27 NOTE — LETTER
August 27, 2020      Karen Villafana DO  [address] [place] [address] [address] [place] [address]           [facility]  [address] [place] [address] [address]  [place] [address]  Phone: [phone]  Fax: [phone]          Patient: Monica Joy   MR Number: [id number]   YOB: 1960   Date of Visit: 8/27/2020       Dear Dr. Karen Villafana:    Thank you for referring Monica Joy to me for evaluation. Attached you will find relevant portions of my assessment and plan of care.    If you have questions, please do not hesitate to call me. I look forward to following Monica Joy along with you.    Sincerely,    Juany Ramos, NP    Enclosure  CC:  No Recipients    If you would like to receive this communication electronically, please contact [email or web address] or [phone] to request more information on [employer] Link access.    For providers and/or their staff who would like to refer a patient to [facility], please contact us through our one-stop-shop provider referral line, Chandler Rowland, at [phone].    If you feel you have received this communication in error or would no longer like to receive these types of communications, please e-mail [email or web address]

## 2020-09-08 ENCOUNTER — HOSPITAL ENCOUNTER (OUTPATIENT)
Dept: RADIOLOGY | Facility: HOSPITAL | Age: 60
Discharge: HOME | End: 2020-09-08
Attending: PHYSICIAN ASSISTANT
Payer: MEDICAID

## 2020-09-08 ENCOUNTER — OFFICE VISIT (OUTPATIENT)
Dept: URGENT CARE | Facility: CLINIC | Age: 60
End: 2020-09-08
Payer: MEDICAID

## 2020-09-08 VITALS
HEART RATE: 85 BPM | OXYGEN SATURATION: 96 % | DIASTOLIC BLOOD PRESSURE: 76 MMHG | TEMPERATURE: 98 F | WEIGHT: 238.44 LBS | BODY MASS INDEX: 39.68 KG/M2 | SYSTOLIC BLOOD PRESSURE: 141 MMHG

## 2020-09-08 DIAGNOSIS — M79.642 LEFT HAND PAIN: Primary | ICD-10-CM

## 2020-09-08 DIAGNOSIS — M79.642 LEFT HAND PAIN: ICD-10-CM

## 2020-09-08 PROCEDURE — 73110 X-RAY EXAM OF WRIST: CPT | Mod: 26,LT,, | Performed by: RADIOLOGY

## 2020-09-08 PROCEDURE — 99213 OFFICE O/P EST LOW 20 MIN: CPT | Mod: S$PBB,,, | Performed by: PHYSICIAN ASSISTANT

## 2020-09-08 PROCEDURE — 73110 XR WRIST COMPLETE 3 VIEWS LEFT: ICD-10-PCS | Mod: 26,LT,, | Performed by: RADIOLOGY

## 2020-09-08 PROCEDURE — 73130 XR HAND COMPLETE 3 VIEW LEFT: ICD-10-PCS | Mod: 26,LT,, | Performed by: RADIOLOGY

## 2020-09-08 PROCEDURE — 73110 X-RAY EXAM OF WRIST: CPT | Mod: TC,PO,LT

## 2020-09-08 PROCEDURE — 99213 PR OFFICE/OUTPT VISIT, EST, LEVL III, 20-29 MIN: ICD-10-PCS | Mod: S$PBB,,, | Performed by: PHYSICIAN ASSISTANT

## 2020-09-08 PROCEDURE — 99213 OFFICE O/P EST LOW 20 MIN: CPT | Mod: PBBFAC,25,PO | Performed by: PHYSICIAN ASSISTANT

## 2020-09-08 PROCEDURE — 99999 PR PBB SHADOW E&M-EST. PATIENT-LVL III: ICD-10-PCS | Mod: PBBFAC,,, | Performed by: PHYSICIAN ASSISTANT

## 2020-09-08 PROCEDURE — 73130 X-RAY EXAM OF HAND: CPT | Mod: TC,PO,LT

## 2020-09-08 PROCEDURE — 99999 PR PBB SHADOW E&M-EST. PATIENT-LVL III: CPT | Mod: PBBFAC,,, | Performed by: PHYSICIAN ASSISTANT

## 2020-09-08 PROCEDURE — 73130 X-RAY EXAM OF HAND: CPT | Mod: 26,LT,, | Performed by: RADIOLOGY

## 2020-09-08 NOTE — PATIENT INSTRUCTIONS
Xray shows no acute fracture or dislocation per radiologist.  Evidence of postoperative changes and chronic OA.  Tylenol for pain as needed.  Obviously decrease use and rest hand.  Ortho in 1 week if needed.  Report to ER or re-eval in UC if worsening.

## 2020-09-08 NOTE — PROGRESS NOTES
Monica Joy is a 59 year old female who presents today with complaints of left wrist and hand pain after moving a seat in her vehicle just PTA.  She has a history of multiple left wrist surgeries due to an injury years ago.  She denies numbness or tingling to the area.  She has decreased ROM.  She presents in a soft splint from home for stability.    All areas of patients chart reviewed including past medical history, past surgical history, medications, allergies, family history, and social history.    Review of Systems   Constitutional: Negative for fever.   HENT: Negative for sore throat.    Respiratory: Negative for shortness of breath.    Cardiovascular: Negative for chest pain.   Gastrointestinal: Negative for abdominal pain and nausea.   Genitourinary: Negative for dysuria and frequency.   Musculoskeletal: Positive for joint pain (left wrist). Negative for back pain.   Neurological: Negative for headaches.   All other systems reviewed and are negative.    Physical Exam:  BP (!) 141/76   Pulse 85   Temp 98.3 °F (36.8 °C) (Temporal)   Wt 108.2 kg (238 lb 6.8 oz)   SpO2 96%   BMI 39.68 kg/m²   Physical Exam   Constitutional: She is oriented to person, place, and time and well-developed, well-nourished, and in no distress.   HENT:   Head: Normocephalic.   Right Ear: External ear normal.   Left Ear: External ear normal.   Mouth/Throat: No oropharyngeal exudate.   Neck: Normal range of motion.   Cardiovascular: Normal rate and normal heart sounds.   Pulmonary/Chest: Effort normal and breath sounds normal. No respiratory distress.   Musculoskeletal:      Left wrist: She exhibits decreased range of motion, tenderness and bony tenderness. She exhibits no swelling, no effusion, no crepitus, no deformity and no laceration.        Hands:    Neurological: She is alert and oriented to person, place, and time.   Skin: Skin is warm.   Psychiatric: Affect normal.     Assessment:  1. Left hand pain  - X-Ray Wrist  Complete Left; Future  - X-Ray Hand Complete Left; Future    Plan:  Xray shows no acute fracture or dislocation per radiologist.  Evidence of postoperative changes and chronic OA.  Tylenol for pain as needed.  Obviously decrease use and rest hand.  Ortho in 1 week if needed.  Report to ER or re-eval in UC if worsening.

## 2020-10-22 ENCOUNTER — PATIENT MESSAGE (OUTPATIENT)
Dept: INTERNAL MEDICINE | Facility: CLINIC | Age: 60
End: 2020-10-22

## 2020-10-26 ENCOUNTER — OFFICE VISIT (OUTPATIENT)
Dept: INTERNAL MEDICINE | Facility: CLINIC | Age: 60
End: 2020-10-26
Payer: MEDICAID

## 2020-10-26 ENCOUNTER — LAB VISIT (OUTPATIENT)
Dept: LAB | Facility: HOSPITAL | Age: 60
End: 2020-10-26
Attending: INTERNAL MEDICINE
Payer: MEDICAID

## 2020-10-26 VITALS
DIASTOLIC BLOOD PRESSURE: 84 MMHG | HEIGHT: 65 IN | OXYGEN SATURATION: 95 % | WEIGHT: 238.31 LBS | SYSTOLIC BLOOD PRESSURE: 110 MMHG | BODY MASS INDEX: 39.71 KG/M2 | HEART RATE: 75 BPM | TEMPERATURE: 99 F

## 2020-10-26 DIAGNOSIS — R73.09 ELEVATED GLUCOSE: ICD-10-CM

## 2020-10-26 DIAGNOSIS — R00.2 HEART PALPITATIONS: Primary | ICD-10-CM

## 2020-10-26 DIAGNOSIS — Z12.31 ENCOUNTER FOR SCREENING MAMMOGRAM FOR MALIGNANT NEOPLASM OF BREAST: ICD-10-CM

## 2020-10-26 DIAGNOSIS — R42 DIZZINESS: ICD-10-CM

## 2020-10-26 LAB
ESTIMATED AVG GLUCOSE: 114 MG/DL (ref 68–131)
HBA1C MFR BLD HPLC: 5.6 % (ref 4–5.6)

## 2020-10-26 PROCEDURE — 83036 HEMOGLOBIN GLYCOSYLATED A1C: CPT

## 2020-10-26 PROCEDURE — 93010 EKG 12-LEAD: ICD-10-PCS | Mod: S$PBB,,, | Performed by: INTERNAL MEDICINE

## 2020-10-26 PROCEDURE — 36415 COLL VENOUS BLD VENIPUNCTURE: CPT

## 2020-10-26 PROCEDURE — 99214 PR OFFICE/OUTPT VISIT, EST, LEVL IV, 30-39 MIN: ICD-10-PCS | Mod: S$PBB,,, | Performed by: INTERNAL MEDICINE

## 2020-10-26 PROCEDURE — 99214 OFFICE O/P EST MOD 30 MIN: CPT | Mod: S$PBB,,, | Performed by: INTERNAL MEDICINE

## 2020-10-26 PROCEDURE — 99999 PR PBB SHADOW E&M-EST. PATIENT-LVL IV: CPT | Mod: PBBFAC,,, | Performed by: INTERNAL MEDICINE

## 2020-10-26 PROCEDURE — 99214 OFFICE O/P EST MOD 30 MIN: CPT | Mod: PBBFAC,25 | Performed by: INTERNAL MEDICINE

## 2020-10-26 PROCEDURE — 93010 ELECTROCARDIOGRAM REPORT: CPT | Mod: S$PBB,,, | Performed by: INTERNAL MEDICINE

## 2020-10-26 PROCEDURE — 99999 PR PBB SHADOW E&M-EST. PATIENT-LVL IV: ICD-10-PCS | Mod: PBBFAC,,, | Performed by: INTERNAL MEDICINE

## 2020-10-26 PROCEDURE — 93005 ELECTROCARDIOGRAM TRACING: CPT | Mod: PBBFAC | Performed by: INTERNAL MEDICINE

## 2020-10-26 RX ORDER — ACETAMINOPHEN, DIPHENHYDRAMINE HCL, PHENYLEPHRINE HCL 325; 25; 5 MG/1; MG/1; MG/1
1 TABLET ORAL
COMMUNITY
End: 2021-06-25 | Stop reason: SDUPTHER

## 2020-10-26 RX ORDER — IBUPROFEN 100 MG/5ML
1000 SUSPENSION, ORAL (FINAL DOSE FORM) ORAL
COMMUNITY
End: 2021-09-22

## 2020-10-26 NOTE — PROGRESS NOTES
Subjective:       Patient ID: Monica Joy is a 59 y.o. female.    Chief Complaint: Palpitations and Dizziness    Palpitations   This is a new problem. The current episode started more than 1 month ago. The problem occurs intermittently. The problem has been waxing and waning. Nothing aggravates the symptoms. Associated symptoms include dizziness and shortness of breath. Pertinent negatives include no chest pain or fever. She has tried nothing for the symptoms. Risk factors include obesity, post menopause and family history. There is no history of anemia, anxiety, heart disease or hyperthyroidism.     Lab Results   Component Value Date    TSH 0.427 07/23/2020     Recent labs (seen in Care Everywhere) showed a mildly elevated glucose. She does not have a history of diabetes.     Review of Systems   Constitutional: Negative for fever.   Respiratory: Positive for chest tightness and shortness of breath.    Cardiovascular: Positive for palpitations. Negative for chest pain.   Neurological: Positive for dizziness. Negative for syncope.         Objective:      Physical Exam  Vitals signs reviewed.   Constitutional:       General: She is not in acute distress.     Appearance: She is well-developed. She is obese. She is not ill-appearing or diaphoretic.   Eyes:      General: No scleral icterus.  Cardiovascular:      Rate and Rhythm: Normal rate and regular rhythm.      Heart sounds: No murmur.   Pulmonary:      Effort: Pulmonary effort is normal. No respiratory distress.   Neurological:      Mental Status: She is alert and oriented to person, place, and time.   Psychiatric:         Behavior: Behavior normal.         Thought Content: Thought content normal.         Judgment: Judgment normal.         Assessment:       1. Heart palpitations    2. Dizziness    3. Elevated glucose    4. Encounter for screening mammogram for malignant neoplasm of breast        Plan:       Monica was seen today for palpitations and  dizziness.    Diagnoses and all orders for this visit:    Heart palpitations  -     IN OFFICE EKG 12-LEAD (to [place])--NSR  -     Ambulatory referral/consult to Cardiology; Future    Dizziness  -     Not orthostatic  -     IN OFFICE EKG 12-LEAD (to [place])--NSR   -     Ambulatory referral/consult to Cardiology; Future    Elevated glucose  -     Hemoglobin A1C; Future    Encounter for screening mammogram for malignant neoplasm of breast  -     Mammo Digital Screening Bilat w/ [facility]ino; Future

## 2020-10-26 NOTE — PROGRESS NOTES
"Chief Complaint  Chief Complaint   Patient presents with    Palpitations    Dizziness       HPI  Monica Joy is a 59 y.o. female with multiple medical diagnoses as listed in the medical history and problem list that presents for dizziness and palpitations.  Their last appointment with primary care was 2020.      The patient has been having palpitations/dizziness periodically for the past few months. She became especially concerned after a couple of concurrent episodes of CP and SOB 3 weeks ago. She describes the pain as a "pressure" on her chest. She also says it would radiate to her right arm. She has been having bouts of dizziness that worsen when she lies down, she has been diagnosed with vertigo in the past. She has also had episodes of light headedness when standing up from a seated position. In terms of her palpitations, she says it feels like "butterflies in her chest." She does not report any exacerbating or alleviating factors. She says that she has noticed she has become SOB when walking to her mailbox sometimes (120 feet). She has also noticed that her ankles will swell up toward the end of the day. She is not sure how much of all of this has to do with her weight. She recently saw her nephrologist for monitoring of benign renal and liver cysts and her CRP was elevated to 21.7. Her nephrologist recommended she see a cardiologist given her recent palpitations/dizziness. She has numerous risk factors for cardiac disease including family history (mom  of A-fib, Dad had CHF, brother had Mis at age 34+40) and obesity. She says she had a stress test years ago which was normal. Her uric acid was also elevated to 6.6. She says her father battled with gout his entire life. She denies any gouty symptoms at this time.    Arthritis- taking extra-strength tylenol as needed, anti-CCP was negative previously.  Asthma + allergies- Taking Albuterol, Levocetirizine as prescribed, no new exacerbations.  KAM- " followed by pulmonology, using CPAP      PAST MEDICAL HISTORY:  Past Medical History:   Diagnosis Date    Arthritis     Asthma 2013    Thyroid disease        PAST SURGICAL HISTORY:  Past Surgical History:   Procedure Laterality Date     SECTION      COLONOSCOPY N/A 2019    Procedure: COLONOSCOPY;  Surgeon: Scout Rivera MD;  Location: [facility];  Service: Endoscopy;  Laterality: N/A;    FRACTURE SURGERY      JOINT REPLACEMENT      Novasure Endometrial Ablation  2016    THYROIDECTOMY, PARTIAL      TUBAL LIGATION      WRIST SURGERY         SOCIAL HISTORY:  Social History     Socioeconomic History    Marital status:      Spouse name: Not on file    Number of children: 2    Years of education: Not on file    Highest education level: Not on file   Occupational History    Not on file   Social Needs    Financial resource strain: Somewhat hard    Food insecurity     Worry: Sometimes true     Inability: Never true    Transportation needs     Medical: No     Non-medical: No   Tobacco Use    Smoking status: Never Smoker    Smokeless tobacco: Never Used   Substance and Sexual Activity    Alcohol use: No     Frequency: Monthly or less     Drinks per session: 1 or 2     Binge frequency: Never     Comment: on rare holidays    Drug use: No    Sexual activity: Yes     Partners: Male     Birth control/protection: None   Lifestyle    Physical activity     Days per week: 1 day     Minutes per session: 30 min    Stress: Only a little   Relationships    Social connections     Talks on phone: More than three times a week     Gets together: Twice a week     Attends Denominational service: Not on file     Active member of club or organization: Yes     Attends meetings of clubs or organizations: More than 4 times per year     Relationship status:    Other Topics Concern    Not on file   Social History Narrative    Not on file       FAMILY HISTORY:  Family History   Problem  Relation Age of Onset    Atrial fibrillation Mother     Anuerysm Mother     Diabetes Father     Heart disease Father     Hyperlipidemia Father     Stroke Father     Kidney disease Father     Heart disease Brother     Diabetes Brother     Anuerysm Brother     Breast cancer Neg Hx     Colon cancer Neg Hx     Ovarian cancer Neg Hx        ALLERGIES AND MEDICATIONS: updated and reviewed.  Review of patient's allergies indicates:   Allergen Reactions    Amoxicillin Rash and Swelling     Rash with throat swelling    Codeine Palpitations, Rash and Swelling     Other reaction(s): Shortness of breath  Other reaction(s): Hives  Rash and throat swelling    Hydrocodone Rash and Swelling    Iodine and iodide containing products Rash and Swelling    Medrol [methylprednisolone]     Oxycodone Shortness Of Breath, Rash and Swelling    Oxycodone hcl-oxycodone-asa Rash and Swelling    Percodan filipe Shortness Of Breath    Tetracyclines Rash and Swelling    Tramadol Shortness Of Breath, Rash and Swelling    Augmentin [amoxicillin-pot clavulanate] Itching and Rash    Levaquin [levofloxacin] Hives    Sulfa (sulfonamide antibiotics) Itching and Rash    Doxycycline Swelling     Other reaction(s): Rash    Lortab  [hydrocodone-acetaminophen]      Other reaction(s): Hives  Other reaction(s): Difficulty breathing    Percocet  [oxycodone-acetaminophen]      Other reaction(s): Shortness of breath    Shellfish containing products      Other reaction(s): Shortness of breath  Other reaction(s): Hives  Other reaction(s): Shortness of breath  Other reaction(s): Hives    Tetracycline Swelling    Vibramycin  [doxycycline calcium]      Other reaction(s): Hives  Other reaction(s): Difficulty breathing     Current Outpatient Medications   Medication Sig Dispense Refill    albuterol (VENTOLIN HFA) 90 mcg/actuation inhaler Inhale 2 puffs into the lungs every 4 (four) hours as needed for Wheezing or Shortness of Breath. 18 g 11  "   ascorbic acid, vitamin C, (VITAMIN C) 1000 MG tablet Take 1,000 mg by mouth.      cyanocobalamin, vitamin B-12, (VITAMIN B-12) 1,000 mcg Subl Place under the tongue.      cyanocobalamin, vitamin B-12, 5,000 mcg Subl Place 1 tablet under the tongue.      levocetirizine (XYZAL) 5 MG tablet TAKE 1 TABLET BY MOUTH EVERY EVENING 30 tablet 11    fluticasone propionate (FLONASE) 50 mcg/actuation nasal spray 2 sprays (100 mcg total) by Each Nostril route once daily. 1 Bottle 12    levocetirizine dihydrochloride (LEVOCETIRIZINE ORAL) Take 1 tablet by mouth.      neomycin-polymyxin-hydrocortisone (CORTISPORIN) 3.5-10,000-1 mg/mL-unit/mL-% otic suspension Place 3 drops into the left ear 3 (three) times daily. 10 mL 0     No current facility-administered medications for this visit.          ROS  Review of Systems   Constitutional: Negative for activity change, appetite change, chills, fatigue and fever.   HENT: Negative for congestion, postnasal drip, rhinorrhea, sinus pressure, sinus pain and sore throat.    Eyes: Negative for visual disturbance.   Respiratory: Positive for chest tightness and shortness of breath. Negative for apnea, wheezing and stridor.    Cardiovascular: Positive for chest pain, palpitations and leg swelling.   Gastrointestinal: Negative for abdominal distention, abdominal pain, constipation, diarrhea, nausea and vomiting.   Genitourinary: Negative for dysuria, frequency and urgency.   Musculoskeletal: Positive for arthralgias and joint swelling.   Neurological: Positive for dizziness and light-headedness. Negative for seizures, syncope and numbness.           Physical Exam  Vitals:    10/26/20 0931   BP: 110/84   BP Location: Left arm   Patient Position: Sitting   Pulse: 75   Temp: 98.5 °F (36.9 °C)   TempSrc: Tympanic   SpO2: 95%   Weight: 108.1 kg (238 lb 5.1 oz)   Height: 5' 5" (1.651 m)    Body mass index is 39.66 kg/m².  Weight: 108.1 kg (238 lb 5.1 oz)   Height: 5' 5" (165.1 cm)   Physical " Exam  Constitutional:       General: She is not in acute distress.     Appearance: Normal appearance. She is obese. She is not toxic-appearing or diaphoretic.   HENT:      Head: Normocephalic and atraumatic.      Right Ear: Tympanic membrane, ear canal and external ear normal. There is impacted cerumen.      Left Ear: Tympanic membrane, ear canal and external ear normal. There is impacted cerumen. A PE tube is present.      Ears:      Comments: Tube in place in left ear     Nose: Nose normal. No congestion or rhinorrhea.      Mouth/Throat:      Mouth: Mucous membranes are moist.      Pharynx: Oropharynx is clear. No oropharyngeal exudate or posterior oropharyngeal erythema.   Eyes:      Extraocular Movements: Extraocular movements intact.      Conjunctiva/sclera: Conjunctivae normal.      Pupils: Pupils are equal, round, and reactive to light.   Neck:      Musculoskeletal: Normal range of motion.   Cardiovascular:      Rate and Rhythm: Normal rate and regular rhythm.      Pulses: Normal pulses.      Heart sounds: Normal heart sounds. No murmur. No friction rub. No gallop.    Pulmonary:      Effort: Pulmonary effort is normal.      Breath sounds: Normal breath sounds. No wheezing, rhonchi or rales.   Abdominal:      General: Abdomen is flat. Bowel sounds are normal. There is no distension.      Palpations: Abdomen is soft.      Tenderness: There is no abdominal tenderness. There is no guarding.   Musculoskeletal: Normal range of motion.      Right hand: She exhibits deformity.        Hands:    Skin:     General: Skin is warm and dry.      Capillary Refill: Capillary refill takes less than 2 seconds.   Neurological:      General: No focal deficit present.      Mental Status: She is alert and oriented to person, place, and time.           Health Maintenance       Date Due Completion Date    Mammogram 11/28/2020 11/28/2018    Shingles Vaccine (1 of 2) 10/26/2021 (Originally 12/30/2011) ---    HIV Screening 07/23/2026  (Originally 12/30/1975) ---    Cervical Cancer Screening 12/10/2021 12/10/2018    Lipid Panel 11/26/2023 11/26/2018    Colorectal Cancer Screening 01/31/2024 1/31/2019    TETANUS VACCINE 11/21/2028 11/21/2018            Assessment and Plan:  60 y/o female with palpitations and dizziness, possibly cardiac etiology. Orthostatics and EKG wnl. Normal ear exam.    Heart palpitations  -EKG done in office, NSR  -Referral to cardiology for further work-up given family history and associated symptoms  -     IN OFFICE EKG 12-LEAD (to [place])  -     Ambulatory referral/consult to Cardiology; Future; Expected date: 11/02/2020    Dizziness  -EKG done in office, NSR  -Orhtostatics negative  -Normal ear exam  -Possible cardiac etiology, cardiology will work up further  -     IN OFFICE EKG 12-LEAD (to [place])  -     Ambulatory referral/consult to Cardiology; Future; Expected date: 11/02/2020    Elevated glucose  -Elevated glucose on CMP with nephrology, never had A1c before, will check  -     Hemoglobin A1C; Future; Expected date: 10/26/2020    Encounter for screening mammogram for malignant neoplasm of breast  -     Mammo Digital Screening Bilat w/ [facility]; Future      Dayron Sheikh, MS4  [facility] [employer]  [facility]

## 2020-10-28 NOTE — PROGRESS NOTES
Subjective:   Patient ID:  Monica Joy is a 59 y.o. female who presents for evaluation of No chief complaint on file.   This is a pleasant 59-year-old female with history of obesity.  History of fatigue intermittent palpitations shortness of breath and dizziness have been noted over the past month.  Patient complained of palpitations intermittently.  These current mostly nighttime.  Patient will complete also complains of chest heaviness with exertion.  There is a strong family history of coronary disease.  No syncope or near syncopal episodes have been noted.  Most of these symptoms have occurred over the last month.  Complaints episodic palpitations including dizziness and lightheadedness    Family History   Problem Relation Age of Onset    Atrial fibrillation Mother     Anuerysm Mother     Diabetes Father     Heart disease Father     Hyperlipidemia Father     Stroke Father     Kidney disease Father     Heart disease Brother     Diabetes Brother     Anuerysm Brother     Breast cancer Neg Hx     Colon cancer Neg Hx     Ovarian cancer Neg Hx      Past Medical History:   Diagnosis Date    Arthritis     Asthma 9/6/2013    Thyroid disease      Social History     Socioeconomic History    Marital status:      Spouse name: Not on file    Number of children: 2    Years of education: Not on file    Highest education level: Not on file   Occupational History    Not on file   Social Needs    Financial resource strain: Somewhat hard    Food insecurity     Worry: Sometimes true     Inability: Never true    Transportation needs     Medical: No     Non-medical: No   Tobacco Use    Smoking status: Never Smoker    Smokeless tobacco: Never Used   Substance and Sexual Activity    Alcohol use: No     Frequency: Monthly or less     Drinks per session: 1 or 2     Binge frequency: Never     Comment: on rare holidays    Drug use: No    Sexual activity: Yes     Partners: Male     Birth  control/protection: None   Lifestyle    Physical activity     Days per week: 1 day     Minutes per session: 30 min    Stress: Only a little   Relationships    Social connections     Talks on phone: More than three times a week     Gets together: Twice a week     Attends Sabianist service: Not on file     Active member of club or organization: Yes     Attends meetings of clubs or organizations: More than 4 times per year     Relationship status:    Other Topics Concern    Not on file   Social History Narrative    Not on file     Current Outpatient Medications on File Prior to Visit   Medication Sig Dispense Refill    albuterol (VENTOLIN HFA) 90 mcg/actuation inhaler Inhale 2 puffs into the lungs every 4 (four) hours as needed for Wheezing or Shortness of Breath. 18 g 11    ascorbic acid, vitamin C, (VITAMIN C) 1000 MG tablet Take 1,000 mg by mouth.      cyanocobalamin, vitamin B-12, (VITAMIN B-12) 1,000 mcg Subl Place under the tongue.      cyanocobalamin, vitamin B-12, 5,000 mcg Subl Place 1 tablet under the tongue.      fluticasone propionate (FLONASE) 50 mcg/actuation nasal spray 2 sprays (100 mcg total) by Each Nostril route once daily. 1 Bottle 12    levocetirizine (XYZAL) 5 MG tablet TAKE 1 TABLET BY MOUTH EVERY EVENING 30 tablet 11    levocetirizine dihydrochloride (LEVOCETIRIZINE ORAL) Take 1 tablet by mouth.      neomycin-polymyxin-hydrocortisone (CORTISPORIN) 3.5-10,000-1 mg/mL-unit/mL-% otic suspension Place 3 drops into the left ear 3 (three) times daily. 10 mL 0     No current facility-administered medications on file prior to visit.      Review of patient's allergies indicates:   Allergen Reactions    Amoxicillin Rash and Swelling     Rash with throat swelling    Codeine Palpitations, Rash and Swelling     Other reaction(s): Shortness of breath  Other reaction(s): Hives  Rash and throat swelling    Hydrocodone Rash and Swelling    Iodine and iodide containing products Rash and  Swelling    Medrol [methylprednisolone]     Oxycodone Shortness Of Breath, Rash and Swelling    Oxycodone hcl-oxycodone-asa Rash and Swelling    Percodan filipe Shortness Of Breath    Tetracyclines Rash and Swelling    Tramadol Shortness Of Breath, Rash and Swelling    Augmentin [amoxicillin-pot clavulanate] Itching and Rash    Levaquin [levofloxacin] Hives    Sulfa (sulfonamide antibiotics) Itching and Rash    Doxycycline Swelling     Other reaction(s): Rash    Lortab  [hydrocodone-acetaminophen]      Other reaction(s): Hives  Other reaction(s): Difficulty breathing    Percocet  [oxycodone-acetaminophen]      Other reaction(s): Shortness of breath    Shellfish containing products      Other reaction(s): Shortness of breath  Other reaction(s): Hives  Other reaction(s): Shortness of breath  Other reaction(s): Hives    Tetracycline Swelling    Vibramycin  [doxycycline calcium]      Other reaction(s): Hives  Other reaction(s): Difficulty breathing       Review of Systems   Constitution: Negative for chills, diaphoresis, night sweats, weight gain and weight loss.   HENT: Negative for congestion, hoarse voice, sore throat and stridor.    Eyes: Negative for double vision and pain.   Cardiovascular: Negative for chest pain, claudication, cyanosis, dyspnea on exertion, irregular heartbeat, leg swelling, near-syncope, orthopnea, palpitations, paroxysmal nocturnal dyspnea and syncope.   Respiratory: Negative for cough, hemoptysis, shortness of breath, sleep disturbances due to breathing, snoring, sputum production and wheezing.    Endocrine: Negative for cold intolerance, heat intolerance and polydipsia.   Hematologic/Lymphatic: Negative for bleeding problem. Does not bruise/bleed easily.   Skin: Negative for color change, dry skin and rash.   Musculoskeletal: Negative for joint swelling and muscle cramps.   Gastrointestinal: Negative for bloating, abdominal pain, constipation, diarrhea, dysphagia, melena, nausea  and vomiting.   Genitourinary: Negative for flank pain and urgency.   Neurological: Negative for dizziness, focal weakness, headaches, light-headedness, loss of balance, seizures and weakness.   Psychiatric/Behavioral: Negative for altered mental status and memory loss. The patient is not nervous/anxious.          Objective:     Physical Exam   Constitutional: She is oriented to person, place, and time. She appears well-developed and well-nourished.   HENT:   Head: Normocephalic.   Eyes: Pupils are equal, round, and reactive to light.   Neck: Normal range of motion. No tracheal deviation present.   Cardiovascular: Normal rate, regular rhythm, normal heart sounds and intact distal pulses. Exam reveals no gallop and no friction rub.   No murmur heard.  Pulses:       Carotid pulses are 2+ on the right side and 2+ on the left side.       Radial pulses are 2+ on the right side and 2+ on the left side.        Femoral pulses are 2+ on the right side and 2+ on the left side.       Popliteal pulses are 2+ on the right side and 2+ on the left side.        Dorsalis pedis pulses are 2+ on the right side and 2+ on the left side.        Posterior tibial pulses are 2+ on the right side and 2+ on the left side.   Pulmonary/Chest: Effort normal and breath sounds normal. No stridor. No respiratory distress. She has no wheezes. She has no rales. She exhibits no tenderness.   Abdominal: She exhibits no distension. There is no abdominal tenderness. There is no rebound.   Musculoskeletal:         General: No tenderness or edema.   Neurological: She is alert and oriented to person, place, and time.   Skin: Skin is warm and dry.     ROSMERY testing 11/21/2018:  Date of Procedure: 11/21/2018     PRE-TEST DATA   Resting ROSMERY:     The right ankle brachial index was 1.30 which is normal.   The left ankle brachial index was 1.17 which is normal.   The right TBI is 0.82.   The left TBI is 0.82.   Waveform analysis are compatible with the above  findings.        Assessment:     1. Obstructive sleep apnea    2. Sleep disorder breathing    3. Claudication of both lower extremities    4. Palpitations    5. Dizziness          Plan:       1.  Sleep apnea:  Patient continue with therapy  2.  Claudication ABIs have been normal  3.  Palpitations and dizziness:  The patient will have a Holter monitor for 24-48 hours and we will review this resulted  4.  Chest heaviness:  The patient will have a stress echo to rule out cardiac ischemia and also to evaluate LV function.  For significant symptoms patient will call the office or go to the emergency room if necessary for severe chest discomfort.  Patient understands the issues that we discussed today.

## 2020-10-29 ENCOUNTER — OFFICE VISIT (OUTPATIENT)
Dept: CARDIOLOGY | Facility: CLINIC | Age: 60
End: 2020-10-29
Payer: MEDICAID

## 2020-10-29 VITALS
BODY MASS INDEX: 39.8 KG/M2 | HEART RATE: 80 BPM | DIASTOLIC BLOOD PRESSURE: 82 MMHG | WEIGHT: 239.19 LBS | OXYGEN SATURATION: 96 % | SYSTOLIC BLOOD PRESSURE: 126 MMHG

## 2020-10-29 DIAGNOSIS — R07.89 CHEST PRESSURE: ICD-10-CM

## 2020-10-29 DIAGNOSIS — R42 DIZZINESS: ICD-10-CM

## 2020-10-29 DIAGNOSIS — G47.33 OBSTRUCTIVE SLEEP APNEA: ICD-10-CM

## 2020-10-29 DIAGNOSIS — R00.2 PALPITATIONS: ICD-10-CM

## 2020-10-29 DIAGNOSIS — G47.30 SLEEP DISORDER BREATHING: ICD-10-CM

## 2020-10-29 DIAGNOSIS — R00.2 HEART PALPITATIONS: Primary | ICD-10-CM

## 2020-10-29 DIAGNOSIS — I73.9 CLAUDICATION OF BOTH LOWER EXTREMITIES: ICD-10-CM

## 2020-10-29 PROCEDURE — 99214 OFFICE O/P EST MOD 30 MIN: CPT | Mod: PBBFAC | Performed by: INTERNAL MEDICINE

## 2020-10-29 PROCEDURE — 99999 PR PBB SHADOW E&M-EST. PATIENT-LVL IV: ICD-10-PCS | Mod: PBBFAC,,, | Performed by: INTERNAL MEDICINE

## 2020-10-29 PROCEDURE — 99999 PR PBB SHADOW E&M-EST. PATIENT-LVL IV: CPT | Mod: PBBFAC,,, | Performed by: INTERNAL MEDICINE

## 2020-10-29 PROCEDURE — 99205 OFFICE O/P NEW HI 60 MIN: CPT | Mod: S$PBB,,, | Performed by: INTERNAL MEDICINE

## 2020-10-29 PROCEDURE — 99205 PR OFFICE/OUTPT VISIT, NEW, LEVL V, 60-74 MIN: ICD-10-PCS | Mod: S$PBB,,, | Performed by: INTERNAL MEDICINE

## 2020-10-29 NOTE — LETTER
October 29, 2020      Karen Villafana DO  [address] [place] [address]           [facility]  [address]  [place] [address]  Phone: [phone]  Fax: [phone]          Patient: Monica Joy   MR Number: [id number]   YOB: 1960   Date of Visit: 10/29/2020       Dear Dr. Karen Villafana:    Thank you for referring Monica Joy to me for evaluation. Attached you will find relevant portions of my assessment and plan of care.    If you have questions, please do not hesitate to call me. I look forward to following Monica Joy along with you.    Sincerely,    Dagoberto Larson MD    Enclosure  CC:  No Recipients    If you would like to receive this communication electronically, please contact [email or web address] or [phone] to request more information on WebEvents Link access.    For providers and/or their staff who would like to refer a patient to [facility], please contact us through our one-stop-shop provider referral line, [facility] , at [phone].    If you feel you have received this communication in error or would no longer like to receive these types of communications, please e-mail [email or web address]

## 2020-10-31 ENCOUNTER — PATIENT MESSAGE (OUTPATIENT)
Dept: PULMONOLOGY | Facility: CLINIC | Age: 60
End: 2020-10-31

## 2020-11-02 ENCOUNTER — HOSPITAL ENCOUNTER (OUTPATIENT)
Dept: RADIOLOGY | Facility: HOSPITAL | Age: 60
Discharge: HOME | End: 2020-11-02
Attending: INTERNAL MEDICINE
Payer: MEDICAID

## 2020-11-02 DIAGNOSIS — Z12.31 ENCOUNTER FOR SCREENING MAMMOGRAM FOR MALIGNANT NEOPLASM OF BREAST: ICD-10-CM

## 2020-11-02 PROCEDURE — 77067 SCR MAMMO BI INCL CAD: CPT | Mod: TC

## 2020-11-02 PROCEDURE — 77067 SCR MAMMO BI INCL CAD: CPT | Mod: 26,,, | Performed by: RADIOLOGY

## 2020-11-02 PROCEDURE — 77067 MAMMO DIGITAL SCREENING BILAT WITH TOMO: ICD-10-PCS | Mod: 26,,, | Performed by: RADIOLOGY

## 2020-11-02 PROCEDURE — 77063 BREAST TOMOSYNTHESIS BI: CPT | Mod: 26,,, | Performed by: RADIOLOGY

## 2020-11-02 PROCEDURE — 77063 MAMMO DIGITAL SCREENING BILAT WITH TOMO: ICD-10-PCS | Mod: 26,,, | Performed by: RADIOLOGY

## 2020-11-03 ENCOUNTER — HOSPITAL ENCOUNTER (OUTPATIENT)
Dept: CARDIOLOGY | Facility: HOSPITAL | Age: 60
Discharge: HOME | End: 2020-11-03
Attending: INTERNAL MEDICINE
Payer: MEDICAID

## 2020-11-03 VITALS
BODY MASS INDEX: 39.65 KG/M2 | HEIGHT: 65 IN | DIASTOLIC BLOOD PRESSURE: 108 MMHG | WEIGHT: 238 LBS | SYSTOLIC BLOOD PRESSURE: 159 MMHG

## 2020-11-03 DIAGNOSIS — G47.30 SLEEP DISORDER BREATHING: ICD-10-CM

## 2020-11-03 DIAGNOSIS — G47.33 OBSTRUCTIVE SLEEP APNEA: ICD-10-CM

## 2020-11-03 DIAGNOSIS — R00.2 HEART PALPITATIONS: ICD-10-CM

## 2020-11-03 DIAGNOSIS — R42 DIZZINESS: ICD-10-CM

## 2020-11-03 DIAGNOSIS — R00.2 PALPITATIONS: ICD-10-CM

## 2020-11-03 DIAGNOSIS — R07.89 CHEST PRESSURE: ICD-10-CM

## 2020-11-03 DIAGNOSIS — I73.9 CLAUDICATION OF BOTH LOWER EXTREMITIES: ICD-10-CM

## 2020-11-03 LAB
AV INDEX (PROSTH): 0.65
AV MEAN GRADIENT: 4 MMHG
AV PEAK GRADIENT: 6 MMHG
AV VALVE AREA: 1.98 CM2
AV VELOCITY RATIO: 0.65
BSA FOR ECHO PROCEDURE: 2.22 M2
CV ECHO LV RWT: 0.46 CM
CV STRESS BASE HR: 77 BPM
DIASTOLIC BLOOD PRESSURE: 108 MMHG
DOP CALC AO PEAK VEL: 1.26 M/S
DOP CALC AO VTI: 28.39 CM
DOP CALC LVOT AREA: 3 CM2
DOP CALC LVOT DIAMETER: 1.97 CM
DOP CALC LVOT PEAK VEL: 0.82 M/S
DOP CALC LVOT STROKE VOLUME: 56.24 CM3
DOP CALC RVOT PEAK VEL: 0.7 M/S
DOP CALC RVOT VTI: 16.92 CM
DOP CALCLVOT PEAK VEL VTI: 18.46 CM
E WAVE DECELERATION TIME: 148.74 MSEC
E/A RATIO: 0.86
E/E' RATIO: 6.9 M/S
ECHO LV POSTERIOR WALL: 1.17 CM (ref 0.6–1.1)
FRACTIONAL SHORTENING: 41 % (ref 28–44)
INTERVENTRICULAR SEPTUM: 1.13 CM (ref 0.6–1.1)
IVRT: 124.57 MSEC
LA MAJOR: 4.71 CM
LA MINOR: 4.19 CM
LA WIDTH: 4.55 CM
LEFT ATRIUM SIZE: 4.37 CM
LEFT ATRIUM VOLUME INDEX: 35.2 ML/M2
LEFT ATRIUM VOLUME: 74.95 CM3
LEFT INTERNAL DIMENSION IN SYSTOLE: 3 CM (ref 2.1–4)
LEFT VENTRICLE DIASTOLIC VOLUME INDEX: 57.01 ML/M2
LEFT VENTRICLE DIASTOLIC VOLUME: 121.42 ML
LEFT VENTRICLE MASS INDEX: 105 G/M2
LEFT VENTRICLE SYSTOLIC VOLUME INDEX: 16.4 ML/M2
LEFT VENTRICLE SYSTOLIC VOLUME: 34.96 ML
LEFT VENTRICULAR INTERNAL DIMENSION IN DIASTOLE: 5.06 CM (ref 3.5–6)
LEFT VENTRICULAR MASS: 224.52 G
LV LATERAL E/E' RATIO: 6.27 M/S
LV SEPTAL E/E' RATIO: 7.67 M/S
MV PEAK A VEL: 0.8 M/S
MV PEAK E VEL: 0.69 M/S
MV STENOSIS PRESSURE HALF TIME: 43.13 MS
MV VALVE AREA P 1/2 METHOD: 5.1 CM2
OHS CV CPX 1 MINUTE RECOVERY HEART RATE: 108 BPM
OHS CV CPX 85 PERCENT MAX PREDICTED HEART RATE MALE: 132
OHS CV CPX ESTIMATED METS: 7
OHS CV CPX MAX PREDICTED HEART RATE: 155
OHS CV CPX PATIENT IS FEMALE: 1
OHS CV CPX PATIENT IS MALE: 0
OHS CV CPX PEAK DIASTOLIC BLOOD PRESSURE: 101 MMHG
OHS CV CPX PEAK HEAR RATE: 144 BPM
OHS CV CPX PEAK RATE PRESSURE PRODUCT: ABNORMAL
OHS CV CPX PEAK SYSTOLIC BLOOD PRESSURE: 184 MMHG
OHS CV CPX PERCENT MAX PREDICTED HEART RATE ACHIEVED: 93
OHS CV CPX RATE PRESSURE PRODUCT PRESENTING: ABNORMAL
PISA TR MAX VEL: 2.2 M/S
PULM VEIN S/D RATIO: 1.76
PV MEAN GRADIENT: 1.42 MMHG
PV PEAK D VEL: 0.37 M/S
PV PEAK S VEL: 0.65 M/S
PV PEAK VELOCITY: 0.86 CM/S
RA MAJOR: 4.33 CM
RA PRESSURE: 3 MMHG
RA WIDTH: 3.52 CM
RIGHT VENTRICULAR END-DIASTOLIC DIMENSION: 3.7 CM
SINUS: 3.37 CM
STJ: 3.41 CM
STRESS ANGINA INDEX: 0
STRESS ECHO POST EXERCISE DUR MIN: 6 MINUTES
STRESS ECHO POST EXERCISE DUR SEC: 4 SECONDS
SYSTOLIC BLOOD PRESSURE: 159 MMHG
TDI LATERAL: 0.11 M/S
TDI SEPTAL: 0.09 M/S
TDI: 0.1 M/S
TR MAX PG: 19 MMHG
TRICUSPID ANNULAR PLANE SYSTOLIC EXCURSION: 2.39 CM
TV REST PULMONARY ARTERY PRESSURE: 22 MMHG

## 2020-11-03 PROCEDURE — 93351 STRESS TTE COMPLETE: CPT | Mod: 26,,, | Performed by: INTERNAL MEDICINE

## 2020-11-03 PROCEDURE — 93320 DOPPLER ECHO COMPLETE: CPT | Mod: 26,,, | Performed by: INTERNAL MEDICINE

## 2020-11-03 PROCEDURE — 93325 STRESS ECHO (CUPID ONLY): ICD-10-PCS | Mod: 26,,, | Performed by: INTERNAL MEDICINE

## 2020-11-03 PROCEDURE — 93320 STRESS ECHO (CUPID ONLY): ICD-10-PCS | Mod: 26,,, | Performed by: INTERNAL MEDICINE

## 2020-11-03 PROCEDURE — 93351 STRESS TTE COMPLETE: CPT

## 2020-11-03 PROCEDURE — 93226 XTRNL ECG REC<48 HR SCAN A/R: CPT

## 2020-11-03 PROCEDURE — 93227 HOLTER MONITOR - 48 HOUR (CUPID ONLY): ICD-10-PCS | Mod: ,,, | Performed by: INTERNAL MEDICINE

## 2020-11-03 PROCEDURE — 93227 XTRNL ECG REC<48 HR R&I: CPT | Mod: ,,, | Performed by: INTERNAL MEDICINE

## 2020-11-03 PROCEDURE — 93325 DOPPLER ECHO COLOR FLOW MAPG: CPT | Mod: 26,,, | Performed by: INTERNAL MEDICINE

## 2020-11-03 PROCEDURE — 93351 STRESS ECHO (CUPID ONLY): ICD-10-PCS | Mod: 26,,, | Performed by: INTERNAL MEDICINE

## 2020-11-04 ENCOUNTER — TELEPHONE (OUTPATIENT)
Dept: CARDIOLOGY | Facility: CLINIC | Age: 60
End: 2020-11-04

## 2020-11-04 NOTE — TELEPHONE ENCOUNTER
Contacted pt about echo and stress test results, and to f/u at next appointment, pt vu    ----- Message from Dagoberto Larson MD sent at 11/4/2020 10:08 AM CST -----  This is a normal echo report.  This is a normal stress echo.  Tell the patient that she should come to her next visit.

## 2020-11-06 ENCOUNTER — PATIENT MESSAGE (OUTPATIENT)
Dept: CARDIOLOGY | Facility: CLINIC | Age: 60
End: 2020-11-06

## 2020-11-06 ENCOUNTER — TELEPHONE (OUTPATIENT)
Dept: CARDIOLOGY | Facility: CLINIC | Age: 60
End: 2020-11-06

## 2020-11-06 LAB
OHS CV EVENT MONITOR DAY: 0
OHS CV HOLTER LENGTH DECIMAL HOURS: 48
OHS CV HOLTER LENGTH HOURS: 48
OHS CV HOLTER LENGTH MINUTES: 0

## 2020-11-09 ENCOUNTER — TELEPHONE (OUTPATIENT)
Dept: CARDIOLOGY | Facility: CLINIC | Age: 60
End: 2020-11-09

## 2020-11-09 ENCOUNTER — PATIENT MESSAGE (OUTPATIENT)
Dept: OTOLARYNGOLOGY | Facility: CLINIC | Age: 60
End: 2020-11-09

## 2020-11-09 NOTE — TELEPHONE ENCOUNTER
Talked to pt about holter results and upcoming appointment, pt vu      ----- Message from Dagoberto Larson MD sent at 11/6/2020  3:45 PM CST -----  Tell her the holter is essentially normal, will review at next visit.

## 2020-11-11 ENCOUNTER — OFFICE VISIT (OUTPATIENT)
Dept: OTOLARYNGOLOGY | Facility: CLINIC | Age: 60
End: 2020-11-11
Payer: MEDICAID

## 2020-11-11 VITALS
WEIGHT: 241.63 LBS | DIASTOLIC BLOOD PRESSURE: 87 MMHG | HEART RATE: 82 BPM | SYSTOLIC BLOOD PRESSURE: 149 MMHG | TEMPERATURE: 99 F | BODY MASS INDEX: 40.21 KG/M2

## 2020-11-11 DIAGNOSIS — H92.12 OTORRHEA, LEFT: Primary | ICD-10-CM

## 2020-11-11 PROCEDURE — 99999 PR PBB SHADOW E&M-EST. PATIENT-LVL III: CPT | Mod: PBBFAC,,, | Performed by: PHYSICIAN ASSISTANT

## 2020-11-11 PROCEDURE — 87070 CULTURE OTHR SPECIMN AEROBIC: CPT

## 2020-11-11 PROCEDURE — 99999 PR PBB SHADOW E&M-EST. PATIENT-LVL III: ICD-10-PCS | Mod: PBBFAC,,, | Performed by: PHYSICIAN ASSISTANT

## 2020-11-11 PROCEDURE — 99213 OFFICE O/P EST LOW 20 MIN: CPT | Mod: PBBFAC | Performed by: PHYSICIAN ASSISTANT

## 2020-11-11 PROCEDURE — 99214 OFFICE O/P EST MOD 30 MIN: CPT | Mod: S$PBB,,, | Performed by: PHYSICIAN ASSISTANT

## 2020-11-11 PROCEDURE — 99214 PR OFFICE/OUTPT VISIT, EST, LEVL IV, 30-39 MIN: ICD-10-PCS | Mod: S$PBB,,, | Performed by: PHYSICIAN ASSISTANT

## 2020-11-11 RX ORDER — NEOMYCIN SULFATE, POLYMYXIN B SULFATE AND HYDROCORTISONE 10; 3.5; 1 MG/ML; MG/ML; [USP'U]/ML
3 SUSPENSION/ DROPS AURICULAR (OTIC) 3 TIMES DAILY
Qty: 10 ML | Refills: 0 | Status: SHIPPED | OUTPATIENT
Start: 2020-11-11 | End: 2020-11-21

## 2020-11-11 NOTE — PROGRESS NOTES
"Subjective:       Patient ID: Monica Joy is a 59 y.o. female.    Chief Complaint: Otalgia, Otitis Media, Ear Fullness, and Ear Drainage    Patient is a very pleasant 59 year old female here to see me today for evaluation of left ear drainage over past week.  She had LEFT tube placement in 8/2019 with Dr. Pugh.  She has been using Cortisporin for past 3 days and says that's helped the pain and the drainage seems better today too.  She feels that her left ear seems to stop up and then opens again.  Denies runny nose or congestion.  She continues to have popping sensation in that ear and at times says it feels "like bubbles" in her left ear.  No fever or dizziness.    Review of Systems   Constitutional: Negative for activity change, appetite change and fever.   HENT: Positive for ear discharge (left), ear pain, hearing loss and tinnitus. Negative for postnasal drip and rhinorrhea.    Neurological: Negative for dizziness.         Objective:      Physical Exam  Vitals signs reviewed.   Constitutional:       General: She is not in acute distress.     Appearance: She is well-developed.   HENT:      Head: Normocephalic and atraumatic.      Right Ear: Tympanic membrane, ear canal and external ear normal.      Left Ear: Ear canal and external ear normal. Drainage (scant, mucoid at inferior aspect of tube lumen (cultured today)) present. A PE tube (angled but appears intact; unable to see down lumen due to positioning of the tube) is present. Tympanic membrane is not injected or erythematous.      Nose: Nose normal. No nasal deformity.      Mouth/Throat:      Mouth: Mucous membranes are not pale and not dry.   Eyes:      General: Lids are normal. No scleral icterus.     Extraocular Movements:      Right eye: Normal extraocular motion and no nystagmus.      Left eye: Normal extraocular motion and no nystagmus.      Conjunctiva/sclera: Conjunctivae normal.      Right eye: Right conjunctiva is not injected. No " chemosis.     Left eye: Left conjunctiva is not injected. No chemosis.     Pupils: Pupils are equal, round, and reactive to light.   Neck:      Thyroid: No thyroid mass or thyromegaly.      Trachea: Trachea and phonation normal. No tracheal tenderness or tracheal deviation.   Pulmonary:      Effort: Pulmonary effort is normal. No respiratory distress.      Breath sounds: No stridor.   Abdominal:      General: There is no distension.   Lymphadenopathy:      Head:      Right side of head: No submental, submandibular, preauricular or posterior auricular adenopathy.      Left side of head: No submental, submandibular, preauricular or posterior auricular adenopathy.      Cervical: No cervical adenopathy.   Skin:     General: Skin is warm and dry.      Findings: No erythema or rash.   Neurological:      Mental Status: She is alert and oriented to person, place, and time.      Cranial Nerves: No cranial nerve deficit.   Psychiatric:         Behavior: Behavior normal. Behavior is cooperative.         Assessment:       1. Otorrhea, left        Plan:         The patient has drainage from the the left ear today.  I would recommend that she continue on Cortisporin ear drops (refilled today).  A culture was obtained today in clinic, and will call in 3-4 days with culture results.  If ear is still draining at that point, will then adjust ear drop as needed and likely start on culture directed oral antibiotics.  We did discuss that sometimes the culture does not reveal any significant bacteria even with active drainage, and in that case will treat empirically.  Return to clinic in two weeks, sooner if needed.

## 2020-11-15 ENCOUNTER — PATIENT MESSAGE (OUTPATIENT)
Dept: CARDIOLOGY | Facility: CLINIC | Age: 60
End: 2020-11-15

## 2020-11-16 ENCOUNTER — PATIENT MESSAGE (OUTPATIENT)
Dept: CARDIOLOGY | Facility: CLINIC | Age: 60
End: 2020-11-16

## 2020-11-16 LAB — BACTERIA SPEC AEROBE CULT: NO GROWTH

## 2020-11-20 ENCOUNTER — PATIENT MESSAGE (OUTPATIENT)
Dept: OTOLARYNGOLOGY | Facility: CLINIC | Age: 60
End: 2020-11-20

## 2020-11-28 ENCOUNTER — PATIENT MESSAGE (OUTPATIENT)
Dept: INTERNAL MEDICINE | Facility: CLINIC | Age: 60
End: 2020-11-28

## 2020-11-28 DIAGNOSIS — M79.645 PAIN OF FINGER OF LEFT HAND: Primary | ICD-10-CM

## 2020-11-30 ENCOUNTER — TELEPHONE (OUTPATIENT)
Dept: ORTHOPEDICS | Facility: CLINIC | Age: 60
End: 2020-11-30

## 2020-11-30 ENCOUNTER — OFFICE VISIT (OUTPATIENT)
Dept: ORTHOPEDICS | Facility: CLINIC | Age: 60
End: 2020-11-30
Payer: MEDICAID

## 2020-11-30 VITALS
HEIGHT: 65 IN | SYSTOLIC BLOOD PRESSURE: 128 MMHG | HEART RATE: 91 BPM | DIASTOLIC BLOOD PRESSURE: 86 MMHG | WEIGHT: 241.63 LBS | BODY MASS INDEX: 40.26 KG/M2

## 2020-11-30 DIAGNOSIS — M79.645 PAIN OF FINGER OF LEFT HAND: ICD-10-CM

## 2020-11-30 DIAGNOSIS — M19.042 PRIMARY OSTEOARTHRITIS OF BOTH HANDS: ICD-10-CM

## 2020-11-30 DIAGNOSIS — M67.431 GANGLION CYST OF DORSUM OF RIGHT WRIST: Primary | ICD-10-CM

## 2020-11-30 DIAGNOSIS — Z88.9 MULTIPLE ALLERGIES: ICD-10-CM

## 2020-11-30 DIAGNOSIS — M19.041 PRIMARY OSTEOARTHRITIS OF BOTH HANDS: ICD-10-CM

## 2020-11-30 DIAGNOSIS — M67.49 GANGLION, MULTIPLE SITES: ICD-10-CM

## 2020-11-30 PROCEDURE — 99999 PR PBB SHADOW E&M-EST. PATIENT-LVL III: CPT | Mod: PBBFAC,,, | Performed by: FAMILY MEDICINE

## 2020-11-30 PROCEDURE — 99213 OFFICE O/P EST LOW 20 MIN: CPT | Mod: PBBFAC | Performed by: FAMILY MEDICINE

## 2020-11-30 PROCEDURE — 99214 OFFICE O/P EST MOD 30 MIN: CPT | Mod: S$PBB,,, | Performed by: FAMILY MEDICINE

## 2020-11-30 PROCEDURE — 99214 PR OFFICE/OUTPT VISIT, EST, LEVL IV, 30-39 MIN: ICD-10-PCS | Mod: S$PBB,,, | Performed by: FAMILY MEDICINE

## 2020-11-30 PROCEDURE — 99999 PR PBB SHADOW E&M-EST. PATIENT-LVL III: ICD-10-PCS | Mod: PBBFAC,,, | Performed by: FAMILY MEDICINE

## 2020-11-30 NOTE — PROGRESS NOTES
Subjective:     Patient ID: Monica Joy is a 59 y.o. female.    Chief Complaint: Pain of the Right Wrist, Pain of the Left Wrist, Pain and Swelling of the Left Hand, and Pain of the Right Hand      HPI:  History of a fall and severely injuring the left wrist 12 years ago requiring total wrist replacement which took 6 different surgeries but has been doing well the last few years.    The patient is noted the left index finger to be swollen at the proximal joint for the last week or 2 but no redness or heat.  She does use her hands a lot during the day.    She all closed complains of increasing pain in the right wrist for the last few months and notices swelling consistent with a ganglion cyst in the dorsum of the wrist although the swelling is not present today.    She had multiple rheumatologic test done by her primary care physician recently which were all negative except for an elevated CRP and she says her cardiac testing was normal.  She is allergic to multiple medications but can take Tylenol and anti-inflammatory medications but not steroids.    Past Medical History:   Diagnosis Date    Arthritis     Asthma 2013    Thyroid disease      Past Surgical History:   Procedure Laterality Date     SECTION      COLONOSCOPY N/A 2019    Procedure: COLONOSCOPY;  Surgeon: Scout Rivera MD;  Location: [facility];  Service: Endoscopy;  Laterality: N/A;    FRACTURE SURGERY      JOINT REPLACEMENT      Novasure Endometrial Ablation  2016    THYROIDECTOMY, PARTIAL      TUBAL LIGATION      WRIST SURGERY       Family History   Problem Relation Age of Onset    Atrial fibrillation Mother     Anuerysm Mother     Diabetes Father     Heart disease Father     Hyperlipidemia Father     Stroke Father     Kidney disease Father     Heart disease Brother     Diabetes Brother     Anuerysm Brother     Breast cancer Neg Hx     Colon cancer Neg Hx     Ovarian cancer Neg Hx      Social History      Socioeconomic History    Marital status:      Spouse name: Not on file    Number of children: 2    Years of education: Not on file    Highest education level: Not on file   Occupational History    Not on file   Social Needs    Financial resource strain: Somewhat hard    Food insecurity     Worry: Sometimes true     Inability: Never true    Transportation needs     Medical: No     Non-medical: No   Tobacco Use    Smoking status: Never Smoker    Smokeless tobacco: Never Used   Substance and Sexual Activity    Alcohol use: No     Frequency: Monthly or less     Drinks per session: 1 or 2     Binge frequency: Never     Comment: on rare holidays    Drug use: No    Sexual activity: Yes     Partners: Male     Birth control/protection: None   Lifestyle    Physical activity     Days per week: 1 day     Minutes per session: 30 min    Stress: Only a little   Relationships    Social connections     Talks on phone: More than three times a week     Gets together: Twice a week     Attends [employer]ian service: Not on file     Active member of club or organization: Yes     Attends meetings of clubs or organizations: More than 4 times per year     Relationship status:    Other Topics Concern    Not on file   Social History Narrative    Not on file       Current Outpatient Medications:     albuterol (VENTOLIN HFA) 90 mcg/actuation inhaler, Inhale 2 puffs into the lungs every 4 (four) hours as needed for Wheezing or Shortness of Breath., Disp: 18 g, Rfl: 11    ascorbic acid, vitamin C, (VITAMIN C) 1000 MG tablet, Take 1,000 mg by mouth., Disp: , Rfl:     cyanocobalamin, vitamin B-12, (VITAMIN B-12) 1,000 mcg Subl, Place under the tongue., Disp: , Rfl:     cyanocobalamin, vitamin B-12, 5,000 mcg Subl, Place 1 tablet under the tongue., Disp: , Rfl:     levocetirizine (XYZAL) 5 MG tablet, TAKE 1 TABLET BY MOUTH EVERY EVENING, Disp: 30 tablet, Rfl: 11    levocetirizine dihydrochloride (LEVOCETIRIZINE  ORAL), Take 1 tablet by mouth., Disp: , Rfl:     fluticasone propionate (FLONASE) 50 mcg/actuation nasal spray, 2 sprays (100 mcg total) by Each Nostril route once daily., Disp: 1 Bottle, Rfl: 12    neomycin-polymyxin-hydrocortisone (CORTISPORIN) 3.5-10,000-1 mg/mL-unit/mL-% otic suspension, Place 3 drops into the left ear 3 (three) times daily., Disp: 10 mL, Rfl: 0  Review of patient's allergies indicates:   Allergen Reactions    Amoxicillin Rash and Swelling     Rash with throat swelling    Codeine Palpitations, Rash and Swelling     Other reaction(s): Shortness of breath  Other reaction(s): Hives  Rash and throat swelling    Hydrocodone Rash and Swelling    Iodine and iodide containing products Rash and Swelling    Medrol [methylprednisolone]     Oxycodone Shortness Of Breath, Rash and Swelling    Oxycodone hcl-oxycodone-asa Rash and Swelling    Percodan filipe Shortness Of Breath    Tetracyclines Rash and Swelling    Tramadol Shortness Of Breath, Rash and Swelling    Augmentin [amoxicillin-pot clavulanate] Itching and Rash    Levaquin [levofloxacin] Hives    Sulfa (sulfonamide antibiotics) Itching and Rash    Doxycycline Swelling     Other reaction(s): Rash    Lortab  [hydrocodone-acetaminophen]      Other reaction(s): Hives  Other reaction(s): Difficulty breathing    Percocet  [oxycodone-acetaminophen]      Other reaction(s): Shortness of breath    Shellfish containing products      Other reaction(s): Shortness of breath  Other reaction(s): Hives  Other reaction(s): Shortness of breath  Other reaction(s): Hives    Tetracycline Swelling    Vibramycin  [doxycycline calcium]      Other reaction(s): Hives  Other reaction(s): Difficulty breathing     Review of Systems   Constitutional: Negative for chills, fever and weight loss.   Respiratory: Negative for shortness of breath.    Cardiovascular: Negative for chest pain and palpitations.       Objective:   Body mass index is 40.21 kg/m².  Vitals:     11/30/20 1601   BP: 128/86   Pulse: 91           Ortho/SPM Exam -alert and oriented well-nourished well-developed ambulatory pleasant adult female in no acute distress    Respiratory effort normal    Left hand and wrist with good range of motion at the wrist and abdomen witnessing extremity joints but she does have 1 to 2+ swelling of the left index PIP joint and mild tenderness diffusely to palpation but no warmth or redness and no fluctuance.    Right hand and wrist-small nodule lower cystic type lesion is palpable in the dorsum of the wrist and is tender to palpation    The patient has greatly reduced range of motion of the right wrist    Multiple deformities of the fingers consistent with osteoarthritis.    Neurovascular intact    Psychiatric good affect and cognition  IMAGING: Holter monitor - 48 hour  · Sinus rhythm with heart rates varying between 54 and 121 bpm with an   average of 80 bpm.  · There were very rare PVCs totalling 3 and averaging 0.06 per hour.  · There were very rare PACs totalling 86 and averaging 1.79 per hour.  · There was 1 run EAT and lasting for 3 beats. The rate 152 bpm.          Radiographs / Imaging : Relevant imaging results reviewed by me and interpreted by me, discussed with the patient and / or family -radiographs reviewed by me and agree with report of moderate to severe degenerative changes in both hands and wrist and hardware appears intact in the left wrist.    40 min spent face-to-face patient over 50% that time in consultation regarding management of her osteoarthritis and particularly the swelling in left index finger and the problems with her right wrist and we will order an MRI and refer her to Hand Orthopedics.  She does not think she has used Voltaren gel before and this may be very helpful to her regarding her hand arthritis.      Assessment:     Encounter Diagnoses   Name Primary?    Pain of finger of left hand     Ganglion cyst of dorsum of right wrist Yes     Primary osteoarthritis of both hands     Ganglion, multiple sites     Multiple allergies         Plan:   Ganglion cyst of dorsum of right wrist    Pain of finger of left hand  -     Ambulatory referral/consult to Orthopedics    Primary osteoarthritis of both hands    Ganglion, multiple sites  -     MRI Wrist Joint Without Contrast Right; Future; Expected date: 11/30/2020    Multiple allergies        The patient verbalized good understanding of the medical issues discussed today and expressed appreciation for the care provided.  Patient was given the opportunity to ask questions and be an active participant in their medical care. Patient had no further questions or concerns at this time.     The patient was encouraged to participate in appropriate physical activity.      Elías Elaine M.D.  [facility]        This note was dictated using voice recognition software and may have sound a like errors.

## 2020-11-30 NOTE — LETTER
November 30, 2020      Karen Villafana DO  [id number] [place] [address] [address] [place] [address]           [facility]  [address] [facility]  [place] [address]  Phone: [phone]  Fax: [phone]          Patient: Monica Joy   MR Number: [id number]   YOB: 1960   Date of Visit: 11/30/2020       Dear Dr. Karen Villafana:    Thank you for referring Mnoica Joy to me for evaluation. Attached you will find relevant portions of my assessment and plan of care.    If you have questions, please do not hesitate to call me. I look forward to following Monica Joy along with you.    Sincerely,    Elías Elaine MD    Enclosure  CC:  No Recipients    If you would like to receive this communication electronically, please contact [email or web address] or [phone] to request more information on [employer] Link access.    For providers and/or their staff who would like to refer a patient to [facility], please contact us through our one-stop-shop provider referral line, Chandler Rowland, at [phone].    If you feel you have received this communication in error or would no longer like to receive these types of communications, please e-mail [email or web address]

## 2020-11-30 NOTE — PATIENT INSTRUCTIONS
Voltaren gel 3 to 4 times a day to wrist and hands as needed    Referral to orthopedic hand surgeon for right wrist pain/mass    MRI ordered for right wrist

## 2020-12-01 ENCOUNTER — OFFICE VISIT (OUTPATIENT)
Dept: PULMONOLOGY | Facility: CLINIC | Age: 60
End: 2020-12-01
Payer: MEDICAID

## 2020-12-01 VITALS
RESPIRATION RATE: 18 BRPM | OXYGEN SATURATION: 97 % | WEIGHT: 240.94 LBS | BODY MASS INDEX: 40.14 KG/M2 | HEART RATE: 74 BPM | HEIGHT: 65 IN

## 2020-12-01 DIAGNOSIS — J45.20 MILD INTERMITTENT ASTHMA WITHOUT COMPLICATION: ICD-10-CM

## 2020-12-01 DIAGNOSIS — E66.01 SEVERE OBESITY (BMI 35.0-35.9 WITH COMORBIDITY): ICD-10-CM

## 2020-12-01 DIAGNOSIS — Z91.018 FOOD ALLERGY: ICD-10-CM

## 2020-12-01 DIAGNOSIS — G47.33 OSA ON CPAP: Primary | Chronic | ICD-10-CM

## 2020-12-01 DIAGNOSIS — Z88.9 MULTIPLE ALLERGIES: ICD-10-CM

## 2020-12-01 PROBLEM — G47.30 SLEEP DISORDER BREATHING: Status: RESOLVED | Noted: 2017-04-13 | Resolved: 2020-12-01

## 2020-12-01 PROCEDURE — 99999 PR PBB SHADOW E&M-EST. PATIENT-LVL III: ICD-10-PCS | Mod: PBBFAC,,, | Performed by: NURSE PRACTITIONER

## 2020-12-01 PROCEDURE — 99214 OFFICE O/P EST MOD 30 MIN: CPT | Mod: S$PBB,,, | Performed by: NURSE PRACTITIONER

## 2020-12-01 PROCEDURE — 99214 PR OFFICE/OUTPT VISIT, EST, LEVL IV, 30-39 MIN: ICD-10-PCS | Mod: S$PBB,,, | Performed by: NURSE PRACTITIONER

## 2020-12-01 PROCEDURE — 99213 OFFICE O/P EST LOW 20 MIN: CPT | Mod: PBBFAC | Performed by: NURSE PRACTITIONER

## 2020-12-01 PROCEDURE — 99999 PR PBB SHADOW E&M-EST. PATIENT-LVL III: CPT | Mod: PBBFAC,,, | Performed by: NURSE PRACTITIONER

## 2020-12-01 NOTE — ASSESSMENT & PLAN NOTE
Benefits and compliant with Auto CPAP 5-20 cm  AHI: 4.1  Full face mask   HME: [facility]   Patient request more air at start up, orders remote change to auto CPAP 8-16 cm

## 2020-12-01 NOTE — PROGRESS NOTES
Subjective:      Patient ID: Monica Joy is a 59 y.o. female.    Chief Complaint: Sleep Apnea    HPI: Monica Joy presents to clinic for follow up for KAM with initial CPAP complaince assessment.  She is on Auto CPAP of 5-20 cmH2O pressure which is optimally controlling sleep apnea with apneic index (AHI) 4.1 events an hour.   She is compliant with CPAP use. Complaince download today reveals 100% of days with greater than 4 hours of device use.   Patient reports benefit from CPAP use.  Patient reports complaint of Patient request more air at start up, orders remote change to auto CPAP 8-16 cm Full face mask is tolerated.   [facility] 0    Home sleep study 2020 revealed moderate obstructive sleep apnea with overall AHI 27.5/hour (184 events):  Night # 1  Prior Home Sleep Study 2017 no obstructive sleep apnea detected with AHI 4.6     Previous Report Reviewed: lab reports and office notes     Past Medical History: The following portions of the patient's history were reviewed and updated as appropriate:   She  has a past surgical history that includes Thyroidectomy, partial; Wrist surgery; Joint replacement; Fracture surgery;  section; Tubal ligation; Novasure Endometrial Ablation (2016); and Colonoscopy (N/A, 2019).  Her family history includes Anuerysm in her brother and mother; Atrial fibrillation in her mother; Diabetes in her brother and father; Heart disease in her brother and father; Hyperlipidemia in her father; Kidney disease in her father; Stroke in her father.  She  reports that she has never smoked. She has never used smokeless tobacco. She reports that she does not drink alcohol or use drugs.  She has a current medication list which includes the following prescription(s): albuterol, ascorbic acid (vitamin c), cyanocobalamin (vitamin b-12), cyanocobalamin (vitamin b-12), levocetirizine, levocetirizine dihydrochloride, fluticasone propionate, and  "neomycin-polymyxin-hydrocortisone.  She is allergic to amoxicillin; codeine; hydrocodone; iodine and iodide containing products; medrol [methylprednisolone]; oxycodone; oxycodone hcl-oxycodone-asa; percodan filipe; tetracyclines; tramadol; augmentin [amoxicillin-pot clavulanate]; levaquin [levofloxacin]; sulfa (sulfonamide antibiotics); doxycycline; lortab  [hydrocodone-acetaminophen]; percocet  [oxycodone-acetaminophen]; shellfish containing products; tetracycline; and vibramycin  [doxycycline calcium]..    The following portions of the patient's history were reviewed and updated as appropriate: allergies, current medications, past family history, past medical history, past social history, past surgical history and problem list.    Review of Systems   Constitutional: Negative for fever, chills, weight loss, weight gain, activity change, appetite change, fatigue and night sweats.   HENT: Negative for postnasal drip, rhinorrhea, sinus pressure, voice change and congestion.    Eyes: Negative for redness and itching.   Respiratory: Negative for snoring, cough, sputum production, chest tightness, shortness of breath, wheezing, orthopnea, asthma nighttime symptoms, dyspnea on extertion, use of rescue inhaler and somnolence.    Cardiovascular: Negative.  Negative for chest pain, palpitations and leg swelling.   Genitourinary: Negative for difficulty urinating and hematuria.   Endocrine: Negative for cold intolerance and heat intolerance.    Musculoskeletal: Negative for arthralgias, gait problem, joint swelling and myalgias.   Skin: Negative.    Gastrointestinal: Negative for nausea, vomiting, abdominal pain and acid reflux.   Neurological: Negative for dizziness, weakness, light-headedness and headaches.   Hematological: Negative for adenopathy. No excessive bruising.   All other systems reviewed and are negative.     Objective:   Pulse 74   Resp 18   Ht 5' 5" (1.651 m)   Wt 109.3 kg (240 lb 15.4 oz)   SpO2 97%   BMI " 40.10 kg/m²   Physical Exam  Vitals signs and nursing note reviewed.   Constitutional:       General: She is not in acute distress.     Appearance: She is well-developed. She is not ill-appearing or toxic-appearing.   HENT:      Head: Normocephalic.      Right Ear: External ear normal.      Left Ear: External ear normal.      Nose: Nose normal.      Mouth/Throat:      Pharynx: No oropharyngeal exudate.   Eyes:      Conjunctiva/sclera: Conjunctivae normal.   Neck:      Musculoskeletal: Normal range of motion and neck supple.   Cardiovascular:      Rate and Rhythm: Normal rate and regular rhythm.      Heart sounds: Normal heart sounds.   Pulmonary:      Effort: Pulmonary effort is normal.      Breath sounds: Normal breath sounds. No stridor.   Abdominal:      Palpations: Abdomen is soft.   Musculoskeletal: Normal range of motion.   Lymphadenopathy:      Cervical: No cervical adenopathy.   Skin:     General: Skin is warm and dry.   Neurological:      Mental Status: She is alert and oriented to person, place, and time.   Psychiatric:         Behavior: Behavior normal. Behavior is cooperative.         Thought Content: Thought content normal.         Judgment: Judgment normal.         Personal Diagnostic Review  CPAP download  APAP 5-20 cm  Compliance Summary  10/25/2020 - 11/23/2020 (30 days)  Days with Device Usage 30 days  Days without Device Usage 0 days  Percent Days with Device Usage 100.0%  Cumulative Usage 10 days 4 hrs. 13 mins. 40 secs.  Maximum Usage (1 Day) 11 hrs. 44 mins. 23 secs.  Average Usage (All Days) 8 hrs. 8 mins. 27 secs.  Average Usage (Days Used) 8 hrs. 8 mins. 27 secs.  Minimum Usage (1 Day) 5 hrs. 27 mins. 14 secs.  Percent of Days with Usage >= 4 Hours 100.0%  Percent of Days with Usage < 4 Hours 0.0%  Date Range  Total Blower Time 10 days 5 hrs. 33 mins. 38 secs.  Average AHI 4.1  Auto-CPAP Summary  Auto-CPAP Mean Pressure 10.3 cmH2O  Auto-CPAP Peak Average Pressure 14.1 cmH2O  Average Device  "Pressure <= 90% of Time 13.5 cmH2O  Average Time in Large Leak Per Day 14 secs.    Assessment:     1. KAM on CPAP    2. Severe obesity (BMI 35.0-35.9 with comorbidity)    3. Mild intermittent asthma without complication    4. Multiple allergies    5. Food allergy        Orders Placed This Encounter   Procedures    HME - OTHER     Please change remotely to Auto CPAP 8-16 cm     Order Specific Question:   Type of Equipment:     Answer:   CPAP     Order Specific Question:   Height:     Answer:   5' 5" (1.651 m)     Order Specific Question:   Weight:     Answer:   109.3 kg (240 lb 15.4 oz)     Order Specific Question:   Does patient have medical equipment at home?     Answer:   CPAP     Plan:     Problem List Items Addressed This Visit     Severe obesity (BMI 35.0-35.9 with comorbidity)     Encouraged calorie reduction and 30 minutes of exercise daily. Discussed impact of obesity on general health.  No regular exercise, plan on joining a gym          KAM on CPAP - Primary (Chronic)     Benefits and compliant with Auto CPAP 5-20 cm  AHI: 4.1  Full face mask   HME: [facility]   Patient request more air at start up, orders remote change to auto CPAP 8-16 cm         Relevant Orders    HME - OTHER    Multiple allergies     Controlled on Xyzal 5 mg daily         Mild intermittent asthma without complication     Controlled. Has albuterol inhaler on hand if needed.         Food allergy     Shellfish, any kind of seafood              (DME) - [facility]  Reviewed therapeutic goals for positive airway pressure therapy Auto CPAP  Ideal is usage 100% of nights for 6 - 8 hours per night. Minimum usage is 70% of night for at least 4 hours per night used.     Follow up in about 6 months (around 6/1/2021) for CPAP 6 mo compliance download.    TIME SPENT WITH PATIENT: Time spent:25 minutes in face to face  discussion concerning diagnosis, prognosis, review of lab and test results, benefits of treatment as well as management of disease, " counseling of patient. Greater than half the time spent was used for coordination of care and counseling of patient.

## 2020-12-04 NOTE — TELEPHONE ENCOUNTER
Patient remains confused, pulled off pulse oximeter and refusing to have it placed on finger. Resting in bed at this time. Pulse oximetry has been maintaining >90 on 4L N/C. Bed in low position. Call light within reach.   Spoke with patient and she says she didn't think it worked because she was still hurting even on the flexeril, but when she stopped them she was hurting worse so they were working

## 2020-12-08 ENCOUNTER — HOSPITAL ENCOUNTER (OUTPATIENT)
Dept: RADIOLOGY | Facility: HOSPITAL | Age: 60
Discharge: HOME | End: 2020-12-08
Attending: ORTHOPAEDIC SURGERY
Payer: MEDICAID

## 2020-12-08 ENCOUNTER — OFFICE VISIT (OUTPATIENT)
Dept: ORTHOPEDICS | Facility: CLINIC | Age: 60
End: 2020-12-08
Payer: MEDICAID

## 2020-12-08 VITALS
HEIGHT: 65 IN | DIASTOLIC BLOOD PRESSURE: 78 MMHG | BODY MASS INDEX: 39.99 KG/M2 | WEIGHT: 240 LBS | HEART RATE: 75 BPM | SYSTOLIC BLOOD PRESSURE: 138 MMHG

## 2020-12-08 DIAGNOSIS — M19.131 SLAC (SCAPHOLUNATE ADVANCED COLLAPSE) OF WRIST, RIGHT: ICD-10-CM

## 2020-12-08 DIAGNOSIS — M67.431 GANGLION CYST OF DORSUM OF RIGHT WRIST: Primary | ICD-10-CM

## 2020-12-08 DIAGNOSIS — M79.645 PAIN OF FINGER OF LEFT HAND: ICD-10-CM

## 2020-12-08 DIAGNOSIS — M67.431 GANGLION CYST OF DORSUM OF RIGHT WRIST: ICD-10-CM

## 2020-12-08 PROCEDURE — 99213 PR OFFICE/OUTPT VISIT, EST, LEVL III, 20-29 MIN: ICD-10-PCS | Mod: S$PBB,,, | Performed by: ORTHOPAEDIC SURGERY

## 2020-12-08 PROCEDURE — 99213 OFFICE O/P EST LOW 20 MIN: CPT | Mod: S$PBB,,, | Performed by: ORTHOPAEDIC SURGERY

## 2020-12-08 PROCEDURE — 73110 X-RAY EXAM OF WRIST: CPT | Mod: 26,RT,, | Performed by: RADIOLOGY

## 2020-12-08 PROCEDURE — 73110 XR WRIST COMPLETE 3 VIEWS RIGHT: ICD-10-PCS | Mod: 26,RT,, | Performed by: RADIOLOGY

## 2020-12-08 PROCEDURE — 99213 OFFICE O/P EST LOW 20 MIN: CPT | Mod: PBBFAC,25 | Performed by: ORTHOPAEDIC SURGERY

## 2020-12-08 PROCEDURE — 73110 X-RAY EXAM OF WRIST: CPT | Mod: TC,RT

## 2020-12-08 PROCEDURE — 99999 PR PBB SHADOW E&M-EST. PATIENT-LVL III: ICD-10-PCS | Mod: PBBFAC,,, | Performed by: ORTHOPAEDIC SURGERY

## 2020-12-08 PROCEDURE — 99999 PR PBB SHADOW E&M-EST. PATIENT-LVL III: CPT | Mod: PBBFAC,,, | Performed by: ORTHOPAEDIC SURGERY

## 2020-12-08 NOTE — PROGRESS NOTES
Subjective:     Patient ID: Monica Joy is a 59 y.o. female.    Chief Complaint: Pain of the Right Hand and Pain of the Left Wrist    The patient is a 59-year-old female who has had a left total wrist arthroplasty by Dr. Leone about 11 years ago.  She is doing well with that.  She complains of right wrist pain.  She has only a jog of motion in the right wrist.      Past Medical History:   Diagnosis Date    Arthritis     Asthma 2013    Thyroid disease      Past Surgical History:   Procedure Laterality Date     SECTION      COLONOSCOPY N/A 2019    Procedure: COLONOSCOPY;  Surgeon: Scout Rivera MD;  Location: [facility];  Service: Endoscopy;  Laterality: N/A;    FRACTURE SURGERY      JOINT REPLACEMENT      Novasure Endometrial Ablation  2016    THYROIDECTOMY, PARTIAL      TUBAL LIGATION      WRIST SURGERY       Family History   Problem Relation Age of Onset    Atrial fibrillation Mother     Anuerysm Mother     Diabetes Father     Heart disease Father     Hyperlipidemia Father     Stroke Father     Kidney disease Father     Heart disease Brother     Diabetes Brother     Anuerysm Brother     Breast cancer Neg Hx     Colon cancer Neg Hx     Ovarian cancer Neg Hx      Social History     Socioeconomic History    Marital status:      Spouse name: Not on file    Number of children: 2    Years of education: Not on file    Highest education level: Not on file   Occupational History    Not on file   Social Needs    Financial resource strain: Somewhat hard    Food insecurity     Worry: Sometimes true     Inability: Never true    Transportation needs     Medical: No     Non-medical: No   Tobacco Use    Smoking status: Never Smoker    Smokeless tobacco: Never Used   Substance and Sexual Activity    Alcohol use: No     Frequency: Monthly or less     Drinks per session: 1 or 2     Binge frequency: Never     Comment: on rare holidays    Drug use: No    Sexual  activity: Yes     Partners: Male     Birth control/protection: None   Lifestyle    Physical activity     Days per week: 1 day     Minutes per session: 30 min    Stress: Only a little   Relationships    Social connections     Talks on phone: More than three times a week     Gets together: Twice a week     Attends Scientologist service: Not on file     Active member of club or organization: Yes     Attends meetings of clubs or organizations: More than 4 times per year     Relationship status:    Other Topics Concern    Not on file   Social History Narrative    Not on file     Medication List with Changes/Refills   Current Medications    ALBUTEROL (VENTOLIN HFA) 90 MCG/ACTUATION INHALER    Inhale 2 puffs into the lungs every 4 (four) hours as needed for Wheezing or Shortness of Breath.    ASCORBIC ACID, VITAMIN C, (VITAMIN C) 1000 MG TABLET    Take 1,000 mg by mouth.    CYANOCOBALAMIN, VITAMIN B-12, (VITAMIN B-12) 1,000 MCG SUBL    Place under the tongue.    CYANOCOBALAMIN, VITAMIN B-12, 5,000 MCG SUBL    Place 1 tablet under the tongue.    FLUTICASONE PROPIONATE (FLONASE) 50 MCG/ACTUATION NASAL SPRAY    2 sprays (100 mcg total) by Each Nostril route once daily.    LEVOCETIRIZINE (XYZAL) 5 MG TABLET    TAKE 1 TABLET BY MOUTH EVERY EVENING    LEVOCETIRIZINE DIHYDROCHLORIDE (LEVOCETIRIZINE ORAL)    Take 1 tablet by mouth.    NEOMYCIN-POLYMYXIN-HYDROCORTISONE (CORTISPORIN) 3.5-10,000-1 MG/ML-UNIT/ML-% OTIC SUSPENSION    Place 3 drops into the left ear 3 (three) times daily.     Review of patient's allergies indicates:   Allergen Reactions    Amoxicillin Rash and Swelling     Rash with throat swelling    Codeine Palpitations, Rash and Swelling     Other reaction(s): Shortness of breath  Other reaction(s): Hives  Rash and throat swelling    Hydrocodone Rash and Swelling    Iodine and iodide containing products Rash and Swelling    Medrol [methylprednisolone]     Oxycodone Shortness Of Breath, Rash and Swelling     Oxycodone hcl-oxycodone-asa Rash and Swelling    Percodan filipe Shortness Of Breath    Tetracyclines Rash and Swelling    Tramadol Shortness Of Breath, Rash and Swelling    Augmentin [amoxicillin-pot clavulanate] Itching and Rash    Levaquin [levofloxacin] Hives    Sulfa (sulfonamide antibiotics) Itching and Rash    Doxycycline Swelling     Other reaction(s): Rash    Lortab  [hydrocodone-acetaminophen]      Other reaction(s): Hives  Other reaction(s): Difficulty breathing    Percocet  [oxycodone-acetaminophen]      Other reaction(s): Shortness of breath    Shellfish containing products      Other reaction(s): Shortness of breath  Other reaction(s): Hives  Other reaction(s): Shortness of breath  Other reaction(s): Hives    Tetracycline Swelling    Vibramycin  [doxycycline calcium]      Other reaction(s): Hives  Other reaction(s): Difficulty breathing     Review of Systems   Constitution: Negative for malaise/fatigue.   HENT: Negative for hearing loss.    Eyes: Negative for double vision and visual disturbance.   Cardiovascular: Positive for chest pain.   Respiratory: Positive for sleep disturbances due to breathing and wheezing. Negative for shortness of breath.    Endocrine: Negative for cold intolerance.   Hematologic/Lymphatic: Does not bruise/bleed easily.   Skin: Negative for poor wound healing and suspicious lesions.   Musculoskeletal: Positive for arthritis, joint pain and joint swelling. Negative for gout.   Gastrointestinal: Positive for abdominal pain. Negative for nausea and vomiting.   Genitourinary: Negative for dysuria.   Neurological: Negative for numbness, paresthesias and sensory change.   Psychiatric/Behavioral: Negative for depression, memory loss and substance abuse. The patient is not nervous/anxious.    Allergic/Immunologic: Negative for persistent infections.       Objective:   Body mass index is 39.94 kg/m².  Vitals:    12/08/20 0857   BP: 138/78   Pulse: 75                 General    Constitutional: She is oriented to person, place, and time. She appears well-developed and well-nourished. No distress.   HENT:   Head: Normocephalic.   Mouth/Throat: Oropharynx is clear and moist.   Eyes: EOM are normal.   Neck: Normal range of motion.   Cardiovascular: Normal rate.    Pulmonary/Chest: Effort normal.   Abdominal: Soft.   Neurological: She is alert and oriented to person, place, and time. No cranial nerve deficit.   Psychiatric: She has a normal mood and affect.             Right Hand/Wrist Exam     Inspection   Scars: Wrist - absent Hand -  absent  Effusion: Wrist - present Hand -  present    Pain   Wrist - The patient exhibits pain of the scapholunate/lunate ECU.    Other     Neuorologic Exam    Median Distribution: normal  Ulnar Distribution: normal  Radial Distribution: normal    Comments:  The patient has only a jog of motion in the right wrist.  There is radiocarpal pain and swelling.  There are no motor or sensory deficits.          Vascular Exam       Capillary Refill  Right Hand: normal capillary refill      Relevant imaging results reviewed and interpreted by me, discussed with the patient and / or family today radiographs right wrist showed a scapholunate advanced collapse pattern stage III.  She has a cyst in the distal radius she has good joint at the lunate radial joint.  The capitate articulates with the radius and there is a cyst in this area and some evidence of subchondral fracture of the radius.  There is arthritic change between the lunate and capitate and lunate and hamate.  Assessment:     Encounter Diagnoses   Name Primary?    Ganglion cyst of dorsum of right wrist Yes    Pain of finger of left hand     Slac (scapholunate advanced collapse) of wrist, right         Plan:     The patient would seem to benefit from a scaphoid excision using the scaphoid for bone graft as well as arthrodesis of the capitate lunate capitate hamate and hamate lunate.  This  will be a 3 corner fusion.  Risk complications and alternatives were discussed including the risk of infection, anesthetic risk, injury to nerves and vessels, loss of motion, and possible need for additional surgeries were discussed.  She seems to understand and agree that surgery.  She will call when she is ready to schedule.                Disclaimer: This note was prepared using a voice recognition system and is likely to have sound alike errors within the text.

## 2020-12-13 ENCOUNTER — PATIENT MESSAGE (OUTPATIENT)
Dept: PULMONOLOGY | Facility: CLINIC | Age: 60
End: 2020-12-13

## 2020-12-14 ENCOUNTER — LAB VISIT (OUTPATIENT)
Dept: PRIMARY CARE CLINIC | Facility: OTHER | Age: 60
End: 2020-12-14
Attending: INTERNAL MEDICINE
Payer: MEDICAID

## 2020-12-14 DIAGNOSIS — Z03.818 ENCNTR FOR OBS FOR SUSP EXPSR TO OTH BIOLG AGENTS RULED OUT: Primary | ICD-10-CM

## 2020-12-14 PROCEDURE — U0003 INFECTIOUS AGENT DETECTION BY NUCLEIC ACID (DNA OR RNA); SEVERE ACUTE RESPIRATORY SYNDROME CORONAVIRUS 2 (SARS-COV-2) (CORONAVIRUS DISEASE [COVID-19]), AMPLIFIED PROBE TECHNIQUE, MAKING USE OF HIGH THROUGHPUT TECHNOLOGIES AS DESCRIBED BY CMS-2020-01-R: HCPCS

## 2020-12-15 LAB — SARS-COV-2 RNA RESP QL NAA+PROBE: NOT DETECTED

## 2020-12-29 ENCOUNTER — PATIENT MESSAGE (OUTPATIENT)
Dept: ORTHOPEDICS | Facility: CLINIC | Age: 60
End: 2020-12-29

## 2020-12-30 ENCOUNTER — OFFICE VISIT (OUTPATIENT)
Dept: ORTHOPEDICS | Facility: CLINIC | Age: 60
End: 2020-12-30
Payer: MEDICAID

## 2020-12-30 VITALS
BODY MASS INDEX: 39.99 KG/M2 | SYSTOLIC BLOOD PRESSURE: 131 MMHG | HEIGHT: 65 IN | WEIGHT: 240 LBS | HEART RATE: 79 BPM | DIASTOLIC BLOOD PRESSURE: 81 MMHG

## 2020-12-30 DIAGNOSIS — M19.131 SLAC (SCAPHOLUNATE ADVANCED COLLAPSE) OF WRIST, RIGHT: Primary | ICD-10-CM

## 2020-12-30 PROCEDURE — 99213 OFFICE O/P EST LOW 20 MIN: CPT | Mod: S$PBB,,, | Performed by: ORTHOPAEDIC SURGERY

## 2020-12-30 PROCEDURE — 99213 OFFICE O/P EST LOW 20 MIN: CPT | Mod: PBBFAC | Performed by: ORTHOPAEDIC SURGERY

## 2020-12-30 PROCEDURE — 99213 PR OFFICE/OUTPT VISIT, EST, LEVL III, 20-29 MIN: ICD-10-PCS | Mod: S$PBB,,, | Performed by: ORTHOPAEDIC SURGERY

## 2020-12-30 PROCEDURE — 99999 PR PBB SHADOW E&M-EST. PATIENT-LVL III: CPT | Mod: PBBFAC,,, | Performed by: ORTHOPAEDIC SURGERY

## 2020-12-30 PROCEDURE — 99999 PR PBB SHADOW E&M-EST. PATIENT-LVL III: ICD-10-PCS | Mod: PBBFAC,,, | Performed by: ORTHOPAEDIC SURGERY

## 2020-12-30 NOTE — PROGRESS NOTES
Subjective:     Patient ID: Monica Joy is a 60 y.o. female.    Chief Complaint: Pain of the Right Hand      HPI:  Patient is a 60-year-old female who has had a left total wrist arthroplasty by Dr. Leone 11 years ago.  She has right wrist pain.  She has  scapholunate advanced collapse right wrist.  She wishes to have a stable excision and limited intercarpal arthrodesis.  She has a large synovial cyst in the distal radius and will use the scaphoid bone for bone graft for this defect.  Additionally she will have limited intercarpal arthrodesis will be a 3 corner fusion.  She also had a radial styloidectomy.  She has tried splinting and injection without improvement.    Past Medical History:   Diagnosis Date    Arthritis     Asthma 2013    Thyroid disease      Past Surgical History:   Procedure Laterality Date     SECTION      COLONOSCOPY N/A 2019    Procedure: COLONOSCOPY;  Surgeon: Scout Rivera MD;  Location: [facility];  Service: Endoscopy;  Laterality: N/A;    FRACTURE SURGERY      JOINT REPLACEMENT      Novasure Endometrial Ablation  2016    THYROIDECTOMY, PARTIAL      TUBAL LIGATION      WRIST SURGERY       Family History   Problem Relation Age of Onset    Atrial fibrillation Mother     Anuerysm Mother     Diabetes Father     Heart disease Father     Hyperlipidemia Father     Stroke Father     Kidney disease Father     Heart disease Brother     Diabetes Brother     Anuerysm Brother     Breast cancer Neg Hx     Colon cancer Neg Hx     Ovarian cancer Neg Hx      Social History     Socioeconomic History    Marital status:      Spouse name: Not on file    Number of children: 2    Years of education: Not on file    Highest education level: Not on file   Occupational History    Not on file   Social Needs    Financial resource strain: Somewhat hard    Food insecurity     Worry: Sometimes true     Inability: Never true    Transportation needs      Medical: No     Non-medical: No   Tobacco Use    Smoking status: Never Smoker    Smokeless tobacco: Never Used   Substance and Sexual Activity    Alcohol use: No     Frequency: Monthly or less     Drinks per session: 1 or 2     Binge frequency: Never     Comment: on rare holidays    Drug use: No    Sexual activity: Yes     Partners: Male     Birth control/protection: None   Lifestyle    Physical activity     Days per week: 1 day     Minutes per session: 30 min    Stress: Only a little   Relationships    Social connections     Talks on phone: More than three times a week     Gets together: Twice a week     Attends Oriental orthodox service: Not on file     Active member of club or organization: Yes     Attends meetings of clubs or organizations: More than 4 times per year     Relationship status:    Other Topics Concern    Not on file   Social History Narrative    Not on file     Medication List with Changes/Refills   Current Medications    ALBUTEROL (VENTOLIN HFA) 90 MCG/ACTUATION INHALER    Inhale 2 puffs into the lungs every 4 (four) hours as needed for Wheezing or Shortness of Breath.    ASCORBIC ACID, VITAMIN C, (VITAMIN C) 1000 MG TABLET    Take 1,000 mg by mouth.    CYANOCOBALAMIN, VITAMIN B-12, (VITAMIN B-12) 1,000 MCG SUBL    Place under the tongue.    CYANOCOBALAMIN, VITAMIN B-12, 5,000 MCG SUBL    Place 1 tablet under the tongue.    FLUTICASONE PROPIONATE (FLONASE) 50 MCG/ACTUATION NASAL SPRAY    2 sprays (100 mcg total) by Each Nostril route once daily.    LEVOCETIRIZINE (XYZAL) 5 MG TABLET    TAKE 1 TABLET BY MOUTH EVERY EVENING    LEVOCETIRIZINE DIHYDROCHLORIDE (LEVOCETIRIZINE ORAL)    Take 1 tablet by mouth.    NEOMYCIN-POLYMYXIN-HYDROCORTISONE (CORTISPORIN) 3.5-10,000-1 MG/ML-UNIT/ML-% OTIC SUSPENSION    Place 3 drops into the left ear 3 (three) times daily.     Review of patient's allergies indicates:   Allergen Reactions    Amoxicillin Rash and Swelling     Rash with throat swelling     Codeine Palpitations, Rash and Swelling     Other reaction(s): Shortness of breath  Other reaction(s): Hives  Rash and throat swelling    Hydrocodone Rash and Swelling    Iodine and iodide containing products Rash and Swelling    Medrol [methylprednisolone]     Oxycodone Shortness Of Breath, Rash and Swelling    Oxycodone hcl-oxycodone-asa Rash and Swelling    Percodan filipe Shortness Of Breath    Tetracyclines Rash and Swelling    Tramadol Shortness Of Breath, Rash and Swelling    Augmentin [amoxicillin-pot clavulanate] Itching and Rash    Levaquin [levofloxacin] Hives    Sulfa (sulfonamide antibiotics) Itching and Rash    Doxycycline Swelling     Other reaction(s): Rash    Lortab  [hydrocodone-acetaminophen]      Other reaction(s): Hives  Other reaction(s): Difficulty breathing    Percocet  [oxycodone-acetaminophen]      Other reaction(s): Shortness of breath    Shellfish containing products      Other reaction(s): Shortness of breath  Other reaction(s): Hives  Other reaction(s): Shortness of breath  Other reaction(s): Hives    Tetracycline Swelling    Vibramycin  [doxycycline calcium]      Other reaction(s): Hives  Other reaction(s): Difficulty breathing     Review of Systems   Constitution: Negative for malaise/fatigue.   HENT: Negative for hearing loss.    Eyes: Negative for double vision and visual disturbance.   Cardiovascular: Positive for chest pain.   Respiratory: Positive for sleep disturbances due to breathing and wheezing. Negative for shortness of breath.    Endocrine: Negative for cold intolerance.   Hematologic/Lymphatic: Does not bruise/bleed easily.   Skin: Negative for poor wound healing and suspicious lesions.   Musculoskeletal: Positive for arthritis, joint pain and joint swelling. Negative for gout.   Gastrointestinal: Positive for abdominal pain. Negative for nausea and vomiting.   Genitourinary: Negative for dysuria.   Neurological: Negative for numbness, paresthesias and  sensory change.   Psychiatric/Behavioral: Negative for depression, memory loss and substance abuse. The patient is not nervous/anxious.    Allergic/Immunologic: Negative for persistent infections.       Objective:   Body mass index is 39.94 kg/m².  Vitals:    12/30/20 0905   BP: 131/81   Pulse: 79                General    Constitutional: She is oriented to person, place, and time. She appears well-developed and well-nourished. No distress.   HENT:   Head: Normocephalic.   Mouth/Throat: Oropharynx is clear and moist.   Eyes: EOM are normal.   Neck: Normal range of motion.   Cardiovascular: Normal rate.    Pulmonary/Chest: Effort normal.   Abdominal: Soft.   Neurological: She is alert and oriented to person, place, and time. No cranial nerve deficit.   Psychiatric: She has a normal mood and affect.             Right Hand/Wrist Exam     Inspection   Scars: Wrist - absent Hand -  absent  Effusion: Wrist - present Hand -  absent    Pain   Wrist - The patient exhibits pain of the scapholunate/lunate ECU.    Tenderness   The patient is tender to palpation of the dorsal area.    Other     Neuorologic Exam    Median Distribution: normal  Ulnar Distribution: normal  Radial Distribution: normal    Comments:  The patient is local swelling and tenderness radioscaphoid joint right wrist.  There is pain at the extremes of motion.  There are no motor or sensory deficits          Vascular Exam       Capillary Refill  Right Hand: normal capillary refill      Relevant imaging results reviewed and interpreted by me, discussed with the patient and / or family today radiographs right wrist showed radioscaphoid joint severe degenerative joint disease with a large osteophyte of the radial styloid.  There is a synovial cyst in the distal radius beneath the scaphoid.  There is subluxation of the capitate lunate joint with the capitate appearing to articulate partially on the radial side of the wrist.  The patient has arthritic change  between the capitate lunate and capitate hamate and hamate lunate joints.  The triquetral is relatively uninvolved.  Assessment:     Encounter Diagnosis   Name Primary?    Slac (scapholunate advanced collapse) of wrist, right Yes        Plan:     The patient will benefit from a radial styloidectomy as well as scaphoid excision and using the bone graft to fill the defect in the distal radial epiphysis.  Additionally a reduction of the four-corner area will be performed with bone graft and fusion.  A 3 corner fusion will be performed with limited intercarpal arthrodesis.  We will use 3 mm cannulated screws for this issue.  Risk complications and alternatives were discussed including the risk of infection, anesthetic risk, injury to nerves and vessels, loss of motion, and possible need for additional surgeries were discussed.  She seems to understand and agree that surgery.  All questions were answered.                Disclaimer: This note was prepared using a voice recognition system and is likely to have sound alike errors within the text.

## 2021-01-08 ENCOUNTER — TELEPHONE (OUTPATIENT)
Dept: PREADMISSION TESTING | Facility: HOSPITAL | Age: 61
End: 2021-01-08

## 2021-01-08 DIAGNOSIS — Z01.818 PRE-OP TESTING: Primary | ICD-10-CM

## 2021-02-22 ENCOUNTER — TELEPHONE (OUTPATIENT)
Dept: PREADMISSION TESTING | Facility: HOSPITAL | Age: 61
End: 2021-02-22

## 2021-02-22 ENCOUNTER — PATIENT MESSAGE (OUTPATIENT)
Dept: SURGERY | Facility: HOSPITAL | Age: 61
End: 2021-02-22

## 2021-02-22 ENCOUNTER — HOSPITAL ENCOUNTER (OUTPATIENT)
Dept: RADIOLOGY | Facility: HOSPITAL | Age: 61
Discharge: HOME | End: 2021-02-22
Attending: ORTHOPAEDIC SURGERY
Payer: MEDICAID

## 2021-02-22 ENCOUNTER — TELEPHONE (OUTPATIENT)
Dept: ORTHOPEDICS | Facility: CLINIC | Age: 61
End: 2021-02-22

## 2021-02-22 ENCOUNTER — HOSPITAL ENCOUNTER (OUTPATIENT)
Dept: CARDIOLOGY | Facility: HOSPITAL | Age: 61
Discharge: HOME | End: 2021-02-22
Attending: ORTHOPAEDIC SURGERY
Payer: MEDICAID

## 2021-02-22 DIAGNOSIS — Z01.818 PREOP TESTING: Primary | ICD-10-CM

## 2021-02-22 DIAGNOSIS — Z01.818 PREOP TESTING: ICD-10-CM

## 2021-02-22 DIAGNOSIS — Z01.818 PRE-OP TESTING: ICD-10-CM

## 2021-02-22 DIAGNOSIS — M25.531 RIGHT WRIST PAIN: Primary | ICD-10-CM

## 2021-02-22 PROCEDURE — 93005 ELECTROCARDIOGRAM TRACING: CPT

## 2021-02-22 PROCEDURE — 93010 EKG 12-LEAD: ICD-10-PCS | Mod: ,,, | Performed by: INTERNAL MEDICINE

## 2021-02-22 PROCEDURE — 71046 XR CHEST PA AND LATERAL PRE-OP: ICD-10-PCS | Mod: 26,,, | Performed by: RADIOLOGY

## 2021-02-22 PROCEDURE — 93010 ELECTROCARDIOGRAM REPORT: CPT | Mod: ,,, | Performed by: INTERNAL MEDICINE

## 2021-02-22 PROCEDURE — 71046 X-RAY EXAM CHEST 2 VIEWS: CPT | Mod: 26,,, | Performed by: RADIOLOGY

## 2021-02-22 PROCEDURE — 71046 X-RAY EXAM CHEST 2 VIEWS: CPT | Mod: TC

## 2021-02-24 ENCOUNTER — TELEPHONE (OUTPATIENT)
Dept: PREADMISSION TESTING | Facility: HOSPITAL | Age: 61
End: 2021-02-24

## 2021-02-25 ENCOUNTER — HOSPITAL ENCOUNTER (OUTPATIENT)
Facility: HOSPITAL | Age: 61
Discharge: HOME | End: 2021-02-25
Attending: ORTHOPAEDIC SURGERY | Admitting: ORTHOPAEDIC SURGERY
Payer: MEDICAID

## 2021-02-25 ENCOUNTER — PATIENT MESSAGE (OUTPATIENT)
Dept: SURGERY | Facility: HOSPITAL | Age: 61
End: 2021-02-25

## 2021-02-25 VITALS
DIASTOLIC BLOOD PRESSURE: 81 MMHG | RESPIRATION RATE: 20 BRPM | HEIGHT: 65 IN | WEIGHT: 243.06 LBS | HEART RATE: 72 BPM | TEMPERATURE: 98 F | OXYGEN SATURATION: 97 % | BODY MASS INDEX: 40.5 KG/M2 | SYSTOLIC BLOOD PRESSURE: 136 MMHG

## 2021-02-25 DIAGNOSIS — S62.009A SCAPHOID FRACTURE, WRIST, CLOSED: ICD-10-CM

## 2021-02-25 PROCEDURE — 63600175 PHARM REV CODE 636 W HCPCS: Performed by: ANESTHESIOLOGY

## 2021-02-25 RX ORDER — BUPIVACAINE HYDROCHLORIDE 2.5 MG/ML
INJECTION, SOLUTION EPIDURAL; INFILTRATION; INTRACAUDAL
Status: DISCONTINUED
Start: 2021-02-25 | End: 2021-02-25 | Stop reason: HOSPADM

## 2021-02-25 RX ORDER — FENTANYL CITRATE 50 UG/ML
25 INJECTION, SOLUTION INTRAMUSCULAR; INTRAVENOUS EVERY 5 MIN PRN
Status: CANCELLED | OUTPATIENT
Start: 2021-02-25

## 2021-02-25 RX ORDER — CLINDAMYCIN PHOSPHATE 900 MG/50ML
INJECTION, SOLUTION INTRAVENOUS
Status: DISCONTINUED
Start: 2021-02-25 | End: 2021-02-25 | Stop reason: WASHOUT

## 2021-02-25 RX ORDER — ALBUTEROL SULFATE 0.83 MG/ML
2.5 SOLUTION RESPIRATORY (INHALATION) EVERY 4 HOURS PRN
Status: DISCONTINUED | OUTPATIENT
Start: 2021-02-25 | End: 2021-02-25 | Stop reason: HOSPADM

## 2021-02-25 RX ORDER — MEPERIDINE HYDROCHLORIDE 25 MG/ML
12.5 INJECTION INTRAMUSCULAR; INTRAVENOUS; SUBCUTANEOUS ONCE
Status: CANCELLED | OUTPATIENT
Start: 2021-02-25 | End: 2021-02-25

## 2021-02-25 RX ORDER — SODIUM CHLORIDE, SODIUM LACTATE, POTASSIUM CHLORIDE, CALCIUM CHLORIDE 600; 310; 30; 20 MG/100ML; MG/100ML; MG/100ML; MG/100ML
INJECTION, SOLUTION INTRAVENOUS
Status: ACTIVE | OUTPATIENT
Start: 2021-02-25

## 2021-02-25 RX ORDER — LIDOCAINE HYDROCHLORIDE 20 MG/ML
INJECTION, SOLUTION INFILTRATION; PERINEURAL
Status: DISCONTINUED
Start: 2021-02-25 | End: 2021-02-25 | Stop reason: HOSPADM

## 2021-02-25 RX ORDER — DIPHENHYDRAMINE HYDROCHLORIDE 50 MG/ML
25 INJECTION INTRAMUSCULAR; INTRAVENOUS EVERY 6 HOURS PRN
Status: CANCELLED | OUTPATIENT
Start: 2021-02-25

## 2021-02-25 RX ORDER — CLINDAMYCIN PHOSPHATE 900 MG/50ML
900 INJECTION, SOLUTION INTRAVENOUS
Status: COMPLETED | OUTPATIENT
Start: 2021-02-25 | End: 2021-03-19

## 2021-02-25 RX ORDER — ONDANSETRON 2 MG/ML
4 INJECTION INTRAMUSCULAR; INTRAVENOUS ONCE AS NEEDED
Status: CANCELLED | OUTPATIENT
Start: 2021-02-25 | End: 2032-07-23

## 2021-02-25 RX ORDER — SODIUM CHLORIDE, SODIUM LACTATE, POTASSIUM CHLORIDE, CALCIUM CHLORIDE 600; 310; 30; 20 MG/100ML; MG/100ML; MG/100ML; MG/100ML
INJECTION, SOLUTION INTRAVENOUS CONTINUOUS
Status: DISCONTINUED | OUTPATIENT
Start: 2021-02-25 | End: 2023-03-02

## 2021-02-25 RX ADMIN — SODIUM CHLORIDE, SODIUM LACTATE, POTASSIUM CHLORIDE, AND CALCIUM CHLORIDE: .6; .31; .03; .02 INJECTION, SOLUTION INTRAVENOUS at 06:02

## 2021-03-02 ENCOUNTER — PATIENT MESSAGE (OUTPATIENT)
Dept: ORTHOPEDICS | Facility: CLINIC | Age: 61
End: 2021-03-02

## 2021-03-05 ENCOUNTER — TELEPHONE (OUTPATIENT)
Dept: PULMONOLOGY | Facility: CLINIC | Age: 61
End: 2021-03-05

## 2021-03-10 ENCOUNTER — HOSPITAL ENCOUNTER (OUTPATIENT)
Dept: RADIOLOGY | Facility: HOSPITAL | Age: 61
Discharge: HOME | End: 2021-03-10
Attending: ORTHOPAEDIC SURGERY
Payer: MEDICAID

## 2021-03-10 ENCOUNTER — OFFICE VISIT (OUTPATIENT)
Dept: ORTHOPEDICS | Facility: CLINIC | Age: 61
End: 2021-03-10
Payer: MEDICAID

## 2021-03-10 VITALS
BODY MASS INDEX: 40.48 KG/M2 | HEIGHT: 65 IN | WEIGHT: 243 LBS | HEART RATE: 80 BPM | DIASTOLIC BLOOD PRESSURE: 81 MMHG | SYSTOLIC BLOOD PRESSURE: 125 MMHG

## 2021-03-10 DIAGNOSIS — M79.645 PAIN OF FINGER OF LEFT HAND: ICD-10-CM

## 2021-03-10 DIAGNOSIS — M19.041 PRIMARY OSTEOARTHRITIS OF BOTH HANDS: Primary | ICD-10-CM

## 2021-03-10 DIAGNOSIS — M79.644 FINGER PAIN, RIGHT: Primary | ICD-10-CM

## 2021-03-10 DIAGNOSIS — M19.042 PRIMARY OSTEOARTHRITIS OF BOTH HANDS: Primary | ICD-10-CM

## 2021-03-10 DIAGNOSIS — Z01.818 PREOP TESTING: Primary | ICD-10-CM

## 2021-03-10 DIAGNOSIS — M19.041 PRIMARY OSTEOARTHRITIS OF BOTH HANDS: ICD-10-CM

## 2021-03-10 DIAGNOSIS — M19.131 SLAC (SCAPHOLUNATE ADVANCED COLLAPSE) OF WRIST, RIGHT: ICD-10-CM

## 2021-03-10 DIAGNOSIS — M19.042 PRIMARY OSTEOARTHRITIS OF BOTH HANDS: ICD-10-CM

## 2021-03-10 DIAGNOSIS — M19.131 SLAC (SCAPHOLUNATE ADVANCED COLLAPSE) OF WRIST, RIGHT: Primary | ICD-10-CM

## 2021-03-10 DIAGNOSIS — M79.644 FINGER PAIN, RIGHT: ICD-10-CM

## 2021-03-10 PROCEDURE — 99213 PR OFFICE/OUTPT VISIT, EST, LEVL III, 20-29 MIN: ICD-10-PCS | Mod: S$PBB,,, | Performed by: ORTHOPAEDIC SURGERY

## 2021-03-10 PROCEDURE — 73140 XR FINGER 2 OR MORE VIEWS RIGHT: ICD-10-PCS | Mod: 26,RT,, | Performed by: RADIOLOGY

## 2021-03-10 PROCEDURE — 99999 PR PBB SHADOW E&M-EST. PATIENT-LVL III: CPT | Mod: PBBFAC,,, | Performed by: ORTHOPAEDIC SURGERY

## 2021-03-10 PROCEDURE — 73140 X-RAY EXAM OF FINGER(S): CPT | Mod: TC,RT

## 2021-03-10 PROCEDURE — 73140 X-RAY EXAM OF FINGER(S): CPT | Mod: 26,RT,, | Performed by: RADIOLOGY

## 2021-03-10 PROCEDURE — 99213 OFFICE O/P EST LOW 20 MIN: CPT | Mod: S$PBB,,, | Performed by: ORTHOPAEDIC SURGERY

## 2021-03-10 PROCEDURE — 99213 OFFICE O/P EST LOW 20 MIN: CPT | Mod: PBBFAC,25 | Performed by: ORTHOPAEDIC SURGERY

## 2021-03-10 PROCEDURE — 99999 PR PBB SHADOW E&M-EST. PATIENT-LVL III: ICD-10-PCS | Mod: PBBFAC,,, | Performed by: ORTHOPAEDIC SURGERY

## 2021-03-16 ENCOUNTER — LAB VISIT (OUTPATIENT)
Dept: OTOLARYNGOLOGY | Facility: CLINIC | Age: 61
End: 2021-03-16
Payer: MEDICAID

## 2021-03-16 DIAGNOSIS — Z01.818 PREOP TESTING: ICD-10-CM

## 2021-03-16 PROCEDURE — U0005 INFEC AGEN DETEC AMPLI PROBE: HCPCS | Performed by: ORTHOPAEDIC SURGERY

## 2021-03-16 PROCEDURE — U0003 INFECTIOUS AGENT DETECTION BY NUCLEIC ACID (DNA OR RNA); SEVERE ACUTE RESPIRATORY SYNDROME CORONAVIRUS 2 (SARS-COV-2) (CORONAVIRUS DISEASE [COVID-19]), AMPLIFIED PROBE TECHNIQUE, MAKING USE OF HIGH THROUGHPUT TECHNOLOGIES AS DESCRIBED BY CMS-2020-01-R: HCPCS | Performed by: ORTHOPAEDIC SURGERY

## 2021-03-17 ENCOUNTER — PATIENT MESSAGE (OUTPATIENT)
Dept: ORTHOPEDICS | Facility: CLINIC | Age: 61
End: 2021-03-17

## 2021-03-17 LAB — SARS-COV-2 RNA RESP QL NAA+PROBE: NOT DETECTED

## 2021-03-18 ENCOUNTER — ANESTHESIA EVENT (OUTPATIENT)
Dept: SURGERY | Facility: HOSPITAL | Age: 61
End: 2021-03-18
Payer: MEDICAID

## 2021-03-18 ENCOUNTER — TELEPHONE (OUTPATIENT)
Dept: PREADMISSION TESTING | Facility: HOSPITAL | Age: 61
End: 2021-03-18

## 2021-03-19 ENCOUNTER — HOSPITAL ENCOUNTER (OUTPATIENT)
Dept: RADIOLOGY | Facility: HOSPITAL | Age: 61
Discharge: HOME | End: 2021-03-19
Attending: ORTHOPAEDIC SURGERY | Admitting: ORTHOPAEDIC SURGERY
Payer: MEDICAID

## 2021-03-19 ENCOUNTER — ANESTHESIA (OUTPATIENT)
Dept: SURGERY | Facility: HOSPITAL | Age: 61
End: 2021-03-19
Payer: MEDICAID

## 2021-03-19 ENCOUNTER — HOSPITAL ENCOUNTER (OUTPATIENT)
Facility: HOSPITAL | Age: 61
Discharge: HOME | End: 2021-03-19
Attending: ORTHOPAEDIC SURGERY | Admitting: ORTHOPAEDIC SURGERY
Payer: MEDICAID

## 2021-03-19 VITALS
BODY MASS INDEX: 41.01 KG/M2 | DIASTOLIC BLOOD PRESSURE: 68 MMHG | TEMPERATURE: 98 F | HEIGHT: 65 IN | HEART RATE: 72 BPM | WEIGHT: 246.13 LBS | SYSTOLIC BLOOD PRESSURE: 131 MMHG | RESPIRATION RATE: 16 BRPM | OXYGEN SATURATION: 99 %

## 2021-03-19 DIAGNOSIS — S62.021A: ICD-10-CM

## 2021-03-19 DIAGNOSIS — M19.131 SLAC (SCAPHOLUNATE ADVANCED COLLAPSE) OF WRIST, RIGHT: Primary | ICD-10-CM

## 2021-03-19 PROCEDURE — 73140 X-RAY EXAM OF FINGER(S): CPT | Mod: TC,RT

## 2021-03-19 PROCEDURE — 63600175 PHARM REV CODE 636 W HCPCS: Performed by: ANESTHESIOLOGY

## 2021-03-19 PROCEDURE — 73100 X-RAY EXAM OF WRIST: CPT | Mod: 26,RT,, | Performed by: RADIOLOGY

## 2021-03-19 PROCEDURE — 63600175 PHARM REV CODE 636 W HCPCS: Performed by: NURSE ANESTHETIST, CERTIFIED REGISTERED

## 2021-03-19 PROCEDURE — 64415 PR NERVE BLOCK INJ, ANES/STEROID, BRACHIAL PLEXUS, INCL IMAG GUIDANCE: ICD-10-PCS | Mod: 59,RT,, | Performed by: ANESTHESIOLOGY

## 2021-03-19 PROCEDURE — 73100 X-RAY EXAM OF WRIST: CPT | Mod: TC,RT

## 2021-03-19 PROCEDURE — 25000003 PHARM REV CODE 250: Performed by: NURSE ANESTHETIST, CERTIFIED REGISTERED

## 2021-03-19 PROCEDURE — 26860 FUSION OF FINGER JOINT: CPT | Mod: 51,F8,, | Performed by: ORTHOPAEDIC SURGERY

## 2021-03-19 PROCEDURE — 37000009 HC ANESTHESIA EA ADD 15 MINS: Performed by: ORTHOPAEDIC SURGERY

## 2021-03-19 PROCEDURE — 25000003 PHARM REV CODE 250: Performed by: PHYSICIAN ASSISTANT

## 2021-03-19 PROCEDURE — C1769 GUIDE WIRE: HCPCS | Performed by: ORTHOPAEDIC SURGERY

## 2021-03-19 PROCEDURE — 25820 ARTHRD WRIST LMTD W/O B1 GRF: CPT | Mod: RT,,, | Performed by: ORTHOPAEDIC SURGERY

## 2021-03-19 PROCEDURE — 64415 NJX AA&/STRD BRCH PLXS IMG: CPT | Performed by: ANESTHESIOLOGY

## 2021-03-19 PROCEDURE — 26860 PR FUSION FINGER JOINT: ICD-10-PCS | Mod: 51,F8,, | Performed by: ORTHOPAEDIC SURGERY

## 2021-03-19 PROCEDURE — 76942 PR U/S GUIDANCE FOR NEEDLE GUIDANCE: ICD-10-PCS | Mod: 26,,, | Performed by: ANESTHESIOLOGY

## 2021-03-19 PROCEDURE — 64415 NJX AA&/STRD BRCH PLXS IMG: CPT | Mod: 59,RT,, | Performed by: ANESTHESIOLOGY

## 2021-03-19 PROCEDURE — D9220A PRA ANESTHESIA: ICD-10-PCS | Mod: CRNA,,, | Performed by: NURSE ANESTHETIST, CERTIFIED REGISTERED

## 2021-03-19 PROCEDURE — D9220A PRA ANESTHESIA: Mod: ANES,,, | Performed by: ANESTHESIOLOGY

## 2021-03-19 PROCEDURE — D9220A PRA ANESTHESIA: Mod: CRNA,,, | Performed by: NURSE ANESTHETIST, CERTIFIED REGISTERED

## 2021-03-19 PROCEDURE — 36000708 HC OR TIME LEV III 1ST 15 MIN: Performed by: ORTHOPAEDIC SURGERY

## 2021-03-19 PROCEDURE — 73140 X-RAY EXAM OF FINGER(S): CPT | Mod: 26,RT,, | Performed by: RADIOLOGY

## 2021-03-19 PROCEDURE — 27201423 OPTIME MED/SURG SUP & DEVICES STERILE SUPPLY: Performed by: ORTHOPAEDIC SURGERY

## 2021-03-19 PROCEDURE — 76942 ECHO GUIDE FOR BIOPSY: CPT | Mod: 26,,, | Performed by: ANESTHESIOLOGY

## 2021-03-19 PROCEDURE — 64417 NJX AA&/STRD AX NERVE IMG: CPT | Mod: 59 | Performed by: ORTHOPAEDIC SURGERY

## 2021-03-19 PROCEDURE — 73100 XR WRIST 2 VIEW RIGHT: ICD-10-PCS | Mod: 26,RT,, | Performed by: RADIOLOGY

## 2021-03-19 PROCEDURE — 37000008 HC ANESTHESIA 1ST 15 MINUTES: Performed by: ORTHOPAEDIC SURGERY

## 2021-03-19 PROCEDURE — 73140 XR FINGER 2 OR MORE VIEWS RIGHT: ICD-10-PCS | Mod: 26,RT,, | Performed by: RADIOLOGY

## 2021-03-19 PROCEDURE — C1713 ANCHOR/SCREW BN/BN,TIS/BN: HCPCS | Performed by: ORTHOPAEDIC SURGERY

## 2021-03-19 PROCEDURE — D9220A PRA ANESTHESIA: ICD-10-PCS | Mod: ANES,,, | Performed by: ANESTHESIOLOGY

## 2021-03-19 PROCEDURE — 25820: ICD-10-PCS | Mod: RT,,, | Performed by: ORTHOPAEDIC SURGERY

## 2021-03-19 PROCEDURE — 01830 ANES ARTHR/NDSC WRST/HND NOS: CPT | Performed by: ORTHOPAEDIC SURGERY

## 2021-03-19 PROCEDURE — 71000039 HC RECOVERY, EACH ADD'L HOUR: Performed by: ORTHOPAEDIC SURGERY

## 2021-03-19 PROCEDURE — 76942 ECHO GUIDE FOR BIOPSY: CPT | Mod: 59 | Performed by: ANESTHESIOLOGY

## 2021-03-19 PROCEDURE — 71000033 HC RECOVERY, INTIAL HOUR: Performed by: ORTHOPAEDIC SURGERY

## 2021-03-19 PROCEDURE — 36000709 HC OR TIME LEV III EA ADD 15 MIN: Performed by: ORTHOPAEDIC SURGERY

## 2021-03-19 DEVICE — IMPLANTABLE DEVICE: Type: IMPLANTABLE DEVICE | Site: FINGER | Status: FUNCTIONAL

## 2021-03-19 RX ORDER — ALBUTEROL SULFATE 0.83 MG/ML
2.5 SOLUTION RESPIRATORY (INHALATION) EVERY 4 HOURS PRN
Status: DISCONTINUED | OUTPATIENT
Start: 2021-03-19 | End: 2021-03-19 | Stop reason: HOSPADM

## 2021-03-19 RX ORDER — LIDOCAINE HYDROCHLORIDE 20 MG/ML
INJECTION, SOLUTION EPIDURAL; INFILTRATION; INTRACAUDAL; PERINEURAL
Status: DISCONTINUED | OUTPATIENT
Start: 2021-03-19 | End: 2021-03-19

## 2021-03-19 RX ORDER — SODIUM CHLORIDE, SODIUM LACTATE, POTASSIUM CHLORIDE, CALCIUM CHLORIDE 600; 310; 30; 20 MG/100ML; MG/100ML; MG/100ML; MG/100ML
INJECTION, SOLUTION INTRAVENOUS CONTINUOUS
Status: DISCONTINUED | OUTPATIENT
Start: 2021-03-19 | End: 2021-03-19 | Stop reason: HOSPADM

## 2021-03-19 RX ORDER — SODIUM CHLORIDE 0.9 % (FLUSH) 0.9 %
10 SYRINGE (ML) INJECTION
Status: DISCONTINUED | OUTPATIENT
Start: 2021-03-19 | End: 2021-03-19 | Stop reason: HOSPADM

## 2021-03-19 RX ORDER — ONDANSETRON 2 MG/ML
4 INJECTION INTRAMUSCULAR; INTRAVENOUS ONCE AS NEEDED
Status: COMPLETED | OUTPATIENT
Start: 2021-03-19 | End: 2021-03-19

## 2021-03-19 RX ORDER — SODIUM CHLORIDE, SODIUM LACTATE, POTASSIUM CHLORIDE, CALCIUM CHLORIDE 600; 310; 30; 20 MG/100ML; MG/100ML; MG/100ML; MG/100ML
INJECTION, SOLUTION INTRAVENOUS
Status: DISCONTINUED | OUTPATIENT
Start: 2021-03-19 | End: 2021-03-19 | Stop reason: HOSPADM

## 2021-03-19 RX ORDER — MIDAZOLAM HYDROCHLORIDE 1 MG/ML
INJECTION INTRAMUSCULAR; INTRAVENOUS
Status: DISCONTINUED | OUTPATIENT
Start: 2021-03-19 | End: 2021-03-19

## 2021-03-19 RX ORDER — ROPIVACAINE HYDROCHLORIDE 5 MG/ML
INJECTION, SOLUTION EPIDURAL; INFILTRATION; PERINEURAL
Status: COMPLETED | OUTPATIENT
Start: 2021-03-19 | End: 2021-03-19

## 2021-03-19 RX ORDER — DIPHENHYDRAMINE HYDROCHLORIDE 50 MG/ML
25 INJECTION INTRAMUSCULAR; INTRAVENOUS EVERY 6 HOURS PRN
Status: DISCONTINUED | OUTPATIENT
Start: 2021-03-19 | End: 2021-03-19 | Stop reason: HOSPADM

## 2021-03-19 RX ORDER — IBUPROFEN 600 MG/1
600 TABLET ORAL EVERY 6 HOURS PRN
Qty: 60 TABLET | Refills: 0 | Status: SHIPPED | OUTPATIENT
Start: 2021-03-19 | End: 2021-09-22

## 2021-03-19 RX ORDER — CLINDAMYCIN PHOSPHATE 900 MG/50ML
900 INJECTION, SOLUTION INTRAVENOUS
Status: DISCONTINUED | OUTPATIENT
Start: 2021-03-19 | End: 2021-03-19 | Stop reason: HOSPADM

## 2021-03-19 RX ORDER — FENTANYL CITRATE 50 UG/ML
25 INJECTION, SOLUTION INTRAMUSCULAR; INTRAVENOUS EVERY 5 MIN PRN
Status: DISCONTINUED | OUTPATIENT
Start: 2021-03-19 | End: 2021-03-19 | Stop reason: HOSPADM

## 2021-03-19 RX ORDER — PROPOFOL 10 MG/ML
VIAL (ML) INTRAVENOUS
Status: DISCONTINUED | OUTPATIENT
Start: 2021-03-19 | End: 2021-03-19

## 2021-03-19 RX ORDER — CLINDAMYCIN PHOSPHATE 900 MG/50ML
INJECTION, SOLUTION INTRAVENOUS
Status: COMPLETED
Start: 2021-03-19 | End: 2021-03-19

## 2021-03-19 RX ORDER — HYDROMORPHONE HYDROCHLORIDE 2 MG/1
2 TABLET ORAL
Status: DISCONTINUED | OUTPATIENT
Start: 2021-03-19 | End: 2021-03-19 | Stop reason: RX

## 2021-03-19 RX ORDER — ONDANSETRON 2 MG/ML
INJECTION INTRAMUSCULAR; INTRAVENOUS
Status: DISCONTINUED | OUTPATIENT
Start: 2021-03-19 | End: 2021-03-19

## 2021-03-19 RX ORDER — CHLORHEXIDINE GLUCONATE ORAL RINSE 1.2 MG/ML
10 SOLUTION DENTAL 2 TIMES DAILY
Status: DISCONTINUED | OUTPATIENT
Start: 2021-03-19 | End: 2021-03-19 | Stop reason: HOSPADM

## 2021-03-19 RX ORDER — MEPERIDINE HYDROCHLORIDE 25 MG/ML
12.5 INJECTION INTRAMUSCULAR; INTRAVENOUS; SUBCUTANEOUS ONCE
Status: DISCONTINUED | OUTPATIENT
Start: 2021-03-19 | End: 2021-03-19 | Stop reason: HOSPADM

## 2021-03-19 RX ORDER — FENTANYL CITRATE 50 UG/ML
INJECTION, SOLUTION INTRAMUSCULAR; INTRAVENOUS
Status: DISCONTINUED | OUTPATIENT
Start: 2021-03-19 | End: 2021-03-19

## 2021-03-19 RX ADMIN — MIDAZOLAM HYDROCHLORIDE 2 MG: 1 INJECTION INTRAMUSCULAR; INTRAVENOUS at 09:03

## 2021-03-19 RX ADMIN — ROPIVACAINE HYDROCHLORIDE 20 ML: 5 INJECTION, SOLUTION EPIDURAL; INFILTRATION; PERINEURAL at 09:03

## 2021-03-19 RX ADMIN — LIDOCAINE HYDROCHLORIDE 100 MG: 20 INJECTION, SOLUTION EPIDURAL; INFILTRATION; INTRACAUDAL; PERINEURAL at 09:03

## 2021-03-19 RX ADMIN — PROPOFOL 200 MG: 10 INJECTION, EMULSION INTRAVENOUS at 09:03

## 2021-03-19 RX ADMIN — ONDANSETRON 4 MG: 2 INJECTION INTRAMUSCULAR; INTRAVENOUS at 12:03

## 2021-03-19 RX ADMIN — ONDANSETRON 4 MG: 2 INJECTION, SOLUTION INTRAMUSCULAR; INTRAVENOUS at 11:03

## 2021-03-19 RX ADMIN — FENTANYL CITRATE 50 MCG: 50 INJECTION, SOLUTION INTRAMUSCULAR; INTRAVENOUS at 11:03

## 2021-03-19 RX ADMIN — SODIUM CHLORIDE, SODIUM LACTATE, POTASSIUM CHLORIDE, AND CALCIUM CHLORIDE: .6; .31; .03; .02 INJECTION, SOLUTION INTRAVENOUS at 08:03

## 2021-03-19 RX ADMIN — FENTANYL CITRATE 50 MCG: 50 INJECTION, SOLUTION INTRAMUSCULAR; INTRAVENOUS at 09:03

## 2021-03-19 RX ADMIN — CLINDAMYCIN PHOSPHATE 900 MG: 18 INJECTION, SOLUTION INTRAVENOUS at 09:03

## 2021-03-23 ENCOUNTER — OFFICE VISIT (OUTPATIENT)
Dept: ORTHOPEDICS | Facility: CLINIC | Age: 61
End: 2021-03-23
Payer: MEDICAID

## 2021-03-23 VITALS
BODY MASS INDEX: 40.98 KG/M2 | HEIGHT: 65 IN | DIASTOLIC BLOOD PRESSURE: 80 MMHG | SYSTOLIC BLOOD PRESSURE: 171 MMHG | HEART RATE: 93 BPM | WEIGHT: 246 LBS

## 2021-03-23 DIAGNOSIS — M79.644 FINGER PAIN, RIGHT: ICD-10-CM

## 2021-03-23 DIAGNOSIS — M19.041 PRIMARY OSTEOARTHRITIS OF BOTH HANDS: ICD-10-CM

## 2021-03-23 DIAGNOSIS — M25.531 RIGHT WRIST PAIN: Primary | ICD-10-CM

## 2021-03-23 DIAGNOSIS — M79.644 PAIN OF FINGER OF RIGHT HAND: ICD-10-CM

## 2021-03-23 DIAGNOSIS — Z09 POSTOP CHECK: ICD-10-CM

## 2021-03-23 DIAGNOSIS — M19.042 PRIMARY OSTEOARTHRITIS OF BOTH HANDS: ICD-10-CM

## 2021-03-23 DIAGNOSIS — M19.131 SLAC (SCAPHOLUNATE ADVANCED COLLAPSE) OF WRIST, RIGHT: Primary | ICD-10-CM

## 2021-03-23 PROCEDURE — 99024 PR POST-OP FOLLOW-UP VISIT: ICD-10-PCS | Mod: ,,, | Performed by: PHYSICIAN ASSISTANT

## 2021-03-23 PROCEDURE — 99024 POSTOP FOLLOW-UP VISIT: CPT | Mod: ,,, | Performed by: PHYSICIAN ASSISTANT

## 2021-03-23 PROCEDURE — 99214 OFFICE O/P EST MOD 30 MIN: CPT | Mod: PBBFAC | Performed by: PHYSICIAN ASSISTANT

## 2021-03-23 PROCEDURE — 99999 PR PBB SHADOW E&M-EST. PATIENT-LVL IV: ICD-10-PCS | Mod: PBBFAC,,, | Performed by: PHYSICIAN ASSISTANT

## 2021-03-23 PROCEDURE — 99999 PR PBB SHADOW E&M-EST. PATIENT-LVL IV: CPT | Mod: PBBFAC,,, | Performed by: PHYSICIAN ASSISTANT

## 2021-03-23 RX ORDER — CLINDAMYCIN HYDROCHLORIDE 300 MG/1
300 CAPSULE ORAL 3 TIMES DAILY
Qty: 21 CAPSULE | Refills: 0 | Status: SHIPPED | OUTPATIENT
Start: 2021-03-23 | End: 2021-05-14

## 2021-03-30 ENCOUNTER — HOSPITAL ENCOUNTER (OUTPATIENT)
Dept: RADIOLOGY | Facility: HOSPITAL | Age: 61
Discharge: HOME | End: 2021-03-30
Attending: PHYSICIAN ASSISTANT
Payer: MEDICAID

## 2021-03-30 ENCOUNTER — OFFICE VISIT (OUTPATIENT)
Dept: ORTHOPEDICS | Facility: CLINIC | Age: 61
End: 2021-03-30
Payer: MEDICAID

## 2021-03-30 VITALS
BODY MASS INDEX: 40.98 KG/M2 | WEIGHT: 246 LBS | SYSTOLIC BLOOD PRESSURE: 150 MMHG | DIASTOLIC BLOOD PRESSURE: 79 MMHG | HEART RATE: 85 BPM | HEIGHT: 65 IN

## 2021-03-30 DIAGNOSIS — M79.644 PAIN OF FINGER OF RIGHT HAND: ICD-10-CM

## 2021-03-30 DIAGNOSIS — M25.531 RIGHT WRIST PAIN: Primary | ICD-10-CM

## 2021-03-30 DIAGNOSIS — M19.042 PRIMARY OSTEOARTHRITIS OF BOTH HANDS: ICD-10-CM

## 2021-03-30 DIAGNOSIS — M25.531 RIGHT WRIST PAIN: ICD-10-CM

## 2021-03-30 DIAGNOSIS — Z09 POSTOP CHECK: ICD-10-CM

## 2021-03-30 DIAGNOSIS — M19.131 SLAC (SCAPHOLUNATE ADVANCED COLLAPSE) OF WRIST, RIGHT: Primary | ICD-10-CM

## 2021-03-30 DIAGNOSIS — M19.041 PRIMARY OSTEOARTHRITIS OF BOTH HANDS: ICD-10-CM

## 2021-03-30 PROCEDURE — 99213 OFFICE O/P EST LOW 20 MIN: CPT | Mod: PBBFAC,25 | Performed by: PHYSICIAN ASSISTANT

## 2021-03-30 PROCEDURE — 99999 PR PBB SHADOW E&M-EST. PATIENT-LVL III: CPT | Mod: PBBFAC,,, | Performed by: PHYSICIAN ASSISTANT

## 2021-03-30 PROCEDURE — 99024 PR POST-OP FOLLOW-UP VISIT: ICD-10-PCS | Mod: ,,, | Performed by: PHYSICIAN ASSISTANT

## 2021-03-30 PROCEDURE — 73140 X-RAY EXAM OF FINGER(S): CPT | Mod: 26,RT,, | Performed by: RADIOLOGY

## 2021-03-30 PROCEDURE — 99999 PR PBB SHADOW E&M-EST. PATIENT-LVL III: ICD-10-PCS | Mod: PBBFAC,,, | Performed by: PHYSICIAN ASSISTANT

## 2021-03-30 PROCEDURE — 73110 X-RAY EXAM OF WRIST: CPT | Mod: TC,RT

## 2021-03-30 PROCEDURE — 73140 XR FINGER 2 OR MORE VIEWS: ICD-10-PCS | Mod: 26,RT,, | Performed by: RADIOLOGY

## 2021-03-30 PROCEDURE — 99024 POSTOP FOLLOW-UP VISIT: CPT | Mod: ,,, | Performed by: PHYSICIAN ASSISTANT

## 2021-03-30 PROCEDURE — 73110 XR WRIST COMPLETE 3 VIEWS RIGHT: ICD-10-PCS | Mod: 26,RT,, | Performed by: RADIOLOGY

## 2021-03-30 PROCEDURE — 73110 X-RAY EXAM OF WRIST: CPT | Mod: 26,RT,, | Performed by: RADIOLOGY

## 2021-03-30 PROCEDURE — 73140 X-RAY EXAM OF FINGER(S): CPT | Mod: TC

## 2021-04-04 ENCOUNTER — PATIENT MESSAGE (OUTPATIENT)
Dept: ORTHOPEDICS | Facility: CLINIC | Age: 61
End: 2021-04-04

## 2021-04-05 DIAGNOSIS — M79.644 FINGER PAIN, RIGHT: Primary | ICD-10-CM

## 2021-04-06 ENCOUNTER — TELEPHONE (OUTPATIENT)
Dept: ORTHOPEDICS | Facility: CLINIC | Age: 61
End: 2021-04-06

## 2021-04-14 ENCOUNTER — OFFICE VISIT (OUTPATIENT)
Dept: ORTHOPEDICS | Facility: CLINIC | Age: 61
End: 2021-04-14
Payer: MEDICAID

## 2021-04-14 ENCOUNTER — HOSPITAL ENCOUNTER (OUTPATIENT)
Dept: RADIOLOGY | Facility: HOSPITAL | Age: 61
Discharge: HOME | End: 2021-04-14
Attending: ORTHOPAEDIC SURGERY
Payer: MEDICAID

## 2021-04-14 VITALS
HEART RATE: 62 BPM | DIASTOLIC BLOOD PRESSURE: 84 MMHG | WEIGHT: 246 LBS | HEIGHT: 65 IN | BODY MASS INDEX: 40.98 KG/M2 | SYSTOLIC BLOOD PRESSURE: 134 MMHG

## 2021-04-14 DIAGNOSIS — M19.041 PRIMARY OSTEOARTHRITIS OF BOTH HANDS: Primary | ICD-10-CM

## 2021-04-14 DIAGNOSIS — M21.931 RIGHT WRIST DEFORMITY: ICD-10-CM

## 2021-04-14 DIAGNOSIS — M19.042 PRIMARY OSTEOARTHRITIS OF BOTH HANDS: Primary | ICD-10-CM

## 2021-04-14 DIAGNOSIS — M25.531 RIGHT WRIST PAIN: ICD-10-CM

## 2021-04-14 DIAGNOSIS — M19.131 SLAC (SCAPHOLUNATE ADVANCED COLLAPSE) OF WRIST, RIGHT: ICD-10-CM

## 2021-04-14 DIAGNOSIS — M79.641 RIGHT HAND PAIN: Primary | ICD-10-CM

## 2021-04-14 DIAGNOSIS — M79.644 PAIN OF FINGER OF RIGHT HAND: ICD-10-CM

## 2021-04-14 PROCEDURE — 73140 XR FINGER 2 OR MORE VIEWS: ICD-10-PCS | Mod: 26,RT,, | Performed by: RADIOLOGY

## 2021-04-14 PROCEDURE — 99999 PR PBB SHADOW E&M-EST. PATIENT-LVL III: ICD-10-PCS | Mod: PBBFAC,,, | Performed by: ORTHOPAEDIC SURGERY

## 2021-04-14 PROCEDURE — 73110 X-RAY EXAM OF WRIST: CPT | Mod: TC,RT

## 2021-04-14 PROCEDURE — 73140 X-RAY EXAM OF FINGER(S): CPT | Mod: TC

## 2021-04-14 PROCEDURE — 73110 XR WRIST COMPLETE 3 VIEWS RIGHT: ICD-10-PCS | Mod: 26,RT,, | Performed by: RADIOLOGY

## 2021-04-14 PROCEDURE — 99999 PR PBB SHADOW E&M-EST. PATIENT-LVL III: CPT | Mod: PBBFAC,,, | Performed by: ORTHOPAEDIC SURGERY

## 2021-04-14 PROCEDURE — 73110 X-RAY EXAM OF WRIST: CPT | Mod: 26,RT,, | Performed by: RADIOLOGY

## 2021-04-14 PROCEDURE — 99024 POSTOP FOLLOW-UP VISIT: CPT | Mod: ,,, | Performed by: ORTHOPAEDIC SURGERY

## 2021-04-14 PROCEDURE — 99213 OFFICE O/P EST LOW 20 MIN: CPT | Mod: PBBFAC,25 | Performed by: ORTHOPAEDIC SURGERY

## 2021-04-14 PROCEDURE — 73140 X-RAY EXAM OF FINGER(S): CPT | Mod: 26,RT,, | Performed by: RADIOLOGY

## 2021-04-14 PROCEDURE — 99024 PR POST-OP FOLLOW-UP VISIT: ICD-10-PCS | Mod: ,,, | Performed by: ORTHOPAEDIC SURGERY

## 2021-04-30 ENCOUNTER — PATIENT MESSAGE (OUTPATIENT)
Dept: OTOLARYNGOLOGY | Facility: CLINIC | Age: 61
End: 2021-04-30

## 2021-04-30 RX ORDER — LEVOCETIRIZINE DIHYDROCHLORIDE 5 MG/1
5 TABLET, FILM COATED ORAL NIGHTLY
Qty: 30 TABLET | Refills: 11 | Status: SHIPPED | OUTPATIENT
Start: 2021-04-30 | End: 2021-09-22 | Stop reason: SDUPTHER

## 2021-05-04 ENCOUNTER — PATIENT MESSAGE (OUTPATIENT)
Dept: ORTHOPEDICS | Facility: CLINIC | Age: 61
End: 2021-05-04

## 2021-05-05 DIAGNOSIS — M79.644 FINGER PAIN, RIGHT: Primary | ICD-10-CM

## 2021-05-14 ENCOUNTER — HOSPITAL ENCOUNTER (OUTPATIENT)
Dept: RADIOLOGY | Facility: HOSPITAL | Age: 61
Discharge: HOME | End: 2021-05-14
Attending: ORTHOPAEDIC SURGERY
Payer: MEDICAID

## 2021-05-14 ENCOUNTER — OFFICE VISIT (OUTPATIENT)
Dept: ORTHOPEDICS | Facility: CLINIC | Age: 61
End: 2021-05-14
Payer: MEDICAID

## 2021-05-14 VITALS
BODY MASS INDEX: 40.98 KG/M2 | SYSTOLIC BLOOD PRESSURE: 140 MMHG | HEIGHT: 65 IN | DIASTOLIC BLOOD PRESSURE: 90 MMHG | WEIGHT: 246 LBS | HEART RATE: 81 BPM

## 2021-05-14 DIAGNOSIS — M19.131 SLAC (SCAPHOLUNATE ADVANCED COLLAPSE) OF WRIST, RIGHT: Primary | ICD-10-CM

## 2021-05-14 DIAGNOSIS — M79.644 FINGER PAIN, RIGHT: ICD-10-CM

## 2021-05-14 DIAGNOSIS — M25.569 KNEE PAIN, UNSPECIFIED CHRONICITY, UNSPECIFIED LATERALITY: ICD-10-CM

## 2021-05-14 DIAGNOSIS — M25.569 KNEE PAIN, UNSPECIFIED CHRONICITY, UNSPECIFIED LATERALITY: Primary | ICD-10-CM

## 2021-05-14 DIAGNOSIS — M19.042 PRIMARY OSTEOARTHRITIS OF BOTH HANDS: ICD-10-CM

## 2021-05-14 DIAGNOSIS — M19.041 PRIMARY OSTEOARTHRITIS OF BOTH HANDS: ICD-10-CM

## 2021-05-14 PROCEDURE — 73140 XR FINGER 2 OR MORE VIEWS RIGHT: ICD-10-PCS | Mod: 26,RT,, | Performed by: RADIOLOGY

## 2021-05-14 PROCEDURE — 73140 X-RAY EXAM OF FINGER(S): CPT | Mod: TC,RT

## 2021-05-14 PROCEDURE — 99024 POSTOP FOLLOW-UP VISIT: CPT | Mod: ,,, | Performed by: ORTHOPAEDIC SURGERY

## 2021-05-14 PROCEDURE — 73564 X-RAY EXAM KNEE 4 OR MORE: CPT | Mod: 26,LT,, | Performed by: RADIOLOGY

## 2021-05-14 PROCEDURE — 73562 X-RAY EXAM OF KNEE 3: CPT | Mod: 26,RT,, | Performed by: RADIOLOGY

## 2021-05-14 PROCEDURE — 99213 OFFICE O/P EST LOW 20 MIN: CPT | Mod: PBBFAC,25 | Performed by: ORTHOPAEDIC SURGERY

## 2021-05-14 PROCEDURE — 99999 PR PBB SHADOW E&M-EST. PATIENT-LVL III: ICD-10-PCS | Mod: PBBFAC,,, | Performed by: ORTHOPAEDIC SURGERY

## 2021-05-14 PROCEDURE — 99024 PR POST-OP FOLLOW-UP VISIT: ICD-10-PCS | Mod: ,,, | Performed by: ORTHOPAEDIC SURGERY

## 2021-05-14 PROCEDURE — 73140 X-RAY EXAM OF FINGER(S): CPT | Mod: 26,RT,, | Performed by: RADIOLOGY

## 2021-05-14 PROCEDURE — 73564 X-RAY EXAM KNEE 4 OR MORE: CPT | Mod: TC,LT

## 2021-05-14 PROCEDURE — 73564 XR KNEE ORTHO LEFT WITH FLEXION: ICD-10-PCS | Mod: 26,LT,, | Performed by: RADIOLOGY

## 2021-05-14 PROCEDURE — 99999 PR PBB SHADOW E&M-EST. PATIENT-LVL III: CPT | Mod: PBBFAC,,, | Performed by: ORTHOPAEDIC SURGERY

## 2021-05-14 PROCEDURE — 73562 XR KNEE ORTHO LEFT WITH FLEXION: ICD-10-PCS | Mod: 26,RT,, | Performed by: RADIOLOGY

## 2021-05-14 RX ORDER — IBUPROFEN 600 MG/1
600 TABLET ORAL 3 TIMES DAILY
Qty: 90 TABLET | Refills: 6 | Status: SHIPPED | OUTPATIENT
Start: 2021-05-14 | End: 2022-06-06

## 2021-06-22 ENCOUNTER — HOSPITAL ENCOUNTER (OUTPATIENT)
Dept: RADIOLOGY | Facility: HOSPITAL | Age: 61
Discharge: HOME | End: 2021-06-22
Attending: ORTHOPAEDIC SURGERY
Payer: MEDICAID

## 2021-06-22 ENCOUNTER — OFFICE VISIT (OUTPATIENT)
Dept: ORTHOPEDICS | Facility: CLINIC | Age: 61
End: 2021-06-22
Payer: MEDICAID

## 2021-06-22 VITALS — WEIGHT: 246 LBS | HEIGHT: 65 IN | BODY MASS INDEX: 40.98 KG/M2

## 2021-06-22 DIAGNOSIS — M79.641 RIGHT HAND PAIN: ICD-10-CM

## 2021-06-22 DIAGNOSIS — M19.041 PRIMARY OSTEOARTHRITIS OF BOTH HANDS: ICD-10-CM

## 2021-06-22 DIAGNOSIS — M19.042 PRIMARY OSTEOARTHRITIS OF BOTH HANDS: ICD-10-CM

## 2021-06-22 DIAGNOSIS — M25.531 RIGHT WRIST PAIN: ICD-10-CM

## 2021-06-22 DIAGNOSIS — M19.131 SLAC (SCAPHOLUNATE ADVANCED COLLAPSE) OF WRIST, RIGHT: Primary | ICD-10-CM

## 2021-06-22 PROCEDURE — 73130 X-RAY EXAM OF HAND: CPT | Mod: 26,RT,, | Performed by: RADIOLOGY

## 2021-06-22 PROCEDURE — 99213 PR OFFICE/OUTPT VISIT, EST, LEVL III, 20-29 MIN: ICD-10-PCS | Mod: S$PBB,,, | Performed by: ORTHOPAEDIC SURGERY

## 2021-06-22 PROCEDURE — 99213 OFFICE O/P EST LOW 20 MIN: CPT | Mod: S$PBB,,, | Performed by: ORTHOPAEDIC SURGERY

## 2021-06-22 PROCEDURE — 99213 OFFICE O/P EST LOW 20 MIN: CPT | Mod: PBBFAC,25 | Performed by: ORTHOPAEDIC SURGERY

## 2021-06-22 PROCEDURE — 73130 XR HAND COMPLETE 3 VIEW RIGHT: ICD-10-PCS | Mod: 26,RT,, | Performed by: RADIOLOGY

## 2021-06-22 PROCEDURE — 99999 PR PBB SHADOW E&M-EST. PATIENT-LVL III: CPT | Mod: PBBFAC,,, | Performed by: ORTHOPAEDIC SURGERY

## 2021-06-22 PROCEDURE — 73110 XR WRIST COMPLETE 3 VIEWS RIGHT: ICD-10-PCS | Mod: 26,RT,, | Performed by: RADIOLOGY

## 2021-06-22 PROCEDURE — 73110 X-RAY EXAM OF WRIST: CPT | Mod: TC,RT

## 2021-06-22 PROCEDURE — 73130 X-RAY EXAM OF HAND: CPT | Mod: TC,RT

## 2021-06-22 PROCEDURE — 73110 X-RAY EXAM OF WRIST: CPT | Mod: 26,RT,, | Performed by: RADIOLOGY

## 2021-06-22 PROCEDURE — 99999 PR PBB SHADOW E&M-EST. PATIENT-LVL III: ICD-10-PCS | Mod: PBBFAC,,, | Performed by: ORTHOPAEDIC SURGERY

## 2021-06-25 ENCOUNTER — OFFICE VISIT (OUTPATIENT)
Dept: ORTHOPEDICS | Facility: CLINIC | Age: 61
End: 2021-06-25
Payer: MEDICAID

## 2021-06-25 VITALS
BODY MASS INDEX: 41 KG/M2 | SYSTOLIC BLOOD PRESSURE: 119 MMHG | DIASTOLIC BLOOD PRESSURE: 72 MMHG | HEART RATE: 80 BPM | HEIGHT: 65 IN | WEIGHT: 246.06 LBS

## 2021-06-25 DIAGNOSIS — M25.562 CHRONIC PAIN OF LEFT KNEE: Primary | ICD-10-CM

## 2021-06-25 DIAGNOSIS — G89.29 CHRONIC PAIN OF LEFT KNEE: Primary | ICD-10-CM

## 2021-06-25 DIAGNOSIS — M17.11 PATELLOFEMORAL ARTHRITIS OF RIGHT KNEE: ICD-10-CM

## 2021-06-25 DIAGNOSIS — M17.12 PRIMARY OSTEOARTHRITIS OF LEFT KNEE: ICD-10-CM

## 2021-06-25 PROCEDURE — 99214 OFFICE O/P EST MOD 30 MIN: CPT | Mod: PBBFAC | Performed by: PHYSICIAN ASSISTANT

## 2021-06-25 PROCEDURE — 99999 PR PBB SHADOW E&M-EST. PATIENT-LVL IV: CPT | Mod: PBBFAC,,, | Performed by: PHYSICIAN ASSISTANT

## 2021-06-25 PROCEDURE — 99999 PR PBB SHADOW E&M-EST. PATIENT-LVL IV: ICD-10-PCS | Mod: PBBFAC,,, | Performed by: PHYSICIAN ASSISTANT

## 2021-06-25 PROCEDURE — 99214 OFFICE O/P EST MOD 30 MIN: CPT | Mod: S$PBB,,, | Performed by: PHYSICIAN ASSISTANT

## 2021-06-25 PROCEDURE — 99214 PR OFFICE/OUTPT VISIT, EST, LEVL IV, 30-39 MIN: ICD-10-PCS | Mod: S$PBB,,, | Performed by: PHYSICIAN ASSISTANT

## 2021-06-28 ENCOUNTER — CLINICAL SUPPORT (OUTPATIENT)
Dept: REHABILITATION | Facility: HOSPITAL | Age: 61
End: 2021-06-28
Payer: MEDICAID

## 2021-06-28 DIAGNOSIS — M25.562 CHRONIC PAIN OF LEFT KNEE: ICD-10-CM

## 2021-06-28 DIAGNOSIS — G89.29 CHRONIC PAIN OF LEFT KNEE: ICD-10-CM

## 2021-06-28 DIAGNOSIS — M19.131 SLAC (SCAPHOLUNATE ADVANCED COLLAPSE) OF WRIST, RIGHT: ICD-10-CM

## 2021-06-28 DIAGNOSIS — M17.12 PRIMARY OSTEOARTHRITIS OF LEFT KNEE: ICD-10-CM

## 2021-06-28 DIAGNOSIS — M25.631 STIFFNESS OF RIGHT WRIST JOINT: ICD-10-CM

## 2021-06-28 DIAGNOSIS — M17.11 PATELLOFEMORAL ARTHRITIS OF RIGHT KNEE: ICD-10-CM

## 2021-06-28 PROCEDURE — 97110 THERAPEUTIC EXERCISES: CPT

## 2021-06-28 PROCEDURE — 97161 PT EVAL LOW COMPLEX 20 MIN: CPT

## 2021-06-30 ENCOUNTER — CLINICAL SUPPORT (OUTPATIENT)
Dept: REHABILITATION | Facility: HOSPITAL | Age: 61
End: 2021-06-30
Payer: MEDICAID

## 2021-06-30 DIAGNOSIS — M25.631 STIFFNESS OF RIGHT WRIST JOINT: ICD-10-CM

## 2021-06-30 DIAGNOSIS — M25.562 CHRONIC PAIN OF LEFT KNEE: ICD-10-CM

## 2021-06-30 DIAGNOSIS — G89.29 CHRONIC PAIN OF LEFT KNEE: ICD-10-CM

## 2021-06-30 PROCEDURE — 97140 MANUAL THERAPY 1/> REGIONS: CPT

## 2021-06-30 PROCEDURE — 97110 THERAPEUTIC EXERCISES: CPT

## 2021-07-07 ENCOUNTER — CLINICAL SUPPORT (OUTPATIENT)
Dept: REHABILITATION | Facility: HOSPITAL | Age: 61
End: 2021-07-07
Payer: MEDICAID

## 2021-07-07 DIAGNOSIS — G89.29 CHRONIC PAIN OF LEFT KNEE: ICD-10-CM

## 2021-07-07 DIAGNOSIS — M25.631 STIFFNESS OF RIGHT WRIST JOINT: ICD-10-CM

## 2021-07-07 DIAGNOSIS — M25.562 CHRONIC PAIN OF LEFT KNEE: ICD-10-CM

## 2021-07-07 PROCEDURE — 97110 THERAPEUTIC EXERCISES: CPT

## 2021-07-07 PROCEDURE — 97140 MANUAL THERAPY 1/> REGIONS: CPT

## 2021-07-12 ENCOUNTER — PATIENT MESSAGE (OUTPATIENT)
Dept: ADMINISTRATIVE | Facility: CLINIC | Age: 61
End: 2021-07-12

## 2021-07-12 ENCOUNTER — PATIENT MESSAGE (OUTPATIENT)
Dept: INTERNAL MEDICINE | Facility: CLINIC | Age: 61
End: 2021-07-12

## 2021-07-12 ENCOUNTER — OFFICE VISIT (OUTPATIENT)
Dept: URGENT CARE | Facility: CLINIC | Age: 61
End: 2021-07-12
Payer: MEDICAID

## 2021-07-12 ENCOUNTER — TELEPHONE (OUTPATIENT)
Dept: INTERNAL MEDICINE | Facility: CLINIC | Age: 61
End: 2021-07-12

## 2021-07-12 ENCOUNTER — NURSE TRIAGE (OUTPATIENT)
Dept: ADMINISTRATIVE | Facility: CLINIC | Age: 61
End: 2021-07-12

## 2021-07-12 ENCOUNTER — PATIENT MESSAGE (OUTPATIENT)
Dept: INFECTIOUS DISEASES | Facility: HOSPITAL | Age: 61
End: 2021-07-12

## 2021-07-12 ENCOUNTER — PATIENT MESSAGE (OUTPATIENT)
Dept: ADMINISTRATIVE | Facility: OTHER | Age: 61
End: 2021-07-12

## 2021-07-12 VITALS
TEMPERATURE: 99 F | SYSTOLIC BLOOD PRESSURE: 130 MMHG | HEART RATE: 97 BPM | WEIGHT: 243.38 LBS | DIASTOLIC BLOOD PRESSURE: 90 MMHG | OXYGEN SATURATION: 98 % | BODY MASS INDEX: 40.55 KG/M2 | RESPIRATION RATE: 18 BRPM | HEIGHT: 65 IN

## 2021-07-12 DIAGNOSIS — R68.89 FLU-LIKE SYMPTOMS: ICD-10-CM

## 2021-07-12 DIAGNOSIS — U07.1 COVID-19 VIRUS INFECTION: Primary | ICD-10-CM

## 2021-07-12 LAB
CTP QC/QA: YES
SARS-COV-2 RDRP RESP QL NAA+PROBE: POSITIVE

## 2021-07-12 PROCEDURE — 99999 PR PBB SHADOW E&M-EST. PATIENT-LVL IV: ICD-10-PCS | Mod: PBBFAC,,, | Performed by: PHYSICIAN ASSISTANT

## 2021-07-12 PROCEDURE — 99214 OFFICE O/P EST MOD 30 MIN: CPT | Mod: PBBFAC,PO | Performed by: PHYSICIAN ASSISTANT

## 2021-07-12 PROCEDURE — 99214 PR OFFICE/OUTPT VISIT, EST, LEVL IV, 30-39 MIN: ICD-10-PCS | Mod: S$PBB,,, | Performed by: PHYSICIAN ASSISTANT

## 2021-07-12 PROCEDURE — 99999 PR PBB SHADOW E&M-EST. PATIENT-LVL IV: CPT | Mod: PBBFAC,,, | Performed by: PHYSICIAN ASSISTANT

## 2021-07-12 PROCEDURE — 99214 OFFICE O/P EST MOD 30 MIN: CPT | Mod: S$PBB,,, | Performed by: PHYSICIAN ASSISTANT

## 2021-07-12 PROCEDURE — U0002 COVID-19 LAB TEST NON-CDC: HCPCS | Mod: PBBFAC,PO | Performed by: PHYSICIAN ASSISTANT

## 2021-07-12 RX ORDER — ASPIRIN 325 MG
325 TABLET ORAL DAILY
COMMUNITY
End: 2022-09-26

## 2021-07-13 ENCOUNTER — HOSPITAL ENCOUNTER (EMERGENCY)
Facility: HOSPITAL | Age: 61
Discharge: HOME | End: 2021-07-13
Attending: EMERGENCY MEDICINE
Payer: MEDICAID

## 2021-07-13 ENCOUNTER — PATIENT MESSAGE (OUTPATIENT)
Dept: ADMINISTRATIVE | Facility: OTHER | Age: 61
End: 2021-07-13

## 2021-07-13 ENCOUNTER — NURSE TRIAGE (OUTPATIENT)
Dept: ADMINISTRATIVE | Facility: CLINIC | Age: 61
End: 2021-07-13

## 2021-07-13 VITALS
WEIGHT: 240.88 LBS | DIASTOLIC BLOOD PRESSURE: 86 MMHG | TEMPERATURE: 99 F | HEART RATE: 111 BPM | SYSTOLIC BLOOD PRESSURE: 139 MMHG | OXYGEN SATURATION: 96 % | RESPIRATION RATE: 18 BRPM | BODY MASS INDEX: 40.08 KG/M2

## 2021-07-13 DIAGNOSIS — R79.89 ELEVATED TROPONIN: ICD-10-CM

## 2021-07-13 DIAGNOSIS — U07.1 COVID-19 VIRUS INFECTION: Primary | ICD-10-CM

## 2021-07-13 DIAGNOSIS — R07.89 CHEST TIGHTNESS: ICD-10-CM

## 2021-07-13 LAB
ALBUMIN SERPL BCP-MCNC: 3.8 G/DL (ref 3.5–5.2)
ALP SERPL-CCNC: 100 U/L (ref 55–135)
ALT SERPL W/O P-5'-P-CCNC: 30 U/L (ref 10–44)
ANION GAP SERPL CALC-SCNC: 11 MMOL/L (ref 8–16)
AST SERPL-CCNC: 22 U/L (ref 10–40)
BASOPHILS # BLD AUTO: 0.01 K/UL (ref 0–0.2)
BASOPHILS NFR BLD: 0.2 % (ref 0–1.9)
BILIRUB SERPL-MCNC: 0.3 MG/DL (ref 0.1–1)
BUN SERPL-MCNC: 8 MG/DL (ref 6–20)
CALCIUM SERPL-MCNC: 9.2 MG/DL (ref 8.7–10.5)
CHLORIDE SERPL-SCNC: 105 MMOL/L (ref 95–110)
CK SERPL-CCNC: 174 U/L (ref 20–180)
CO2 SERPL-SCNC: 22 MMOL/L (ref 23–29)
CREAT SERPL-MCNC: 0.8 MG/DL (ref 0.5–1.4)
CRP SERPL-MCNC: 22.4 MG/L (ref 0–8.2)
CTP QC/QA: YES
DIFFERENTIAL METHOD: ABNORMAL
EOSINOPHIL # BLD AUTO: 0 K/UL (ref 0–0.5)
EOSINOPHIL NFR BLD: 0.7 % (ref 0–8)
ERYTHROCYTE [DISTWIDTH] IN BLOOD BY AUTOMATED COUNT: 13.7 % (ref 11.5–14.5)
EST. GFR  (AFRICAN AMERICAN): >60 ML/MIN/1.73 M^2
EST. GFR  (NON AFRICAN AMERICAN): >60 ML/MIN/1.73 M^2
FERRITIN SERPL-MCNC: 204 NG/ML (ref 20–300)
GLUCOSE SERPL-MCNC: 168 MG/DL (ref 70–110)
HCT VFR BLD AUTO: 42 % (ref 37–48.5)
HGB BLD-MCNC: 14.1 G/DL (ref 12–16)
IMM GRANULOCYTES # BLD AUTO: 0.01 K/UL (ref 0–0.04)
IMM GRANULOCYTES NFR BLD AUTO: 0.2 % (ref 0–0.5)
LACTATE SERPL-SCNC: 2 MMOL/L (ref 0.5–2.2)
LDH SERPL L TO P-CCNC: 174 U/L (ref 110–260)
LYMPHOCYTES # BLD AUTO: 0.8 K/UL (ref 1–4.8)
LYMPHOCYTES NFR BLD: 14.1 % (ref 18–48)
MCH RBC QN AUTO: 28.6 PG (ref 27–31)
MCHC RBC AUTO-ENTMCNC: 33.6 G/DL (ref 32–36)
MCV RBC AUTO: 85 FL (ref 82–98)
MONOCYTES # BLD AUTO: 0.7 K/UL (ref 0.3–1)
MONOCYTES NFR BLD: 13.2 % (ref 4–15)
NEUTROPHILS # BLD AUTO: 4 K/UL (ref 1.8–7.7)
NEUTROPHILS NFR BLD: 71.6 % (ref 38–73)
NRBC BLD-RTO: 0 /100 WBC
PLATELET # BLD AUTO: 188 K/UL (ref 150–450)
PMV BLD AUTO: 10.3 FL (ref 9.2–12.9)
POTASSIUM SERPL-SCNC: 3.8 MMOL/L (ref 3.5–5.1)
PROT SERPL-MCNC: 7.4 G/DL (ref 6–8.4)
RBC # BLD AUTO: 4.93 M/UL (ref 4–5.4)
SARS-COV-2 RDRP RESP QL NAA+PROBE: POSITIVE
SODIUM SERPL-SCNC: 138 MMOL/L (ref 136–145)
TROPONIN I SERPL DL<=0.01 NG/ML-MCNC: 0.04 NG/ML (ref 0–0.03)
WBC # BLD AUTO: 5.61 K/UL (ref 3.9–12.7)

## 2021-07-13 PROCEDURE — 99285 EMERGENCY DEPT VISIT HI MDM: CPT | Mod: 25

## 2021-07-13 PROCEDURE — 85025 COMPLETE CBC W/AUTO DIFF WBC: CPT | Performed by: NURSE PRACTITIONER

## 2021-07-13 PROCEDURE — 93010 ELECTROCARDIOGRAM REPORT: CPT | Mod: ,,, | Performed by: INTERNAL MEDICINE

## 2021-07-13 PROCEDURE — 86140 C-REACTIVE PROTEIN: CPT | Performed by: NURSE PRACTITIONER

## 2021-07-13 PROCEDURE — U0002 COVID-19 LAB TEST NON-CDC: HCPCS | Performed by: NURSE PRACTITIONER

## 2021-07-13 PROCEDURE — 83605 ASSAY OF LACTIC ACID: CPT | Performed by: NURSE PRACTITIONER

## 2021-07-13 PROCEDURE — 82728 ASSAY OF FERRITIN: CPT | Performed by: NURSE PRACTITIONER

## 2021-07-13 PROCEDURE — 83615 LACTATE (LD) (LDH) ENZYME: CPT | Performed by: NURSE PRACTITIONER

## 2021-07-13 PROCEDURE — 82550 ASSAY OF CK (CPK): CPT | Performed by: NURSE PRACTITIONER

## 2021-07-13 PROCEDURE — 84484 ASSAY OF TROPONIN QUANT: CPT | Performed by: NURSE PRACTITIONER

## 2021-07-13 PROCEDURE — 93005 ELECTROCARDIOGRAM TRACING: CPT

## 2021-07-13 PROCEDURE — 80053 COMPREHEN METABOLIC PANEL: CPT | Performed by: NURSE PRACTITIONER

## 2021-07-13 PROCEDURE — 93010 EKG 12-LEAD: ICD-10-PCS | Mod: ,,, | Performed by: INTERNAL MEDICINE

## 2021-07-14 ENCOUNTER — PATIENT MESSAGE (OUTPATIENT)
Dept: ADMINISTRATIVE | Facility: CLINIC | Age: 61
End: 2021-07-14

## 2021-07-14 ENCOUNTER — PATIENT MESSAGE (OUTPATIENT)
Dept: ADMINISTRATIVE | Facility: OTHER | Age: 61
End: 2021-07-14

## 2021-07-14 ENCOUNTER — INFUSION (OUTPATIENT)
Dept: INFECTIOUS DISEASES | Facility: HOSPITAL | Age: 61
End: 2021-07-14
Attending: PHYSICIAN ASSISTANT
Payer: MEDICAID

## 2021-07-14 ENCOUNTER — NURSE TRIAGE (OUTPATIENT)
Dept: ADMINISTRATIVE | Facility: CLINIC | Age: 61
End: 2021-07-14

## 2021-07-14 VITALS
SYSTOLIC BLOOD PRESSURE: 138 MMHG | TEMPERATURE: 98 F | DIASTOLIC BLOOD PRESSURE: 78 MMHG | RESPIRATION RATE: 18 BRPM | OXYGEN SATURATION: 97 % | HEART RATE: 80 BPM

## 2021-07-14 DIAGNOSIS — U07.1 COVID-19 VIRUS INFECTION: ICD-10-CM

## 2021-07-14 PROCEDURE — 63600175 PHARM REV CODE 636 W HCPCS: Performed by: INTERNAL MEDICINE

## 2021-07-14 PROCEDURE — M0243 CASIRIVI AND IMDEVI INFUSION: HCPCS | Performed by: INTERNAL MEDICINE

## 2021-07-14 PROCEDURE — 25000003 PHARM REV CODE 250: Performed by: INTERNAL MEDICINE

## 2021-07-14 RX ORDER — SODIUM CHLORIDE 0.9 % (FLUSH) 0.9 %
10 SYRINGE (ML) INJECTION
Status: ACTIVE | OUTPATIENT
Start: 2021-07-14

## 2021-07-14 RX ORDER — EPINEPHRINE 0.3 MG/.3ML
0.3 INJECTION SUBCUTANEOUS
Status: ACTIVE | OUTPATIENT
Start: 2021-07-14

## 2021-07-14 RX ORDER — ONDANSETRON 4 MG/1
4 TABLET, ORALLY DISINTEGRATING ORAL ONCE AS NEEDED
Status: DISCONTINUED | OUTPATIENT
Start: 2021-07-14 | End: 2023-03-02

## 2021-07-14 RX ORDER — ACETAMINOPHEN 325 MG/1
650 TABLET ORAL ONCE AS NEEDED
Status: DISCONTINUED | OUTPATIENT
Start: 2021-07-14 | End: 2023-03-02

## 2021-07-14 RX ORDER — DIPHENHYDRAMINE HYDROCHLORIDE 50 MG/ML
25 INJECTION INTRAMUSCULAR; INTRAVENOUS ONCE AS NEEDED
Status: DISCONTINUED | OUTPATIENT
Start: 2021-07-14 | End: 2023-03-02

## 2021-07-14 RX ORDER — ALBUTEROL SULFATE 90 UG/1
2 AEROSOL, METERED RESPIRATORY (INHALATION)
Status: DISCONTINUED | OUTPATIENT
Start: 2021-07-14 | End: 2021-09-22

## 2021-07-14 RX ADMIN — CASIRIVIMAB 600 MG: 1332 INJECTION, SOLUTION, CONCENTRATE INTRAVENOUS at 02:07

## 2021-07-15 ENCOUNTER — PATIENT MESSAGE (OUTPATIENT)
Dept: ADMINISTRATIVE | Facility: OTHER | Age: 61
End: 2021-07-15

## 2021-07-15 ENCOUNTER — TELEPHONE (OUTPATIENT)
Dept: ADMINISTRATIVE | Facility: CLINIC | Age: 61
End: 2021-07-15

## 2021-07-15 ENCOUNTER — NURSE TRIAGE (OUTPATIENT)
Dept: ADMINISTRATIVE | Facility: CLINIC | Age: 61
End: 2021-07-15

## 2021-07-16 ENCOUNTER — PATIENT MESSAGE (OUTPATIENT)
Dept: ADMINISTRATIVE | Facility: OTHER | Age: 61
End: 2021-07-16

## 2021-07-16 ENCOUNTER — NURSE TRIAGE (OUTPATIENT)
Dept: ADMINISTRATIVE | Facility: CLINIC | Age: 61
End: 2021-07-16

## 2021-07-16 ENCOUNTER — TELEPHONE (OUTPATIENT)
Dept: ADMINISTRATIVE | Facility: CLINIC | Age: 61
End: 2021-07-16

## 2021-07-17 ENCOUNTER — PATIENT MESSAGE (OUTPATIENT)
Dept: ADMINISTRATIVE | Facility: OTHER | Age: 61
End: 2021-07-17

## 2021-07-18 ENCOUNTER — PATIENT MESSAGE (OUTPATIENT)
Dept: ADMINISTRATIVE | Facility: OTHER | Age: 61
End: 2021-07-18

## 2021-07-19 ENCOUNTER — PATIENT MESSAGE (OUTPATIENT)
Dept: ADMINISTRATIVE | Facility: OTHER | Age: 61
End: 2021-07-19

## 2021-07-20 ENCOUNTER — PATIENT MESSAGE (OUTPATIENT)
Dept: ADMINISTRATIVE | Facility: OTHER | Age: 61
End: 2021-07-20

## 2021-07-20 ENCOUNTER — PATIENT MESSAGE (OUTPATIENT)
Dept: INTERNAL MEDICINE | Facility: CLINIC | Age: 61
End: 2021-07-20

## 2021-07-21 ENCOUNTER — PATIENT MESSAGE (OUTPATIENT)
Dept: ADMINISTRATIVE | Facility: OTHER | Age: 61
End: 2021-07-21

## 2021-07-22 ENCOUNTER — PATIENT MESSAGE (OUTPATIENT)
Dept: ADMINISTRATIVE | Facility: OTHER | Age: 61
End: 2021-07-22

## 2021-07-23 ENCOUNTER — PATIENT MESSAGE (OUTPATIENT)
Dept: ADMINISTRATIVE | Facility: OTHER | Age: 61
End: 2021-07-23

## 2021-07-24 ENCOUNTER — PATIENT MESSAGE (OUTPATIENT)
Dept: ADMINISTRATIVE | Facility: OTHER | Age: 61
End: 2021-07-24

## 2021-07-25 ENCOUNTER — PATIENT MESSAGE (OUTPATIENT)
Dept: ADMINISTRATIVE | Facility: OTHER | Age: 61
End: 2021-07-25

## 2021-07-26 ENCOUNTER — PATIENT MESSAGE (OUTPATIENT)
Dept: ADMINISTRATIVE | Facility: OTHER | Age: 61
End: 2021-07-26

## 2021-08-12 ENCOUNTER — PATIENT MESSAGE (OUTPATIENT)
Dept: CARDIOLOGY | Facility: CLINIC | Age: 61
End: 2021-08-12

## 2021-08-18 ENCOUNTER — OFFICE VISIT (OUTPATIENT)
Dept: CARDIOLOGY | Facility: CLINIC | Age: 61
End: 2021-08-18
Payer: MEDICAID

## 2021-08-18 DIAGNOSIS — I73.9 CLAUDICATION OF BOTH LOWER EXTREMITIES: ICD-10-CM

## 2021-08-18 DIAGNOSIS — G47.33 OBSTRUCTIVE SLEEP APNEA: ICD-10-CM

## 2021-08-18 DIAGNOSIS — R07.89 CHEST PRESSURE: ICD-10-CM

## 2021-08-18 DIAGNOSIS — R42 DIZZINESS: ICD-10-CM

## 2021-08-18 DIAGNOSIS — R00.2 HEART PALPITATIONS: ICD-10-CM

## 2021-08-18 DIAGNOSIS — R79.89 ELEVATED TROPONIN LEVEL: Primary | ICD-10-CM

## 2021-08-18 DIAGNOSIS — Z01.818 PRE-OP TESTING: ICD-10-CM

## 2021-08-18 DIAGNOSIS — G47.30 SLEEP DISORDER BREATHING: ICD-10-CM

## 2021-08-18 PROCEDURE — 99214 PR OFFICE/OUTPT VISIT, EST, LEVL IV, 30-39 MIN: ICD-10-PCS | Mod: 95,,, | Performed by: INTERNAL MEDICINE

## 2021-08-18 PROCEDURE — 99214 OFFICE O/P EST MOD 30 MIN: CPT | Mod: 95,,, | Performed by: INTERNAL MEDICINE

## 2021-08-23 ENCOUNTER — PATIENT MESSAGE (OUTPATIENT)
Dept: CARDIOLOGY | Facility: HOSPITAL | Age: 61
End: 2021-08-23

## 2021-09-22 ENCOUNTER — OFFICE VISIT (OUTPATIENT)
Dept: PULMONOLOGY | Facility: CLINIC | Age: 61
End: 2021-09-22
Payer: MEDICAID

## 2021-09-22 ENCOUNTER — LAB VISIT (OUTPATIENT)
Dept: LAB | Facility: HOSPITAL | Age: 61
End: 2021-09-22
Attending: INTERNAL MEDICINE
Payer: MEDICAID

## 2021-09-22 VITALS
DIASTOLIC BLOOD PRESSURE: 80 MMHG | BODY MASS INDEX: 40.52 KG/M2 | HEIGHT: 65 IN | RESPIRATION RATE: 16 BRPM | HEART RATE: 72 BPM | OXYGEN SATURATION: 97 % | SYSTOLIC BLOOD PRESSURE: 130 MMHG | WEIGHT: 243.19 LBS

## 2021-09-22 DIAGNOSIS — Z88.9 HISTORY OF ALLERGY: ICD-10-CM

## 2021-09-22 DIAGNOSIS — J45.909 NOT WELL CONTROLLED ASTHMA WITHOUT COMPLICATION: ICD-10-CM

## 2021-09-22 DIAGNOSIS — G47.33 OSA ON CPAP: ICD-10-CM

## 2021-09-22 DIAGNOSIS — Z86.16 HISTORY OF COVID-19: ICD-10-CM

## 2021-09-22 DIAGNOSIS — J45.909 NOT WELL CONTROLLED ASTHMA WITHOUT COMPLICATION: Primary | ICD-10-CM

## 2021-09-22 LAB
BASOPHILS # BLD AUTO: 0.04 K/UL (ref 0–0.2)
BASOPHILS NFR BLD: 0.4 % (ref 0–1.9)
DIFFERENTIAL METHOD: ABNORMAL
EOSINOPHIL # BLD AUTO: 0.3 K/UL (ref 0–0.5)
EOSINOPHIL NFR BLD: 2.7 % (ref 0–8)
ERYTHROCYTE [DISTWIDTH] IN BLOOD BY AUTOMATED COUNT: 14.6 % (ref 11.5–14.5)
HCT VFR BLD AUTO: 39.6 % (ref 37–48.5)
HGB BLD-MCNC: 13.2 G/DL (ref 12–16)
IMM GRANULOCYTES # BLD AUTO: 0.03 K/UL (ref 0–0.04)
IMM GRANULOCYTES NFR BLD AUTO: 0.3 % (ref 0–0.5)
LYMPHOCYTES # BLD AUTO: 2.2 K/UL (ref 1–4.8)
LYMPHOCYTES NFR BLD: 22.5 % (ref 18–48)
MCH RBC QN AUTO: 29 PG (ref 27–31)
MCHC RBC AUTO-ENTMCNC: 33.3 G/DL (ref 32–36)
MCV RBC AUTO: 87 FL (ref 82–98)
MONOCYTES # BLD AUTO: 0.7 K/UL (ref 0.3–1)
MONOCYTES NFR BLD: 7.4 % (ref 4–15)
NEUTROPHILS # BLD AUTO: 6.5 K/UL (ref 1.8–7.7)
NEUTROPHILS NFR BLD: 66.7 % (ref 38–73)
NRBC BLD-RTO: 0 /100 WBC
PLATELET # BLD AUTO: 225 K/UL (ref 150–450)
PMV BLD AUTO: 10.8 FL (ref 9.2–12.9)
RBC # BLD AUTO: 4.55 M/UL (ref 4–5.4)
WBC # BLD AUTO: 9.79 K/UL (ref 3.9–12.7)

## 2021-09-22 PROCEDURE — 36415 COLL VENOUS BLD VENIPUNCTURE: CPT | Performed by: INTERNAL MEDICINE

## 2021-09-22 PROCEDURE — 99215 PR OFFICE/OUTPT VISIT, EST, LEVL V, 40-54 MIN: ICD-10-PCS | Mod: S$PBB,,, | Performed by: INTERNAL MEDICINE

## 2021-09-22 PROCEDURE — 99999 PR PBB SHADOW E&M-EST. PATIENT-LVL IV: CPT | Mod: PBBFAC,,, | Performed by: INTERNAL MEDICINE

## 2021-09-22 PROCEDURE — 85025 COMPLETE CBC W/AUTO DIFF WBC: CPT | Performed by: INTERNAL MEDICINE

## 2021-09-22 PROCEDURE — 99215 OFFICE O/P EST HI 40 MIN: CPT | Mod: S$PBB,,, | Performed by: INTERNAL MEDICINE

## 2021-09-22 PROCEDURE — 99999 PR PBB SHADOW E&M-EST. PATIENT-LVL IV: ICD-10-PCS | Mod: PBBFAC,,, | Performed by: INTERNAL MEDICINE

## 2021-09-22 PROCEDURE — 99214 OFFICE O/P EST MOD 30 MIN: CPT | Mod: PBBFAC | Performed by: INTERNAL MEDICINE

## 2021-09-22 PROCEDURE — 82785 ASSAY OF IGE: CPT | Performed by: INTERNAL MEDICINE

## 2021-09-22 RX ORDER — LEVOCETIRIZINE DIHYDROCHLORIDE 5 MG/1
5 TABLET, FILM COATED ORAL NIGHTLY
Qty: 90 TABLET | Refills: 3 | Status: SHIPPED | OUTPATIENT
Start: 2021-09-22 | End: 2022-02-09 | Stop reason: SDUPTHER

## 2021-09-22 RX ORDER — ALBUTEROL SULFATE 90 UG/1
2 AEROSOL, METERED RESPIRATORY (INHALATION) EVERY 4 HOURS PRN
Qty: 18 G | Refills: 11 | Status: SHIPPED | OUTPATIENT
Start: 2021-09-22 | End: 2023-03-03

## 2021-09-23 LAB — IGE SERPL-ACNC: 49 IU/ML (ref 0–100)

## 2021-10-12 ENCOUNTER — TELEPHONE (OUTPATIENT)
Dept: CARDIOLOGY | Facility: HOSPITAL | Age: 61
End: 2021-10-12

## 2021-10-21 ENCOUNTER — HOSPITAL ENCOUNTER (OUTPATIENT)
Dept: CARDIOLOGY | Facility: HOSPITAL | Age: 61
Discharge: HOME | End: 2021-10-21
Attending: INTERNAL MEDICINE
Payer: MEDICAID

## 2021-10-21 VITALS
BODY MASS INDEX: 40.48 KG/M2 | HEIGHT: 65 IN | WEIGHT: 243 LBS | DIASTOLIC BLOOD PRESSURE: 76 MMHG | SYSTOLIC BLOOD PRESSURE: 135 MMHG

## 2021-10-21 DIAGNOSIS — G47.33 OBSTRUCTIVE SLEEP APNEA: ICD-10-CM

## 2021-10-21 DIAGNOSIS — R79.89 ELEVATED TROPONIN LEVEL: ICD-10-CM

## 2021-10-21 DIAGNOSIS — R00.2 HEART PALPITATIONS: ICD-10-CM

## 2021-10-21 DIAGNOSIS — R42 DIZZINESS: ICD-10-CM

## 2021-10-21 DIAGNOSIS — Z01.818 PRE-OP TESTING: ICD-10-CM

## 2021-10-21 DIAGNOSIS — G47.30 SLEEP DISORDER BREATHING: ICD-10-CM

## 2021-10-21 DIAGNOSIS — R07.89 CHEST PRESSURE: ICD-10-CM

## 2021-10-21 DIAGNOSIS — I73.9 CLAUDICATION OF BOTH LOWER EXTREMITIES: ICD-10-CM

## 2021-10-21 LAB
AORTIC ROOT ANNULUS: 3.39 CM
ASCENDING AORTA: 3.35 CM
AV INDEX (PROSTH): 0.77
AV MEAN GRADIENT: 5 MMHG
AV PEAK GRADIENT: 8 MMHG
AV VALVE AREA: 2.94 CM2
AV VELOCITY RATIO: 0.74
BSA FOR ECHO PROCEDURE: 2.25 M2
CV ECHO LV RWT: 0.33 CM
CV STRESS BASE HR: 83 BPM
DIASTOLIC BLOOD PRESSURE: 76 MMHG
DOP CALC AO PEAK VEL: 1.44 M/S
DOP CALC AO VTI: 29.95 CM
DOP CALC LVOT AREA: 3.8 CM2
DOP CALC LVOT DIAMETER: 2.21 CM
DOP CALC LVOT PEAK VEL: 1.07 M/S
DOP CALC LVOT STROKE VOLUME: 88.11 CM3
DOP CALC RVOT PEAK VEL: 0.77 M/S
DOP CALC RVOT VTI: 21.23 CM
DOP CALCLVOT PEAK VEL VTI: 22.98 CM
E WAVE DECELERATION TIME: 186.67 MSEC
E/A RATIO: 0.98
E/E' RATIO: 8.78 M/S
ECHO LV POSTERIOR WALL: 0.92 CM (ref 0.6–1.1)
EJECTION FRACTION: 60 %
FRACTIONAL SHORTENING: 30 % (ref 28–44)
INTERVENTRICULAR SEPTUM: 0.87 CM (ref 0.6–1.1)
IVRT: 82.78 MSEC
LA MAJOR: 4.45 CM
LA MINOR: 3.94 CM
LA WIDTH: 3.4 CM
LEFT ATRIUM SIZE: 4.43 CM
LEFT ATRIUM VOLUME INDEX: 24.9 ML/M2
LEFT ATRIUM VOLUME: 53.51 CM3
LEFT INTERNAL DIMENSION IN SYSTOLE: 3.84 CM (ref 2.1–4)
LEFT VENTRICLE DIASTOLIC VOLUME INDEX: 68.63 ML/M2
LEFT VENTRICLE DIASTOLIC VOLUME: 147.56 ML
LEFT VENTRICLE MASS INDEX: 86 G/M2
LEFT VENTRICLE SYSTOLIC VOLUME INDEX: 29.5 ML/M2
LEFT VENTRICLE SYSTOLIC VOLUME: 63.44 ML
LEFT VENTRICULAR INTERNAL DIMENSION IN DIASTOLE: 5.5 CM (ref 3.5–6)
LEFT VENTRICULAR MASS: 184.51 G
LV LATERAL E/E' RATIO: 7.18 M/S
LV SEPTAL E/E' RATIO: 11.29 M/S
MV PEAK A VEL: 0.81 M/S
MV PEAK E VEL: 0.79 M/S
MV STENOSIS PRESSURE HALF TIME: 54.14 MS
MV VALVE AREA P 1/2 METHOD: 4.06 CM2
OHS CV CPX 1 MINUTE RECOVERY HEART RATE: 126 BPM
OHS CV CPX 85 PERCENT MAX PREDICTED HEART RATE MALE: 131
OHS CV CPX ESTIMATED METS: 7
OHS CV CPX MAX PREDICTED HEART RATE: 154
OHS CV CPX PATIENT IS FEMALE: 1
OHS CV CPX PATIENT IS MALE: 0
OHS CV CPX PEAK DIASTOLIC BLOOD PRESSURE: 80 MMHG
OHS CV CPX PEAK HEAR RATE: 157 BPM
OHS CV CPX PEAK RATE PRESSURE PRODUCT: NORMAL
OHS CV CPX PEAK SYSTOLIC BLOOD PRESSURE: 168 MMHG
OHS CV CPX PERCENT MAX PREDICTED HEART RATE ACHIEVED: 102
OHS CV CPX RATE PRESSURE PRODUCT PRESENTING: NORMAL
PISA TR MAX VEL: 2.76 M/S
PULM VEIN S/D RATIO: 1.77
PV MEAN GRADIENT: 2 MMHG
PV PEAK D VEL: 0.47 M/S
PV PEAK S VEL: 0.83 M/S
PV PEAK VELOCITY: 1.05 CM/S
RA MAJOR: 4.22 CM
RA PRESSURE: 3 MMHG
RA WIDTH: 3.81 CM
RIGHT VENTRICULAR END-DIASTOLIC DIMENSION: 3.34 CM
SINUS: 3.07 CM
STJ: 3.02 CM
STRESS ECHO POST EXERCISE DUR MIN: 6 MINUTES
STRESS ECHO POST EXERCISE DUR SEC: 2 SECONDS
SYSTOLIC BLOOD PRESSURE: 135 MMHG
TDI LATERAL: 0.11 M/S
TDI SEPTAL: 0.07 M/S
TDI: 0.09 M/S
TR MAX PG: 30 MMHG
TRICUSPID ANNULAR PLANE SYSTOLIC EXCURSION: 2.24 CM
TV REST PULMONARY ARTERY PRESSURE: 33 MMHG

## 2021-10-21 PROCEDURE — 93351 STRESS TTE COMPLETE: CPT

## 2021-10-21 PROCEDURE — 93351 STRESS TTE COMPLETE: CPT | Mod: 26,,, | Performed by: INTERNAL MEDICINE

## 2021-10-21 PROCEDURE — 93351 STRESS ECHO (CUPID ONLY): ICD-10-PCS | Mod: 26,,, | Performed by: INTERNAL MEDICINE

## 2021-10-22 ENCOUNTER — TELEPHONE (OUTPATIENT)
Dept: CARDIOLOGY | Facility: CLINIC | Age: 61
End: 2021-10-22

## 2021-11-03 ENCOUNTER — HOSPITAL ENCOUNTER (OUTPATIENT)
Dept: RADIOLOGY | Facility: HOSPITAL | Age: 61
Discharge: HOME | End: 2021-11-03
Attending: ORTHOPAEDIC SURGERY
Payer: MEDICAID

## 2021-11-03 ENCOUNTER — OFFICE VISIT (OUTPATIENT)
Dept: ORTHOPEDICS | Facility: CLINIC | Age: 61
End: 2021-11-03
Payer: MEDICAID

## 2021-11-03 VITALS
HEART RATE: 69 BPM | HEIGHT: 65 IN | WEIGHT: 243 LBS | SYSTOLIC BLOOD PRESSURE: 147 MMHG | BODY MASS INDEX: 40.48 KG/M2 | DIASTOLIC BLOOD PRESSURE: 95 MMHG

## 2021-11-03 DIAGNOSIS — M25.531 RIGHT WRIST PAIN: ICD-10-CM

## 2021-11-03 DIAGNOSIS — M25.531 RIGHT WRIST PAIN: Primary | ICD-10-CM

## 2021-11-03 DIAGNOSIS — M19.131 SLAC (SCAPHOLUNATE ADVANCED COLLAPSE) OF WRIST, RIGHT: ICD-10-CM

## 2021-11-03 PROCEDURE — 99999 PR PBB SHADOW E&M-EST. PATIENT-LVL III: CPT | Mod: PBBFAC,,, | Performed by: ORTHOPAEDIC SURGERY

## 2021-11-03 PROCEDURE — 73110 X-RAY EXAM OF WRIST: CPT | Mod: TC,RT

## 2021-11-03 PROCEDURE — 73110 X-RAY EXAM OF WRIST: CPT | Mod: 26,RT,, | Performed by: RADIOLOGY

## 2021-11-03 PROCEDURE — 99999 PR PBB SHADOW E&M-EST. PATIENT-LVL III: ICD-10-PCS | Mod: PBBFAC,,, | Performed by: ORTHOPAEDIC SURGERY

## 2021-11-03 PROCEDURE — 73110 XR WRIST COMPLETE 3 VIEWS RIGHT: ICD-10-PCS | Mod: 26,RT,, | Performed by: RADIOLOGY

## 2021-11-03 PROCEDURE — 99213 OFFICE O/P EST LOW 20 MIN: CPT | Mod: S$PBB,,, | Performed by: ORTHOPAEDIC SURGERY

## 2021-11-03 PROCEDURE — 99213 PR OFFICE/OUTPT VISIT, EST, LEVL III, 20-29 MIN: ICD-10-PCS | Mod: S$PBB,,, | Performed by: ORTHOPAEDIC SURGERY

## 2021-11-03 PROCEDURE — 99213 OFFICE O/P EST LOW 20 MIN: CPT | Mod: PBBFAC | Performed by: ORTHOPAEDIC SURGERY

## 2021-11-10 DIAGNOSIS — Z12.31 OTHER SCREENING MAMMOGRAM: ICD-10-CM

## 2022-01-13 ENCOUNTER — TELEPHONE (OUTPATIENT)
Dept: PULMONOLOGY | Facility: CLINIC | Age: 62
End: 2022-01-13
Payer: MEDICAID

## 2022-02-01 ENCOUNTER — PATIENT MESSAGE (OUTPATIENT)
Dept: OTOLARYNGOLOGY | Facility: CLINIC | Age: 62
End: 2022-02-01
Payer: MEDICAID

## 2022-02-01 ENCOUNTER — OFFICE VISIT (OUTPATIENT)
Dept: OTOLARYNGOLOGY | Facility: CLINIC | Age: 62
End: 2022-02-01
Payer: MEDICAID

## 2022-02-01 VITALS — BODY MASS INDEX: 40.11 KG/M2 | HEIGHT: 65 IN | WEIGHT: 240.75 LBS

## 2022-02-01 DIAGNOSIS — H92.12 OTORRHEA, LEFT: Primary | ICD-10-CM

## 2022-02-01 PROCEDURE — 99999 PR PBB SHADOW E&M-EST. PATIENT-LVL III: CPT | Mod: PBBFAC,,, | Performed by: PHYSICIAN ASSISTANT

## 2022-02-01 PROCEDURE — 3008F PR BODY MASS INDEX (BMI) DOCUMENTED: ICD-10-PCS | Mod: CPTII,,, | Performed by: PHYSICIAN ASSISTANT

## 2022-02-01 PROCEDURE — 99213 PR OFFICE/OUTPT VISIT, EST, LEVL III, 20-29 MIN: ICD-10-PCS | Mod: S$PBB,,, | Performed by: PHYSICIAN ASSISTANT

## 2022-02-01 PROCEDURE — 1159F PR MEDICATION LIST DOCUMENTED IN MEDICAL RECORD: ICD-10-PCS | Mod: CPTII,,, | Performed by: PHYSICIAN ASSISTANT

## 2022-02-01 PROCEDURE — 99999 PR PBB SHADOW E&M-EST. PATIENT-LVL III: ICD-10-PCS | Mod: PBBFAC,,, | Performed by: PHYSICIAN ASSISTANT

## 2022-02-01 PROCEDURE — 99213 OFFICE O/P EST LOW 20 MIN: CPT | Mod: PBBFAC,PO | Performed by: PHYSICIAN ASSISTANT

## 2022-02-01 PROCEDURE — 99213 OFFICE O/P EST LOW 20 MIN: CPT | Mod: S$PBB,,, | Performed by: PHYSICIAN ASSISTANT

## 2022-02-01 PROCEDURE — 3008F BODY MASS INDEX DOCD: CPT | Mod: CPTII,,, | Performed by: PHYSICIAN ASSISTANT

## 2022-02-01 PROCEDURE — 1159F MED LIST DOCD IN RCRD: CPT | Mod: CPTII,,, | Performed by: PHYSICIAN ASSISTANT

## 2022-02-01 RX ORDER — NEOMYCIN SULFATE, POLYMYXIN B SULFATE AND HYDROCORTISONE 10; 3.5; 1 MG/ML; MG/ML; [USP'U]/ML
3 SUSPENSION/ DROPS AURICULAR (OTIC) 3 TIMES DAILY
Qty: 10 ML | Refills: 0 | Status: SHIPPED | OUTPATIENT
Start: 2022-02-01 | End: 2022-02-11

## 2022-02-01 NOTE — PROGRESS NOTES
"Subjective:       Patient ID: Monica Joy is a 61 y.o. female.    Chief Complaint: Ear Drainage    Patient is a very pleasant 61 year old female here to see me today for evaluation of left ear drainage over 4 days.  She had LEFT tube placement in 8/2019 with Dr. Pugh.  She had left ear pain over the weekend and then woke with left ear drainage.  Since it started draining, her otalgia has improved.  She did not have any more Cortisporin (last used in 11/2020).  She tried an OTC ear drop until she noticed package saying not to use if TM perforated.  Since the left ear starting draining, she says that ear seems to stop up and then opens again.  Denies runny nose or congestion.  She continues to have popping sensation in that ear and at times says it feels "like bubbles" in her left ear.  No fever or dizziness.  This AM the ear drainage was red-tinged but mostly thick yellow.    Review of Systems   Constitutional: Negative for fever.   HENT: Positive for ear discharge (left), ear pain and hearing loss (left - muffled). Negative for nasal congestion, postnasal drip and rhinorrhea.    Neurological: Negative for dizziness.         Objective:      Physical Exam  Vitals reviewed.   Constitutional:       General: She is not in acute distress.     Appearance: She is well-developed.   HENT:      Head: Normocephalic and atraumatic.      Right Ear: Ear canal and external ear normal. Tympanic membrane is retracted.      Left Ear: Ear canal and external ear normal. Drainage (scant, mucoid at inferior aspect of lumen) present. A PE tube (angled but appears intact with active drainage) is present. Tympanic membrane is not injected or erythematous.      Nose: Nose normal. No nasal deformity.      Mouth/Throat:      Mouth: Mucous membranes are not pale and not dry.   Eyes:      General: Lids are normal. No scleral icterus.     Extraocular Movements:      Right eye: Normal extraocular motion and no nystagmus.      Left eye: " Normal extraocular motion and no nystagmus.      Conjunctiva/sclera: Conjunctivae normal.      Right eye: Right conjunctiva is not injected. No chemosis.     Left eye: Left conjunctiva is not injected. No chemosis.     Pupils: Pupils are equal, round, and reactive to light.   Neck:      Trachea: Trachea and phonation normal. No tracheal tenderness or tracheal deviation.   Pulmonary:      Effort: Pulmonary effort is normal. No respiratory distress.      Breath sounds: No stridor.   Abdominal:      General: There is no distension.   Lymphadenopathy:      Head:      Right side of head: No preauricular or posterior auricular adenopathy.      Left side of head: No preauricular or posterior auricular adenopathy.      Cervical: No cervical adenopathy.   Skin:     General: Skin is warm and dry.      Findings: No erythema or rash.   Neurological:      Mental Status: She is alert and oriented to person, place, and time.      Cranial Nerves: No cranial nerve deficit.   Psychiatric:         Behavior: Behavior normal. Behavior is cooperative.         Assessment:       Problem List Items Addressed This Visit    None     Visit Diagnoses     Otorrhea, left    -  Primary          Plan:         The patient has drainage from the the left ear today.  I would recommend that we start the patient on Cortisporin ear drops (lasted used in 11/2020).  Recommend she not use any OTC ear drops and we discussed that most of those are not safe to get into the middle ear space.   Return to clinic in two or three weeks, sooner if needed.

## 2022-02-09 ENCOUNTER — OFFICE VISIT (OUTPATIENT)
Dept: PULMONOLOGY | Facility: CLINIC | Age: 62
End: 2022-02-09
Payer: MEDICAID

## 2022-02-09 VITALS
DIASTOLIC BLOOD PRESSURE: 88 MMHG | BODY MASS INDEX: 40.44 KG/M2 | SYSTOLIC BLOOD PRESSURE: 132 MMHG | OXYGEN SATURATION: 97 % | RESPIRATION RATE: 16 BRPM | WEIGHT: 242.75 LBS | HEIGHT: 65 IN | HEART RATE: 77 BPM

## 2022-02-09 DIAGNOSIS — J45.20 MILD INTERMITTENT ASTHMA WITHOUT COMPLICATION: Primary | ICD-10-CM

## 2022-02-09 DIAGNOSIS — G47.33 OSA ON CPAP: Chronic | ICD-10-CM

## 2022-02-09 DIAGNOSIS — J45.909 NOT WELL CONTROLLED ASTHMA WITHOUT COMPLICATION: ICD-10-CM

## 2022-02-09 DIAGNOSIS — Z88.9 MULTIPLE ALLERGIES: ICD-10-CM

## 2022-02-09 DIAGNOSIS — E66.01 MORBID OBESITY WITH BMI OF 40.0-44.9, ADULT: ICD-10-CM

## 2022-02-09 PROCEDURE — 99214 PR OFFICE/OUTPT VISIT, EST, LEVL IV, 30-39 MIN: ICD-10-PCS | Mod: S$PBB,,, | Performed by: PHYSICIAN ASSISTANT

## 2022-02-09 PROCEDURE — 3079F PR MOST RECENT DIASTOLIC BLOOD PRESSURE 80-89 MM HG: ICD-10-PCS | Mod: CPTII,,, | Performed by: PHYSICIAN ASSISTANT

## 2022-02-09 PROCEDURE — 1160F RVW MEDS BY RX/DR IN RCRD: CPT | Mod: CPTII,,, | Performed by: PHYSICIAN ASSISTANT

## 2022-02-09 PROCEDURE — 1160F PR REVIEW ALL MEDS BY PRESCRIBER/CLIN PHARMACIST DOCUMENTED: ICD-10-PCS | Mod: CPTII,,, | Performed by: PHYSICIAN ASSISTANT

## 2022-02-09 PROCEDURE — 3008F PR BODY MASS INDEX (BMI) DOCUMENTED: ICD-10-PCS | Mod: CPTII,,, | Performed by: PHYSICIAN ASSISTANT

## 2022-02-09 PROCEDURE — 1159F PR MEDICATION LIST DOCUMENTED IN MEDICAL RECORD: ICD-10-PCS | Mod: CPTII,,, | Performed by: PHYSICIAN ASSISTANT

## 2022-02-09 PROCEDURE — 99999 PR PBB SHADOW E&M-EST. PATIENT-LVL IV: CPT | Mod: PBBFAC,,, | Performed by: PHYSICIAN ASSISTANT

## 2022-02-09 PROCEDURE — 3075F PR MOST RECENT SYSTOLIC BLOOD PRESS GE 130-139MM HG: ICD-10-PCS | Mod: CPTII,,, | Performed by: PHYSICIAN ASSISTANT

## 2022-02-09 PROCEDURE — 3008F BODY MASS INDEX DOCD: CPT | Mod: CPTII,,, | Performed by: PHYSICIAN ASSISTANT

## 2022-02-09 PROCEDURE — 99999 PR PBB SHADOW E&M-EST. PATIENT-LVL IV: ICD-10-PCS | Mod: PBBFAC,,, | Performed by: PHYSICIAN ASSISTANT

## 2022-02-09 PROCEDURE — 99214 OFFICE O/P EST MOD 30 MIN: CPT | Mod: PBBFAC | Performed by: PHYSICIAN ASSISTANT

## 2022-02-09 PROCEDURE — 3079F DIAST BP 80-89 MM HG: CPT | Mod: CPTII,,, | Performed by: PHYSICIAN ASSISTANT

## 2022-02-09 PROCEDURE — 3075F SYST BP GE 130 - 139MM HG: CPT | Mod: CPTII,,, | Performed by: PHYSICIAN ASSISTANT

## 2022-02-09 PROCEDURE — 1159F MED LIST DOCD IN RCRD: CPT | Mod: CPTII,,, | Performed by: PHYSICIAN ASSISTANT

## 2022-02-09 PROCEDURE — 99214 OFFICE O/P EST MOD 30 MIN: CPT | Mod: S$PBB,,, | Performed by: PHYSICIAN ASSISTANT

## 2022-02-09 RX ORDER — LEVOCETIRIZINE DIHYDROCHLORIDE 5 MG/1
5 TABLET, FILM COATED ORAL NIGHTLY
Qty: 90 TABLET | Refills: 3 | Status: SHIPPED | OUTPATIENT
Start: 2022-02-09 | End: 2022-06-06

## 2022-02-09 NOTE — ASSESSMENT & PLAN NOTE
Benefits from use of CPAP  Discussed therapeutic goals for CPAP: Ideal usage 100% of nights for 6-8 hours per night. Minimum usage is 70% of night for at least 4 hours per night.  Nasal pillow ordered per patient request, note leak on CPAP, will assess after new supplies

## 2022-02-09 NOTE — PROGRESS NOTES
Subjective:       Patient ID: Monica Joy is a 61 y.o. female.    Chief Complaint: Asthma and Apnea      62yo female here for follow up of KAM and asthma  Using AutoPAP 8-16, [facility], [facility]; requesting nasal mask  Compliant with machine and wants to continue use  Patient states improved symptoms with use of CPAP. Sleeping more soundly. Waking up feeling more refreshed. Improved daytime sleepiness.  Mild intermittent asthma controlled on TAHIRA prn, also taking xyzal which she says controls her asthma symptoms  No signs of infection or exacerbation       Asthma Control Test  In the past 4  weeks, how much of the time did your asthma keep you from getting as much done at work, school or at home?: None of the time  During the past 4 weeks, how often have you had shortness of breath?: Not at all  During the past 4 weeks, how often did your asthma symptoms (wheezing, couging, shortness of breath, chest tightness or pain) wake you up at night or earlier than usual in the morning?: Not at all  During the past 4 weeks, how often have you used your rescue inhaler or nebulizer medication (such as albuterol)?: Not at all  How would you rate your asthma control during the past 4 weeks?: Completely controlled  If your score is 19 or less, your asthma may not be under control: 25     EPWORTH SLEEPINESS SCALE 2/9/2022   Sitting and reading 1   Watching TV 0   Sitting, inactive in a public place (e.g. a theatre or a meeting) 0   As a passenger in a car for an hour without a break 0   Lying down to rest in the afternoon when circumstances permit 1   Sitting and talking to someone 0   Sitting quietly after a lunch without alcohol 0   In a car, while stopped for a few minutes in traffic 0   Total score 2     Last seen in pulm clinic with Dr. Wiggins 9/2021:    60-year-old female patient with past medical history of asthma, allergies, has seen an allergist at [facility], obstructive sleep apnea on CPAP 8-16 cm water  compliant with CPAP therapy presenting for follow-up.     Has not well-controlled asthma not using albuterol asthma control test questionnaire 12.   Immunization History   Administered Date(s) Administered    Influenza 11/13/2013, 09/07/2020    Influenza - Quadrivalent - PF *Preferred* (6 months and older) 10/30/2015, 12/22/2016, 08/13/2019    Pneumococcal Polysaccharide - 23 Valent 11/23/2013    Tdap 03/27/2008, 11/21/2018      Tobacco Use: Low Risk     Smoking Tobacco Use: Never Smoker    Smokeless Tobacco Use: Never Used      Past Medical History:   Diagnosis Date    Arthritis     Asthma 9/6/2013    Sleep apnea     Thyroid disease       Current Outpatient Medications on File Prior to Visit   Medication Sig Dispense Refill    albuterol (VENTOLIN HFA) 90 mcg/actuation inhaler Inhale 2 puffs into the lungs every 4 (four) hours as needed for Wheezing or Shortness of Breath. 18 g 11    aspirin 325 MG tablet Take 325 mg by mouth once daily.      ergocalciferol, vitamin D2, (VITAMIN D ORAL) Take by mouth.      neomycin-polymyxin-hydrocortisone (CORTISPORIN) 3.5-10,000-1 mg/mL-unit/mL-% otic suspension Place 3 drops into the left ear 3 (three) times daily. for 10 days 10 mL 0    ZINC ACETATE ORAL Take by mouth.      [DISCONTINUED] levocetirizine (XYZAL) 5 MG tablet Take 1 tablet (5 mg total) by mouth every evening. 90 tablet 3     Current Facility-Administered Medications on File Prior to Visit   Medication Dose Route Frequency Provider Last Rate Last Admin    acetaminophen tablet 650 mg  650 mg Oral Once PRN Leon De La Torre MD        diphenhydrAMINE injection 25 mg  25 mg Intravenous Once PRN Leon De La Torre MD        EPINEPHrine (EPIPEN) 0.3 mg/0.3 mL pen injection 0.3 mg  0.3 mg Intramuscular PRN Leon De La Torre MD        lactated ringers infusion   Intravenous On Call Procedure ARTHUR Henriquez        lactated ringers infusion   Intravenous Continuous Cheryl Cruz MD 10 mL/hr at  "02/25/21 0613 New Bag at 02/25/21 0613    nozaseptin (NOZIN) nasal    Each Nostril On Call Procedure ARTHUR Henriquez   Given at 02/25/21 0613    ondansetron disintegrating tablet 4 mg  4 mg Oral Once PRN Leon De La Torre MD        sodium chloride 0.9% 500 mL flush bag   Intravenous PRN Leon De La Torre MD        sodium chloride 0.9% flush 10 mL  10 mL Intravenous PRN Leon De La Torre MD            Review of Systems   Constitutional: Negative for fever, weight loss, appetite change, fatigue and weakness.   HENT: Negative for postnasal drip, rhinorrhea, sinus pressure, trouble swallowing and congestion.    Respiratory: Negative for cough, sputum production, choking, chest tightness, shortness of breath, wheezing and dyspnea on extertion.    Cardiovascular: Negative for chest pain and leg swelling.   Musculoskeletal: Negative for arthralgias, gait problem and joint swelling.   Gastrointestinal: Negative for nausea, vomiting and abdominal pain.   Neurological: Negative for dizziness, weakness and headaches.   All other systems reviewed and are negative.      Objective:       Vitals:    02/09/22 0906   BP: 132/88   Pulse: 77   Resp: 16   SpO2: 97%   Weight: 110.1 kg (242 lb 11.6 oz)   Height: 5' 5" (1.651 m)       Physical Exam   Constitutional: She is oriented to person, place, and time. She appears well-developed and well-nourished. No distress.   HENT:   Head: Normocephalic.   Nose: Nose normal.   Mouth/Throat: Oropharynx is clear and moist.   Cardiovascular: Normal rate and regular rhythm.   Pulmonary/Chest: Effort normal and breath sounds normal. No respiratory distress. She has no wheezes. She has no rhonchi. She has no rales.   Musculoskeletal:         General: No edema.      Cervical back: Normal range of motion and neck supple.   Lymphadenopathy: No supraclavicular adenopathy is present.     She has no cervical adenopathy.   Neurological: She is alert and oriented to person, place, and " time. Gait normal.   Skin: Skin is warm and dry.   Psychiatric: She has a normal mood and affect.   Vitals reviewed.    Personal Diagnostic Review    EXAMINATION:  XR CHEST AP PORTABLE 7/13/2021     CLINICAL HISTORY:  Chest Pain;     COMPARISON:  February 22, 2021, January 11, 2019     FINDINGS:  The study is lordotic in position.  The lungs are free of new pulmonary opacities.  The cardiac silhouette size is normal. The trachea is midline and the mediastinal width is normal. Negative for focal infiltrate, effusion or pneumothorax. Pulmonary vasculature is normal. Negative for osseous abnormalities. Tortuous aorta.  Marginal spondylosis.  Cardiophrenic fat pads.     Impression:     1.  Negative for acute process involving the chest.     2.  Stable findings as noted above.        Electronically signed by: Ubaldo Toth MD  Date:                                            07/13/2021  Time:                                           13:26    Compliance Summary  12/26/2021 - 1/24/2022 (30 days)  Days with Device Usage 30 days  Days without Device Usage 0 days  Percent Days with Device Usage 100.0%  Cumulative Usage 20 days 13 hrs. 16 mins. 55 secs.  Maximum Usage (1 Day) 22 hrs. 14 mins. 15 secs.  Average Usage (All Days) 16 hrs. 26 mins. 33 secs.  Average Usage (Days Used) 16 hrs. 26 mins. 33 secs.  Minimum Usage (1 Day) 10 hrs. 58 mins. 43 secs.  Percent of Days with Usage >= 4 Hours 100.0%  Percent of Days with Usage < 4 Hours 0.0%  Date Range  Total Blower Time 20 days 19 hrs. 34 mins. 13 secs.  Average AHI 4.7  Auto-CPAP Summary  Auto-CPAP Mean Pressure 9.5 cmH2O  Auto-CPAP Peak Average Pressure 13.7 cmH2O  Device Pressure <= 90% of Time 12.5 cmH2O  Average Time in Large Leak Per Day 2 hrs. 34 mins. 46 secs.      Assessment/Plan:       Problem List Items Addressed This Visit        Pulmonary    Mild intermittent asthma without complication - Primary     Controlled  TAHIRA prn, xyzal         Relevant Medications     levocetirizine (XYZAL) 5 MG tablet    Other Relevant Orders    X-Ray Chest PA And Lateral       Endocrine    Morbid obesity with BMI of 40.0-44.9, adult     Weight loss and exercise to improve overall health.              Other    KAM on CPAP (Chronic)     Benefits from use of CPAP  Discussed therapeutic goals for CPAP: Ideal usage 100% of nights for 6-8 hours per night. Minimum usage is 70% of night for at least 4 hours per night.  Nasal pillow ordered per patient request, note leak on CPAP, will assess after new supplies         Relevant Orders    CPAP/BIPAP SUPPLIES    X-Ray Chest PA And Lateral    Multiple allergies     Controlled on xyzal         Relevant Medications    levocetirizine (XYZAL) 5 MG tablet      Other Visit Diagnoses     Not well controlled asthma without complication            Follow up in 1 year with Chest X Ray.    Discussed diagnosis, its evaluation, treatment and usual course. All questions answered.    Patient verbalized understanding of plan and left in no acute distress    Thank you for the courtesy of participating in the care of this patient    Nathalie Ritchie PA-C

## 2022-02-21 ENCOUNTER — PATIENT MESSAGE (OUTPATIENT)
Dept: OTOLARYNGOLOGY | Facility: CLINIC | Age: 62
End: 2022-02-21
Payer: MEDICAID

## 2022-02-22 ENCOUNTER — PATIENT OUTREACH (OUTPATIENT)
Dept: ADMINISTRATIVE | Facility: OTHER | Age: 62
End: 2022-02-22
Payer: MEDICAID

## 2022-02-23 ENCOUNTER — OFFICE VISIT (OUTPATIENT)
Dept: OTOLARYNGOLOGY | Facility: CLINIC | Age: 62
End: 2022-02-23
Payer: MEDICAID

## 2022-02-23 VITALS
SYSTOLIC BLOOD PRESSURE: 128 MMHG | HEART RATE: 72 BPM | BODY MASS INDEX: 40.76 KG/M2 | DIASTOLIC BLOOD PRESSURE: 83 MMHG | TEMPERATURE: 98 F | WEIGHT: 244.94 LBS

## 2022-02-23 DIAGNOSIS — H92.12 OTORRHEA, LEFT: Primary | ICD-10-CM

## 2022-02-23 DIAGNOSIS — H91.90 PERCEIVED HEARING CHANGES: ICD-10-CM

## 2022-02-23 DIAGNOSIS — H92.22 BLEEDING FROM LEFT EAR: ICD-10-CM

## 2022-02-23 PROCEDURE — 3074F SYST BP LT 130 MM HG: CPT | Mod: CPTII,,, | Performed by: PHYSICIAN ASSISTANT

## 2022-02-23 PROCEDURE — 99999 PR PBB SHADOW E&M-EST. PATIENT-LVL III: CPT | Mod: PBBFAC,,, | Performed by: PHYSICIAN ASSISTANT

## 2022-02-23 PROCEDURE — 1159F MED LIST DOCD IN RCRD: CPT | Mod: CPTII,,, | Performed by: PHYSICIAN ASSISTANT

## 2022-02-23 PROCEDURE — 3008F PR BODY MASS INDEX (BMI) DOCUMENTED: ICD-10-PCS | Mod: CPTII,,, | Performed by: PHYSICIAN ASSISTANT

## 2022-02-23 PROCEDURE — 3008F BODY MASS INDEX DOCD: CPT | Mod: CPTII,,, | Performed by: PHYSICIAN ASSISTANT

## 2022-02-23 PROCEDURE — 99999 PR PBB SHADOW E&M-EST. PATIENT-LVL III: ICD-10-PCS | Mod: PBBFAC,,, | Performed by: PHYSICIAN ASSISTANT

## 2022-02-23 PROCEDURE — 3074F PR MOST RECENT SYSTOLIC BLOOD PRESSURE < 130 MM HG: ICD-10-PCS | Mod: CPTII,,, | Performed by: PHYSICIAN ASSISTANT

## 2022-02-23 PROCEDURE — 3079F DIAST BP 80-89 MM HG: CPT | Mod: CPTII,,, | Performed by: PHYSICIAN ASSISTANT

## 2022-02-23 PROCEDURE — 3079F PR MOST RECENT DIASTOLIC BLOOD PRESSURE 80-89 MM HG: ICD-10-PCS | Mod: CPTII,,, | Performed by: PHYSICIAN ASSISTANT

## 2022-02-23 PROCEDURE — 99213 PR OFFICE/OUTPT VISIT, EST, LEVL III, 20-29 MIN: ICD-10-PCS | Mod: S$PBB,,, | Performed by: PHYSICIAN ASSISTANT

## 2022-02-23 PROCEDURE — 99213 OFFICE O/P EST LOW 20 MIN: CPT | Mod: S$PBB,,, | Performed by: PHYSICIAN ASSISTANT

## 2022-02-23 PROCEDURE — 1159F PR MEDICATION LIST DOCUMENTED IN MEDICAL RECORD: ICD-10-PCS | Mod: CPTII,,, | Performed by: PHYSICIAN ASSISTANT

## 2022-02-23 PROCEDURE — 99213 OFFICE O/P EST LOW 20 MIN: CPT | Mod: PBBFAC | Performed by: PHYSICIAN ASSISTANT

## 2022-02-23 NOTE — PROGRESS NOTES
"Subjective:       Patient ID: Monica Joy is a 61 y.o. female.    Chief Complaint: Ear Drainage    Patient is a very pleasant 61 year old female here to see me today for recheck of her left ear.  She was last here with me on 2/1/22 with left ear drainage x 4 days.  She had LEFT tube placement in 8/2019 with Dr. Pugh.      She reports that her left ear seemed to be improving while using the Cortisporin drops but over past few days, drainage resumed.  No drainage this AM.  She feels as though her left ear seems to stop up intermittently and then opens again for awhile.  Denies runny nose or congestion.  She continues to have popping sensation in that ear.  No longer feels like "bubbles" in that ear.  No fever or dizziness.  No otalgia.    Review of Systems   Constitutional: Negative.  Negative for fever.   HENT: Positive for ear discharge (left) and hearing loss (left - muffled at times). Negative for nasal congestion, postnasal drip and rhinorrhea.    Eyes: Negative.    Cardiovascular: Negative.    Gastrointestinal: Negative.    Endocrine: Negative.    Genitourinary: Negative.    Integumentary:  Negative.   Allergic/Immunologic: Positive for food allergies.   Neurological: Negative.    Hematological: Negative.    Psychiatric/Behavioral: Negative.          Objective:      Physical Exam  Vitals reviewed.   Constitutional:       General: She is not in acute distress.     Appearance: She is well-developed.   HENT:      Head: Normocephalic and atraumatic.      Right Ear: Ear canal and external ear normal. No middle ear effusion. Tympanic membrane is retracted. Tympanic membrane is not injected.      Left Ear: Ear canal and external ear normal. Drainage (scant along inferior TM beyond the tube (attempted to culture today)) present. No swelling or tenderness.  No middle ear effusion (scant drop residue on TM). A PE tube (angled, unable to see down the lumen) is present. Tympanic membrane is not injected or " erythematous.      Ears:      Comments: Minimal bleeding of anterior EAC when I attempted to swab (collect culture) past the tube.  No pooling of blood in the canal, no Afrin necessary     Nose: Nose normal.      Mouth/Throat:      Mouth: Mucous membranes are not pale and not dry.   Eyes:      General: Lids are normal. No scleral icterus.     Extraocular Movements:      Right eye: Normal extraocular motion and no nystagmus.      Left eye: Normal extraocular motion and no nystagmus.      Conjunctiva/sclera: Conjunctivae normal.      Right eye: Right conjunctiva is not injected. No chemosis.     Left eye: Left conjunctiva is not injected. No chemosis.     Pupils: Pupils are equal, round, and reactive to light.   Neck:      Trachea: Trachea and phonation normal. No tracheal tenderness or tracheal deviation.   Pulmonary:      Effort: Pulmonary effort is normal. No respiratory distress.      Breath sounds: No stridor.   Lymphadenopathy:      Cervical: No cervical adenopathy.   Skin:     General: Skin is warm and dry.      Findings: No erythema or rash.   Neurological:      Mental Status: She is alert and oriented to person, place, and time.      Cranial Nerves: No cranial nerve deficit.   Psychiatric:         Behavior: Behavior normal. Behavior is cooperative.             Assessment:       Problem List Items Addressed This Visit    None     Visit Diagnoses     Otorrhea, left    -  Primary    Perceived hearing changes        Bleeding from left ear              Plan:         Attempted to culture left ear drainage (scant amount along inferior TM beyond the tube) but caused bleeding of the anterior EAC.  Nothing collected on swab other than scant blood so did NOT send to lab.  Discussed use of Afrin to stop the bleeding but not necessary in clinic today as it was not pooling in the canal.  I will schedule her to RTC in one week for audiogram and then see me after to review results.  OK to use drops if any further drainage in  "the interim.  Discussed that if she has copious drainage at time of audiogram, I'll collect culture at that time.  It's possible that the position of the tube (angled down) is also exacerbating her "stopped up" feeling.  "

## 2022-02-23 NOTE — PROGRESS NOTES
Health Maintenance Due   Topic Date Due    COVID-19 Vaccine (1) Never done    Shingles Vaccine (1 of 2) Never done    Influenza Vaccine (1) 09/01/2021    Mammogram  11/02/2021     Updates were requested from care everywhere.  Chart was reviewed for overdue Proactive [facility] Encounters (BELLO) topics (CRS, Breast Cancer Screening, Eye exam)  Health Maintenance has been updated.  LINKS immunization registry triggered.  Immunizations were reconciled.

## 2022-02-23 NOTE — PROGRESS NOTES
Answers for HPI/ROS submitted by the patient on 2/22/2022  None of these: Yes  hearing loss: Yes  Ear infection(s)?: Yes  ear discharge: Yes  None of these : Yes  Sleep Apnea?: Yes  None of these : Yes  None of these: Yes  None of these: Yes  Muscle aches / pain?: Yes  None of these : Yes  Food Allergies?: Yes  None of these : Yes  None of these: Yes  None of these: Yes  None of these: Yes

## 2022-03-03 ENCOUNTER — CLINICAL SUPPORT (OUTPATIENT)
Dept: AUDIOLOGY | Facility: CLINIC | Age: 62
End: 2022-03-03
Payer: MEDICAID

## 2022-03-03 ENCOUNTER — OFFICE VISIT (OUTPATIENT)
Dept: OTOLARYNGOLOGY | Facility: CLINIC | Age: 62
End: 2022-03-03
Payer: MEDICAID

## 2022-03-03 VITALS
SYSTOLIC BLOOD PRESSURE: 136 MMHG | DIASTOLIC BLOOD PRESSURE: 84 MMHG | WEIGHT: 243.19 LBS | TEMPERATURE: 98 F | BODY MASS INDEX: 40.47 KG/M2 | HEART RATE: 65 BPM

## 2022-03-03 DIAGNOSIS — H93.13 TINNITUS, BILATERAL: ICD-10-CM

## 2022-03-03 DIAGNOSIS — H90.3 SENSORINEURAL HEARING LOSS (SNHL) OF BOTH EARS: ICD-10-CM

## 2022-03-03 DIAGNOSIS — H93.13 TINNITUS OF BOTH EARS: ICD-10-CM

## 2022-03-03 DIAGNOSIS — H90.3 SENSORINEURAL HEARING LOSS (SNHL) OF BOTH EARS: Primary | ICD-10-CM

## 2022-03-03 DIAGNOSIS — H92.12 OTORRHEA, LEFT: ICD-10-CM

## 2022-03-03 DIAGNOSIS — H90.3 SENSORINEURAL HEARING LOSS, BILATERAL: Primary | ICD-10-CM

## 2022-03-03 PROCEDURE — 3075F SYST BP GE 130 - 139MM HG: CPT | Mod: CPTII,,, | Performed by: PHYSICIAN ASSISTANT

## 2022-03-03 PROCEDURE — 92557 COMPREHENSIVE HEARING TEST: CPT | Mod: PBBFAC | Performed by: AUDIOLOGIST-HEARING AID FITTER

## 2022-03-03 PROCEDURE — 3079F PR MOST RECENT DIASTOLIC BLOOD PRESSURE 80-89 MM HG: ICD-10-PCS | Mod: CPTII,,, | Performed by: PHYSICIAN ASSISTANT

## 2022-03-03 PROCEDURE — 99213 OFFICE O/P EST LOW 20 MIN: CPT | Mod: S$PBB,,, | Performed by: PHYSICIAN ASSISTANT

## 2022-03-03 PROCEDURE — 3008F PR BODY MASS INDEX (BMI) DOCUMENTED: ICD-10-PCS | Mod: CPTII,,, | Performed by: PHYSICIAN ASSISTANT

## 2022-03-03 PROCEDURE — 99211 OFF/OP EST MAY X REQ PHY/QHP: CPT | Mod: PBBFAC,27 | Performed by: AUDIOLOGIST-HEARING AID FITTER

## 2022-03-03 PROCEDURE — 99213 OFFICE O/P EST LOW 20 MIN: CPT | Mod: PBBFAC | Performed by: PHYSICIAN ASSISTANT

## 2022-03-03 PROCEDURE — 3008F BODY MASS INDEX DOCD: CPT | Mod: CPTII,,, | Performed by: PHYSICIAN ASSISTANT

## 2022-03-03 PROCEDURE — 1159F PR MEDICATION LIST DOCUMENTED IN MEDICAL RECORD: ICD-10-PCS | Mod: CPTII,,, | Performed by: PHYSICIAN ASSISTANT

## 2022-03-03 PROCEDURE — 99999 PR PBB SHADOW E&M-EST. PATIENT-LVL III: CPT | Mod: PBBFAC,,, | Performed by: PHYSICIAN ASSISTANT

## 2022-03-03 PROCEDURE — 99999 PR PBB SHADOW E&M-EST. PATIENT-LVL I: ICD-10-PCS | Mod: PBBFAC,,, | Performed by: AUDIOLOGIST-HEARING AID FITTER

## 2022-03-03 PROCEDURE — 3079F DIAST BP 80-89 MM HG: CPT | Mod: CPTII,,, | Performed by: PHYSICIAN ASSISTANT

## 2022-03-03 PROCEDURE — 99999 PR PBB SHADOW E&M-EST. PATIENT-LVL I: CPT | Mod: PBBFAC,,, | Performed by: AUDIOLOGIST-HEARING AID FITTER

## 2022-03-03 PROCEDURE — 3075F PR MOST RECENT SYSTOLIC BLOOD PRESS GE 130-139MM HG: ICD-10-PCS | Mod: CPTII,,, | Performed by: PHYSICIAN ASSISTANT

## 2022-03-03 PROCEDURE — 1159F MED LIST DOCD IN RCRD: CPT | Mod: CPTII,,, | Performed by: PHYSICIAN ASSISTANT

## 2022-03-03 PROCEDURE — 92567 TYMPANOMETRY: CPT | Mod: PBBFAC | Performed by: AUDIOLOGIST-HEARING AID FITTER

## 2022-03-03 PROCEDURE — 99213 PR OFFICE/OUTPT VISIT, EST, LEVL III, 20-29 MIN: ICD-10-PCS | Mod: S$PBB,,, | Performed by: PHYSICIAN ASSISTANT

## 2022-03-03 PROCEDURE — 99999 PR PBB SHADOW E&M-EST. PATIENT-LVL III: ICD-10-PCS | Mod: PBBFAC,,, | Performed by: PHYSICIAN ASSISTANT

## 2022-03-03 RX ORDER — PHENOL 1.4 %
AEROSOL, SPRAY (ML) MUCOUS MEMBRANE
COMMUNITY
End: 2023-07-24

## 2022-03-03 NOTE — PROGRESS NOTES
Referring provider: ROBER Shanksjeremie Joy was seen 03/03/2022 for an audiological evaluation.  Patient returns for follow-up for left otorrhea. She has a tympanostomy tube in her left ear placed 8/2019 with Dr. Pugh. Patient has known bilateral SNHL. Her last audiogram was in 2019. She has noted a decline in hearing since her last exam. She has tinnitus in the bilateral ear that has been present for years.     Results reveal a moderate-to-severe sensorineural hearing loss 250-8000 Hz for the right ear, and a moderate-to-severe sensorineural hearing loss 250-8000 Hz for the left ear.   Speech Reception Thresholds were 40 dBHL for the right ear and 40 dBHL for the left ear.   Word recognition scores were good for the right ear and excellent for the left ear.   Tympanograms were Type A for the right ear and Type B large volume, consistent with patent PE tube for the left ear.    There is a decline in SNHL for both since her previous audiogram from 2019.   Patient was counseled on the above findings.    Recommendations:  1. ENT  2. Hearing aids, binaural. Patient will check into insurance.

## 2022-03-03 NOTE — PROGRESS NOTES
Subjective:       Patient ID: Monica Joy is a 61 y.o. female.    Chief Complaint: Follow-up (Pt states ears are still moist, audio prior)    Patient is a very pleasant 61 year old female here to see me today for recheck of her left ear.  She was last here with me on 2/1/22 with left ear drainage x 4 days.  She had LEFT tube placement in 8/2019 with Dr. Pugh.      She reports that her left ear seems moist at times but overall improved.  No nelson drainage.  She still feels as though her left ear seems to stop up intermittently and then opens again for awhile.  Denies runny nose or congestion.  She continues to have popping sensation in that ear.  No fever or dizziness.  No otalgia.    Review of Systems   Constitutional: Negative.    HENT: Positive for ear discharge (left - improved) and hearing loss.    Cardiovascular: Negative.    Gastrointestinal: Negative.    Endocrine: Negative.    Genitourinary: Negative.    Musculoskeletal: Negative.    Integumentary:  Negative.   Allergic/Immunologic: Positive for food allergies.   Neurological: Negative.    Hematological: Negative.    Psychiatric/Behavioral: Negative.          Objective:      Physical Exam  Vitals reviewed.   Constitutional:       General: She is not in acute distress.     Appearance: She is well-developed.   HENT:      Head: Normocephalic and atraumatic.      Right Ear: Ear canal and external ear normal. No middle ear effusion. Tympanic membrane is retracted. Tympanic membrane is not injected.      Left Ear: Ear canal and external ear normal. No drainage, swelling or tenderness.  No middle ear effusion. A PE tube (angled) is present. Tympanic membrane is not injected or erythematous.      Nose: Nose normal.      Mouth/Throat:      Mouth: Mucous membranes are not pale and not dry.   Eyes:      General: Lids are normal. No scleral icterus.     Extraocular Movements:      Right eye: Normal extraocular motion and no nystagmus.      Left eye: Normal  extraocular motion and no nystagmus.      Conjunctiva/sclera: Conjunctivae normal.      Right eye: Right conjunctiva is not injected. No chemosis.     Left eye: Left conjunctiva is not injected. No chemosis.     Pupils: Pupils are equal, round, and reactive to light.   Neck:      Trachea: Trachea and phonation normal.   Pulmonary:      Effort: Pulmonary effort is normal. No respiratory distress.      Breath sounds: No stridor.   Skin:     General: Skin is warm and dry.      Findings: No erythema or rash.   Neurological:      Mental Status: She is alert and oriented to person, place, and time.      Cranial Nerves: No cranial nerve deficit.   Psychiatric:         Behavior: Behavior normal. Behavior is cooperative.           AUDIOGRAM today:          Assessment:       Problem List Items Addressed This Visit    None     Visit Diagnoses     Sensorineural hearing loss (SNHL) of both ears    -  Primary    Tinnitus of both ears        Otorrhea, left              Plan:         We reviewed the patient's recent audiogram and hearing loss in detail.  We also discussed that she is a excellent candidate for hearing aids, if and when she the patient is motivated. She will check with her insurance and will contact audiology when ready to proceed.  We also discussed the use hearing protection when exposed to loud noise, including lawn equipment.  Annual audiograms.  There is a decline in SNHL AU since her previous audiogram from 2019.      We also discussed that tinnitus is most often caused by a hearing loss, and that as the hair cells are damaged, either genetic or as a result of loud noise exposure, they then cause tinnitus.  Some patients find that restricting the salt or caffeine in their diet helps, and there is also an OTC supplement, lipoflavinoids, that some people find to be effective though their benefit is not fully proven.  Tinnitus tends to be louder in times of stress and fatigue, and may decrease with time.  Sound  machines may also be an effective masking technique if needed at night.    Left otorrhea - now resolved.  Tympanogram today shows patent tube AS with large ECV.

## 2022-03-03 NOTE — PROGRESS NOTES
Answers for HPI/ROS submitted by the patient on 3/2/2022  None of these: Yes  ear discharge: Yes  Eye Drainage?: Yes  Sleep Apnea?: Yes  None of these : Yes  None of these: Yes  None of these: Yes  None of these: Yes  None of these : Yes  Food Allergies?: Yes  None of these : Yes  None of these: Yes  None of these: Yes  None of these: Yes

## 2022-04-11 ENCOUNTER — PATIENT MESSAGE (OUTPATIENT)
Dept: RESEARCH | Facility: HOSPITAL | Age: 62
End: 2022-04-11
Payer: MEDICAID

## 2022-04-26 ENCOUNTER — PATIENT MESSAGE (OUTPATIENT)
Dept: ADMINISTRATIVE | Facility: HOSPITAL | Age: 62
End: 2022-04-26
Payer: MEDICAID

## 2022-04-27 ENCOUNTER — PATIENT MESSAGE (OUTPATIENT)
Dept: ADMINISTRATIVE | Facility: HOSPITAL | Age: 62
End: 2022-04-27
Payer: MEDICAID

## 2022-04-28 ENCOUNTER — PATIENT MESSAGE (OUTPATIENT)
Dept: OTOLARYNGOLOGY | Facility: CLINIC | Age: 62
End: 2022-04-28
Payer: MEDICAID

## 2022-04-28 RX ORDER — NEOMYCIN SULFATE, POLYMYXIN B SULFATE AND HYDROCORTISONE 10; 3.5; 1 MG/ML; MG/ML; [USP'U]/ML
3 SUSPENSION/ DROPS AURICULAR (OTIC) 3 TIMES DAILY
Qty: 10 ML | Refills: 0 | Status: SHIPPED | OUTPATIENT
Start: 2022-04-28 | End: 2022-06-06

## 2022-06-06 ENCOUNTER — PATIENT MESSAGE (OUTPATIENT)
Dept: INTERNAL MEDICINE | Facility: CLINIC | Age: 62
End: 2022-06-06
Payer: MEDICAID

## 2022-06-15 ENCOUNTER — PATIENT OUTREACH (OUTPATIENT)
Dept: ADMINISTRATIVE | Facility: HOSPITAL | Age: 62
End: 2022-06-15
Payer: MEDICAID

## 2022-09-06 ENCOUNTER — HOSPITAL ENCOUNTER (OUTPATIENT)
Dept: RADIOLOGY | Facility: HOSPITAL | Age: 62
Discharge: HOME | End: 2022-09-06
Attending: INTERNAL MEDICINE
Payer: MEDICAID

## 2022-09-06 DIAGNOSIS — Z12.31 OTHER SCREENING MAMMOGRAM: ICD-10-CM

## 2022-09-06 PROCEDURE — 77067 SCR MAMMO BI INCL CAD: CPT | Mod: 26,,, | Performed by: RADIOLOGY

## 2022-09-06 PROCEDURE — 77063 BREAST TOMOSYNTHESIS BI: CPT | Mod: TC

## 2022-09-06 PROCEDURE — 77067 MAMMO DIGITAL SCREENING BILAT WITH TOMO: ICD-10-PCS | Mod: 26,,, | Performed by: RADIOLOGY

## 2022-09-06 PROCEDURE — 77067 SCR MAMMO BI INCL CAD: CPT | Mod: TC

## 2022-09-06 PROCEDURE — 77063 BREAST TOMOSYNTHESIS BI: CPT | Mod: 26,,, | Performed by: RADIOLOGY

## 2022-09-06 PROCEDURE — 77063 MAMMO DIGITAL SCREENING BILAT WITH TOMO: ICD-10-PCS | Mod: 26,,, | Performed by: RADIOLOGY

## 2022-09-26 ENCOUNTER — OFFICE VISIT (OUTPATIENT)
Dept: INTERNAL MEDICINE | Facility: CLINIC | Age: 62
End: 2022-09-26
Payer: MEDICAID

## 2022-09-26 VITALS
HEART RATE: 85 BPM | OXYGEN SATURATION: 95 % | SYSTOLIC BLOOD PRESSURE: 118 MMHG | HEIGHT: 65 IN | RESPIRATION RATE: 18 BRPM | DIASTOLIC BLOOD PRESSURE: 72 MMHG | TEMPERATURE: 98 F | WEIGHT: 246.69 LBS | BODY MASS INDEX: 41.1 KG/M2

## 2022-09-26 DIAGNOSIS — E66.01 MORBID OBESITY WITH BMI OF 40.0-44.9, ADULT: ICD-10-CM

## 2022-09-26 DIAGNOSIS — J45.20 MILD INTERMITTENT ASTHMA WITHOUT COMPLICATION: ICD-10-CM

## 2022-09-26 DIAGNOSIS — Z00.00 ANNUAL PHYSICAL EXAM: Primary | ICD-10-CM

## 2022-09-26 PROCEDURE — 99396 PR PREVENTIVE VISIT,EST,40-64: ICD-10-PCS | Mod: S$PBB,,, | Performed by: INTERNAL MEDICINE

## 2022-09-26 PROCEDURE — 3008F BODY MASS INDEX DOCD: CPT | Mod: CPTII,,, | Performed by: INTERNAL MEDICINE

## 2022-09-26 PROCEDURE — 3074F PR MOST RECENT SYSTOLIC BLOOD PRESSURE < 130 MM HG: ICD-10-PCS | Mod: CPTII,,, | Performed by: INTERNAL MEDICINE

## 2022-09-26 PROCEDURE — 3008F PR BODY MASS INDEX (BMI) DOCUMENTED: ICD-10-PCS | Mod: CPTII,,, | Performed by: INTERNAL MEDICINE

## 2022-09-26 PROCEDURE — 1159F PR MEDICATION LIST DOCUMENTED IN MEDICAL RECORD: ICD-10-PCS | Mod: CPTII,,, | Performed by: INTERNAL MEDICINE

## 2022-09-26 PROCEDURE — 1159F MED LIST DOCD IN RCRD: CPT | Mod: CPTII,,, | Performed by: INTERNAL MEDICINE

## 2022-09-26 PROCEDURE — 3078F PR MOST RECENT DIASTOLIC BLOOD PRESSURE < 80 MM HG: ICD-10-PCS | Mod: CPTII,,, | Performed by: INTERNAL MEDICINE

## 2022-09-26 PROCEDURE — 1160F RVW MEDS BY RX/DR IN RCRD: CPT | Mod: CPTII,,, | Performed by: INTERNAL MEDICINE

## 2022-09-26 PROCEDURE — 99999 PR PBB SHADOW E&M-EST. PATIENT-LVL IV: ICD-10-PCS | Mod: PBBFAC,,, | Performed by: INTERNAL MEDICINE

## 2022-09-26 PROCEDURE — 3074F SYST BP LT 130 MM HG: CPT | Mod: CPTII,,, | Performed by: INTERNAL MEDICINE

## 2022-09-26 PROCEDURE — 99214 OFFICE O/P EST MOD 30 MIN: CPT | Mod: PBBFAC | Performed by: INTERNAL MEDICINE

## 2022-09-26 PROCEDURE — 99396 PREV VISIT EST AGE 40-64: CPT | Mod: S$PBB,,, | Performed by: INTERNAL MEDICINE

## 2022-09-26 PROCEDURE — 1160F PR REVIEW ALL MEDS BY PRESCRIBER/CLIN PHARMACIST DOCUMENTED: ICD-10-PCS | Mod: CPTII,,, | Performed by: INTERNAL MEDICINE

## 2022-09-26 PROCEDURE — 3078F DIAST BP <80 MM HG: CPT | Mod: CPTII,,, | Performed by: INTERNAL MEDICINE

## 2022-09-26 PROCEDURE — 99999 PR PBB SHADOW E&M-EST. PATIENT-LVL IV: CPT | Mod: PBBFAC,,, | Performed by: INTERNAL MEDICINE

## 2022-09-26 NOTE — PROGRESS NOTES
Monica WAY Benita  61 y.o. White female  [id number]    Chief Complaint:  Chief Complaint   Patient presents with    Annual Exam       History of Present Illness:  Presents to the clinic for an annual exam.   Her only complaint is that she recently had swelling of her hands and feet. She denies an increased salt intake. She had some shortness of breath. She took an OTC diuretic and the swelling resolved. She has had an echo done within the past year and her EF was normal. Pulmonary pressure was slightly elevated. She has asthma but states it is well controlled.     Laboratory   Lab Results   Component Value Date    WBC 9.79 09/22/2021    HGB 13.2 09/22/2021    HCT 39.6 09/22/2021     09/22/2021    CHOL 180 11/26/2018    TRIG 123 11/26/2018    HDL 56 11/26/2018    ALT 30 07/13/2021    AST 22 07/13/2021     07/13/2021    K 3.8 07/13/2021     07/13/2021    CREATININE 0.8 07/13/2021    BUN 8 07/13/2021    CO2 22 (L) 07/13/2021    TSH 0.427 07/23/2020    INR 1.0 02/17/2011    HGBA1C 5.6 10/26/2020     Review of Systems   Constitutional:  Negative for fever.   Respiratory:  Positive for shortness of breath. Negative for cough and wheezing.    Cardiovascular:  Positive for leg swelling. Negative for chest pain.   Gastrointestinal:  Negative for abdominal pain, constipation and diarrhea.   Neurological:  Negative for dizziness and headaches.     The following were reviewed: Active problem list, medication list, allergies, family history, social history, and Health Maintenance.     History:  Past Medical History:   Diagnosis Date    Arthritis     Asthma 9/6/2013    Sleep apnea     Thyroid disease      Past Surgical History:   Procedure Laterality Date    BONE GRAFT Right 03/19/2021    Procedure: BONE GRAFT;  Surgeon: Miguel Yoo MD;  Location: [facility];  Service: Orthopedics;  Laterality: Right;  bone graft to fill the defect in the distal radial epiphysis/Reduction of the four-corner area will be  performed with bone graft and fusion     SECTION      COLONOSCOPY N/A 2019    Procedure: COLONOSCOPY;  Surgeon: Scout Rivera MD;  Location: [facility];  Service: Endoscopy;  Laterality: N/A;    FRACTURE SURGERY      HAND FUSION Right 2021    Procedure: FUSION, JOINT, HAND;  Surgeon: Miguel Yoo MD;  Location: [facility] OR;  Service: Orthopedics;  Laterality: Right;  dip joint    JOINT REPLACEMENT      Novasure Endometrial Ablation  2016    OSTECTOMY Right 2021    Procedure: OSTECTOMY;  Surgeon: Miguel Yoo MD;  Location: [facility] OR;  Service: Orthopedics;  Laterality: Right;  radial styloidectomy as well as scaphoid excision    THYROIDECTOMY, PARTIAL      TUBAL LIGATION      WRIST SURGERY       Family History   Problem Relation Age of Onset    Atrial fibrillation Mother     Anuerysm Mother     Diabetes Father     Heart disease Father     Hyperlipidemia Father     Stroke Father     Kidney disease Father     Heart disease Brother     Diabetes Brother     Anuerysm Brother     Breast cancer Neg Hx     Colon cancer Neg Hx     Ovarian cancer Neg Hx      Social History     Socioeconomic History    Marital status:     Number of children: 2   Tobacco Use    Smoking status: Never    Smokeless tobacco: Never   Substance and Sexual Activity    Alcohol use: No     Comment: on rare holidays    Drug use: No    Sexual activity: Yes     Partners: Male     Birth control/protection: None     Patient Active Problem List   Diagnosis    Morbid obesity with BMI of 40.0-44.9, adult    OA (osteoarthritis)    Chronic urticaria    Mild intermittent asthma without complication    Multiple allergies    Pernicious anemia    Lipoma    Hx of colonic polyps    Hip pain    Food allergy    FH: aneurysm    Drug allergy    Colon polyps    Diverticulosis of colon    KAM on CPAP    Chest pressure    Pain of finger of left hand    Ganglion cyst of dorsum of right wrist    Primary osteoarthritis of  both hands    Scaphoid fracture, wrist, closed    Closed comminuted fracture of waist of scaphoid bone of right wrist    Chronic pain of left knee    Stiffness of right wrist joint    Slac (scapholunate advanced collapse) of wrist, right    Patellofemoral arthritis of right knee     Review of patient's allergies indicates:   Allergen Reactions    Amoxicillin Rash and Swelling     Rash with throat swelling    Codeine Palpitations, Rash and Swelling     Other reaction(s): Shortness of breath  Other reaction(s): Hives  Rash and throat swelling    Doxycycline Swelling     Other reaction(s): Rash    Hydrocodone Rash and Swelling    Iodine and iodide containing products Rash and Swelling    Lortab [hydrocodone-acetaminophen] Other (See Comments)     Other reaction(s): Hives  Other reaction(s): Difficulty breathing    Medrol [methylprednisolone] Anaphylaxis    Oxycodone Shortness Of Breath, Rash and Swelling    Oxycodone hcl-oxycodone-asa Rash and Swelling    Percodan filipe Shortness Of Breath    Tetracycline Swelling    Tetracyclines Rash and Swelling    Tramadol Shortness Of Breath, Rash and Swelling    Vibramycin [doxycycline calcium] Shortness Of Breath     Other reaction(s): Hives  Other reaction(s): Difficulty breathing    Augmentin [amoxicillin-pot clavulanate] Itching and Rash    Levaquin [levofloxacin] Hives    Sulfa (sulfonamide antibiotics) Itching and Rash    Percocet  [oxycodone-acetaminophen]      Other reaction(s): Shortness of breath    Shellfish containing products      Other reaction(s): Shortness of breath  Other reaction(s): Hives  Other reaction(s): Shortness of breath  Other reaction(s): Hives       Health Maintenance  Health Maintenance Topics with due status: Not Due       Topic Last Completion Date    TETANUS VACCINE 11/21/2018    Lipid Panel 11/26/2018    Cervical Cancer Screening 12/10/2018    Colorectal Cancer Screening 01/31/2019    Mammogram 09/06/2022     Health Maintenance Due   Topic Date  Due    COVID-19 Vaccine (1) Never done    Shingles Vaccine (1 of 2) Never done       Medications:  Current Outpatient Medications on File Prior to Visit   Medication Sig Dispense Refill    albuterol (VENTOLIN HFA) 90 mcg/actuation inhaler Inhale 2 puffs into the lungs every 4 (four) hours as needed for Wheezing or Shortness of Breath. 18 g 11    ergocalciferol, vitamin D2, (VITAMIN D ORAL) Take by mouth.      ibuprofen (ADVIL,MOTRIN) 600 MG tablet TAKE 1 TABLET(600 MG) BY MOUTH THREE TIMES DAILY FOR 90 DOSES 90 tablet 6    levocetirizine (XYZAL) 5 MG tablet TAKE 1 TABLET(5 MG) BY MOUTH EVERY EVENING 30 tablet 5    mv-mn-iron-FA-Ca carb-vit K (WOMEN'S MULTIVITAMIN) 18 mg-400 mcg- 500 mg-50 mcg Tab Take by mouth.      neomycin-polymyxin-hydrocortisone (CORTISPORIN) 3.5-10,000-1 mg/mL-unit/mL-% otic suspension SHAKE LIQUID AND INSTILL 3 DROPS TO LEFT EAR THREE TIMES DAILY FOR 10 DAYS 10 mL 0    ZINC ACETATE ORAL Take by mouth.         Medications have been reviewed and reconciled with patient at visit today.    Exam:  Vitals:    09/26/22 1303   BP: 118/72   Pulse: 85   Resp: 18   Temp: 98.1 °F (36.7 °C)     Weight: 111.9 kg (246 lb 11.1 oz)   Body mass index is 41.05 kg/m².      Physical Exam  Vitals reviewed.   Constitutional:       General: She is not in acute distress.     Appearance: She is well-developed. She is not ill-appearing.   Eyes:      General: No scleral icterus.  Cardiovascular:      Rate and Rhythm: Normal rate and regular rhythm.      Heart sounds: Normal heart sounds.   Pulmonary:      Effort: Pulmonary effort is normal. No respiratory distress.      Breath sounds: Normal breath sounds.   Abdominal:      General: Bowel sounds are normal.      Palpations: Abdomen is soft.   Musculoskeletal:      Right lower leg: Edema present.      Left lower leg: Edema present.   Skin:     General: Skin is warm and dry.   Neurological:      Mental Status: She is alert and oriented to person, place, and time.    Psychiatric:         Behavior: Behavior normal.         Assessment:  The primary encounter diagnosis was Annual physical exam. Diagnoses of Mild intermittent asthma without complication and Morbid obesity with BMI of 40.0-44.9, adult were also pertinent to this visit.    Plan:  Annual physical exam  -     Counseled regarding age appropriate screenings and immunizations  -     Counseled regarding lifestyle modifications  -     B-TYPE NATRIURETIC PEPTIDE; Future; Expected date: 09/26/2022  -     CBC Auto Differential; Future; Expected date: 09/26/2022  -     Comprehensive Metabolic Panel; Future; Expected date: 09/26/2022  -     TSH; Future  -     Lipid panel; Future; Expected date: 09/26/2022    Mild intermittent asthma without complication    Morbid obesity with BMI of 40.0-44.9, adult  -     Lifestyle modifications discussed    Labs today.     Follow up in about 1 year (around 9/26/2023).

## 2022-09-27 ENCOUNTER — LAB VISIT (OUTPATIENT)
Dept: LAB | Facility: HOSPITAL | Age: 62
End: 2022-09-27
Attending: INTERNAL MEDICINE
Payer: MEDICAID

## 2022-09-27 DIAGNOSIS — Z00.00 ANNUAL PHYSICAL EXAM: ICD-10-CM

## 2022-09-27 LAB
ALBUMIN SERPL BCP-MCNC: 3.9 G/DL (ref 3.5–5.2)
ALP SERPL-CCNC: 101 U/L (ref 55–135)
ALT SERPL W/O P-5'-P-CCNC: 38 U/L (ref 10–44)
ANION GAP SERPL CALC-SCNC: 9 MMOL/L (ref 8–16)
AST SERPL-CCNC: 25 U/L (ref 10–40)
BILIRUB SERPL-MCNC: 0.6 MG/DL (ref 0.1–1)
BNP SERPL-MCNC: 14 PG/ML (ref 0–99)
BUN SERPL-MCNC: 11 MG/DL (ref 6–20)
CALCIUM SERPL-MCNC: 9.5 MG/DL (ref 8.7–10.5)
CHLORIDE SERPL-SCNC: 109 MMOL/L (ref 95–110)
CHOLEST SERPL-MCNC: 211 MG/DL (ref 120–199)
CHOLEST/HDLC SERPL: 4.2 {RATIO} (ref 2–5)
CO2 SERPL-SCNC: 22 MMOL/L (ref 23–29)
CREAT SERPL-MCNC: 0.7 MG/DL (ref 0.5–1.4)
EST. GFR  (NO RACE VARIABLE): >60 ML/MIN/1.73 M^2
GLUCOSE SERPL-MCNC: 106 MG/DL (ref 70–110)
HDLC SERPL-MCNC: 50 MG/DL (ref 40–75)
HDLC SERPL: 23.7 % (ref 20–50)
LDLC SERPL CALC-MCNC: 126.2 MG/DL (ref 63–159)
NONHDLC SERPL-MCNC: 161 MG/DL
POTASSIUM SERPL-SCNC: 4.2 MMOL/L (ref 3.5–5.1)
PROT SERPL-MCNC: 6.8 G/DL (ref 6–8.4)
SODIUM SERPL-SCNC: 140 MMOL/L (ref 136–145)
TRIGL SERPL-MCNC: 174 MG/DL (ref 30–150)

## 2022-09-27 PROCEDURE — 80053 COMPREHEN METABOLIC PANEL: CPT | Performed by: INTERNAL MEDICINE

## 2022-09-27 PROCEDURE — 80061 LIPID PANEL: CPT | Performed by: INTERNAL MEDICINE

## 2022-09-27 PROCEDURE — 85025 COMPLETE CBC W/AUTO DIFF WBC: CPT | Performed by: INTERNAL MEDICINE

## 2022-09-27 PROCEDURE — 83880 ASSAY OF NATRIURETIC PEPTIDE: CPT | Performed by: INTERNAL MEDICINE

## 2022-09-27 PROCEDURE — 84443 ASSAY THYROID STIM HORMONE: CPT | Performed by: INTERNAL MEDICINE

## 2022-09-27 PROCEDURE — 36415 COLL VENOUS BLD VENIPUNCTURE: CPT | Performed by: INTERNAL MEDICINE

## 2022-09-28 LAB
BASOPHILS # BLD AUTO: 0.03 K/UL (ref 0–0.2)
BASOPHILS NFR BLD: 0.3 % (ref 0–1.9)
DIFFERENTIAL METHOD: NORMAL
EOSINOPHIL # BLD AUTO: 0.2 K/UL (ref 0–0.5)
EOSINOPHIL NFR BLD: 2 % (ref 0–8)
ERYTHROCYTE [DISTWIDTH] IN BLOOD BY AUTOMATED COUNT: 14.1 % (ref 11.5–14.5)
HCT VFR BLD AUTO: 41.6 % (ref 37–48.5)
HGB BLD-MCNC: 13.6 G/DL (ref 12–16)
IMM GRANULOCYTES # BLD AUTO: 0.04 K/UL (ref 0–0.04)
IMM GRANULOCYTES NFR BLD AUTO: 0.5 % (ref 0–0.5)
LYMPHOCYTES # BLD AUTO: 2 K/UL (ref 1–4.8)
LYMPHOCYTES NFR BLD: 22.2 % (ref 18–48)
MCH RBC QN AUTO: 29.1 PG (ref 27–31)
MCHC RBC AUTO-ENTMCNC: 32.7 G/DL (ref 32–36)
MCV RBC AUTO: 89 FL (ref 82–98)
MONOCYTES # BLD AUTO: 0.6 K/UL (ref 0.3–1)
MONOCYTES NFR BLD: 6.4 % (ref 4–15)
NEUTROPHILS # BLD AUTO: 6 K/UL (ref 1.8–7.7)
NEUTROPHILS NFR BLD: 68.6 % (ref 38–73)
NRBC BLD-RTO: 0 /100 WBC
PLATELET # BLD AUTO: 206 K/UL (ref 150–450)
PMV BLD AUTO: 11.5 FL (ref 9.2–12.9)
RBC # BLD AUTO: 4.67 M/UL (ref 4–5.4)
TSH SERPL DL<=0.005 MIU/L-ACNC: 0.9 UIU/ML (ref 0.4–4)
WBC # BLD AUTO: 8.79 K/UL (ref 3.9–12.7)

## 2022-10-03 ENCOUNTER — PATIENT MESSAGE (OUTPATIENT)
Dept: INTERNAL MEDICINE | Facility: CLINIC | Age: 62
End: 2022-10-03
Payer: MEDICAID

## 2023-02-09 ENCOUNTER — OFFICE VISIT (OUTPATIENT)
Dept: PULMONOLOGY | Facility: CLINIC | Age: 63
End: 2023-02-09
Payer: MEDICAID

## 2023-02-09 ENCOUNTER — HOSPITAL ENCOUNTER (OUTPATIENT)
Dept: RADIOLOGY | Facility: HOSPITAL | Age: 63
Discharge: HOME | End: 2023-02-09
Attending: PHYSICIAN ASSISTANT
Payer: MEDICAID

## 2023-02-09 VITALS
HEIGHT: 65 IN | WEIGHT: 248.44 LBS | RESPIRATION RATE: 18 BRPM | SYSTOLIC BLOOD PRESSURE: 126 MMHG | DIASTOLIC BLOOD PRESSURE: 72 MMHG | BODY MASS INDEX: 41.39 KG/M2 | HEART RATE: 71 BPM | OXYGEN SATURATION: 96 %

## 2023-02-09 DIAGNOSIS — J45.20 MILD INTERMITTENT ASTHMA WITHOUT COMPLICATION: ICD-10-CM

## 2023-02-09 DIAGNOSIS — G47.33 OSA ON CPAP: Primary | Chronic | ICD-10-CM

## 2023-02-09 DIAGNOSIS — G47.33 OSA ON CPAP: ICD-10-CM

## 2023-02-09 DIAGNOSIS — E66.01 MORBID OBESITY WITH BMI OF 40.0-44.9, ADULT: ICD-10-CM

## 2023-02-09 PROCEDURE — 71046 X-RAY EXAM CHEST 2 VIEWS: CPT | Mod: 26,,, | Performed by: RADIOLOGY

## 2023-02-09 PROCEDURE — 71046 XR CHEST PA AND LATERAL: ICD-10-PCS | Mod: 26,,, | Performed by: RADIOLOGY

## 2023-02-09 PROCEDURE — 1160F PR REVIEW ALL MEDS BY PRESCRIBER/CLIN PHARMACIST DOCUMENTED: ICD-10-PCS | Mod: CPTII,,, | Performed by: PHYSICIAN ASSISTANT

## 2023-02-09 PROCEDURE — 99214 OFFICE O/P EST MOD 30 MIN: CPT | Mod: PBBFAC,25 | Performed by: PHYSICIAN ASSISTANT

## 2023-02-09 PROCEDURE — 3008F BODY MASS INDEX DOCD: CPT | Mod: CPTII,,, | Performed by: PHYSICIAN ASSISTANT

## 2023-02-09 PROCEDURE — 99213 OFFICE O/P EST LOW 20 MIN: CPT | Mod: S$PBB,,, | Performed by: PHYSICIAN ASSISTANT

## 2023-02-09 PROCEDURE — 99213 PR OFFICE/OUTPT VISIT, EST, LEVL III, 20-29 MIN: ICD-10-PCS | Mod: S$PBB,,, | Performed by: PHYSICIAN ASSISTANT

## 2023-02-09 PROCEDURE — 3074F PR MOST RECENT SYSTOLIC BLOOD PRESSURE < 130 MM HG: ICD-10-PCS | Mod: CPTII,,, | Performed by: PHYSICIAN ASSISTANT

## 2023-02-09 PROCEDURE — 3008F PR BODY MASS INDEX (BMI) DOCUMENTED: ICD-10-PCS | Mod: CPTII,,, | Performed by: PHYSICIAN ASSISTANT

## 2023-02-09 PROCEDURE — 3078F PR MOST RECENT DIASTOLIC BLOOD PRESSURE < 80 MM HG: ICD-10-PCS | Mod: CPTII,,, | Performed by: PHYSICIAN ASSISTANT

## 2023-02-09 PROCEDURE — 99999 PR PBB SHADOW E&M-EST. PATIENT-LVL IV: CPT | Mod: PBBFAC,,, | Performed by: PHYSICIAN ASSISTANT

## 2023-02-09 PROCEDURE — 71046 X-RAY EXAM CHEST 2 VIEWS: CPT | Mod: TC

## 2023-02-09 PROCEDURE — 1159F PR MEDICATION LIST DOCUMENTED IN MEDICAL RECORD: ICD-10-PCS | Mod: CPTII,,, | Performed by: PHYSICIAN ASSISTANT

## 2023-02-09 PROCEDURE — 3074F SYST BP LT 130 MM HG: CPT | Mod: CPTII,,, | Performed by: PHYSICIAN ASSISTANT

## 2023-02-09 PROCEDURE — 3078F DIAST BP <80 MM HG: CPT | Mod: CPTII,,, | Performed by: PHYSICIAN ASSISTANT

## 2023-02-09 PROCEDURE — 1159F MED LIST DOCD IN RCRD: CPT | Mod: CPTII,,, | Performed by: PHYSICIAN ASSISTANT

## 2023-02-09 PROCEDURE — 99999 PR PBB SHADOW E&M-EST. PATIENT-LVL IV: ICD-10-PCS | Mod: PBBFAC,,, | Performed by: PHYSICIAN ASSISTANT

## 2023-02-09 PROCEDURE — 1160F RVW MEDS BY RX/DR IN RCRD: CPT | Mod: CPTII,,, | Performed by: PHYSICIAN ASSISTANT

## 2023-02-09 NOTE — PROGRESS NOTES
Subjective:       Patient ID: Monica Joy is a 62 y.o. female.    Chief Complaint: Asthma and Sleep Apnea      60yo female here for follow up of KAM and asthma  Using AutoPAP 8-16, [facility], [facility]  Compliant with machine and wants to continue use  Patient states improved symptoms with use of CPAP. Sleeping more soundly. Waking up feeling more refreshed. Improved daytime sleepiness.  Mild intermittent asthma controlled on TAHIRA prn, also taking xyzal which she says controls her asthma symptoms  No signs of infection or exacerbation  Compliance download reviewed, days with usage > 4 hours is 100%  Average APNEA HYPOPNEA INDEX is 2.2  She notes gradual weight increase over the last year, attributes this is lifestyle and needs to get back to walking     EPWORTH SLEEPINESS SCALE 2/9/2023   Sitting and reading 0   Watching TV 0   Sitting, inactive in a public place (e.g. a theatre or a meeting) 0   As a passenger in a car for an hour without a break 0   Lying down to rest in the afternoon when circumstances permit 1   Sitting and talking to someone 0   Sitting quietly after a lunch without alcohol 0   In a car, while stopped for a few minutes in traffic 0   Total score 1     Immunization History   Administered Date(s) Administered    Influenza 10/30/2015, 09/07/2020    Influenza - Quadrivalent - PF *Preferred* (6 months and older) 12/22/2016, 08/13/2019    Influenza Split 11/13/2013    Pneumococcal Polysaccharide - 23 Valent 11/23/2013    Tdap 03/27/2008, 11/21/2018      Tobacco Use: Low Risk     Smoking Tobacco Use: Never    Smokeless Tobacco Use: Never    Passive Exposure: Not on file      Past Medical History:   Diagnosis Date    Arthritis     Asthma 9/6/2013    Sleep apnea     Thyroid disease       Current Outpatient Medications on File Prior to Visit   Medication Sig Dispense Refill    ergocalciferol, vitamin D2, (VITAMIN D ORAL) Take by mouth.      ibuprofen (ADVIL,MOTRIN) 600 MG tablet TAKE 1 TABLET(600 MG) BY  MOUTH THREE TIMES DAILY FOR 90 DOSES 90 tablet 6    levocetirizine (XYZAL) 5 MG tablet TAKE 1 TABLET(5 MG) BY MOUTH EVERY EVENING 30 tablet 5    mv-mn-iron-FA-Ca carb-vit K (WOMEN'S MULTIVITAMIN) 18 mg-400 mcg- 500 mg-50 mcg Tab Take by mouth.      albuterol (VENTOLIN HFA) 90 mcg/actuation inhaler Inhale 2 puffs into the lungs every 4 (four) hours as needed for Wheezing or Shortness of Breath. 18 g 11    neomycin-polymyxin-hydrocortisone (CORTISPORIN) 3.5-10,000-1 mg/mL-unit/mL-% otic suspension SHAKE LIQUID AND INSTILL 3 DROPS TO LEFT EAR THREE TIMES DAILY FOR 10 DAYS (Patient not taking: Reported on 2/9/2023) 10 mL 0    ZINC ACETATE ORAL Take by mouth.       Current Facility-Administered Medications on File Prior to Visit   Medication Dose Route Frequency Provider Last Rate Last Admin    acetaminophen tablet 650 mg  650 mg Oral Once PRN Leon De La Torre MD        diphenhydrAMINE injection 25 mg  25 mg Intravenous Once PRN Leon De La Torre MD        EPINEPHrine (EPIPEN) 0.3 mg/0.3 mL pen injection 0.3 mg  0.3 mg Intramuscular PRN Leon De La Torre MD        lactated ringers infusion   Intravenous On Call Procedure ARTHUR Henriquez        lactated ringers infusion   Intravenous Continuous Cheryl Cruz MD 10 mL/hr at 02/25/21 0613 New Bag at 02/25/21 0613    nozaseptin (NOZIN) nasal    Each Nostril On Call Procedure ARTHUR Henriquez   Given at 02/25/21 0613    ondansetron disintegrating tablet 4 mg  4 mg Oral Once PRN Leon De La Torre MD        sodium chloride 0.9% 500 mL flush bag   Intravenous PRN Leon De La Torre MD        sodium chloride 0.9% flush 10 mL  10 mL Intravenous PRN Leon De La Torre MD            Review of Systems   Constitutional:  Negative for fever, weight loss, appetite change, fatigue and weakness.   HENT:  Negative for postnasal drip, rhinorrhea, sinus pressure, trouble swallowing and congestion.    Respiratory:  Negative for cough, sputum production, choking, chest  "tightness, shortness of breath, wheezing and dyspnea on extertion.    Cardiovascular:  Negative for chest pain and leg swelling.   Musculoskeletal:  Negative for arthralgias, gait problem and joint swelling.   Gastrointestinal:  Negative for nausea, vomiting and abdominal pain.   Neurological:  Negative for dizziness, weakness and headaches.   All other systems reviewed and are negative.    Objective:       Vitals:    02/09/23 1451   BP: 126/72   Pulse: 71   Resp: 18   SpO2: 96%   Weight: 112.7 kg (248 lb 7.3 oz)   Height: 5' 5" (1.651 m)       Physical Exam   Constitutional: She is oriented to person, place, and time. She appears well-developed and well-nourished. No distress. She is obese.   HENT:   Head: Normocephalic.   Nose: Nose normal.   Mouth/Throat: Oropharynx is clear and moist.   Cardiovascular: Normal rate and regular rhythm.   Pulmonary/Chest: Effort normal and breath sounds normal. No respiratory distress. She has no wheezes. She has no rhonchi. She has no rales.   Musculoskeletal:         General: No edema.      Cervical back: Normal range of motion and neck supple.   Lymphadenopathy: No supraclavicular adenopathy is present.     She has no cervical adenopathy.   Neurological: She is alert and oriented to person, place, and time. Gait normal.   Skin: Skin is warm and dry.   Psychiatric: She has a normal mood and affect.   Vitals reviewed.  Personal Diagnostic Review    EXAMINATION:  XR CHEST AP PORTABLE 7/13/2021     CLINICAL HISTORY:  Chest Pain;     COMPARISON:  February 22, 2021, January 11, 2019     FINDINGS:  The study is lordotic in position.  The lungs are free of new pulmonary opacities.  The cardiac silhouette size is normal. The trachea is midline and the mediastinal width is normal. Negative for focal infiltrate, effusion or pneumothorax. Pulmonary vasculature is normal. Negative for osseous abnormalities. Tortuous aorta.  Marginal spondylosis.  Cardiophrenic fat pads.     Impression:   "   1.  Negative for acute process involving the chest.     2.  Stable findings as noted above.        Electronically signed by: Ubaldo Toth MD  Date:                                            07/13/2021  Time:                                           13:26    Compliance Summary  12/26/2021 - 1/24/2022 (30 days)  Days with Device Usage 30 days  Days without Device Usage 0 days  Percent Days with Device Usage 100.0%  Cumulative Usage 20 days 13 hrs. 16 mins. 55 secs.  Maximum Usage (1 Day) 22 hrs. 14 mins. 15 secs.  Average Usage (All Days) 16 hrs. 26 mins. 33 secs.  Average Usage (Days Used) 16 hrs. 26 mins. 33 secs.  Minimum Usage (1 Day) 10 hrs. 58 mins. 43 secs.  Percent of Days with Usage >= 4 Hours 100.0%  Percent of Days with Usage < 4 Hours 0.0%  Date Range  Total Blower Time 20 days 19 hrs. 34 mins. 13 secs.  Average AHI 4.7  Auto-CPAP Summary  Auto-CPAP Mean Pressure 9.5 cmH2O  Auto-CPAP Peak Average Pressure 13.7 cmH2O  Device Pressure <= 90% of Time 12.5 cmH2O  Average Time in Large Leak Per Day 2 hrs. 34 mins. 46 secs.      Assessment/Plan:       Problem List Items Addressed This Visit          Pulmonary    Mild intermittent asthma without complication     Controlled  TAHIRA prn, xyzal            Endocrine    Morbid obesity with BMI of 40.0-44.9, adult     Weight loss and exercise to improve overall health.              Other    KAM on CPAP - Primary (Chronic)     Compliant and Benefits from use of CPAP  Discussed therapeutic goals for CPAP: Ideal usage 100% of nights for 6-8 hours per night. Minimum usage is 70% of night for at least 4 hours per night.           Relevant Orders    CPAP/BIPAP SUPPLIES     Follow up in about 1 year (around 2/9/2024) for KAM follow up.    Discussed diagnosis, its evaluation, treatment and usual course. All questions answered.    Patient verbalized understanding of plan and left in no acute distress    Thank you for the courtesy of participating in the care of this  patient    Nathalie Ritchie PA-C

## 2023-02-10 NOTE — ASSESSMENT & PLAN NOTE
Compliant and Benefits from use of CPAP  Discussed therapeutic goals for CPAP: Ideal usage 100% of nights for 6-8 hours per night. Minimum usage is 70% of night for at least 4 hours per night.

## 2023-03-02 ENCOUNTER — OFFICE VISIT (OUTPATIENT)
Dept: PRIMARY CARE CLINIC | Facility: CLINIC | Age: 63
End: 2023-03-02
Payer: MEDICAID

## 2023-03-02 DIAGNOSIS — B35.3 TINEA PEDIS OF RIGHT FOOT: Primary | ICD-10-CM

## 2023-03-02 PROCEDURE — 1159F PR MEDICATION LIST DOCUMENTED IN MEDICAL RECORD: ICD-10-PCS | Mod: CPTII,95,, | Performed by: NURSE PRACTITIONER

## 2023-03-02 PROCEDURE — 1159F MED LIST DOCD IN RCRD: CPT | Mod: CPTII,95,, | Performed by: NURSE PRACTITIONER

## 2023-03-02 PROCEDURE — 99214 PR OFFICE/OUTPT VISIT, EST, LEVL IV, 30-39 MIN: ICD-10-PCS | Mod: 95,,, | Performed by: NURSE PRACTITIONER

## 2023-03-02 PROCEDURE — 1160F PR REVIEW ALL MEDS BY PRESCRIBER/CLIN PHARMACIST DOCUMENTED: ICD-10-PCS | Mod: CPTII,95,, | Performed by: NURSE PRACTITIONER

## 2023-03-02 PROCEDURE — 1160F RVW MEDS BY RX/DR IN RCRD: CPT | Mod: CPTII,95,, | Performed by: NURSE PRACTITIONER

## 2023-03-02 PROCEDURE — 99214 OFFICE O/P EST MOD 30 MIN: CPT | Mod: 95,,, | Performed by: NURSE PRACTITIONER

## 2023-03-02 RX ORDER — TERBINAFINE HYDROCHLORIDE 250 MG/1
250 TABLET ORAL DAILY
Qty: 14 TABLET | Refills: 0 | Status: SHIPPED | OUTPATIENT
Start: 2023-03-02 | End: 2023-03-16

## 2023-03-02 RX ORDER — CLINDAMYCIN HYDROCHLORIDE 300 MG/1
300 CAPSULE ORAL 3 TIMES DAILY
Qty: 30 CAPSULE | Refills: 0 | Status: SHIPPED | OUTPATIENT
Start: 2023-03-02 | End: 2023-03-12

## 2023-03-02 NOTE — PROGRESS NOTES
Subjective:       Patient ID: Monica Joy is a 62 y.o. female.    Chief Complaint: Right Foot Rash (itchy)  The patient location is:[place], [place]    Visit type: audiovisual-Synchronous      Face to Face time with patient: 11 min  12 minutes of total time spent on the encounter, which includes face to face time and non-face to face time preparing to see the patient (eg, review of tests), Obtaining and/or reviewing separately obtained history, Documenting clinical information in the electronic or other health record, Independently interpreting results (not separately reported) and communicating results to the patient/family/caregiver, or Care coordination (not separately reported).         Each patient to whom he or she provides medical services by telemedicine is:  (1) informed of the relationship between the physician and patient and the respective role of any other health care provider with respect to management of the patient; and (2) notified that he or she may decline to receive medical services by telemedicine and may withdraw from such care at any time.       History of Present Illness:   Monica Joy 62 y.o. female presents today with reports of right foot itchy rash that has been present for over 3 weeks and is not improving with self treatment with antibiotic and antifungal cream. Will send over medication as prescribed and have patient to return to clinic if no improvement. Treatment options and alternatives were discussed with the patient. Patient provided opportunity to ask additional questions.  All questions were answered. Voices understanding and acceptance of this advice. Instructed to call back if any further questions or concerns.      Past Medical History:   Diagnosis Date    Arthritis     Asthma 9/6/2013    Sleep apnea     Thyroid disease      Family History   Problem Relation Age of Onset    Atrial fibrillation Mother     Anuerysm Mother     Diabetes Father     Heart disease Father      Hyperlipidemia Father     Stroke Father     Kidney disease Father     Heart disease Brother     Diabetes Brother     Anuerysm Brother     Breast cancer Neg Hx     Colon cancer Neg Hx     Ovarian cancer Neg Hx      Social History     Socioeconomic History    Marital status:     Number of children: 2   Tobacco Use    Smoking status: Never    Smokeless tobacco: Never   Substance and Sexual Activity    Alcohol use: No     Comment: on rare holidays    Drug use: No    Sexual activity: Yes     Partners: Male     Birth control/protection: None     Social Determinants of Health     Financial Resource Strain: Medium Risk    Difficulty of Paying Living Expenses: Somewhat hard   Food Insecurity: No Food Insecurity    Worried About Running Out of Food in the Last Year: Never true    Ran Out of Food in the Last Year: Never true   Transportation Needs: No Transportation Needs    Lack of Transportation (Medical): No    Lack of Transportation (Non-Medical): No   Physical Activity: Insufficiently Active    Days of Exercise per Week: 1 day    Minutes of Exercise per Session: 30 min   Stress: No Stress Concern Present    Feeling of Stress : Only a little   Social Connections: Unknown    Frequency of Communication with Friends and Family: More than three times a week    Frequency of Social Gatherings with Friends and Family: Three times a week    Active Member of Clubs or Organizations: Yes    Attends Club or Organization Meetings: More than 4 times per year    Marital Status:    Housing Stability: Low Risk     Unable to Pay for Housing in the Last Year: No    Number of Places Lived in the Last Year: 1    Unstable Housing in the Last Year: No           Review of Systems   Constitutional:  Negative for appetite change, chills and fever.   HENT:  Negative for ear pain, sinus pressure, sore throat and trouble swallowing.    Eyes:  Negative for visual disturbance.   Respiratory:  Negative for shortness of breath.     Cardiovascular:  Negative for chest pain.   Gastrointestinal:  Negative for abdominal pain, diarrhea, nausea and vomiting.   Endocrine: Negative for cold intolerance, polyphagia and polyuria.   Genitourinary:  Negative for decreased urine volume and dysuria.   Musculoskeletal:  Negative for back pain.   Skin:  Positive for rash (right foot that is itchy and between toes.).   Allergic/Immunologic: Negative for environmental allergies and food allergies.   Neurological:  Negative for dizziness, tremors, weakness and numbness.   Hematological:  Does not bruise/bleed easily.   Psychiatric/Behavioral:  Negative for confusion and hallucinations. The patient is not nervous/anxious and is not hyperactive.    All other systems reviewed and are negative.    Objective:      There were no vitals taken for this visit.  Physical Exam  Constitutional:       Appearance: Normal appearance. She is normal weight.   Musculoskeletal:        Feet:    Feet:      Comments: Rash between toes presentation consistent with   Neurological:      Mental Status: She is alert.       Results for orders placed or performed in visit on 09/27/22   B-TYPE NATRIURETIC PEPTIDE   Result Value Ref Range    BNP 14 0 - 99 pg/mL   CBC Auto Differential   Result Value Ref Range    WBC 8.79 3.90 - 12.70 K/uL    RBC 4.67 4.00 - 5.40 M/uL    Hemoglobin 13.6 12.0 - 16.0 g/dL    Hematocrit 41.6 37.0 - 48.5 %    MCV 89 82 - 98 fL    MCH 29.1 27.0 - 31.0 pg    MCHC 32.7 32.0 - 36.0 g/dL    RDW 14.1 11.5 - 14.5 %    Platelets 206 150 - 450 K/uL    MPV 11.5 9.2 - 12.9 fL    Immature Granulocytes 0.5 0.0 - 0.5 %    Gran # (ANC) 6.0 1.8 - 7.7 K/uL    Immature Grans (Abs) 0.04 0.00 - 0.04 K/uL    Lymph # 2.0 1.0 - 4.8 K/uL    Mono # 0.6 0.3 - 1.0 K/uL    Eos # 0.2 0.0 - 0.5 K/uL    Baso # 0.03 0.00 - 0.20 K/uL    nRBC 0 0 /100 WBC    Gran % 68.6 38.0 - 73.0 %    Lymph % 22.2 18.0 - 48.0 %    Mono % 6.4 4.0 - 15.0 %    Eosinophil % 2.0 0.0 - 8.0 %    Basophil % 0.3 0.0 -  1.9 %    Differential Method Automated    Comprehensive Metabolic Panel   Result Value Ref Range    Sodium 140 136 - 145 mmol/L    Potassium 4.2 3.5 - 5.1 mmol/L    Chloride 109 95 - 110 mmol/L    CO2 22 (L) 23 - 29 mmol/L    Glucose 106 70 - 110 mg/dL    BUN 11 6 - 20 mg/dL    Creatinine 0.7 0.5 - 1.4 mg/dL    Calcium 9.5 8.7 - 10.5 mg/dL    Total Protein 6.8 6.0 - 8.4 g/dL    Albumin 3.9 3.5 - 5.2 g/dL    Total Bilirubin 0.6 0.1 - 1.0 mg/dL    Alkaline Phosphatase 101 55 - 135 U/L    AST 25 10 - 40 U/L    ALT 38 10 - 44 U/L    Anion Gap 9 8 - 16 mmol/L    eGFR >60.0 >60 mL/min/1.73 m^2   TSH   Result Value Ref Range    TSH 0.898 0.400 - 4.000 uIU/mL   Lipid panel   Result Value Ref Range    Cholesterol 211 (H) 120 - 199 mg/dL    Triglycerides 174 (H) 30 - 150 mg/dL    HDL 50 40 - 75 mg/dL    LDL Cholesterol 126.2 63.0 - 159.0 mg/dL    HDL/Cholesterol Ratio 23.7 20.0 - 50.0 %    Total Cholesterol/HDL Ratio 4.2 2.0 - 5.0    Non-HDL Cholesterol 161 mg/dL     Assessment:       1. Tinea pedis of right foot        Plan:   Tinea pedis of right foot  -     clindamycin (CLEOCIN) 300 MG capsule; Take 1 capsule (300 mg total) by mouth 3 (three) times daily. for 10 days  Dispense: 30 capsule; Refill: 0  -     terbinafine HCL (LAMISIL) 250 mg tablet; Take 1 tablet (250 mg total) by mouth once daily. for 14 days  Dispense: 14 tablet; Refill: 0  -     CBC Auto Differential; Future; Expected date: 03/02/2023  -     Comprehensive Metabolic Panel; Future; Expected date: 03/02/2023             [facility]   [address] [place] [address] [address]  [place] [address]  Office [phone]  Fax [phone]

## 2023-03-03 ENCOUNTER — PATIENT MESSAGE (OUTPATIENT)
Dept: PRIMARY CARE CLINIC | Facility: CLINIC | Age: 63
End: 2023-03-03
Payer: MEDICAID

## 2023-03-03 ENCOUNTER — PATIENT MESSAGE (OUTPATIENT)
Dept: INTERNAL MEDICINE | Facility: CLINIC | Age: 63
End: 2023-03-03
Payer: MEDICAID

## 2023-03-03 ENCOUNTER — LAB VISIT (OUTPATIENT)
Dept: LAB | Facility: HOSPITAL | Age: 63
End: 2023-03-03
Attending: NURSE PRACTITIONER
Payer: MEDICAID

## 2023-03-03 DIAGNOSIS — B35.3 TINEA PEDIS OF RIGHT FOOT: ICD-10-CM

## 2023-03-03 LAB
ALBUMIN SERPL BCP-MCNC: 3.8 G/DL (ref 3.5–5.2)
ALP SERPL-CCNC: 102 U/L (ref 55–135)
ALT SERPL W/O P-5'-P-CCNC: 25 U/L (ref 10–44)
ANION GAP SERPL CALC-SCNC: 13 MMOL/L (ref 8–16)
AST SERPL-CCNC: 19 U/L (ref 10–40)
BASOPHILS # BLD AUTO: 0.03 K/UL (ref 0–0.2)
BASOPHILS NFR BLD: 0.3 % (ref 0–1.9)
BILIRUB SERPL-MCNC: 0.4 MG/DL (ref 0.1–1)
BUN SERPL-MCNC: 10 MG/DL (ref 8–23)
CALCIUM SERPL-MCNC: 8.9 MG/DL (ref 8.7–10.5)
CHLORIDE SERPL-SCNC: 107 MMOL/L (ref 95–110)
CO2 SERPL-SCNC: 20 MMOL/L (ref 23–29)
CREAT SERPL-MCNC: 0.7 MG/DL (ref 0.5–1.4)
DIFFERENTIAL METHOD: NORMAL
EOSINOPHIL # BLD AUTO: 0.2 K/UL (ref 0–0.5)
EOSINOPHIL NFR BLD: 2.3 % (ref 0–8)
ERYTHROCYTE [DISTWIDTH] IN BLOOD BY AUTOMATED COUNT: 13.5 % (ref 11.5–14.5)
EST. GFR  (NO RACE VARIABLE): >60 ML/MIN/1.73 M^2
GLUCOSE SERPL-MCNC: 98 MG/DL (ref 70–110)
HCT VFR BLD AUTO: 40.6 % (ref 37–48.5)
HGB BLD-MCNC: 13.5 G/DL (ref 12–16)
IMM GRANULOCYTES # BLD AUTO: 0.04 K/UL (ref 0–0.04)
IMM GRANULOCYTES NFR BLD AUTO: 0.4 % (ref 0–0.5)
LYMPHOCYTES # BLD AUTO: 2.2 K/UL (ref 1–4.8)
LYMPHOCYTES NFR BLD: 22.3 % (ref 18–48)
MCH RBC QN AUTO: 28.6 PG (ref 27–31)
MCHC RBC AUTO-ENTMCNC: 33.3 G/DL (ref 32–36)
MCV RBC AUTO: 86 FL (ref 82–98)
MONOCYTES # BLD AUTO: 0.7 K/UL (ref 0.3–1)
MONOCYTES NFR BLD: 6.7 % (ref 4–15)
NEUTROPHILS # BLD AUTO: 6.7 K/UL (ref 1.8–7.7)
NEUTROPHILS NFR BLD: 68 % (ref 38–73)
NRBC BLD-RTO: 0 /100 WBC
PLATELET # BLD AUTO: 243 K/UL (ref 150–450)
PMV BLD AUTO: 10.6 FL (ref 9.2–12.9)
POTASSIUM SERPL-SCNC: 4.2 MMOL/L (ref 3.5–5.1)
PROT SERPL-MCNC: 7 G/DL (ref 6–8.4)
RBC # BLD AUTO: 4.72 M/UL (ref 4–5.4)
SODIUM SERPL-SCNC: 140 MMOL/L (ref 136–145)
WBC # BLD AUTO: 9.87 K/UL (ref 3.9–12.7)

## 2023-03-03 PROCEDURE — 80053 COMPREHEN METABOLIC PANEL: CPT | Performed by: NURSE PRACTITIONER

## 2023-03-03 PROCEDURE — 36415 COLL VENOUS BLD VENIPUNCTURE: CPT | Mod: PO | Performed by: NURSE PRACTITIONER

## 2023-03-03 PROCEDURE — 85025 COMPLETE CBC W/AUTO DIFF WBC: CPT | Performed by: NURSE PRACTITIONER

## 2023-04-18 ENCOUNTER — OFFICE VISIT (OUTPATIENT)
Dept: OTOLARYNGOLOGY | Facility: CLINIC | Age: 63
End: 2023-04-18
Payer: MEDICAID

## 2023-04-18 VITALS — WEIGHT: 248.44 LBS | TEMPERATURE: 99 F | HEIGHT: 65 IN | BODY MASS INDEX: 41.39 KG/M2

## 2023-04-18 DIAGNOSIS — Z96.22 HISTORY OF TYMPANOSTOMY TUBE PLACEMENT: ICD-10-CM

## 2023-04-18 DIAGNOSIS — H92.12 OTORRHEA, LEFT: Primary | ICD-10-CM

## 2023-04-18 PROCEDURE — 92504 PR EAR MICROSCOPY EXAMINATION: ICD-10-PCS | Mod: S$PBB,,, | Performed by: STUDENT IN AN ORGANIZED HEALTH CARE EDUCATION/TRAINING PROGRAM

## 2023-04-18 PROCEDURE — 99999 PR PBB SHADOW E&M-EST. PATIENT-LVL III: CPT | Mod: PBBFAC,,, | Performed by: STUDENT IN AN ORGANIZED HEALTH CARE EDUCATION/TRAINING PROGRAM

## 2023-04-18 PROCEDURE — 3008F BODY MASS INDEX DOCD: CPT | Mod: CPTII,,, | Performed by: STUDENT IN AN ORGANIZED HEALTH CARE EDUCATION/TRAINING PROGRAM

## 2023-04-18 PROCEDURE — 99999 PR PBB SHADOW E&M-EST. PATIENT-LVL III: ICD-10-PCS | Mod: PBBFAC,,, | Performed by: STUDENT IN AN ORGANIZED HEALTH CARE EDUCATION/TRAINING PROGRAM

## 2023-04-18 PROCEDURE — 87070 CULTURE OTHR SPECIMN AEROBIC: CPT | Performed by: STUDENT IN AN ORGANIZED HEALTH CARE EDUCATION/TRAINING PROGRAM

## 2023-04-18 PROCEDURE — 99213 OFFICE O/P EST LOW 20 MIN: CPT | Mod: S$PBB,25,, | Performed by: STUDENT IN AN ORGANIZED HEALTH CARE EDUCATION/TRAINING PROGRAM

## 2023-04-18 PROCEDURE — 3008F PR BODY MASS INDEX (BMI) DOCUMENTED: ICD-10-PCS | Mod: CPTII,,, | Performed by: STUDENT IN AN ORGANIZED HEALTH CARE EDUCATION/TRAINING PROGRAM

## 2023-04-18 PROCEDURE — 99213 OFFICE O/P EST LOW 20 MIN: CPT | Mod: PBBFAC | Performed by: STUDENT IN AN ORGANIZED HEALTH CARE EDUCATION/TRAINING PROGRAM

## 2023-04-18 PROCEDURE — 92504 EAR MICROSCOPY EXAMINATION: CPT | Mod: 25,PBBFAC | Performed by: STUDENT IN AN ORGANIZED HEALTH CARE EDUCATION/TRAINING PROGRAM

## 2023-04-18 PROCEDURE — 1159F MED LIST DOCD IN RCRD: CPT | Mod: CPTII,,, | Performed by: STUDENT IN AN ORGANIZED HEALTH CARE EDUCATION/TRAINING PROGRAM

## 2023-04-18 PROCEDURE — 99213 PR OFFICE/OUTPT VISIT, EST, LEVL III, 20-29 MIN: ICD-10-PCS | Mod: S$PBB,25,, | Performed by: STUDENT IN AN ORGANIZED HEALTH CARE EDUCATION/TRAINING PROGRAM

## 2023-04-18 PROCEDURE — 1159F PR MEDICATION LIST DOCUMENTED IN MEDICAL RECORD: ICD-10-PCS | Mod: CPTII,,, | Performed by: STUDENT IN AN ORGANIZED HEALTH CARE EDUCATION/TRAINING PROGRAM

## 2023-04-18 PROCEDURE — 92504 EAR MICROSCOPY EXAMINATION: CPT | Mod: S$PBB,,, | Performed by: STUDENT IN AN ORGANIZED HEALTH CARE EDUCATION/TRAINING PROGRAM

## 2023-04-18 RX ORDER — CIPROFLOXACIN HYDROCHLORIDE 3 MG/ML
4 SOLUTION/ DROPS OPHTHALMIC 2 TIMES DAILY
Qty: 10 ML | Refills: 0 | Status: SHIPPED | OUTPATIENT
Start: 2023-04-18 | End: 2023-04-25

## 2023-04-18 NOTE — PROGRESS NOTES
Chief complaint:   Chief Complaint   Patient presents with    Otitis Media          Referring Provider:  No referring provider defined for this encounter.    History of Present Illness:     Ms. Joy is a 62 y.o. female presenting for evaluation of left ear pain and drainage. Comes and goes. Most recently had drainage a month or two ago. Resolved, but then had mild drainage again yesterday. Always feels wet after showering and states this will cause the pain and drainage.     Had relief with cortisporin, but returned.    She had LEFT tube placement in 2019 with Dr. Pugh.        The patient denies significant hearing loss risk factors, ototoxic medication exposure, chronic vestibular suppressant use, head/ facial/ esau trauma, and otologic surgery.        History        Past Medical History:   Past Medical History:   Diagnosis Date    Arthritis     Asthma 2013    Sleep apnea     Thyroid disease     .          Past Surgical History:  Past Surgical History:   Procedure Laterality Date    BONE GRAFT Right 2021    Procedure: BONE GRAFT;  Surgeon: Miguel Yoo MD;  Location: [facility];  Service: Orthopedics;  Laterality: Right;  bone graft to fill the defect in the distal radial epiphysis/Reduction of the four-corner area will be performed with bone graft and fusion     SECTION      COLONOSCOPY N/A 2019    Procedure: COLONOSCOPY;  Surgeon: Scout Rivera MD;  Location: [facility];  Service: Endoscopy;  Laterality: N/A;    FRACTURE SURGERY      HAND FUSION Right 2021    Procedure: FUSION, JOINT, HAND;  Surgeon: Miguel Yoo MD;  Location: [facility] OR;  Service: Orthopedics;  Laterality: Right;  dip joint    JOINT REPLACEMENT      Novasure Endometrial Ablation  2016    OSTECTOMY Right 2021    Procedure: OSTECTOMY;  Surgeon: Miguel Yoo MD;  Location: [facility] OR;  Service: Orthopedics;  Laterality: Right;  radial styloidectomy as well as scaphoid  excision    THYROIDECTOMY, PARTIAL      TUBAL LIGATION      WRIST SURGERY     .         Medications: Medication list was reviewed. She  has a current medication list which includes the following prescription(s): albuterol, ergocalciferol (vitamin d2), ibuprofen, levocetirizine, women's multivitamin, and zinc acetate, and the following Facility-Administered Medications: epinephrine, lactated ringers, nozaseptin, and sodium chloride 0.9%.         Allergies:   Review of patient's allergies indicates:   Allergen Reactions    Amoxicillin Rash and Swelling     Rash with throat swelling    Codeine Palpitations, Rash and Swelling     Other reaction(s): Shortness of breath  Other reaction(s): Hives  Rash and throat swelling    Doxycycline Swelling     Other reaction(s): Rash    Hydrocodone Rash and Swelling    Iodine and iodide containing products Rash and Swelling    Lortab [hydrocodone-acetaminophen] Other (See Comments)     Other reaction(s): Hives  Other reaction(s): Difficulty breathing    Medrol [methylprednisolone] Anaphylaxis    Oxycodone Shortness Of Breath, Rash and Swelling    Oxycodone hcl-oxycodone-asa Rash and Swelling    Percodan filipe Shortness Of Breath    Tetracycline Swelling    Tetracyclines Rash and Swelling    Tramadol Shortness Of Breath, Rash and Swelling    Vibramycin [doxycycline calcium] Shortness Of Breath     Other reaction(s): Hives  Other reaction(s): Difficulty breathing    Augmentin [amoxicillin-pot clavulanate] Itching and Rash    Levaquin [levofloxacin] Hives    Sulfa (sulfonamide antibiotics) Itching and Rash    Percocet  [oxycodone-acetaminophen]      Other reaction(s): Shortness of breath    Shellfish containing products      Other reaction(s): Shortness of breath  Other reaction(s): Hives  Other reaction(s): Shortness of breath  Other reaction(s): Hives            Family history: family history includes Anuerysm in her brother and mother; Atrial fibrillation in her mother; Diabetes in  her brother and father; Heart disease in her brother and father; Hyperlipidemia in her father; Kidney disease in her father; Stroke in her father.         Social History          Alcohol use:  reports no history of alcohol use.            Tobacco:  reports that she has never smoked. She has never used smokeless tobacco.         Please see the patient's intake form for full details of past medical history, past surgical history, family history, social history and review of systems. ?This information was reviewed by me and noted.      Physical Examination     General: Well developed, well nourished, well hydrated. Verbal with a strong voice and not dysphonic.     Head/Face: Normocephalic, atraumatic. No scars or lesions. Facial musculature equal.     Eyes: No scleral icterus or conjunctival hemorrhage. EOMI. PERRLA.     Ears:     Right ear: No gross deformity. EAC is clear of debris and erythema. The TM is intact with a pneumatized middle ear. No signs of retraction, fluid or infection.      Left ear: see procedure    Hearing: grossly intact    Nose: No gross deformity or lesions. No purulent discharge. No significant NSD.      Mouth/Oropharynx: Lips without any lesions. No mucosal lesions within the oropharynx. No tonsillar exudate or lesions. Pharyngeal walls symmetrical. Uvula midline. Tongue midline without lesions.     Neck: Trachea midline. No masses. No thyromegaly or nodules palpated.     Lymphatic: No lymphadenopathy in the neck.     Extremities: No cyanosis. Warm and well-perfused.     Skin: No scars or lesions on face or neck.      Neurologic: Moving all extremities without gross abnormality.CN II-XII grossly intact. House-Brackmann 1/6. No signs of nystagmus.      Psych: Alert and oriented to person, place, and time with an appropriate mood and affect.     Data review:    Review of records:      I reviewed records from the referring provider's office visits.  These describe the history, workup, and/or  treatment of this problem thus far.    Audiogram     Audiogram was independently reviewed       Culture 2020 and 2019  Component 2 yr ago   Aerobic Bacterial Culture No growth      Ear Microscopy    Indication: microscopy was required for removal of obstructing pathology and adequate visualization of the tympanic membrane and middle ear    Technique: The patient was placed in a semi-recumbent position.  The left ear was first inspected under the microscope. There was evidence of thin, minimal drainage. This was cultured.  This was removed with suction. The PET was angled and partially extruding. A pick was used to reposition it flush against the TM. Repeat examination revealed clear middle ear and patent PET.             Assessment/Plan:      1. Otorrhea, left    2. History of tympanostomy tube placement           Try ciloxan x7d (very low risk reaction when given topical)  Keep ear dry  Appt for fitted ear plugs  Return to clinic 1-2m, sooner with concerns        Dayron Barnes MD  [facility]   [facility]  [id number] [place] [address].  [place] [id number]  P: [phone]  F: [phone]

## 2023-04-21 LAB — BACTERIA SPEC AEROBE CULT: NORMAL

## 2023-06-10 ENCOUNTER — OFFICE VISIT (OUTPATIENT)
Dept: URGENT CARE | Facility: CLINIC | Age: 63
End: 2023-06-10
Payer: MEDICAID

## 2023-06-10 ENCOUNTER — HOSPITAL ENCOUNTER (EMERGENCY)
Facility: HOSPITAL | Age: 63
Discharge: HOME | End: 2023-06-10
Attending: EMERGENCY MEDICINE
Payer: MEDICAID

## 2023-06-10 VITALS
BODY MASS INDEX: 41.32 KG/M2 | OXYGEN SATURATION: 95 % | WEIGHT: 248 LBS | SYSTOLIC BLOOD PRESSURE: 148 MMHG | DIASTOLIC BLOOD PRESSURE: 89 MMHG | TEMPERATURE: 98 F | HEART RATE: 72 BPM | HEIGHT: 65 IN | RESPIRATION RATE: 20 BRPM

## 2023-06-10 VITALS
OXYGEN SATURATION: 97 % | BODY MASS INDEX: 41.82 KG/M2 | HEIGHT: 65 IN | DIASTOLIC BLOOD PRESSURE: 82 MMHG | RESPIRATION RATE: 20 BRPM | HEART RATE: 68 BPM | WEIGHT: 251 LBS | TEMPERATURE: 98 F | SYSTOLIC BLOOD PRESSURE: 146 MMHG

## 2023-06-10 DIAGNOSIS — M79.89 PAIN AND SWELLING OF LEFT LOWER EXTREMITY: ICD-10-CM

## 2023-06-10 DIAGNOSIS — R60.0 EDEMA OF LEFT LOWER EXTREMITY: Primary | ICD-10-CM

## 2023-06-10 DIAGNOSIS — I82.442 ACUTE DEEP VEIN THROMBOSIS (DVT) OF TIBIAL VEIN OF LEFT LOWER EXTREMITY: Primary | ICD-10-CM

## 2023-06-10 DIAGNOSIS — M79.89 LEG SWELLING: ICD-10-CM

## 2023-06-10 DIAGNOSIS — M79.605 PAIN AND SWELLING OF LEFT LOWER EXTREMITY: ICD-10-CM

## 2023-06-10 LAB
ALBUMIN SERPL BCP-MCNC: 3.7 G/DL (ref 3.5–5.2)
ALP SERPL-CCNC: 101 U/L (ref 55–135)
ALT SERPL W/O P-5'-P-CCNC: 25 U/L (ref 10–44)
ANION GAP SERPL CALC-SCNC: 10 MMOL/L (ref 8–16)
AST SERPL-CCNC: 18 U/L (ref 10–40)
BASOPHILS # BLD AUTO: 0.1 K/UL (ref 0–0.2)
BASOPHILS NFR BLD: 0.9 % (ref 0–1.9)
BILIRUB SERPL-MCNC: 0.3 MG/DL (ref 0.1–1)
BUN SERPL-MCNC: 15 MG/DL (ref 8–23)
CALCIUM SERPL-MCNC: 9.6 MG/DL (ref 8.7–10.5)
CHLORIDE SERPL-SCNC: 108 MMOL/L (ref 95–110)
CO2 SERPL-SCNC: 21 MMOL/L (ref 23–29)
CREAT SERPL-MCNC: 0.8 MG/DL (ref 0.5–1.4)
DIFFERENTIAL METHOD: ABNORMAL
EOSINOPHIL # BLD AUTO: 0.2 K/UL (ref 0–0.5)
EOSINOPHIL NFR BLD: 1.7 % (ref 0–8)
ERYTHROCYTE [DISTWIDTH] IN BLOOD BY AUTOMATED COUNT: 13.6 % (ref 11.5–14.5)
EST. GFR  (NO RACE VARIABLE): >60 ML/MIN/1.73 M^2
GLUCOSE SERPL-MCNC: 106 MG/DL (ref 70–110)
HCT VFR BLD AUTO: 42.4 % (ref 37–48.5)
HGB BLD-MCNC: 14 G/DL (ref 12–16)
IMM GRANULOCYTES # BLD AUTO: 0.18 K/UL (ref 0–0.04)
IMM GRANULOCYTES NFR BLD AUTO: 1.6 % (ref 0–0.5)
LYMPHOCYTES # BLD AUTO: 2.6 K/UL (ref 1–4.8)
LYMPHOCYTES NFR BLD: 22.6 % (ref 18–48)
MCH RBC QN AUTO: 29.2 PG (ref 27–31)
MCHC RBC AUTO-ENTMCNC: 33 G/DL (ref 32–36)
MCV RBC AUTO: 88 FL (ref 82–98)
MONOCYTES # BLD AUTO: 0.7 K/UL (ref 0.3–1)
MONOCYTES NFR BLD: 6.4 % (ref 4–15)
NEUTROPHILS # BLD AUTO: 7.7 K/UL (ref 1.8–7.7)
NEUTROPHILS NFR BLD: 66.8 % (ref 38–73)
NRBC BLD-RTO: 0 /100 WBC
PLATELET # BLD AUTO: 206 K/UL (ref 150–450)
PMV BLD AUTO: 10.1 FL (ref 9.2–12.9)
POTASSIUM SERPL-SCNC: 4.1 MMOL/L (ref 3.5–5.1)
PROT SERPL-MCNC: 7.3 G/DL (ref 6–8.4)
RBC # BLD AUTO: 4.8 M/UL (ref 4–5.4)
SODIUM SERPL-SCNC: 139 MMOL/L (ref 136–145)
WBC # BLD AUTO: 11.48 K/UL (ref 3.9–12.7)

## 2023-06-10 PROCEDURE — 73630 X-RAY EXAM OF FOOT: CPT | Mod: LT,S$GLB,, | Performed by: RADIOLOGY

## 2023-06-10 PROCEDURE — 80053 COMPREHEN METABOLIC PANEL: CPT | Performed by: PHYSICIAN ASSISTANT

## 2023-06-10 PROCEDURE — 85025 COMPLETE CBC W/AUTO DIFF WBC: CPT | Performed by: PHYSICIAN ASSISTANT

## 2023-06-10 PROCEDURE — 96372 THER/PROPH/DIAG INJ SC/IM: CPT | Performed by: EMERGENCY MEDICINE

## 2023-06-10 PROCEDURE — 99215 OFFICE O/P EST HI 40 MIN: CPT | Mod: S$GLB,,, | Performed by: NURSE PRACTITIONER

## 2023-06-10 PROCEDURE — 99285 EMERGENCY DEPT VISIT HI MDM: CPT | Mod: 25

## 2023-06-10 PROCEDURE — 99215 PR OFFICE/OUTPT VISIT, EST, LEVL V, 40-54 MIN: ICD-10-PCS | Mod: S$GLB,,, | Performed by: NURSE PRACTITIONER

## 2023-06-10 PROCEDURE — 73630 XR FOOT COMPLETE 3 VIEW LEFT: ICD-10-PCS | Mod: LT,S$GLB,, | Performed by: RADIOLOGY

## 2023-06-10 PROCEDURE — 63600175 PHARM REV CODE 636 W HCPCS: Performed by: EMERGENCY MEDICINE

## 2023-06-10 RX ORDER — APIXABAN 5 MG (74)
KIT ORAL
Qty: 1 EACH | Refills: 0 | Status: SHIPPED | OUTPATIENT
Start: 2023-06-10 | End: 2023-07-24

## 2023-06-10 RX ORDER — ENOXAPARIN SODIUM 150 MG/ML
1 INJECTION SUBCUTANEOUS ONCE
Status: COMPLETED | OUTPATIENT
Start: 2023-06-10 | End: 2023-06-10

## 2023-06-10 RX ADMIN — ENOXAPARIN SODIUM 120 MG: 120 INJECTION SUBCUTANEOUS at 05:06

## 2023-06-10 NOTE — FIRST PROVIDER EVALUATION
Emergency Department TeleTriage Encounter Note      CHIEF COMPLAINT    Chief Complaint   Patient presents with    Sent by U/C     Pt sent by urgent care to rule out DVT LLE, pt c/o tightness to LLE/ pain, mild redness noted in triage       VITAL SIGNS   Initial Vitals [06/10/23 1434]   BP Pulse Resp Temp SpO2   (!) 193/91 77 16 98.4 °F (36.9 °C) 96 %      MAP       --            ALLERGIES    Review of patient's allergies indicates:   Allergen Reactions    Amoxicillin Rash and Swelling     Rash with throat swelling    Codeine Palpitations, Rash and Swelling     Other reaction(s): Shortness of breath  Other reaction(s): Hives  Rash and throat swelling    Doxycycline Swelling     Other reaction(s): Rash    Hydrocodone Rash and Swelling    Iodine and iodide containing products Rash and Swelling    Lortab [hydrocodone-acetaminophen] Other (See Comments)     Other reaction(s): Hives  Other reaction(s): Difficulty breathing    Medrol [methylprednisolone] Anaphylaxis    Oxycodone Shortness Of Breath, Rash and Swelling    Oxycodone hcl-oxycodone-asa Rash and Swelling    Percodan filipe Shortness Of Breath    Tetracycline Swelling    Tetracyclines Rash and Swelling    Tramadol Shortness Of Breath, Rash and Swelling    Vibramycin [doxycycline calcium] Shortness Of Breath     Other reaction(s): Hives  Other reaction(s): Difficulty breathing    Augmentin [amoxicillin-pot clavulanate] Itching and Rash    Levaquin [levofloxacin] Hives    Sulfa (sulfonamide antibiotics) Itching and Rash    Percocet  [oxycodone-acetaminophen]      Other reaction(s): Shortness of breath    Shellfish containing products      Other reaction(s): Shortness of breath  Other reaction(s): Hives  Other reaction(s): Shortness of breath  Other reaction(s): Hives       PROVIDER TRIAGE NOTE      62-year-old female presents ER for evaluation of left lower extremity swelling and redness ongoing for last 2 weeks.  Initially thought to have an insect bite but went to  urgent care who was concerned for clot.  Sent to the ER for DVT workup.  No shortness of breath    PE:  Patient alert and oriented. No acute distress- no dyspnea noted  Unable to see leg on ipad    Initial orders will be placed and care will be transferred to an alternate provider when patient is roomed for a full evaluation. Any additional orders and the final disposition will be determined by that provider.        ORDERS  Labs Reviewed   HIV 1 / 2 ANTIBODY   HEPATITIS C ANTIBODY   HEP C VIRUS HOLD SPECIMEN       ED Orders (720h ago, onward)      Start Ordered     Status Ordering Provider    06/10/23 1502 06/10/23 1502  CBC auto differential  STAT         Ordered EMMANUEL MYERS    06/10/23 1502 06/10/23 1502  Comprehensive metabolic panel  STAT         Ordered EMMANUEL MYERS    06/10/23 1502 06/10/23 1502  Insert Saline lock IV  Once         Ordered EMMANUEL MYERS    06/10/23 1502 06/10/23 1502  US Lower Extremity Veins Left  1 time imaging         Ordered EMMANUEL MYERS    06/10/23 1436 06/10/23 1435  HIV 1/2 Ag/Ab (4th Gen)  STAT         Ordered RAO DAIGLE.    06/10/23 1436 06/10/23 1435  Hepatitis C Antibody  STAT        See Hyperspace for full Linked Orders Report.    Ordered RAO DAIGLE.    06/10/23 1436 06/10/23 1435  HCV Virus Hold Specimen  STAT        See Hyperspace for full Linked Orders Report.    Ordered RAO DAIGLE              Virtual Visit Note: The provider triage portion of this emergency department evaluation and documentation was performed via [employer], a HIPAA-compliant telemedicine application, in concert with a tele-presenter in the room. A face to face patient evaluation with one of my colleagues will occur once the patient is placed in an emergency department room.      DISCLAIMER: This note was prepared with [employer] voice recognition transcription software. Garbled syntax, mangled pronouns, and other bizarre constructions may be attributed to that  software system.

## 2023-06-10 NOTE — PATIENT INSTRUCTIONS
Rest activity ad boone  Elevate as often as possible   Gentle Range of motion exercises     Outpt US referral as soon as possible to rule out DVT-- Go to ER after this visit but do not check in for ER services, have them notify Radiology Tomasa Knott tech is expecting you     Any shortness of breath, chest pain or worsening leg pain go to local ER   Follow up with PCP or local ortho as needed    [facility] [phone]  Mon-Fri 8am-5pm

## 2023-06-10 NOTE — PROGRESS NOTES
"Subjective:      Patient ID: Monica Joy is a 62 y.o. female.    Vitals:  height is 5' 5" (1.651 m) and weight is 112.5 kg (248 lb). Her oral temperature is 98.4 °F (36.9 °C). Her blood pressure is 148/89 (abnormal) and her pulse is 72. Her respiration is 20 and oxygen saturation is 95%.     Chief Complaint: Foot Pain    Pt states she went to [facility] 5 days ago.  Was given steroid Rx.  States the swelling went down briefly and came right back.  When it swells, feels tight and like a sunburn.  Pain level 9 when touched.    Foot Pain  This is a new problem. The current episode started in the past 7 days. The problem occurs constantly. The problem has been gradually worsening. Associated symptoms include joint swelling and numbness. Pertinent negatives include no abdominal pain, chest pain, congestion, coughing, fever or headaches. The symptoms are aggravated by walking and standing. Treatments tried: elevation. The treatment provided mild relief.     Constitution: Negative for fever.   HENT:  Negative for congestion.    Cardiovascular:  Positive for leg swelling. Negative for chest pain and sob on exertion.   Respiratory:  Negative for cough.    Gastrointestinal:  Negative for abdominal pain.   Musculoskeletal:  Positive for joint swelling.   Neurological:  Positive for numbness. Negative for headaches.    Objective:     Vitals:    06/10/23 1259   BP: (!) 148/89   BP Location: Left arm   Patient Position: Sitting   BP Method: Medium (Automatic)   Pulse: 72   Resp: 20   Temp: 98.4 °F (36.9 °C)   TempSrc: Oral   SpO2: 95%   Weight: 112.5 kg (248 lb)   Height: 5' 5" (1.651 m)       Physical Exam   Constitutional: She is oriented to person, place, and time. She appears well-developed. She is cooperative.   HENT:   Head: Normocephalic and atraumatic.   Ears:   Right Ear: Hearing, tympanic membrane, external ear and ear canal normal.   Left Ear: Hearing, tympanic membrane, external ear and ear canal normal.   Nose: Nose " normal. No mucosal edema or nasal deformity. No epistaxis. Right sinus exhibits no maxillary sinus tenderness and no frontal sinus tenderness. Left sinus exhibits no maxillary sinus tenderness and no frontal sinus tenderness.   Mouth/Throat: Uvula is midline, oropharynx is clear and moist and mucous membranes are normal. No trismus in the jaw. Normal dentition. No uvula swelling.   Eyes: Conjunctivae and lids are normal.   Neck: Trachea normal and phonation normal. Neck supple.   Cardiovascular: Normal rate, regular rhythm, normal heart sounds and normal pulses.   Pulmonary/Chest: Effort normal and breath sounds normal.   Abdominal: Normal appearance and bowel sounds are normal. Soft.   Musculoskeletal: Normal range of motion.         General: Swelling and tenderness present. No signs of injury. Normal range of motion.      Left lower leg: Edema present.      Comments: LLE + calf tenderness, mild erythema; swelling significant from knee to lateral malleolus   Faint but present palpable pedal pulse    Neurological: She is alert and oriented to person, place, and time. She exhibits normal muscle tone.   Skin: Skin is warm, dry and intact. Capillary refill takes less than 2 seconds.   Psychiatric: Her speech is normal and behavior is normal. Judgment and thought content normal.   Nursing note and vitals reviewed.    Assessment:     1. Edema of left lower extremity    2. Pain and swelling of left lower extremity      Xray as reviewed by me; minor arthritic  changes and soft tissue swelling; no evidence of erosion or fracture     X-Ray Foot Complete 3 view Left    Result Date: 6/10/2023  EXAM: XR FOOT COMPLETE 3 VIEW LEFT CLINICAL HISTORY: Left foot pain TECHNIQUE: Left foot, 3 views COMPARISON:  No studies are available for comparison. 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  Dorsal soft tissue swelling.  Calcaneal spur.  Mild  first MTP and interphalangeal degenerative joint disease.  Negative for fracture.      Degenerative changes. Finalized on: 6/10/2023 2:02 PM By:  SHREYA Hebert MD, MD [facility]# [id number]      2023-06-10 14:04:40.254    [facility]         Plan:       Edema of left lower extremity  -     US Lower Extremity Veins Left; Future  -     X-Ray Foot Complete 3 view Left; Future; Expected date: 06/10/2023    Pain and swelling of left lower extremity      Xray no findings to justify swelling of LLE in absence of trauma;    Referred to [facility] ER entrance for radiology Venous US LLE ; spoke with Tomasa fisher and she is expecting patient for outpt coordinated testing      Additional MDM:   Differential Diagnosis:   Other: The following diagnoses were also considered and will be evaluated: CHF, DVT, vascular insuffiency and Sprain/strain, Fracture. Patient has no obvious dvt risk factor history except for non traumatic acute swelling of LLE; hx of arthritis of L knee but denies pain or discomfort in knee; Denied chest pain or SOB;     Reviewed records with no noted prior hx of DVT or embolism                Patient Instructions   Rest activity ad boone  Elevate as often as possible   Gentle Range of motion exercises     Outpt US referral as soon as possible to rule out DVT-- Go to ER after this visit but do not check in for ER services, have them notify Radiology [facility], Tomasa fisher is expecting you     Any shortness of breath, chest pain or worsening leg pain go to local ER   Follow up with PCP or local ortho as needed    [facility] [phone]  Mon-Fri 8am-5pm

## 2023-06-10 NOTE — ED PROVIDER NOTES
SCRIBE #1 NOTE: I, Roxana Gilbert, am scribing for, and in the presence of, No att. providers found. I have scribed the entire note.       History     Chief Complaint   Patient presents with    Sent by U/C     Pt sent by urgent care to rule out DVT LLE, pt c/o tightness to LLE/ pain, mild redness noted in triage     Review of patient's allergies indicates:   Allergen Reactions    Amoxicillin Rash and Swelling     Rash with throat swelling    Codeine Palpitations, Rash and Swelling     Other reaction(s): Shortness of breath  Other reaction(s): Hives  Rash and throat swelling    Doxycycline Swelling     Other reaction(s): Rash    Hydrocodone Rash and Swelling    Iodine and iodide containing products Rash and Swelling    Lortab [hydrocodone-acetaminophen] Other (See Comments)     Other reaction(s): Hives  Other reaction(s): Difficulty breathing    Medrol [methylprednisolone] Anaphylaxis    Oxycodone Shortness Of Breath, Rash and Swelling    Oxycodone hcl-oxycodone-asa Rash and Swelling    Percodan filipe Shortness Of Breath    Tetracycline Swelling    Tetracyclines Rash and Swelling    Tramadol Shortness Of Breath, Rash and Swelling    Vibramycin [doxycycline calcium] Shortness Of Breath     Other reaction(s): Hives  Other reaction(s): Difficulty breathing    Augmentin [amoxicillin-pot clavulanate] Itching and Rash    Levaquin [levofloxacin] Hives    Sulfa (sulfonamide antibiotics) Itching and Rash    Percocet  [oxycodone-acetaminophen]      Other reaction(s): Shortness of breath    Shellfish containing products      Other reaction(s): Shortness of breath  Other reaction(s): Hives  Other reaction(s): Shortness of breath  Other reaction(s): Hives         History of Present Illness     [facility]    6/10/2023, 4:34 PM  History obtained from the patient      History of Present Illness: Monica Joy is a 62 y.o. female patient with a PMHx of thyroid disease who presents to the Emergency Department for evaluation of LLE swelling  which onset 2 weeks ago. Pt was sent by urgent care to rule out DVT. Symptoms are constant and moderate in severity. No mitigating or exacerbating factors reported. No associated sxs. Patient denies any Chest pain, SOB, dizziness, weakness, numbness, and all other sxs at this time. No prior Tx reported. No further complaints or concerns at this time.       Arrival mode: Personal vehicle      PCP: Karen Villafana DO        Past Medical History:  Past Medical History:   Diagnosis Date    Arthritis     Asthma 2013    Sleep apnea     Thyroid disease        Past Surgical History:  Past Surgical History:   Procedure Laterality Date    BONE GRAFT Right 2021    Procedure: BONE GRAFT;  Surgeon: Miguel Yoo MD;  Location: [facility];  Service: Orthopedics;  Laterality: Right;  bone graft to fill the defect in the distal radial epiphysis/Reduction of the four-corner area will be performed with bone graft and fusion     SECTION      COLONOSCOPY N/A 2019    Procedure: COLONOSCOPY;  Surgeon: Scout Rivera MD;  Location: [facility];  Service: Endoscopy;  Laterality: N/A;    FRACTURE SURGERY      HAND FUSION Right 2021    Procedure: FUSION, JOINT, HAND;  Surgeon: Miguel Yoo MD;  Location: [facility];  Service: Orthopedics;  Laterality: Right;  dip joint    JOINT REPLACEMENT      Novasure Endometrial Ablation  2016    OSTECTOMY Right 2021    Procedure: OSTECTOMY;  Surgeon: Miguel Yoo MD;  Location: [facility];  Service: Orthopedics;  Laterality: Right;  radial styloidectomy as well as scaphoid excision    THYROIDECTOMY, PARTIAL      TUBAL LIGATION      WRIST SURGERY           Family History:  Family History   Problem Relation Age of Onset    Atrial fibrillation Mother     Anuerysm Mother     Diabetes Father     Heart disease Father     Hyperlipidemia Father     Stroke Father     Kidney disease Father     Heart disease Brother     Diabetes Brother     Anuerysm  Brother     Breast cancer Neg Hx     Colon cancer Neg Hx     Ovarian cancer Neg Hx        Social History:  Social History     Tobacco Use    Smoking status: Never    Smokeless tobacco: Never   Substance and Sexual Activity    Alcohol use: No     Comment: on rare holidays    Drug use: No    Sexual activity: Yes     Partners: Male     Birth control/protection: None        Review of Systems     Review of Systems   Constitutional:  Negative for fever.   HENT:  Negative for sore throat.    Respiratory:  Negative for shortness of breath.    Cardiovascular:  Positive for leg swelling (LLE). Negative for chest pain.   Gastrointestinal:  Negative for nausea.   Genitourinary:  Negative for dysuria.   Musculoskeletal:  Negative for back pain.   Skin:  Negative for rash.   Neurological:  Negative for dizziness, weakness and numbness.   Hematological:  Does not bruise/bleed easily.   All other systems reviewed and are negative.     Physical Exam     Initial Vitals [06/10/23 1434]   BP Pulse Resp Temp SpO2   (!) 193/91 77 16 98.4 °F (36.9 °C) 96 %      MAP       --          Physical Exam  Nursing Notes and Vital Signs Reviewed.  Constitutional: Patient is in no acute distress. Well-developed and well-nourished.  Head: Atraumatic. Normocephalic.  Eyes: PERRL. EOM intact. Conjunctivae are not pale. No scleral icterus.  ENT: Mucous membranes are moist. Oropharynx is clear and symmetric.    Neck: Supple. Full ROM. No lymphadenopathy.  Cardiovascular: Regular rate. Regular rhythm. No murmurs, rubs, or gallops. Distal pulses are 2+ and symmetric.Good pulse in feet neurovascularly intact.   Pulmonary/Chest: No respiratory distress. Clear to auscultation bilaterally. No wheezing or rales.  Abdominal: Soft and non-distended.  There is no tenderness.  No rebound, guarding, or rigidity. Good bowel sounds.  Genitourinary: No CVA tenderness  Musculoskeletal: Moves all extremities. No obvious deformities. Non-pitting edema to left foot and  "distal leg, small bruise noted to distal left shin. Pulses equal and symmetric. No calf tenderness.  Skin: Warm and dry.  Neurological:  Alert, awake, and appropriate.  Normal speech.  No acute focal neurological deficits are appreciated.  Psychiatric: Normal affect. Good eye contact. Appropriate in content.     ED Course   Procedures  ED Vital Signs:  Vitals:    06/10/23 1434 06/10/23 1612 06/10/23 1616 06/10/23 1716   BP: (!) 193/91 (!) 187/98 (!) 161/75 (!) 146/82   Pulse: 77 81 67 68   Resp: 16 18 20 20   Temp: 98.4 °F (36.9 °C)      TempSrc: Oral      SpO2: 96% 97% 98% 97%   Weight: 113.9 kg (250 lb 15.9 oz)      Height: 5' 5" (1.651 m)          Abnormal Lab Results:  Labs Reviewed   CBC W/ AUTO DIFFERENTIAL - Abnormal; Notable for the following components:       Result Value    Immature Granulocytes 1.6 (*)     Immature Grans (Abs) 0.18 (*)     All other components within normal limits   COMPREHENSIVE METABOLIC PANEL - Abnormal; Notable for the following components:    CO2 21 (*)     All other components within normal limits        All Lab Results:  Results for orders placed or performed during the hospital encounter of 06/10/23   CBC auto differential   Result Value Ref Range    WBC 11.48 3.90 - 12.70 K/uL    RBC 4.80 4.00 - 5.40 M/uL    Hemoglobin 14.0 12.0 - 16.0 g/dL    Hematocrit 42.4 37.0 - 48.5 %    MCV 88 82 - 98 fL    MCH 29.2 27.0 - 31.0 pg    MCHC 33.0 32.0 - 36.0 g/dL    RDW 13.6 11.5 - 14.5 %    Platelets 206 150 - 450 K/uL    MPV 10.1 9.2 - 12.9 fL    Immature Granulocytes 1.6 (H) 0.0 - 0.5 %    Gran # (ANC) 7.7 1.8 - 7.7 K/uL    Immature Grans (Abs) 0.18 (H) 0.00 - 0.04 K/uL    Lymph # 2.6 1.0 - 4.8 K/uL    Mono # 0.7 0.3 - 1.0 K/uL    Eos # 0.2 0.0 - 0.5 K/uL    Baso # 0.10 0.00 - 0.20 K/uL    nRBC 0 0 /100 WBC    Gran % 66.8 38.0 - 73.0 %    Lymph % 22.6 18.0 - 48.0 %    Mono % 6.4 4.0 - 15.0 %    Eosinophil % 1.7 0.0 - 8.0 %    Basophil % 0.9 0.0 - 1.9 %    Differential Method Automated  "   Comprehensive metabolic panel   Result Value Ref Range    Sodium 139 136 - 145 mmol/L    Potassium 4.1 3.5 - 5.1 mmol/L    Chloride 108 95 - 110 mmol/L    CO2 21 (L) 23 - 29 mmol/L    Glucose 106 70 - 110 mg/dL    BUN 15 8 - 23 mg/dL    Creatinine 0.8 0.5 - 1.4 mg/dL    Calcium 9.6 8.7 - 10.5 mg/dL    Total Protein 7.3 6.0 - 8.4 g/dL    Albumin 3.7 3.5 - 5.2 g/dL    Total Bilirubin 0.3 0.1 - 1.0 mg/dL    Alkaline Phosphatase 101 55 - 135 U/L    AST 18 10 - 40 U/L    ALT 25 10 - 44 U/L    Anion Gap 10 8 - 16 mmol/L    eGFR >60 >60 mL/min/1.73 m^2        Imaging Results:  Imaging Results              US Lower Extremity Veins Left (Final result)  Result time 06/10/23 15:56:29      Final result by Nando Hebert MD (06/10/23 15:56:29)                   Impression:      Age indeterminate thrombosis of the left anterior tibial vein.      Electronically signed by: Nando Hebert MD  Date:    06/10/2023  Time:    15:56               Narrative:    EXAMINATION:  US LOWER EXTREMITY VEINS LEFT    CLINICAL HISTORY:  Other specified soft tissue disorders    FINDINGS:  Grayscale and color Doppler sonography was formed through the  left  lower extremity    Complete thrombosis of the left anterior tibial vein.  The remaining deep veins of the left leg are widely patent.                                            ED Discussion       4:52 PM: Reassessed pt at this time. Discussed with pt all pertinent ED information and results. Discussed pt dx and plan of tx. Gave pt all f/u and return to the ED instructions. All questions and concerns were addressed at this time. Pt expresses understanding of information and instructions, and is comfortable with plan to discharge. Pt is stable for discharge.    I discussed with patient and/or family/caretaker that evaluation in the ED does not suggest any emergent or life threatening medical conditions requiring immediate intervention beyond what was provided in the ED, and I believe patient is safe  for discharge.  Regardless, an unremarkable evaluation in the ED does not preclude the development or presence of a serious of life threatening condition. As such, patient was instructed to return immediately for any worsening or change in current symptoms.        Medical Decision Making:   Differential Diagnosis:   1. DVT  2. Cellulitis    Clinical Tests:   Lab Tests: Ordered and Reviewed  Radiological Study: Ordered and Reviewed  ED Management:  Presents with distal left leg pain and swelling intermittently for past several weeks, no trauma, US reviewed shows DVT in anterior tibial vein, no prior hx of DVT, denies CP or SOB, no indication for further admission or imaging, start on Eliquis outpatient, hematology referral placed.          ED Medication(s):  Medications   enoxaparin injection 120 mg (120 mg Subcutaneous Given 6/10/23 7712)       Discharge Medication List as of 6/10/2023  4:33 PM        START taking these medications    Details   apixaban (ELIQUIS DVT-PE TREAT 30D START) 5 mg (74 tabs) DsPk For the first 7 days take two 5 mg tablets twice daily.  After 7 days take one 5 mg tablet twice daily., Normal              Follow-up Information       Karen Villafana DO. Schedule an appointment as soon as possible for a visit in 2 days.    Specialty: Internal Medicine  Why: Return to the Emergency Room, If symptoms worsen  Contact information:  [id number] [place] [address] [address] [place] [address]  [phone]               [facility] HEMATOLOGY/ONCOLOGY. Schedule an appointment as soon as possible for a visit in 2 days.    Specialty: Hematology and Oncology  Why: Return to the Emergency Room, If symptoms worsen  Contact information:  [address] [facility]  [facility] [address]  [phone]                               Scribe Attestation:   Scribe #1: I performed the above scribed service and the documentation accurately describes the services I performed. I attest to the accuracy of the note.     Attending:    Physician Attestation Statement for Scribe #1: I, Roxana Gilbert MD, personally performed the services described in this documentation, as scribed by Betty Fink, in my presence, and it is both accurate and complete.           Clinical Impression       ICD-10-CM ICD-9-CM   1. Acute deep vein thrombosis (DVT) of tibial vein of left lower extremity  I82.442 453.42   2. Leg swelling  M79.89 729.81       Disposition:   Disposition: Discharged  Condition: Stable      Roxana Gilbert MD  06/10/23 173

## 2023-06-12 ENCOUNTER — TELEPHONE (OUTPATIENT)
Dept: INTERNAL MEDICINE | Facility: CLINIC | Age: 63
End: 2023-06-12
Payer: MEDICAID

## 2023-06-12 ENCOUNTER — TELEPHONE (OUTPATIENT)
Dept: HEMATOLOGY/ONCOLOGY | Facility: CLINIC | Age: 63
End: 2023-06-12
Payer: MEDICAID

## 2023-06-12 DIAGNOSIS — I82.442 ACUTE DVT OF LEFT TIBIAL VEIN: Primary | ICD-10-CM

## 2023-06-12 NOTE — TELEPHONE ENCOUNTER
Spoke to patient in reference to Hematology referral from Dr. Gilbert.  Appointment scheduled per patient's request next available.  Appointment notice via pt portal.

## 2023-06-15 ENCOUNTER — OFFICE VISIT (OUTPATIENT)
Dept: INTERNAL MEDICINE | Facility: CLINIC | Age: 63
End: 2023-06-15
Payer: MEDICAID

## 2023-06-15 VITALS
HEART RATE: 80 BPM | BODY MASS INDEX: 41.32 KG/M2 | RESPIRATION RATE: 20 BRPM | OXYGEN SATURATION: 97 % | SYSTOLIC BLOOD PRESSURE: 132 MMHG | HEIGHT: 65 IN | WEIGHT: 248 LBS | TEMPERATURE: 98 F | DIASTOLIC BLOOD PRESSURE: 80 MMHG

## 2023-06-15 DIAGNOSIS — M79.672 LEFT FOOT PAIN: ICD-10-CM

## 2023-06-15 DIAGNOSIS — M79.89 SWELLING OF LEFT FOOT: ICD-10-CM

## 2023-06-15 DIAGNOSIS — I82.442 ACUTE DVT OF LEFT TIBIAL VEIN: Primary | ICD-10-CM

## 2023-06-15 PROCEDURE — 99999 PR PBB SHADOW E&M-EST. PATIENT-LVL IV: ICD-10-PCS | Mod: PBBFAC,,, | Performed by: INTERNAL MEDICINE

## 2023-06-15 PROCEDURE — 3079F PR MOST RECENT DIASTOLIC BLOOD PRESSURE 80-89 MM HG: ICD-10-PCS | Mod: CPTII,,, | Performed by: INTERNAL MEDICINE

## 2023-06-15 PROCEDURE — 99214 PR OFFICE/OUTPT VISIT, EST, LEVL IV, 30-39 MIN: ICD-10-PCS | Mod: S$PBB,,, | Performed by: INTERNAL MEDICINE

## 2023-06-15 PROCEDURE — 99999 PR PBB SHADOW E&M-EST. PATIENT-LVL IV: CPT | Mod: PBBFAC,,, | Performed by: INTERNAL MEDICINE

## 2023-06-15 PROCEDURE — 3075F SYST BP GE 130 - 139MM HG: CPT | Mod: CPTII,,, | Performed by: INTERNAL MEDICINE

## 2023-06-15 PROCEDURE — 3008F PR BODY MASS INDEX (BMI) DOCUMENTED: ICD-10-PCS | Mod: CPTII,,, | Performed by: INTERNAL MEDICINE

## 2023-06-15 PROCEDURE — 3075F PR MOST RECENT SYSTOLIC BLOOD PRESS GE 130-139MM HG: ICD-10-PCS | Mod: CPTII,,, | Performed by: INTERNAL MEDICINE

## 2023-06-15 PROCEDURE — 1160F PR REVIEW ALL MEDS BY PRESCRIBER/CLIN PHARMACIST DOCUMENTED: ICD-10-PCS | Mod: CPTII,,, | Performed by: INTERNAL MEDICINE

## 2023-06-15 PROCEDURE — 99214 OFFICE O/P EST MOD 30 MIN: CPT | Mod: PBBFAC | Performed by: INTERNAL MEDICINE

## 2023-06-15 PROCEDURE — 1159F MED LIST DOCD IN RCRD: CPT | Mod: CPTII,,, | Performed by: INTERNAL MEDICINE

## 2023-06-15 PROCEDURE — 1159F PR MEDICATION LIST DOCUMENTED IN MEDICAL RECORD: ICD-10-PCS | Mod: CPTII,,, | Performed by: INTERNAL MEDICINE

## 2023-06-15 PROCEDURE — 3008F BODY MASS INDEX DOCD: CPT | Mod: CPTII,,, | Performed by: INTERNAL MEDICINE

## 2023-06-15 PROCEDURE — 1160F RVW MEDS BY RX/DR IN RCRD: CPT | Mod: CPTII,,, | Performed by: INTERNAL MEDICINE

## 2023-06-15 PROCEDURE — 3079F DIAST BP 80-89 MM HG: CPT | Mod: CPTII,,, | Performed by: INTERNAL MEDICINE

## 2023-06-15 PROCEDURE — 99214 OFFICE O/P EST MOD 30 MIN: CPT | Mod: S$PBB,,, | Performed by: INTERNAL MEDICINE

## 2023-06-15 NOTE — PROGRESS NOTES
Monica Joy  62 y.o. White female  [id number]    Chief Complaint:  Chief Complaint   Patient presents with    Hospital Follow Up     ED F/U DVT       History of Present Illness:  Presents to the clinic for an ER visit f/u. She went to the ER on 6/10 with left leg pain and swelling and was diagnosed with an acute DVT in the left anterior tibial vein. She was started on apixaban and has seen vascular specialty.   She continues to have left foot swelling and pain, but it has improved.     History:  Past Medical History:   Diagnosis Date    Arthritis     Asthma 9/6/2013    Sleep apnea     Thyroid disease        Medications:  Current Outpatient Medications on File Prior to Visit   Medication Sig Dispense Refill    albuterol (PROVENTIL/VENTOLIN HFA) 90 mcg/actuation inhaler INHALE 2 PUFFS INTO THE LUNGS EVERY 4 HOURS AS NEEDED FOR WHEEZING OR SHORTNESS OF BREATH 18 g 11    apixaban (ELIQUIS DVT-PE TREAT 30D START) 5 mg (74 tabs) DsPk For the first 7 days take two 5 mg tablets twice daily.  After 7 days take one 5 mg tablet twice daily. 1 each 0    ibuprofen (ADVIL,MOTRIN) 600 MG tablet TAKE 1 TABLET(600 MG) BY MOUTH THREE TIMES DAILY FOR 90 DOSES 90 tablet 6    levocetirizine (XYZAL) 5 MG tablet TAKE 1 TABLET(5 MG) BY MOUTH EVERY EVENING 30 tablet 5    mv-mn-iron-FA-Ca carb-vit K (WOMEN'S MULTIVITAMIN) 18 mg-400 mcg- 500 mg-50 mcg Tab Take by mouth.         Allergies:  Review of patient's allergies indicates:   Allergen Reactions    Amoxicillin Rash and Swelling     Rash with throat swelling    Codeine Palpitations, Rash and Swelling     Other reaction(s): Shortness of breath  Other reaction(s): Hives  Rash and throat swelling    Doxycycline Swelling     Other reaction(s): Rash    Hydrocodone Rash and Swelling    Iodine and iodide containing products Rash and Swelling    Lortab [hydrocodone-acetaminophen] Other (See Comments)     Other reaction(s): Hives  Other reaction(s): Difficulty breathing    Medrol  [methylprednisolone] Anaphylaxis    Oxycodone Shortness Of Breath, Rash and Swelling    Oxycodone hcl-oxycodone-asa Rash and Swelling    Percodan filipe Shortness Of Breath    Tetracycline Swelling    Tetracyclines Rash and Swelling    Tramadol Shortness Of Breath, Rash and Swelling    Vibramycin [doxycycline calcium] Shortness Of Breath     Other reaction(s): Hives  Other reaction(s): Difficulty breathing    Augmentin [amoxicillin-pot clavulanate] Itching and Rash    Levaquin [levofloxacin] Hives    Sulfa (sulfonamide antibiotics) Itching and Rash    Percocet  [oxycodone-acetaminophen]      Other reaction(s): Shortness of breath    Shellfish containing products      Other reaction(s): Shortness of breath  Other reaction(s): Hives  Other reaction(s): Shortness of breath  Other reaction(s): Hives       Review of Systems   Constitutional:  Negative for fever.   Cardiovascular:  Positive for leg swelling.   Gastrointestinal:  Negative for blood in stool.   Genitourinary:  Negative for hematuria.   Musculoskeletal:  Positive for joint pain.     Exam:  Vitals:    06/15/23 0855   BP: 132/80   Pulse: 80   Resp: 20   Temp: 97.7 °F (36.5 °C)     Weight: 112.5 kg (248 lb 0.3 oz)   Body mass index is 41.27 kg/m².      Physical Exam  Vitals reviewed.   Constitutional:       General: She is not in acute distress.     Appearance: She is well-developed. She is not ill-appearing.   Cardiovascular:      Rate and Rhythm: Normal rate.   Pulmonary:      Effort: Pulmonary effort is normal. No respiratory distress.   Musculoskeletal:         General: Swelling (left foot) present.   Skin:     General: Skin is warm and dry.   Neurological:      Mental Status: She is alert and oriented to person, place, and time.   Psychiatric:         Behavior: Behavior normal.         Thought Content: Thought content normal.         Judgment: Judgment normal.       Assessment:  The primary encounter diagnosis was Acute DVT of left tibial vein. Diagnoses of  Swelling of left foot and Left foot pain were also pertinent to this visit.    Plan:  Acute DVT of left tibial vein  -     continue apixaban  -     f/u with vascular specialist    Swelling of left foot  -     recommend compression stocking    Left foot pain  -     recommend Tylenol as needed  -     advised to avoid NSAID's    RTC in September for annual visit.

## 2023-06-20 NOTE — PROGRESS NOTES
HEMATOLOGY / ONCOLOGY   CLINIC NOTE     ONCOLOGICAL HISTORY:     Diagnosis:  - DVT     Treatment History:  -     Current Treatment:   - Eliquis    Subjective:       Chief Complaint: Deep Vein Thrombosis      HPI    Monica Joy  62 y.o.  female with past medical history significant for asthma, obstructive sleep apnea on CPAP refer for management of the distal DVT.     She went to the ER on 6/10/2023 with left leg pain and swelling for few day and was diagnosed with an acute DVT in the left anterior tibial vein. She was started on apixaban and has seen vascular specialty.  Patient denies any provoking factors like treated with any hormonal therapy, surgeries, traumas, long travel, infections or any vaccination.  Denies any previous history of blood clots or any family history of blood clots, other than mother being diagnosed with a PE at old age.  Denies any bleeding or bruising      Interval History:     Patient was having swelling and pain in her lower left leg for few days before she was diagnosed with PE.  She had been started on Eliquis with some improvement but still gets some intermittent swelling in her leg.  According to her she was recently seen by vascular surgery and was told that the blood clot has resolved     Past Medical History:   Diagnosis Date    Arthritis     Asthma 2013    Sleep apnea     Thyroid disease       Past Surgical History:   Procedure Laterality Date    BONE GRAFT Right 2021    Procedure: BONE GRAFT;  Surgeon: Miguel Yoo MD;  Location: [facility];  Service: Orthopedics;  Laterality: Right;  bone graft to fill the defect in the distal radial epiphysis/Reduction of the four-corner area will be performed with bone graft and fusion     SECTION      COLONOSCOPY N/A 2019    Procedure: COLONOSCOPY;  Surgeon: Scout Rivera MD;  Location: [facility];  Service: Endoscopy;  Laterality: N/A;    FRACTURE SURGERY      HAND FUSION Right 2021     Procedure: FUSION, JOINT, HAND;  Surgeon: Miguel Yoo MD;  Location: [facility] OR;  Service: Orthopedics;  Laterality: Right;  dip joint    JOINT REPLACEMENT      Novasure Endometrial Ablation  04/13/2016    OSTECTOMY Right 03/19/2021    Procedure: OSTECTOMY;  Surgeon: Miguel Yoo MD;  Location: [facility] OR;  Service: Orthopedics;  Laterality: Right;  radial styloidectomy as well as scaphoid excision    THYROIDECTOMY, PARTIAL      TUBAL LIGATION      WRIST SURGERY       Social History     Socioeconomic History    Marital status:     Number of children: 2   Tobacco Use    Smoking status: Never    Smokeless tobacco: Never   Substance and Sexual Activity    Alcohol use: No     Comment: on rare holidays    Drug use: No    Sexual activity: Yes     Partners: Male     Birth control/protection: None     Social Determinants of Health     Financial Resource Strain: Low Risk     Difficulty of Paying Living Expenses: Not very hard   Food Insecurity: No Food Insecurity    Worried About Running Out of Food in the Last Year: Never true    Ran Out of Food in the Last Year: Never true   Transportation Needs: No Transportation Needs    Lack of Transportation (Medical): No    Lack of Transportation (Non-Medical): No   Physical Activity: Insufficiently Active    Days of Exercise per Week: 1 day    Minutes of Exercise per Session: 10 min   Stress: No Stress Concern Present    Feeling of Stress : Only a little   Social Connections: Unknown    Frequency of Communication with Friends and Family: More than three times a week    Frequency of Social Gatherings with Friends and Family: Once a week    Active Member of Clubs or Organizations: Yes    Attends Club or Organization Meetings: More than 4 times per year    Marital Status:    Housing Stability: Low Risk     Unable to Pay for Housing in the Last Year: No    Number of Places Lived in the Last Year: 1    Unstable Housing in the Last Year: No      Family History    Problem Relation Age of Onset    Atrial fibrillation Mother     Anuerysm Mother     Diabetes Father     Heart disease Father     Hyperlipidemia Father     Stroke Father     Kidney disease Father     Heart disease Brother     Diabetes Brother     Anuerysm Brother     Breast cancer Neg Hx     Colon cancer Neg Hx     Ovarian cancer Neg Hx       Review of patient's allergies indicates:   Allergen Reactions    Amoxicillin Rash and Swelling     Rash with throat swelling    Codeine Palpitations, Rash and Swelling     Other reaction(s): Shortness of breath  Other reaction(s): Hives  Rash and throat swelling    Doxycycline Swelling     Other reaction(s): Rash    Hydrocodone Rash and Swelling    Iodine and iodide containing products Rash and Swelling    Lortab [hydrocodone-acetaminophen] Other (See Comments)     Other reaction(s): Hives  Other reaction(s): Difficulty breathing    Medrol [methylprednisolone] Anaphylaxis    Oxycodone Shortness Of Breath, Rash and Swelling    Oxycodone hcl-oxycodone-asa Rash and Swelling    Percodan filipe Shortness Of Breath    Tetracycline Swelling    Tetracyclines Rash and Swelling    Tramadol Shortness Of Breath, Rash and Swelling    Vibramycin [doxycycline calcium] Shortness Of Breath     Other reaction(s): Hives  Other reaction(s): Difficulty breathing    Augmentin [amoxicillin-pot clavulanate] Itching and Rash    Levaquin [levofloxacin] Hives    Sulfa (sulfonamide antibiotics) Itching and Rash    Percocet  [oxycodone-acetaminophen]      Other reaction(s): Shortness of breath    Shellfish containing products      Other reaction(s): Shortness of breath  Other reaction(s): Hives  Other reaction(s): Shortness of breath  Other reaction(s): Hives      Review of Systems   Constitutional: Negative.  Negative for activity change, chills, fatigue and fever.   HENT: Negative.     Eyes: Negative.    Respiratory:  Negative for cough and shortness of breath.    Cardiovascular:  Negative for chest pain  and leg swelling.   Gastrointestinal:  Negative for constipation, diarrhea, nausea and vomiting.   Endocrine: Negative.    Genitourinary: Negative.    Musculoskeletal:  Negative for arthralgias and myalgias.        Left lower leg pain and swelling   Integumentary:  Negative.   Allergic/Immunologic: Negative.    Neurological:  Negative for light-headedness, numbness and headaches.   Hematological: Negative.    Psychiatric/Behavioral: Negative.         Objective:        Vitals:    06/21/23 1454   BP: 116/76   Pulse: 89   Temp: 97.6 °F (36.4 °C)        Physical Exam  Constitutional:       General: She is not in acute distress.     Appearance: She is well-developed. She is not ill-appearing.   HENT:      Head: Normocephalic and atraumatic.      Mouth/Throat:      Mouth: Mucous membranes are moist.   Eyes:      Extraocular Movements: Extraocular movements intact.      Conjunctiva/sclera: Conjunctivae normal.   Cardiovascular:      Rate and Rhythm: Normal rate and regular rhythm.      Heart sounds: Normal heart sounds.   Pulmonary:      Effort: Pulmonary effort is normal. No respiratory distress.      Breath sounds: Normal breath sounds. No wheezing.   Abdominal:      General: Bowel sounds are normal. There is no distension.      Palpations: Abdomen is soft.      Tenderness: There is no abdominal tenderness.   Musculoskeletal:         General: Normal range of motion.      Cervical back: Normal range of motion and neck supple.   Skin:     General: Skin is warm.   Neurological:      General: No focal deficit present.      Mental Status: She is alert and oriented to person, place, and time. Mental status is at baseline.      Cranial Nerves: No cranial nerve deficit.   Psychiatric:         Mood and Affect: Mood normal.         LABS / IMAGING      - 06/10/2023 U/S: Complete thrombosis of the left anterior tibial vein.  The remaining deep veins of the left leg are widely patent.             Assessment:     ECOG SCORE    0 -  Fully active-able to carry on all pre-disease performance without restriction       LLE DVT:   - 06/10/2023 U/S: Complete thrombosis of the left anterior tibial vein.  The remaining deep veins of the left leg are widely patent.  - Started on eliquis     Plan:         - patient diagnosed with DVT of the distal vein left lower extremity which was unprovoked but symptomatic, thus will treat the patient with anticoagulation with Eliquis for a minimum of 3 months.  After this we will have thrombophilia workup done along with checking for D-dimers.  If everything is negative then can consider discontinuing anticoagulation & discuss with the patient as the risk of recurrence is 1.9% in the 1st year for unprovoked distal DVT which is significantly lower than recurrence in unprovoked proximal vein DVT.  - thrombophilia workup before next visit along with checking for D-dimers  - return to the clinic in 3 months          Med Onc Chart Routing      Follow up with physician 3 months.   Follow up with [facility]    Infusion scheduling note    Injection scheduling note    Labs   Scheduling:  Preferred lab:  Lab interval:  Labs before next visit:  Factor 5 Leiden, prothrombin gene, protein S, protein C, antithrombin 3, DRVVT, cardiolipin, beta 2 glycoprotein, D-dimer   Imaging    Pharmacy appointment    Other referrals              The patient was seen, interviewed and examined. Pertinent lab and radiology studies were reviewed. Pt instructed to call should develop concerning signs/symptoms or have further questions.       Portions of the record may have been created with voice recognition software. Occasional wrong-word or sound-a-like substitutions may have occurred due to the inherent limitations of voice recognition software. Read the chart carefully and recognize, using context, where substitutions have occurred.    Laura Rodriguez MD  Hematology / Oncology

## 2023-06-21 ENCOUNTER — OFFICE VISIT (OUTPATIENT)
Dept: HEMATOLOGY/ONCOLOGY | Facility: CLINIC | Age: 63
End: 2023-06-21
Payer: MEDICAID

## 2023-06-21 VITALS
SYSTOLIC BLOOD PRESSURE: 116 MMHG | WEIGHT: 248 LBS | HEIGHT: 65 IN | HEART RATE: 89 BPM | OXYGEN SATURATION: 98 % | BODY MASS INDEX: 41.32 KG/M2 | DIASTOLIC BLOOD PRESSURE: 76 MMHG | TEMPERATURE: 98 F

## 2023-06-21 DIAGNOSIS — I82.442 ACUTE DEEP VEIN THROMBOSIS (DVT) OF TIBIAL VEIN OF LEFT LOWER EXTREMITY: ICD-10-CM

## 2023-06-21 DIAGNOSIS — I82.442 ACUTE DEEP VEIN THROMBOSIS (DVT) OF LEFT TIBIAL VEIN: ICD-10-CM

## 2023-06-21 PROCEDURE — 3078F PR MOST RECENT DIASTOLIC BLOOD PRESSURE < 80 MM HG: ICD-10-PCS | Mod: CPTII,,, | Performed by: INTERNAL MEDICINE

## 2023-06-21 PROCEDURE — 99999 PR PBB SHADOW E&M-EST. PATIENT-LVL III: CPT | Mod: PBBFAC,,, | Performed by: INTERNAL MEDICINE

## 2023-06-21 PROCEDURE — 3074F PR MOST RECENT SYSTOLIC BLOOD PRESSURE < 130 MM HG: ICD-10-PCS | Mod: CPTII,,, | Performed by: INTERNAL MEDICINE

## 2023-06-21 PROCEDURE — 99999 PR PBB SHADOW E&M-EST. PATIENT-LVL III: ICD-10-PCS | Mod: PBBFAC,,, | Performed by: INTERNAL MEDICINE

## 2023-06-21 PROCEDURE — 99214 OFFICE O/P EST MOD 30 MIN: CPT | Mod: S$PBB,,, | Performed by: INTERNAL MEDICINE

## 2023-06-21 PROCEDURE — 3008F BODY MASS INDEX DOCD: CPT | Mod: CPTII,,, | Performed by: INTERNAL MEDICINE

## 2023-06-21 PROCEDURE — 3078F DIAST BP <80 MM HG: CPT | Mod: CPTII,,, | Performed by: INTERNAL MEDICINE

## 2023-06-21 PROCEDURE — 3074F SYST BP LT 130 MM HG: CPT | Mod: CPTII,,, | Performed by: INTERNAL MEDICINE

## 2023-06-21 PROCEDURE — 99213 OFFICE O/P EST LOW 20 MIN: CPT | Mod: PBBFAC | Performed by: INTERNAL MEDICINE

## 2023-06-21 PROCEDURE — 3008F PR BODY MASS INDEX (BMI) DOCUMENTED: ICD-10-PCS | Mod: CPTII,,, | Performed by: INTERNAL MEDICINE

## 2023-06-21 PROCEDURE — 99214 PR OFFICE/OUTPT VISIT, EST, LEVL IV, 30-39 MIN: ICD-10-PCS | Mod: S$PBB,,, | Performed by: INTERNAL MEDICINE

## 2023-06-29 ENCOUNTER — PATIENT MESSAGE (OUTPATIENT)
Dept: INTERNAL MEDICINE | Facility: CLINIC | Age: 63
End: 2023-06-29
Payer: MEDICAID

## 2023-06-29 DIAGNOSIS — M19.90 ARTHRITIS: Primary | ICD-10-CM

## 2023-07-05 RX ORDER — IBUPROFEN 600 MG/1
600 TABLET ORAL DAILY PRN
Qty: 20 TABLET | Refills: 2 | Status: SHIPPED | OUTPATIENT
Start: 2023-07-05 | End: 2024-01-03

## 2023-07-07 ENCOUNTER — PATIENT MESSAGE (OUTPATIENT)
Dept: INFECTIOUS DISEASES | Facility: CLINIC | Age: 63
End: 2023-07-07
Payer: MEDICAID

## 2023-07-09 ENCOUNTER — PATIENT MESSAGE (OUTPATIENT)
Dept: INTERNAL MEDICINE | Facility: CLINIC | Age: 63
End: 2023-07-09
Payer: MEDICAID

## 2023-07-09 DIAGNOSIS — R82.90 ABNORMAL URINE ODOR: Primary | ICD-10-CM

## 2023-07-10 ENCOUNTER — TELEPHONE (OUTPATIENT)
Dept: INTERNAL MEDICINE | Facility: CLINIC | Age: 63
End: 2023-07-10
Payer: MEDICAID

## 2023-07-10 NOTE — TELEPHONE ENCOUNTER
----- Message from Ferny Quintero sent at 7/10/2023 12:33 PM CDT -----  Contact: Monica Anderson is needing a call back in regards to scheduling an appt for a swollen left foot that is red. Please give her a call back at [phone]

## 2023-07-11 ENCOUNTER — PATIENT MESSAGE (OUTPATIENT)
Dept: INTERNAL MEDICINE | Facility: CLINIC | Age: 63
End: 2023-07-11

## 2023-07-11 ENCOUNTER — LAB VISIT (OUTPATIENT)
Dept: LAB | Facility: HOSPITAL | Age: 63
End: 2023-07-11
Attending: INTERNAL MEDICINE
Payer: MEDICAID

## 2023-07-11 ENCOUNTER — OFFICE VISIT (OUTPATIENT)
Dept: INTERNAL MEDICINE | Facility: CLINIC | Age: 63
End: 2023-07-11
Payer: MEDICAID

## 2023-07-11 VITALS
WEIGHT: 247.56 LBS | HEIGHT: 65 IN | DIASTOLIC BLOOD PRESSURE: 84 MMHG | SYSTOLIC BLOOD PRESSURE: 126 MMHG | TEMPERATURE: 98 F | OXYGEN SATURATION: 95 % | HEART RATE: 77 BPM | RESPIRATION RATE: 20 BRPM | BODY MASS INDEX: 41.25 KG/M2

## 2023-07-11 DIAGNOSIS — L03.116 CELLULITIS OF FOOT, LEFT: ICD-10-CM

## 2023-07-11 DIAGNOSIS — R82.90 ABNORMAL URINE ODOR: ICD-10-CM

## 2023-07-11 DIAGNOSIS — M79.89 SWELLING OF LEFT FOOT: ICD-10-CM

## 2023-07-11 DIAGNOSIS — Z12.31 ENCOUNTER FOR SCREENING MAMMOGRAM FOR MALIGNANT NEOPLASM OF BREAST: ICD-10-CM

## 2023-07-11 DIAGNOSIS — M79.89 SWELLING OF LEFT FOOT: Primary | ICD-10-CM

## 2023-07-11 LAB
ANION GAP SERPL CALC-SCNC: 11 MMOL/L (ref 8–16)
BACTERIA #/AREA URNS HPF: ABNORMAL /HPF
BASOPHILS # BLD AUTO: 0.02 K/UL (ref 0–0.2)
BASOPHILS NFR BLD: 0.2 % (ref 0–1.9)
BILIRUB UR QL STRIP: NEGATIVE
BUN SERPL-MCNC: 8 MG/DL (ref 8–23)
CALCIUM SERPL-MCNC: 9.7 MG/DL (ref 8.7–10.5)
CHLORIDE SERPL-SCNC: 107 MMOL/L (ref 95–110)
CLARITY UR: CLEAR
CO2 SERPL-SCNC: 23 MMOL/L (ref 23–29)
COLOR UR: ABNORMAL
CREAT SERPL-MCNC: 0.7 MG/DL (ref 0.5–1.4)
DIFFERENTIAL METHOD: NORMAL
EOSINOPHIL # BLD AUTO: 0.2 K/UL (ref 0–0.5)
EOSINOPHIL NFR BLD: 1.8 % (ref 0–8)
ERYTHROCYTE [DISTWIDTH] IN BLOOD BY AUTOMATED COUNT: 13.7 % (ref 11.5–14.5)
EST. GFR  (NO RACE VARIABLE): >60 ML/MIN/1.73 M^2
GLUCOSE SERPL-MCNC: 84 MG/DL (ref 70–110)
GLUCOSE UR QL STRIP: NEGATIVE
HCT VFR BLD AUTO: 42 % (ref 37–48.5)
HGB BLD-MCNC: 13.6 G/DL (ref 12–16)
HGB UR QL STRIP: NEGATIVE
IMM GRANULOCYTES # BLD AUTO: 0.02 K/UL (ref 0–0.04)
IMM GRANULOCYTES NFR BLD AUTO: 0.2 % (ref 0–0.5)
KETONES UR QL STRIP: NEGATIVE
LEUKOCYTE ESTERASE UR QL STRIP: ABNORMAL
LYMPHOCYTES # BLD AUTO: 2.2 K/UL (ref 1–4.8)
LYMPHOCYTES NFR BLD: 25.7 % (ref 18–48)
MCH RBC QN AUTO: 28.6 PG (ref 27–31)
MCHC RBC AUTO-ENTMCNC: 32.4 G/DL (ref 32–36)
MCV RBC AUTO: 88 FL (ref 82–98)
MICROSCOPIC COMMENT: ABNORMAL
MONOCYTES # BLD AUTO: 0.6 K/UL (ref 0.3–1)
MONOCYTES NFR BLD: 6.7 % (ref 4–15)
NEUTROPHILS # BLD AUTO: 5.5 K/UL (ref 1.8–7.7)
NEUTROPHILS NFR BLD: 65.4 % (ref 38–73)
NITRITE UR QL STRIP: NEGATIVE
NRBC BLD-RTO: 0 /100 WBC
PH UR STRIP: 7 [PH] (ref 5–8)
PLATELET # BLD AUTO: 220 K/UL (ref 150–450)
PMV BLD AUTO: 10.8 FL (ref 9.2–12.9)
POTASSIUM SERPL-SCNC: 3.8 MMOL/L (ref 3.5–5.1)
PROT UR QL STRIP: NEGATIVE
RBC # BLD AUTO: 4.76 M/UL (ref 4–5.4)
RBC #/AREA URNS HPF: 2 /HPF (ref 0–4)
SODIUM SERPL-SCNC: 141 MMOL/L (ref 136–145)
SP GR UR STRIP: <=1.005 (ref 1–1.03)
SQUAMOUS #/AREA URNS HPF: 1 /HPF
URN SPEC COLLECT METH UR: ABNORMAL
UROBILINOGEN UR STRIP-ACNC: NEGATIVE EU/DL
WBC # BLD AUTO: 8.37 K/UL (ref 3.9–12.7)
WBC #/AREA URNS HPF: 11 /HPF (ref 0–5)
WBC CLUMPS URNS QL MICRO: ABNORMAL

## 2023-07-11 PROCEDURE — 1160F RVW MEDS BY RX/DR IN RCRD: CPT | Mod: CPTII,,, | Performed by: INTERNAL MEDICINE

## 2023-07-11 PROCEDURE — 80048 BASIC METABOLIC PNL TOTAL CA: CPT | Performed by: INTERNAL MEDICINE

## 2023-07-11 PROCEDURE — 3008F BODY MASS INDEX DOCD: CPT | Mod: CPTII,,, | Performed by: INTERNAL MEDICINE

## 2023-07-11 PROCEDURE — 3074F SYST BP LT 130 MM HG: CPT | Mod: CPTII,,, | Performed by: INTERNAL MEDICINE

## 2023-07-11 PROCEDURE — 99999 PR PBB SHADOW E&M-EST. PATIENT-LVL IV: CPT | Mod: PBBFAC,,, | Performed by: INTERNAL MEDICINE

## 2023-07-11 PROCEDURE — 83880 ASSAY OF NATRIURETIC PEPTIDE: CPT | Performed by: INTERNAL MEDICINE

## 2023-07-11 PROCEDURE — 1160F PR REVIEW ALL MEDS BY PRESCRIBER/CLIN PHARMACIST DOCUMENTED: ICD-10-PCS | Mod: CPTII,,, | Performed by: INTERNAL MEDICINE

## 2023-07-11 PROCEDURE — 99214 OFFICE O/P EST MOD 30 MIN: CPT | Mod: S$PBB,,, | Performed by: INTERNAL MEDICINE

## 2023-07-11 PROCEDURE — 1159F PR MEDICATION LIST DOCUMENTED IN MEDICAL RECORD: ICD-10-PCS | Mod: CPTII,,, | Performed by: INTERNAL MEDICINE

## 2023-07-11 PROCEDURE — 99214 PR OFFICE/OUTPT VISIT, EST, LEVL IV, 30-39 MIN: ICD-10-PCS | Mod: S$PBB,,, | Performed by: INTERNAL MEDICINE

## 2023-07-11 PROCEDURE — 3079F PR MOST RECENT DIASTOLIC BLOOD PRESSURE 80-89 MM HG: ICD-10-PCS | Mod: CPTII,,, | Performed by: INTERNAL MEDICINE

## 2023-07-11 PROCEDURE — 1159F MED LIST DOCD IN RCRD: CPT | Mod: CPTII,,, | Performed by: INTERNAL MEDICINE

## 2023-07-11 PROCEDURE — 81000 URINALYSIS NONAUTO W/SCOPE: CPT | Performed by: INTERNAL MEDICINE

## 2023-07-11 PROCEDURE — 99999 PR PBB SHADOW E&M-EST. PATIENT-LVL IV: ICD-10-PCS | Mod: PBBFAC,,, | Performed by: INTERNAL MEDICINE

## 2023-07-11 PROCEDURE — 85025 COMPLETE CBC W/AUTO DIFF WBC: CPT | Performed by: INTERNAL MEDICINE

## 2023-07-11 PROCEDURE — 3074F PR MOST RECENT SYSTOLIC BLOOD PRESSURE < 130 MM HG: ICD-10-PCS | Mod: CPTII,,, | Performed by: INTERNAL MEDICINE

## 2023-07-11 PROCEDURE — 3079F DIAST BP 80-89 MM HG: CPT | Mod: CPTII,,, | Performed by: INTERNAL MEDICINE

## 2023-07-11 PROCEDURE — 3008F PR BODY MASS INDEX (BMI) DOCUMENTED: ICD-10-PCS | Mod: CPTII,,, | Performed by: INTERNAL MEDICINE

## 2023-07-11 PROCEDURE — 99214 OFFICE O/P EST MOD 30 MIN: CPT | Mod: PBBFAC | Performed by: INTERNAL MEDICINE

## 2023-07-11 PROCEDURE — 36415 COLL VENOUS BLD VENIPUNCTURE: CPT | Performed by: INTERNAL MEDICINE

## 2023-07-11 RX ORDER — CLINDAMYCIN HYDROCHLORIDE 300 MG/1
300 CAPSULE ORAL EVERY 8 HOURS
Qty: 21 CAPSULE | Refills: 0 | Status: SHIPPED | OUTPATIENT
Start: 2023-07-11 | End: 2023-07-17 | Stop reason: SDUPTHER

## 2023-07-11 NOTE — PROGRESS NOTES
Monica M Benita  62 y.o. White female  [id number]    Chief Complaint:  Chief Complaint   Patient presents with    Foot Swelling       History of Present Illness:  Presents to the clinic with complaint of swelling in her left foot. She was diagnosed with a DVT in the LLE last month but has since had two negative scans done by her vascular specialist. She has been taking off apixaban.   She also began to notice redness on the top and side of her foot. She is concerned about  having an infection. Initially she was treated for an infection but when the DVT was found, she stopped the antibiotic.   She is also concerned about her urine having an odor.      History:  Past Medical History:   Diagnosis Date    Arthritis     Asthma 9/6/2013    Sleep apnea     Thyroid disease        Medications:  Current Outpatient Medications on File Prior to Visit   Medication Sig Dispense Refill    albuterol (PROVENTIL/VENTOLIN HFA) 90 mcg/actuation inhaler INHALE 2 PUFFS INTO THE LUNGS EVERY 4 HOURS AS NEEDED FOR WHEEZING OR SHORTNESS OF BREATH 18 g 11    apixaban (ELIQUIS DVT-PE TREAT 30D START) 5 mg (74 tabs) DsPk For the first 7 days take two 5 mg tablets twice daily.  After 7 days take one 5 mg tablet twice daily. 1 each 0    ibuprofen (ADVIL,MOTRIN) 600 MG tablet Take 1 tablet (600 mg total) by mouth daily as needed for Pain. 20 tablet 2    levocetirizine (XYZAL) 5 MG tablet TAKE 1 TABLET(5 MG) BY MOUTH EVERY EVENING 30 tablet 5    mv-mn-iron-FA-Ca carb-vit K (WOMEN'S MULTIVITAMIN) 18 mg-400 mcg- 500 mg-50 mcg Tab Take by mouth.       Current Facility-Administered Medications on File Prior to Visit   Medication Dose Route Frequency Provider Last Rate Last Admin    EPINEPHrine (EPIPEN) 0.3 mg/0.3 mL pen injection 0.3 mg  0.3 mg Intramuscular PRN Leon De La Torre MD        lactated ringers infusion   Intravenous On Call Procedure ARTHUR Henriquez        nozaseptin (NOZIN) nasal    Each Nostril On Call Procedure Mary Anne BROUSSARD  ARTHUR Johnson   Given at 02/25/21 0613    sodium chloride 0.9% flush 10 mL  10 mL Intravenous PRN Leon De La Torre MD           Allergies:  Review of patient's allergies indicates:   Allergen Reactions    Amoxicillin Rash and Swelling     Rash with throat swelling    Codeine Palpitations, Rash and Swelling     Other reaction(s): Shortness of breath  Other reaction(s): Hives  Rash and throat swelling    Doxycycline Swelling     Other reaction(s): Rash    Hydrocodone Rash and Swelling    Iodine and iodide containing products Rash and Swelling    Lortab [hydrocodone-acetaminophen] Other (See Comments)     Other reaction(s): Hives  Other reaction(s): Difficulty breathing    Medrol [methylprednisolone] Anaphylaxis    Oxycodone Shortness Of Breath, Rash and Swelling    Oxycodone hcl-oxycodone-asa Rash and Swelling    Percodan filipe Shortness Of Breath    Tetracycline Swelling    Tetracyclines Rash and Swelling    Tramadol Shortness Of Breath, Rash and Swelling    Vibramycin [doxycycline calcium] Shortness Of Breath     Other reaction(s): Hives  Other reaction(s): Difficulty breathing    Augmentin [amoxicillin-pot clavulanate] Itching and Rash    Levaquin [levofloxacin] Hives    Sulfa (sulfonamide antibiotics) Itching and Rash    Percocet  [oxycodone-acetaminophen]      Other reaction(s): Shortness of breath    Shellfish containing products      Other reaction(s): Shortness of breath  Other reaction(s): Hives  Other reaction(s): Shortness of breath  Other reaction(s): Hives       Review of Systems   Constitutional:  Negative for fever.   Respiratory:  Positive for shortness of breath.    Cardiovascular:  Positive for leg swelling.   Genitourinary:  Positive for dysuria.   Musculoskeletal:  Positive for joint pain.     Exam:  Vitals:    07/11/23 1018   BP: 126/84   Pulse: 77   Resp: 20   Temp: 98 °F (36.7 °C)     Weight: 112.3 kg (247 lb 9.2 oz)   Body mass index is 41.2 kg/m².      Physical Exam  Vitals reviewed.    Constitutional:       General: She is not in acute distress.     Appearance: She is well-developed. She is not ill-appearing.   Cardiovascular:      Rate and Rhythm: Normal rate.   Pulmonary:      Effort: Pulmonary effort is normal. No respiratory distress.   Musculoskeletal:      Left lower leg: Edema present.   Skin:     General: Skin is warm and dry.      Findings: Erythema (left foot) present.   Neurological:      Mental Status: She is alert and oriented to person, place, and time.   Psychiatric:         Behavior: Behavior normal.         Thought Content: Thought content normal.         Judgment: Judgment normal.       Assessment:  The primary encounter diagnosis was Encounter for screening mammogram for malignant neoplasm of breast. Diagnoses of Abnormal urine odor, Swelling of left foot, and Cellulitis of foot, left were also pertinent to this visit.    Plan:  Swelling of left foot  -     B-TYPE NATRIURETIC PEPTIDE; Future; Expected date: 07/11/2023  -     Basic Metabolic Panel; Future  -     CBC Auto Differential; Future; Expected date: 07/11/2023    Cellulitis of foot, left  -     clindamycin (CLEOCIN) 300 MG capsule; Take 1 capsule (300 mg total) by mouth every 8 (eight) hours. for 7 days  Dispense: 21 capsule; Refill: 0    Abnormal urine odor  -     Urinalysis    Encounter for screening mammogram for malignant neoplasm of breast  -     Mammo Digital Screening Bilat w/ Rufino; Future; Expected date: 09/07/2023    Labs today.     RTC if symptoms worsen or fail to resolve with treatment.

## 2023-07-12 LAB — BNP SERPL-MCNC: 19 PG/ML (ref 0–99)

## 2023-07-16 DIAGNOSIS — L03.116 CELLULITIS OF FOOT, LEFT: ICD-10-CM

## 2023-07-17 RX ORDER — CLINDAMYCIN HYDROCHLORIDE 300 MG/1
300 CAPSULE ORAL EVERY 8 HOURS
Qty: 21 CAPSULE | Refills: 0 | OUTPATIENT
Start: 2023-07-17 | End: 2023-07-24

## 2023-07-17 RX ORDER — CLINDAMYCIN HYDROCHLORIDE 300 MG/1
300 CAPSULE ORAL EVERY 8 HOURS
Qty: 21 CAPSULE | Refills: 0 | Status: SHIPPED | OUTPATIENT
Start: 2023-07-17 | End: 2023-07-24

## 2023-07-24 ENCOUNTER — OFFICE VISIT (OUTPATIENT)
Dept: CARDIOLOGY | Facility: CLINIC | Age: 63
End: 2023-07-24
Payer: MEDICAID

## 2023-07-24 ENCOUNTER — TELEPHONE (OUTPATIENT)
Dept: CARDIOLOGY | Facility: CLINIC | Age: 63
End: 2023-07-24
Payer: MEDICAID

## 2023-07-24 ENCOUNTER — NURSE TRIAGE (OUTPATIENT)
Dept: ADMINISTRATIVE | Facility: CLINIC | Age: 63
End: 2023-07-24
Payer: MEDICAID

## 2023-07-24 ENCOUNTER — HOSPITAL ENCOUNTER (OUTPATIENT)
Dept: CARDIOLOGY | Facility: HOSPITAL | Age: 63
Discharge: HOME | End: 2023-07-24
Payer: MEDICAID

## 2023-07-24 ENCOUNTER — HOSPITAL ENCOUNTER (OUTPATIENT)
Dept: CARDIOLOGY | Facility: HOSPITAL | Age: 63
Discharge: HOME | End: 2023-07-24
Attending: PHYSICIAN ASSISTANT
Payer: MEDICAID

## 2023-07-24 VITALS
DIASTOLIC BLOOD PRESSURE: 82 MMHG | SYSTOLIC BLOOD PRESSURE: 134 MMHG | WEIGHT: 250 LBS | BODY MASS INDEX: 41.65 KG/M2 | HEIGHT: 65 IN

## 2023-07-24 VITALS
BODY MASS INDEX: 41.73 KG/M2 | DIASTOLIC BLOOD PRESSURE: 82 MMHG | WEIGHT: 250.44 LBS | OXYGEN SATURATION: 95 % | HEIGHT: 65 IN | SYSTOLIC BLOOD PRESSURE: 134 MMHG | HEART RATE: 81 BPM

## 2023-07-24 DIAGNOSIS — R00.2 PALPITATIONS: ICD-10-CM

## 2023-07-24 DIAGNOSIS — I82.442 ACUTE DEEP VEIN THROMBOSIS (DVT) OF LEFT TIBIAL VEIN: ICD-10-CM

## 2023-07-24 DIAGNOSIS — R06.02 SHORTNESS OF BREATH: ICD-10-CM

## 2023-07-24 DIAGNOSIS — M19.90 OSTEOARTHRITIS, UNSPECIFIED OSTEOARTHRITIS TYPE, UNSPECIFIED SITE: ICD-10-CM

## 2023-07-24 DIAGNOSIS — R79.89 ELEVATED D-DIMER: Primary | ICD-10-CM

## 2023-07-24 DIAGNOSIS — R06.02 SHORTNESS OF BREATH: Primary | ICD-10-CM

## 2023-07-24 DIAGNOSIS — R60.0 EDEMA OF LEFT LOWER EXTREMITY: ICD-10-CM

## 2023-07-24 DIAGNOSIS — G47.33 OSA ON CPAP: Chronic | ICD-10-CM

## 2023-07-24 DIAGNOSIS — R06.09 DOE (DYSPNEA ON EXERTION): ICD-10-CM

## 2023-07-24 DIAGNOSIS — E66.01 MORBID OBESITY WITH BMI OF 40.0-44.9, ADULT: ICD-10-CM

## 2023-07-24 PROCEDURE — 3008F PR BODY MASS INDEX (BMI) DOCUMENTED: ICD-10-PCS | Mod: CPTII,ICN,, | Performed by: PHYSICIAN ASSISTANT

## 2023-07-24 PROCEDURE — 3008F BODY MASS INDEX DOCD: CPT | Mod: CPTII,ICN,, | Performed by: PHYSICIAN ASSISTANT

## 2023-07-24 PROCEDURE — 99999 PR PBB SHADOW E&M-EST. PATIENT-LVL III: ICD-10-PCS | Mod: PBBFAC,,, | Performed by: PHYSICIAN ASSISTANT

## 2023-07-24 PROCEDURE — 99999 PR PBB SHADOW E&M-EST. PATIENT-LVL III: CPT | Mod: PBBFAC,,, | Performed by: PHYSICIAN ASSISTANT

## 2023-07-24 PROCEDURE — 93971 EXTREMITY STUDY: CPT | Mod: LT

## 2023-07-24 PROCEDURE — 99214 OFFICE O/P EST MOD 30 MIN: CPT | Mod: S$PBB,ICN,, | Performed by: PHYSICIAN ASSISTANT

## 2023-07-24 PROCEDURE — 99214 PR OFFICE/OUTPT VISIT, EST, LEVL IV, 30-39 MIN: ICD-10-PCS | Mod: S$PBB,ICN,, | Performed by: PHYSICIAN ASSISTANT

## 2023-07-24 PROCEDURE — 93010 EKG 12-LEAD: ICD-10-PCS | Mod: ,,, | Performed by: INTERNAL MEDICINE

## 2023-07-24 PROCEDURE — 93971 CV US DOPPLER VENOUS LEG LEFT (CUPID ONLY): ICD-10-PCS | Mod: 26,LT,, | Performed by: INTERNAL MEDICINE

## 2023-07-24 PROCEDURE — 93005 ELECTROCARDIOGRAM TRACING: CPT

## 2023-07-24 PROCEDURE — 93971 EXTREMITY STUDY: CPT | Mod: 26,LT,, | Performed by: INTERNAL MEDICINE

## 2023-07-24 PROCEDURE — 3075F SYST BP GE 130 - 139MM HG: CPT | Mod: CPTII,ICN,, | Performed by: PHYSICIAN ASSISTANT

## 2023-07-24 PROCEDURE — 3079F PR MOST RECENT DIASTOLIC BLOOD PRESSURE 80-89 MM HG: ICD-10-PCS | Mod: CPTII,ICN,, | Performed by: PHYSICIAN ASSISTANT

## 2023-07-24 PROCEDURE — 99213 OFFICE O/P EST LOW 20 MIN: CPT | Mod: PBBFAC,25 | Performed by: PHYSICIAN ASSISTANT

## 2023-07-24 PROCEDURE — 3079F DIAST BP 80-89 MM HG: CPT | Mod: CPTII,ICN,, | Performed by: PHYSICIAN ASSISTANT

## 2023-07-24 PROCEDURE — 3075F PR MOST RECENT SYSTOLIC BLOOD PRESS GE 130-139MM HG: ICD-10-PCS | Mod: CPTII,ICN,, | Performed by: PHYSICIAN ASSISTANT

## 2023-07-24 PROCEDURE — 1159F PR MEDICATION LIST DOCUMENTED IN MEDICAL RECORD: ICD-10-PCS | Mod: CPTII,ICN,, | Performed by: PHYSICIAN ASSISTANT

## 2023-07-24 PROCEDURE — 93010 ELECTROCARDIOGRAM REPORT: CPT | Mod: ,,, | Performed by: INTERNAL MEDICINE

## 2023-07-24 PROCEDURE — 1159F MED LIST DOCD IN RCRD: CPT | Mod: CPTII,ICN,, | Performed by: PHYSICIAN ASSISTANT

## 2023-07-24 NOTE — TELEPHONE ENCOUNTER
Spoke to patient she stated that she need to be seen today for swelling Sob and pap. Made a appt for 2:00 with the PA.     ----- Message from Nadia Hernandez sent at 7/24/2023  8:42 AM CDT -----  Regarding: Appt  Contact: [phone]  Patient Monica is calling. Patient is having swelling in left foot, heart Palp, SOB. Patient would like to be seen. Please call patient at [phone]

## 2023-07-24 NOTE — PROGRESS NOTES
"Subjective:   Patient ID:  Monica Joy is a 62 y.o. female who presents for follow-up of leg swelling, SOB, palpitations    HPI  Ms. Joy is a 62 year old female patient whose current medical conditions include obesity and ? DVT (previously on Eliquis) who presents today for follow-up. Patient returns today and states she is still having issues with LLE swelling up to her mid shin area.  Associated symptoms include erythema to dorsal surface and pain/tightness from the swelling. She does not some improvement with elevation. She has tried a course of steroids and is currently on abx with minimal improvement. She has tried compression socks but did not feel like they helped. In addition, she has also noticed some increased SOB above her baseline over the past two weeks. Worse with exertion. Chronically sleeps inclined. States she feels like she is "full of fluid". Denies any nelson chest pain heaviness, or tightness. Does admit to intermittent episodes of palpitations/racing heartbeat. She reports compliance with her medications. Uses CPAP nightly. Previously had negative stress echo 10/21 which showed normal EF.    EKG today shows incomplete RBBB, cannot rule out anterior infarct (seen on prior EKG's). Patient does have strong familial history of CAD (brother with MI in his 30's).      Recent labs from PCP reviewed. BNP WNL. No significant anemia.     Reviewed records/notes from vascular. No DVT identified on scans-thus Eliquis was stopped.     Review of Systems   Constitutional: Positive for malaise/fatigue. Negative for chills, decreased appetite and fever.   HENT:  Negative for congestion, hoarse voice and sore throat.    Eyes:  Negative for blurred vision and discharge.   Cardiovascular:  Positive for dyspnea on exertion and leg swelling. Negative for chest pain, claudication, cyanosis, irregular heartbeat, near-syncope, orthopnea, palpitations and paroxysmal nocturnal dyspnea.   Respiratory:  Positive for " "shortness of breath. Negative for cough, hemoptysis, snoring, sputum production and wheezing.    Endocrine: Negative for cold intolerance and heat intolerance.   Hematologic/Lymphatic: Negative for bleeding problem. Does not bruise/bleed easily.   Skin:  Negative for rash.   Musculoskeletal:  Positive for arthritis and joint pain. Negative for back pain, joint swelling, muscle cramps, muscle weakness and myalgias.   Gastrointestinal:  Negative for abdominal pain, constipation, diarrhea, heartburn, melena and nausea.   Genitourinary:  Negative for hematuria.   Neurological:  Negative for dizziness, focal weakness, headaches, light-headedness, loss of balance, numbness, paresthesias, seizures and weakness.   Psychiatric/Behavioral:  Negative for memory loss. The patient does not have insomnia.    Allergic/Immunologic: Negative for hives.       /82 (BP Location: Left arm, Patient Position: Sitting, BP Method: Large (Manual))   Pulse 81   Ht 5' 5" (1.651 m)   Wt 113.6 kg (250 lb 7.1 oz)   SpO2 95%   BMI 41.68 kg/m²     Objective:   Physical Exam  Vitals and nursing note reviewed.   Constitutional:       General: She is not in acute distress.     Appearance: Normal appearance. She is well-developed. She is not diaphoretic.   HENT:      Head: Normocephalic and atraumatic.   Eyes:      General:         Right eye: No discharge.         Left eye: No discharge.      Pupils: Pupils are equal, round, and reactive to light.   Neck:      Thyroid: No thyromegaly.      Vascular: No JVD.      Trachea: No tracheal deviation.   Cardiovascular:      Rate and Rhythm: Normal rate and regular rhythm.      Heart sounds: Normal heart sounds, S1 normal and S2 normal. No murmur heard.  Pulmonary:      Effort: Pulmonary effort is normal. No respiratory distress.      Breath sounds: Normal breath sounds. No wheezing or rales.   Abdominal:      General: There is no distension.      Palpations: Abdomen is soft.      Tenderness: There " is no rebound.   Musculoskeletal:      Cervical back: Neck supple.      Right lower leg: Edema present.      Left lower leg: Edema present.      Comments: LLE > RLE    No palpable cord, tenderness   Skin:     General: Skin is warm and dry.      Findings: No erythema.   Neurological:      General: No focal deficit present.      Mental Status: She is alert and oriented to person, place, and time.   Psychiatric:         Mood and Affect: Mood normal.         Behavior: Behavior normal.         Thought Content: Thought content normal.     Stress Echo Results 8/21  Summary    The test was stopped because the patient experienced fatigue.  There were no arrhythmias during stress.  The patient's exercise capacity was normal.  The ECG portion of this study is negative for myocardial ischemia.  With normal right ventricular systolic function.  Normal systolic function.  The estimated ejection fraction is 60%.  Normal left ventricular diastolic function.  Normal central venous pressure (3 mmHg).  The estimated PA systolic pressure is 33 mmHg.  The stress echo portion of this study is negative for myocardial ischemia.    Lab Results   Component Value Date    WBC 8.37 07/11/2023    HGB 13.6 07/11/2023    HCT 42.0 07/11/2023    MCV 88 07/11/2023     07/11/2023         Chemistry        Component Value Date/Time     07/11/2023 1142    K 3.8 07/11/2023 1142     07/11/2023 1142    CO2 23 07/11/2023 1142    BUN 8 07/11/2023 1142    CREATININE 0.7 07/11/2023 1142    GLU 84 07/11/2023 1142        Component Value Date/Time    CALCIUM 9.7 07/11/2023 1142    ALKPHOS 101 06/10/2023 1513    AST 18 06/10/2023 1513    ALT 25 06/10/2023 1513    BILITOT 0.3 06/10/2023 1513    ESTGFRAFRICA >60 07/13/2021 1149    EGFRNONAA >60 07/13/2021 1149            Assessment:      1. Edema of left lower extremity    2. Morbid obesity with BMI of 40.0-44.9, adult    3. Osteoarthritis, unspecified osteoarthritis type, unspecified site    4.  KAM on CPAP    5. Acute deep vein thrombosis (DVT) of left tibial vein    6. DICKSON (dyspnea on exertion)    7. Palpitations      Patient presents for f/u. Still having LLE edema, noticeable on exam. No real improvement with abx/steroids. Elevation does help. Concerned about DVT given conflicting U/S reports, will check repeat venous U/S today. Recent BNP WNL. Needs updated echo as well as Holter monitor. Check d-dimer. Suspect she has component of venous insufficiency and will need diuretic therapy.   Plan:   -D-dimer, venous U/S today with phone review  -Holter monitor, echo  -Continue same meds in interim  -Elevate legs, low salt diet  -Continue abx as prescribed by PCP  -RTC next week with Dr. Larson    ADDENDUM:    D-dimer positive. Patient phoned yesterday, ED advised for PE rule out. She declined, requested OP workup. In light of her risk factors and ? History of DV T, will proceed with V/Q scan today given significant iodine/steroid allergy.

## 2023-07-24 NOTE — TELEPHONE ENCOUNTER
Pt calling stating that she has had swelling to bilat legs but especially the L side and was told she had cellulitis but feels despite medication it is still swelling almost up to the knee. States she is also having feeling winded and heart beating funny at times. Agreed to triage and then while going through triage got a call from her provider and states they made her an appointment for today and she didn't need to go through triage with us anymore. Advised if she would like to go through triage at any time please call us back. verbalized understanding. Denies any further questions or concerns at this time, advised to call back if they have any that come up. Advised pt to call back with any other concerns or worsening symptoms. Verbalized understanding and will route message to provider.     Reason for Disposition   Caller has cancelled the call before the first contact    Additional Information   Negative: SEVERE difficulty breathing (e.g., struggling for each breath, speaks in single words, pulse > 120)   Negative: Breathing stopped and hasn't returned   Negative: Choking on something   Negative: Bluish (or gray) lips or face   Negative: Difficult to awaken or acting confused (e.g., disoriented, slurred speech)    Protocols used: Breathing Difficulty-A-OH, No Contact or Duplicate Contact Call-A-OH

## 2023-07-25 ENCOUNTER — TELEPHONE (OUTPATIENT)
Dept: CARDIOLOGY | Facility: HOSPITAL | Age: 63
End: 2023-07-25
Payer: MEDICAID

## 2023-07-25 NOTE — TELEPHONE ENCOUNTER
Attempted to contact pt with u/s results. No answer, LVM    --- ARTHUR Gallego 07/25/23 02:47 PM ---  Please phone patient. U/S reviewed, no evidence of DVT.

## 2023-07-26 ENCOUNTER — HOSPITAL ENCOUNTER (OUTPATIENT)
Dept: CARDIOLOGY | Facility: HOSPITAL | Age: 63
Discharge: HOME | End: 2023-07-26
Attending: PHYSICIAN ASSISTANT
Payer: MEDICAID

## 2023-07-26 ENCOUNTER — PATIENT MESSAGE (OUTPATIENT)
Dept: INTERNAL MEDICINE | Facility: CLINIC | Age: 63
End: 2023-07-26
Payer: MEDICAID

## 2023-07-26 ENCOUNTER — HOSPITAL ENCOUNTER (OUTPATIENT)
Dept: RADIOLOGY | Facility: HOSPITAL | Age: 63
Discharge: HOME | End: 2023-07-26
Attending: PHYSICIAN ASSISTANT
Payer: MEDICAID

## 2023-07-26 VITALS — HEIGHT: 65 IN | WEIGHT: 250 LBS | BODY MASS INDEX: 41.65 KG/M2

## 2023-07-26 DIAGNOSIS — R06.09 DOE (DYSPNEA ON EXERTION): ICD-10-CM

## 2023-07-26 DIAGNOSIS — M79.89 FOOT SWELLING: ICD-10-CM

## 2023-07-26 DIAGNOSIS — R00.2 PALPITATIONS: ICD-10-CM

## 2023-07-26 DIAGNOSIS — R60.0 EDEMA OF LEFT LOWER EXTREMITY: ICD-10-CM

## 2023-07-26 DIAGNOSIS — R79.89 ELEVATED D-DIMER: ICD-10-CM

## 2023-07-26 DIAGNOSIS — M79.673 PAIN OF FOOT, UNSPECIFIED LATERALITY: Primary | ICD-10-CM

## 2023-07-26 DIAGNOSIS — I82.442 ACUTE DEEP VEIN THROMBOSIS (DVT) OF LEFT TIBIAL VEIN: ICD-10-CM

## 2023-07-26 DIAGNOSIS — E66.01 MORBID OBESITY WITH BMI OF 40.0-44.9, ADULT: ICD-10-CM

## 2023-07-26 DIAGNOSIS — M10.9 GOUT, ARTHRITIS: ICD-10-CM

## 2023-07-26 LAB
AORTIC ROOT ANNULUS: 3.06 CM
ASCENDING AORTA: 2.83 CM
AV INDEX (PROSTH): 0.74
AV MEAN GRADIENT: 5 MMHG
AV PEAK GRADIENT: 9 MMHG
AV VALVE AREA: 2.44 CM2
AV VELOCITY RATIO: 0.59
BSA FOR ECHO PROCEDURE: 2.28 M2
CV ECHO LV RWT: 0.47 CM
DOP CALC AO PEAK VEL: 1.48 M/S
DOP CALC AO VTI: 26.1 CM
DOP CALC LVOT AREA: 3.3 CM2
DOP CALC LVOT DIAMETER: 2.05 CM
DOP CALC LVOT PEAK VEL: 0.88 M/S
DOP CALC LVOT STROKE VOLUME: 63.67 CM3
DOP CALC RVOT PEAK VEL: 0.74 M/S
DOP CALC RVOT VTI: 13.4 CM
DOP CALCLVOT PEAK VEL VTI: 19.3 CM
E WAVE DECELERATION TIME: 253.25 MSEC
E/A RATIO: 0.77
E/E' RATIO: 5.4 M/S
ECHO LV POSTERIOR WALL: 1.14 CM (ref 0.6–1.1)
EJECTION FRACTION: 60 %
FRACTIONAL SHORTENING: 36 % (ref 28–44)
INTERVENTRICULAR SEPTUM: 1.24 CM (ref 0.6–1.1)
IVC DIAMETER: 1.57 CM
IVRT: 72.31 MSEC
LA MAJOR: 4.84 CM
LA MINOR: 4.81 CM
LA WIDTH: 3.2 CM
LEFT ATRIUM SIZE: 3.68 CM
LEFT ATRIUM VOLUME INDEX: 22.3 ML/M2
LEFT ATRIUM VOLUME: 48.3 CM3
LEFT INTERNAL DIMENSION IN SYSTOLE: 3.12 CM (ref 2.1–4)
LEFT VENTRICLE DIASTOLIC VOLUME INDEX: 51.7 ML/M2
LEFT VENTRICLE DIASTOLIC VOLUME: 112.19 ML
LEFT VENTRICLE MASS INDEX: 103 G/M2
LEFT VENTRICLE SYSTOLIC VOLUME INDEX: 17.7 ML/M2
LEFT VENTRICLE SYSTOLIC VOLUME: 38.38 ML
LEFT VENTRICULAR INTERNAL DIMENSION IN DIASTOLE: 4.89 CM (ref 3.5–6)
LEFT VENTRICULAR MASS: 223 G
LV LATERAL E/E' RATIO: 4.91 M/S
LV SEPTAL E/E' RATIO: 6 M/S
LVOT MG: 2.32 MMHG
LVOT MV: 0.75 CM/S
MV PEAK A VEL: 0.7 M/S
MV PEAK E VEL: 0.54 M/S
MV STENOSIS PRESSURE HALF TIME: 73.44 MS
MV VALVE AREA P 1/2 METHOD: 3 CM2
PISA TR MAX VEL: 2.12 M/S
PV MEAN GRADIENT: 1.3 MMHG
RA MAJOR: 3.5 CM
RA PRESSURE: 8 MMHG
RA WIDTH: 3 CM
STJ: 2.96 CM
TDI LATERAL: 0.11 M/S
TDI SEPTAL: 0.09 M/S
TDI: 0.1 M/S
TR MAX PG: 18 MMHG
TR MEAN GRADIENT: 17 MMHG
TRICUSPID ANNULAR PLANE SYSTOLIC EXCURSION: 1.9 CM
TV REST PULMONARY ARTERY PRESSURE: 26 MMHG

## 2023-07-26 PROCEDURE — 93306 TTE W/DOPPLER COMPLETE: CPT | Mod: 26,,, | Performed by: INTERNAL MEDICINE

## 2023-07-26 PROCEDURE — A9567 TECHNETIUM TC-99M AEROSOL: HCPCS

## 2023-07-26 PROCEDURE — 93225 XTRNL ECG REC<48 HRS REC: CPT

## 2023-07-26 PROCEDURE — 93306 TTE W/DOPPLER COMPLETE: CPT

## 2023-07-26 PROCEDURE — 93227 XTRNL ECG REC<48 HR R&I: CPT | Mod: ,,, | Performed by: INTERNAL MEDICINE

## 2023-07-26 PROCEDURE — 78582 NM LUNG VENTILATION AND PERFUSION IMAGING: ICD-10-PCS | Mod: 26,,, | Performed by: RADIOLOGY

## 2023-07-26 PROCEDURE — 93306 ECHO (CUPID ONLY): ICD-10-PCS | Mod: 26,,, | Performed by: INTERNAL MEDICINE

## 2023-07-26 PROCEDURE — 78582 LUNG VENTILAT&PERFUS IMAGING: CPT | Mod: 26,,, | Performed by: RADIOLOGY

## 2023-07-26 PROCEDURE — 93227 HOLTER MONITOR - 48 HOUR (CUPID ONLY): ICD-10-PCS | Mod: ,,, | Performed by: INTERNAL MEDICINE

## 2023-07-26 NOTE — TELEPHONE ENCOUNTER
Contact pt and informed her that V/Q scan reviewed, low probability of PE. Echo reviewed and showed normal heart strength/function. Will await Holter results. Pt vu w/o q/c        --- ARTHUR Gallego 07/26/23 10:48 AM ---  Please phone patient. V/Q scan reviewed, low probability of PE.    Echo reviewed and showed normal heart strength/function.    Will await Holter results.    Thanks

## 2023-07-26 NOTE — TELEPHONE ENCOUNTER
Please phone patient. V/Q scan reviewed, low probability of PE.    Echo reviewed and showed normal heart strength/function.    Will await Holter results.    Thanks

## 2023-07-27 ENCOUNTER — LAB VISIT (OUTPATIENT)
Dept: LAB | Facility: HOSPITAL | Age: 63
End: 2023-07-27
Attending: INTERNAL MEDICINE
Payer: MEDICAID

## 2023-07-27 DIAGNOSIS — M79.673 PAIN OF FOOT, UNSPECIFIED LATERALITY: ICD-10-CM

## 2023-07-27 DIAGNOSIS — M79.89 FOOT SWELLING: ICD-10-CM

## 2023-07-27 LAB — URATE SERPL-MCNC: 7.9 MG/DL (ref 2.4–5.7)

## 2023-07-27 PROCEDURE — 84550 ASSAY OF BLOOD/URIC ACID: CPT | Performed by: INTERNAL MEDICINE

## 2023-07-27 PROCEDURE — 36415 COLL VENOUS BLD VENIPUNCTURE: CPT | Performed by: INTERNAL MEDICINE

## 2023-07-28 ENCOUNTER — TELEPHONE (OUTPATIENT)
Dept: INTERNAL MEDICINE | Facility: CLINIC | Age: 63
End: 2023-07-28
Payer: MEDICAID

## 2023-07-28 ENCOUNTER — TELEPHONE (OUTPATIENT)
Dept: CARDIOLOGY | Facility: CLINIC | Age: 63
End: 2023-07-28
Payer: MEDICAID

## 2023-07-28 ENCOUNTER — PATIENT MESSAGE (OUTPATIENT)
Dept: INTERNAL MEDICINE | Facility: CLINIC | Age: 63
End: 2023-07-28
Payer: MEDICAID

## 2023-07-28 LAB
OHS CV EVENT MONITOR DAY: 0
OHS CV HOLTER LENGTH DECIMAL HOURS: 47.98
OHS CV HOLTER LENGTH HOURS: 47
OHS CV HOLTER LENGTH MINUTES: 59
OHS CV HOLTER SINUS AVERAGE HR: 85
OHS CV HOLTER SINUS MAX HR: 115
OHS CV HOLTER SINUS MIN HR: 53

## 2023-07-28 RX ORDER — COLCHICINE 0.6 MG/1
TABLET ORAL
Qty: 6 TABLET | Refills: 0 | Status: SHIPPED | OUTPATIENT
Start: 2023-07-28 | End: 2023-09-11

## 2023-07-28 NOTE — TELEPHONE ENCOUNTER
Contacted pt and informed her Holter reviewed, occasional skipped beats (PACs and PVCs) but nothing of significance. Continue same meds/mgmt and f/u with Dr. Larson as scheduled. Pt vu w/o q/c.       --- ARTHUR Gallego 07/28/23 02:42 PM ---  Please phone patient. Holter reviewed, occasional skipped beats (PACs and PVCs) but nothing of significance.    Continue same meds/mgmt and f/u with Dr. Larson as scheduled.    Thanks

## 2023-07-28 NOTE — TELEPHONE ENCOUNTER
Patient is requesting medication for her gout. States he leg are swollen and its painful. She states she know if nothing is being sent today she would have to go the weekend dealing with this issue. Can you please address?

## 2023-07-28 NOTE — TELEPHONE ENCOUNTER
----- Message from Nikita Dahl sent at 7/28/2023 12:15 PM CDT -----  Contact: self  Pt is asking for an return call in reference to seeing if she can get medication for gout she was diagnosed with , please call back at .[phone] Thx CJ

## 2023-07-28 NOTE — TELEPHONE ENCOUNTER
Please phone patient. Holter reviewed, occasional skipped beats (PACs and PVCs) but nothing of significance.    Continue same meds/mgmt and f/u with Dr. Larson as scheduled.    Thanks

## 2023-08-02 ENCOUNTER — PATIENT MESSAGE (OUTPATIENT)
Dept: INTERNAL MEDICINE | Facility: CLINIC | Age: 63
End: 2023-08-02
Payer: MEDICAID

## 2023-08-02 DIAGNOSIS — M10.9 GOUT, ARTHRITIS: Primary | ICD-10-CM

## 2023-08-02 RX ORDER — ALLOPURINOL 100 MG/1
100 TABLET ORAL DAILY
Qty: 30 TABLET | Refills: 2 | Status: SHIPPED | OUTPATIENT
Start: 2023-08-02 | End: 2023-08-24

## 2023-08-07 ENCOUNTER — OFFICE VISIT (OUTPATIENT)
Dept: CARDIOLOGY | Facility: CLINIC | Age: 63
End: 2023-08-07
Payer: MEDICAID

## 2023-08-07 VITALS
SYSTOLIC BLOOD PRESSURE: 120 MMHG | HEART RATE: 76 BPM | BODY MASS INDEX: 41.86 KG/M2 | DIASTOLIC BLOOD PRESSURE: 80 MMHG | WEIGHT: 251.56 LBS | OXYGEN SATURATION: 96 %

## 2023-08-07 DIAGNOSIS — G47.33 OBSTRUCTIVE SLEEP APNEA: ICD-10-CM

## 2023-08-07 DIAGNOSIS — Z01.818 PRE-OP TESTING: ICD-10-CM

## 2023-08-07 DIAGNOSIS — E66.01 MORBID OBESITY WITH BMI OF 40.0-44.9, ADULT: ICD-10-CM

## 2023-08-07 DIAGNOSIS — R79.89 ELEVATED D-DIMER: ICD-10-CM

## 2023-08-07 DIAGNOSIS — G47.30 SLEEP DISORDER BREATHING: ICD-10-CM

## 2023-08-07 DIAGNOSIS — R42 DIZZINESS: ICD-10-CM

## 2023-08-07 DIAGNOSIS — R60.0 EDEMA OF LEFT LOWER EXTREMITY: Primary | ICD-10-CM

## 2023-08-07 DIAGNOSIS — M19.90 OSTEOARTHRITIS, UNSPECIFIED OSTEOARTHRITIS TYPE, UNSPECIFIED SITE: ICD-10-CM

## 2023-08-07 DIAGNOSIS — R00.2 HEART PALPITATIONS: ICD-10-CM

## 2023-08-07 DIAGNOSIS — I82.442 ACUTE DEEP VEIN THROMBOSIS (DVT) OF LEFT TIBIAL VEIN: ICD-10-CM

## 2023-08-07 DIAGNOSIS — R00.2 PALPITATIONS: ICD-10-CM

## 2023-08-07 DIAGNOSIS — R06.09 DOE (DYSPNEA ON EXERTION): ICD-10-CM

## 2023-08-07 DIAGNOSIS — R79.89 ELEVATED TROPONIN LEVEL: ICD-10-CM

## 2023-08-07 DIAGNOSIS — G47.33 OSA ON CPAP: ICD-10-CM

## 2023-08-07 DIAGNOSIS — R07.89 CHEST PRESSURE: ICD-10-CM

## 2023-08-07 DIAGNOSIS — I73.9 CLAUDICATION OF BOTH LOWER EXTREMITIES: ICD-10-CM

## 2023-08-07 DIAGNOSIS — R06.02 SHORTNESS OF BREATH: ICD-10-CM

## 2023-08-07 PROCEDURE — 1159F MED LIST DOCD IN RCRD: CPT | Mod: CPTII,,, | Performed by: INTERNAL MEDICINE

## 2023-08-07 PROCEDURE — 99999 PR PBB SHADOW E&M-EST. PATIENT-LVL III: CPT | Mod: PBBFAC,,, | Performed by: INTERNAL MEDICINE

## 2023-08-07 PROCEDURE — 1159F PR MEDICATION LIST DOCUMENTED IN MEDICAL RECORD: ICD-10-PCS | Mod: CPTII,,, | Performed by: INTERNAL MEDICINE

## 2023-08-07 PROCEDURE — 3079F DIAST BP 80-89 MM HG: CPT | Mod: CPTII,,, | Performed by: INTERNAL MEDICINE

## 2023-08-07 PROCEDURE — 3008F PR BODY MASS INDEX (BMI) DOCUMENTED: ICD-10-PCS | Mod: CPTII,,, | Performed by: INTERNAL MEDICINE

## 2023-08-07 PROCEDURE — 99214 OFFICE O/P EST MOD 30 MIN: CPT | Mod: S$PBB,,, | Performed by: INTERNAL MEDICINE

## 2023-08-07 PROCEDURE — 3074F PR MOST RECENT SYSTOLIC BLOOD PRESSURE < 130 MM HG: ICD-10-PCS | Mod: CPTII,,, | Performed by: INTERNAL MEDICINE

## 2023-08-07 PROCEDURE — 99214 PR OFFICE/OUTPT VISIT, EST, LEVL IV, 30-39 MIN: ICD-10-PCS | Mod: S$PBB,,, | Performed by: INTERNAL MEDICINE

## 2023-08-07 PROCEDURE — 3074F SYST BP LT 130 MM HG: CPT | Mod: CPTII,,, | Performed by: INTERNAL MEDICINE

## 2023-08-07 PROCEDURE — 99213 OFFICE O/P EST LOW 20 MIN: CPT | Mod: PBBFAC | Performed by: INTERNAL MEDICINE

## 2023-08-07 PROCEDURE — 3079F PR MOST RECENT DIASTOLIC BLOOD PRESSURE 80-89 MM HG: ICD-10-PCS | Mod: CPTII,,, | Performed by: INTERNAL MEDICINE

## 2023-08-07 PROCEDURE — 99999 PR PBB SHADOW E&M-EST. PATIENT-LVL III: ICD-10-PCS | Mod: PBBFAC,,, | Performed by: INTERNAL MEDICINE

## 2023-08-07 PROCEDURE — 3008F BODY MASS INDEX DOCD: CPT | Mod: CPTII,,, | Performed by: INTERNAL MEDICINE

## 2023-08-07 RX ORDER — FUROSEMIDE 20 MG/1
20 TABLET ORAL DAILY
Qty: 30 TABLET | Refills: 1 | Status: SHIPPED | OUTPATIENT
Start: 2023-08-07 | End: 2024-02-26

## 2023-08-07 NOTE — PROGRESS NOTES
Subjective:   Patient ID:  Monica Joy is a 62 y.o. female who presents for follow-up of No chief complaint on file.     This is a pleasant 59-year-old female with history of obesity.  History of fatigue intermittent palpitations shortness of breath and dizziness have been noted over the past month.  Patient complained of palpitations intermittently.  These current mostly nighttime.  Patient will complete also complains of chest heaviness with exertion.  There is a strong family history of coronary disease.  No syncope or near syncopal episodes have been noted.  Most of these symptoms have occurred over the last month.  Complaints episodic palpitations including dizziness and lightheadedness  Visit today finds the patient feeling fatigued.  She recently had COVID infection 07/13/2021.  Mild elevation troponin level that time EKG shows nonspecific EKG changes and tachycardia.  Patient has no chest discomfort and feels generally well no acute shortness of breath and did receive monoclonal antibody infusion.  Patient this point has only fatigue is an issue.  Will go ahead with a stress echo to rule out cardiac ischemia and evaluate for any evidence of pericardial effusion.  Otherwise stable this time.  Patient is on aspirin therapy for the time being her PCP will continue with this for the time being.   Cardiac echo 07/06/2023:   The left ventricle is normal in size with mild concentric hypertrophy and normal systolic function.  The estimated ejection fraction is 60%.  Normal left ventricular diastolic function.  Normal right ventricular size with normal right ventricular systolic function.  Mild tricuspid regurgitation.  Intermediate central venous pressure (8 mmHg).  The estimated PA systolic pressure is 26 mmHg.  Holter 07/26/2023:   There was an episode of PALPITATIONS reported. The corresponding rhythm strips revealed the following:        During the event (SAW SNAKE), the rhythm was sinus rhythm at 99 bpm  with without ectopy and no ST segment shift of significance.     There was an episode of PALPITATIONS reported. The corresponding rhythm strips revealed the following:        During the event (STANDING), the rhythm was sinus rhythm at 68 bpm with without ectopy and no ST segment shift of significance.     There was an episode of FLUTTER reported. The corresponding rhythm strips revealed the following:        During the event (LAYING DOWN), the rhythm was sinus rhythm at 66 bpm with without ectopy and no ST segment shift of significance.     Rhythm    Predominant Rhythm  Sinus rhythm with heart rates varying between 53 and 115 BPM with an average of 85BPM.             08/07/2023 overall patient is doing well no significant palpitations by the monitor.  V/Q scan was negative.  There is still peripheral edema left greater than right ankles will give diuretics does have elevated uric acid is on allopurinol seems to be improving slightly.        Review of Systems   Constitutional: Negative for chills, diaphoresis, night sweats, weight gain and weight loss.   HENT:  Negative for congestion, hoarse voice, sore throat and stridor.    Eyes:  Negative for double vision and pain.   Cardiovascular:  Negative for chest pain, claudication, cyanosis, dyspnea on exertion, irregular heartbeat, leg swelling, near-syncope, orthopnea, palpitations, paroxysmal nocturnal dyspnea and syncope.   Respiratory:  Negative for cough, hemoptysis, shortness of breath, sleep disturbances due to breathing, snoring, sputum production and wheezing.    Endocrine: Negative for cold intolerance, heat intolerance and polydipsia.   Hematologic/Lymphatic: Negative for bleeding problem. Does not bruise/bleed easily.   Skin:  Negative for color change, dry skin and rash.   Musculoskeletal:  Negative for joint swelling and muscle cramps.   Gastrointestinal:  Negative for bloating, abdominal pain, constipation, diarrhea, dysphagia, melena, nausea and vomiting.    Genitourinary:  Negative for flank pain and urgency.   Neurological:  Negative for dizziness, focal weakness, headaches, light-headedness, loss of balance, seizures and weakness.   Psychiatric/Behavioral:  Negative for altered mental status and memory loss. The patient is not nervous/anxious.    Family History   Problem Relation Age of Onset    Atrial fibrillation Mother     Anuerysm Mother     Diabetes Father     Heart disease Father     Hyperlipidemia Father     Stroke Father     Kidney disease Father     Heart disease Brother     Diabetes Brother     Anuerysm Brother     Breast cancer Neg Hx     Colon cancer Neg Hx     Ovarian cancer Neg Hx      Past Medical History:   Diagnosis Date    Arthritis     Asthma 9/6/2013    Sleep apnea     Thyroid disease      Social History     Socioeconomic History    Marital status:     Number of children: 2   Tobacco Use    Smoking status: Never    Smokeless tobacco: Never   Substance and Sexual Activity    Alcohol use: No     Comment: on rare holidays    Drug use: No    Sexual activity: Yes     Partners: Male     Birth control/protection: None     Social Determinants of Health     Financial Resource Strain: Low Risk  (7/24/2023)    Overall Financial Resource Strain (CARDIA)     Difficulty of Paying Living Expenses: Not hard at all   Food Insecurity: No Food Insecurity (7/24/2023)    Hunger Vital Sign     Worried About Running Out of Food in the Last Year: Never true     Ran Out of Food in the Last Year: Never true   Transportation Needs: No Transportation Needs (7/24/2023)    PRAPARE - Transportation     Lack of Transportation (Medical): No     Lack of Transportation (Non-Medical): No   Physical Activity: Insufficiently Active (7/24/2023)    Exercise Vital Sign     Days of Exercise per Week: 1 day     Minutes of Exercise per Session: 10 min   Stress: No Stress Concern Present (7/24/2023)    [employer] - Occupational Stress Questionnaire      Feeling of Stress : Only a little   Social Connections: Unknown (7/24/2023)    Social Connection and Isolation Panel [NHANES]     Frequency of Communication with Friends and Family: More than three times a week     Frequency of Social Gatherings with Friends and Family: Three times a week     Active Member of Clubs or Organizations: Yes     Attends Club or Organization Meetings: More than 4 times per year     Marital Status:    Housing Stability: Low Risk  (7/24/2023)    Housing Stability Vital Sign     Unable to Pay for Housing in the Last Year: No     Number of Places Lived in the Last Year: 1     Unstable Housing in the Last Year: No     Current Outpatient Medications on File Prior to Visit   Medication Sig Dispense Refill    albuterol (PROVENTIL/VENTOLIN HFA) 90 mcg/actuation inhaler INHALE 2 PUFFS INTO THE LUNGS EVERY 4 HOURS AS NEEDED FOR WHEEZING OR SHORTNESS OF BREATH 18 g 11    allopurinoL (ZYLOPRIM) 100 MG tablet Take 1 tablet (100 mg total) by mouth once daily. 30 tablet 2    colchicine (COLCRYS) 0.6 mg tablet Take two tablets x 1 and may take one tablet an hour later. Repeat in 3 days if needed. 6 tablet 0    ibuprofen (ADVIL,MOTRIN) 600 MG tablet Take 1 tablet (600 mg total) by mouth daily as needed for Pain. 20 tablet 2    levocetirizine (XYZAL) 5 MG tablet TAKE 1 TABLET(5 MG) BY MOUTH EVERY EVENING 30 tablet 5     Current Facility-Administered Medications on File Prior to Visit   Medication Dose Route Frequency Provider Last Rate Last Admin    EPINEPHrine (EPIPEN) 0.3 mg/0.3 mL pen injection 0.3 mg  0.3 mg Intramuscular PRN Leon De La Torre MD        lactated ringers infusion   Intravenous On Call Procedure Mary Anne Johnson PA        nozaseptin (NOZIN) nasal    Each Nostril On Call Procedure Mary Anne Johnson PA   Given at 02/25/21 0613    sodium chloride 0.9% flush 10 mL  10 mL Intravenous PRN Leon De La Torre MD         Review of patient's allergies indicates:   Allergen  Reactions    Amoxicillin Rash and Swelling     Rash with throat swelling    Codeine Palpitations, Rash and Swelling     Other reaction(s): Shortness of breath  Other reaction(s): Hives  Rash and throat swelling    Doxycycline Swelling     Other reaction(s): Rash    Hydrocodone Rash and Swelling    Iodine and iodide containing products Rash and Swelling    Lortab [hydrocodone-acetaminophen] Other (See Comments)     Other reaction(s): Hives  Other reaction(s): Difficulty breathing    Medrol [methylprednisolone] Anaphylaxis    Oxycodone Shortness Of Breath, Rash and Swelling    Oxycodone hcl-oxycodone-asa Rash and Swelling    Percodan filipe Shortness Of Breath    Tetracycline Swelling    Tetracyclines Rash and Swelling    Tramadol Shortness Of Breath, Rash and Swelling    Vibramycin [doxycycline calcium] Shortness Of Breath     Other reaction(s): Hives  Other reaction(s): Difficulty breathing    Augmentin [amoxicillin-pot clavulanate] Itching and Rash    Levaquin [levofloxacin] Hives    Sulfa (sulfonamide antibiotics) Itching and Rash    Percocet  [oxycodone-acetaminophen]      Other reaction(s): Shortness of breath    Shellfish containing products      Other reaction(s): Shortness of breath  Other reaction(s): Hives  Other reaction(s): Shortness of breath  Other reaction(s): Hives       Objective:     Physical Exam  Eyes:      Pupils: Pupils are equal, round, and reactive to light.   Neck:      Trachea: No tracheal deviation.   Cardiovascular:      Rate and Rhythm: Normal rate and regular rhythm.      Pulses: Intact distal pulses.           Carotid pulses are 2+ on the right side and 2+ on the left side.       Radial pulses are 2+ on the right side and 2+ on the left side.        Femoral pulses are 2+ on the right side and 2+ on the left side.       Popliteal pulses are 2+ on the right side and 2+ on the left side.        Dorsalis pedis pulses are 2+ on the right side and 2+ on the left side.        Posterior tibial  pulses are 2+ on the right side and 2+ on the left side.      Heart sounds: Normal heart sounds. No murmur heard.     No friction rub. No gallop.   Pulmonary:      Effort: Pulmonary effort is normal. No respiratory distress.      Breath sounds: Normal breath sounds. No stridor. No wheezing or rales.   Chest:      Chest wall: No tenderness.   Abdominal:      General: There is no distension.      Tenderness: There is no abdominal tenderness. There is no rebound.   Musculoskeletal:         General: No tenderness.      Cervical back: Normal range of motion.      Right lower leg: Edema present.      Left lower leg: Edema present.   Skin:     General: Skin is warm and dry.   Neurological:      Mental Status: She is alert and oriented to person, place, and time.       Assessment:     1. Edema of left lower extremity    2. Acute deep vein thrombosis (DVT) of left tibial vein    3. Elevated d-dimer    4. Heart palpitations    5. Elevated troponin level    6. DICKSON (dyspnea on exertion)    7. Morbid obesity with BMI of 40.0-44.9, adult    8. Sleep disorder breathing    9. Pre-op testing    10. Palpitations    11. Osteoarthritis, unspecified osteoarthritis type, unspecified site    12. Dizziness    13. Chest pressure    14. Shortness of breath    15. KAM on CPAP    16. Claudication of both lower extremities    17. Obstructive sleep apnea        Plan:     Edema of left lower extremity    Acute deep vein thrombosis (DVT) of left tibial vein    Elevated d-dimer    Heart palpitations    Elevated troponin level    DICKSON (dyspnea on exertion)    Morbid obesity with BMI of 40.0-44.9, adult    Sleep disorder breathing    Pre-op testing    Palpitations    Osteoarthritis, unspecified osteoarthritis type, unspecified site    Dizziness    Chest pressure    Shortness of breath    KAM on CPAP    Claudication of both lower extremities    Obstructive sleep apnea      Impression 1 palpitations stable resolved  2. Shortness breath improved   3  peripheral edema will add diuretics and see if this improves her overall weight.  Clinically stable otherwise we will see her back in several weeks repeat lab tests including BUN creatinine

## 2023-08-21 ENCOUNTER — LAB VISIT (OUTPATIENT)
Dept: LAB | Facility: HOSPITAL | Age: 63
End: 2023-08-21
Attending: INTERNAL MEDICINE
Payer: MEDICAID

## 2023-08-21 DIAGNOSIS — G47.33 OBSTRUCTIVE SLEEP APNEA: ICD-10-CM

## 2023-08-21 DIAGNOSIS — R00.2 HEART PALPITATIONS: ICD-10-CM

## 2023-08-21 DIAGNOSIS — R79.89 ELEVATED D-DIMER: ICD-10-CM

## 2023-08-21 DIAGNOSIS — I82.442 ACUTE DEEP VEIN THROMBOSIS (DVT) OF LEFT TIBIAL VEIN: ICD-10-CM

## 2023-08-21 DIAGNOSIS — M19.90 OSTEOARTHRITIS, UNSPECIFIED OSTEOARTHRITIS TYPE, UNSPECIFIED SITE: ICD-10-CM

## 2023-08-21 DIAGNOSIS — G47.33 OSA ON CPAP: ICD-10-CM

## 2023-08-21 DIAGNOSIS — I73.9 CLAUDICATION OF BOTH LOWER EXTREMITIES: ICD-10-CM

## 2023-08-21 DIAGNOSIS — R79.89 ELEVATED TROPONIN LEVEL: ICD-10-CM

## 2023-08-21 DIAGNOSIS — R42 DIZZINESS: ICD-10-CM

## 2023-08-21 DIAGNOSIS — R07.89 CHEST PRESSURE: ICD-10-CM

## 2023-08-21 DIAGNOSIS — R60.0 EDEMA OF LEFT LOWER EXTREMITY: ICD-10-CM

## 2023-08-21 DIAGNOSIS — E66.01 MORBID OBESITY WITH BMI OF 40.0-44.9, ADULT: ICD-10-CM

## 2023-08-21 DIAGNOSIS — R06.02 SHORTNESS OF BREATH: ICD-10-CM

## 2023-08-21 DIAGNOSIS — Z01.818 PRE-OP TESTING: ICD-10-CM

## 2023-08-21 DIAGNOSIS — R00.2 PALPITATIONS: ICD-10-CM

## 2023-08-21 DIAGNOSIS — R06.09 DOE (DYSPNEA ON EXERTION): ICD-10-CM

## 2023-08-21 DIAGNOSIS — G47.30 SLEEP DISORDER BREATHING: ICD-10-CM

## 2023-08-21 LAB
ANION GAP SERPL CALC-SCNC: 11 MMOL/L (ref 8–16)
BUN SERPL-MCNC: 14 MG/DL (ref 8–23)
CALCIUM SERPL-MCNC: 9.3 MG/DL (ref 8.7–10.5)
CHLORIDE SERPL-SCNC: 107 MMOL/L (ref 95–110)
CO2 SERPL-SCNC: 23 MMOL/L (ref 23–29)
CREAT SERPL-MCNC: 0.8 MG/DL (ref 0.5–1.4)
EST. GFR  (NO RACE VARIABLE): >60 ML/MIN/1.73 M^2
GLUCOSE SERPL-MCNC: 182 MG/DL (ref 70–110)
POTASSIUM SERPL-SCNC: 3.9 MMOL/L (ref 3.5–5.1)
SODIUM SERPL-SCNC: 141 MMOL/L (ref 136–145)
URATE SERPL-MCNC: 6.2 MG/DL (ref 2.4–5.7)

## 2023-08-21 PROCEDURE — 36415 COLL VENOUS BLD VENIPUNCTURE: CPT | Mod: PO | Performed by: INTERNAL MEDICINE

## 2023-08-21 PROCEDURE — 80048 BASIC METABOLIC PNL TOTAL CA: CPT | Performed by: INTERNAL MEDICINE

## 2023-08-21 PROCEDURE — 84550 ASSAY OF BLOOD/URIC ACID: CPT | Performed by: INTERNAL MEDICINE

## 2023-08-23 ENCOUNTER — PATIENT MESSAGE (OUTPATIENT)
Dept: INTERNAL MEDICINE | Facility: CLINIC | Age: 63
End: 2023-08-23
Payer: MEDICAID

## 2023-08-23 ENCOUNTER — OFFICE VISIT (OUTPATIENT)
Dept: CARDIOLOGY | Facility: CLINIC | Age: 63
End: 2023-08-23
Payer: MEDICAID

## 2023-08-23 VITALS
DIASTOLIC BLOOD PRESSURE: 80 MMHG | HEIGHT: 65 IN | SYSTOLIC BLOOD PRESSURE: 124 MMHG | OXYGEN SATURATION: 98 % | HEART RATE: 81 BPM | BODY MASS INDEX: 41.51 KG/M2 | WEIGHT: 249.13 LBS

## 2023-08-23 DIAGNOSIS — I73.9 CLAUDICATION OF BOTH LOWER EXTREMITIES: ICD-10-CM

## 2023-08-23 DIAGNOSIS — R79.89 ELEVATED TROPONIN LEVEL: Primary | ICD-10-CM

## 2023-08-23 DIAGNOSIS — I82.442 ACUTE DEEP VEIN THROMBOSIS (DVT) OF LEFT TIBIAL VEIN: ICD-10-CM

## 2023-08-23 DIAGNOSIS — M10.9 GOUT, ARTHRITIS: ICD-10-CM

## 2023-08-23 DIAGNOSIS — G47.33 OBSTRUCTIVE SLEEP APNEA: ICD-10-CM

## 2023-08-23 DIAGNOSIS — R06.09 DOE (DYSPNEA ON EXERTION): ICD-10-CM

## 2023-08-23 DIAGNOSIS — R00.2 PALPITATIONS: ICD-10-CM

## 2023-08-23 DIAGNOSIS — E66.01 MORBID OBESITY WITH BMI OF 40.0-44.9, ADULT: ICD-10-CM

## 2023-08-23 DIAGNOSIS — G47.33 OSA ON CPAP: ICD-10-CM

## 2023-08-23 DIAGNOSIS — Z01.818 PRE-OP TESTING: ICD-10-CM

## 2023-08-23 DIAGNOSIS — R42 DIZZINESS: ICD-10-CM

## 2023-08-23 DIAGNOSIS — R06.02 SHORTNESS OF BREATH: ICD-10-CM

## 2023-08-23 DIAGNOSIS — R79.89 ELEVATED D-DIMER: ICD-10-CM

## 2023-08-23 DIAGNOSIS — R00.2 HEART PALPITATIONS: ICD-10-CM

## 2023-08-23 PROCEDURE — 99213 OFFICE O/P EST LOW 20 MIN: CPT | Mod: PBBFAC | Performed by: INTERNAL MEDICINE

## 2023-08-23 PROCEDURE — 3079F DIAST BP 80-89 MM HG: CPT | Mod: CPTII,,, | Performed by: INTERNAL MEDICINE

## 2023-08-23 PROCEDURE — 99999 PR PBB SHADOW E&M-EST. PATIENT-LVL III: ICD-10-PCS | Mod: PBBFAC,,, | Performed by: INTERNAL MEDICINE

## 2023-08-23 PROCEDURE — 1159F PR MEDICATION LIST DOCUMENTED IN MEDICAL RECORD: ICD-10-PCS | Mod: CPTII,,, | Performed by: INTERNAL MEDICINE

## 2023-08-23 PROCEDURE — 3074F PR MOST RECENT SYSTOLIC BLOOD PRESSURE < 130 MM HG: ICD-10-PCS | Mod: CPTII,,, | Performed by: INTERNAL MEDICINE

## 2023-08-23 PROCEDURE — 99214 PR OFFICE/OUTPT VISIT, EST, LEVL IV, 30-39 MIN: ICD-10-PCS | Mod: S$PBB,,, | Performed by: INTERNAL MEDICINE

## 2023-08-23 PROCEDURE — 3008F PR BODY MASS INDEX (BMI) DOCUMENTED: ICD-10-PCS | Mod: CPTII,,, | Performed by: INTERNAL MEDICINE

## 2023-08-23 PROCEDURE — 99999 PR PBB SHADOW E&M-EST. PATIENT-LVL III: CPT | Mod: PBBFAC,,, | Performed by: INTERNAL MEDICINE

## 2023-08-23 PROCEDURE — 99214 OFFICE O/P EST MOD 30 MIN: CPT | Mod: S$PBB,,, | Performed by: INTERNAL MEDICINE

## 2023-08-23 PROCEDURE — 3008F BODY MASS INDEX DOCD: CPT | Mod: CPTII,,, | Performed by: INTERNAL MEDICINE

## 2023-08-23 PROCEDURE — 3079F PR MOST RECENT DIASTOLIC BLOOD PRESSURE 80-89 MM HG: ICD-10-PCS | Mod: CPTII,,, | Performed by: INTERNAL MEDICINE

## 2023-08-23 PROCEDURE — 3074F SYST BP LT 130 MM HG: CPT | Mod: CPTII,,, | Performed by: INTERNAL MEDICINE

## 2023-08-23 PROCEDURE — 1159F MED LIST DOCD IN RCRD: CPT | Mod: CPTII,,, | Performed by: INTERNAL MEDICINE

## 2023-08-23 NOTE — PROGRESS NOTES
Subjective:   Patient ID:  Monica Joy is a 62 y.o. female who presents for follow-up of No chief complaint on file.  This is a pleasant 59-year-old female with history of obesity.  History of fatigue intermittent palpitations shortness of breath and dizziness have been noted over the past month.  Patient complained of palpitations intermittently.  These current mostly nighttime.  Patient will complete also complains of chest heaviness with exertion.  There is a strong family history of coronary disease.  No syncope or near syncopal episodes have been noted.  Most of these symptoms have occurred over the last month.  Complaints episodic palpitations including dizziness and lightheadedness  Visit today finds the patient feeling fatigued.  She recently had COVID infection 07/13/2021.  Mild elevation troponin level that time EKG shows nonspecific EKG changes and tachycardia.  Patient has no chest discomfort and feels generally well no acute shortness of breath and did receive monoclonal antibody infusion.  Patient this point has only fatigue is an issue.  Will go ahead with a stress echo to rule out cardiac ischemia and evaluate for any evidence of pericardial effusion.  Otherwise stable this time.  Patient is on aspirin therapy for the time being her PCP will continue with this for the time being.   Cardiac echo 07/06/2023:   The left ventricle is normal in size with mild concentric hypertrophy and normal systolic function.  The estimated ejection fraction is 60%.  Normal left ventricular diastolic function.  Normal right ventricular size with normal right ventricular systolic function.  Mild tricuspid regurgitation.  Intermediate central venous pressure (8 mmHg).  The estimated PA systolic pressure is 26 mmHg.  Holter 07/26/2023:   There was an episode of PALPITATIONS reported. The corresponding rhythm strips revealed the following:        During the event (SAW SNAKE), the rhythm was sinus rhythm at 99 bpm with  without ectopy and no ST segment shift of significance.     There was an episode of PALPITATIONS reported. The corresponding rhythm strips revealed the following:        During the event (STANDING), the rhythm was sinus rhythm at 68 bpm with without ectopy and no ST segment shift of significance.     There was an episode of FLUTTER reported. The corresponding rhythm strips revealed the following:        During the event (LAYING DOWN), the rhythm was sinus rhythm at 66 bpm with without ectopy and no ST segment shift of significance.      Rhythm     Predominant Rhythm  Sinus rhythm with heart rates varying between 53 and 115 BPM with an average of 85BPM.               08/07/2023 overall patient is doing well no significant palpitations by the monitor.  V/Q scan was negative.  There is still peripheral edema left greater than right ankles will give diuretics does have elevated uric acid is on allopurinol seems to be improving slightly.            08/23/2023 overall patient doing well no recurrence symptoms.  Left leg is improving she will continue with diuretics intermittently for the next several weeks I think she probably needs to go increase on allopurinol uric acid levels are improving slowly will continue.  She still has gout in the foot.        Review of Systems   Constitutional: Negative for chills, diaphoresis, night sweats, weight gain and weight loss.   HENT:  Negative for congestion, hoarse voice, sore throat and stridor.    Eyes:  Negative for double vision and pain.   Cardiovascular:  Negative for chest pain, claudication, cyanosis, dyspnea on exertion, irregular heartbeat, leg swelling, near-syncope, orthopnea, palpitations, paroxysmal nocturnal dyspnea and syncope.   Respiratory:  Negative for cough, hemoptysis, shortness of breath, sleep disturbances due to breathing, snoring, sputum production and wheezing.    Endocrine: Negative for cold intolerance, heat intolerance and polydipsia.    Hematologic/Lymphatic: Negative for bleeding problem. Does not bruise/bleed easily.   Skin:  Negative for color change, dry skin and rash.   Musculoskeletal:  Negative for joint swelling and muscle cramps.   Gastrointestinal:  Negative for bloating, abdominal pain, constipation, diarrhea, dysphagia, melena, nausea and vomiting.   Genitourinary:  Negative for flank pain and urgency.   Neurological:  Negative for dizziness, focal weakness, headaches, light-headedness, loss of balance, seizures and weakness.   Psychiatric/Behavioral:  Negative for altered mental status and memory loss. The patient is not nervous/anxious.    Family History   Problem Relation Age of Onset    Atrial fibrillation Mother     Anuerysm Mother     Diabetes Father     Heart disease Father     Hyperlipidemia Father     Stroke Father     Kidney disease Father     Heart disease Brother     Diabetes Brother     Anuerysm Brother     Breast cancer Neg Hx     Colon cancer Neg Hx     Ovarian cancer Neg Hx      Past Medical History:   Diagnosis Date    Arthritis     Asthma 9/6/2013    Sleep apnea     Thyroid disease      Social History     Socioeconomic History    Marital status:     Number of children: 2   Tobacco Use    Smoking status: Never    Smokeless tobacco: Never   Substance and Sexual Activity    Alcohol use: No     Comment: on rare holidays    Drug use: No    Sexual activity: Yes     Partners: Male     Birth control/protection: None     Social Determinants of Health     Financial Resource Strain: Low Risk  (8/21/2023)    Overall Financial Resource Strain (CARDIA)     Difficulty of Paying Living Expenses: Not hard at all   Food Insecurity: No Food Insecurity (8/21/2023)    Hunger Vital Sign     Worried About Running Out of Food in the Last Year: Never true     Ran Out of Food in the Last Year: Never true   Transportation Needs: No Transportation Needs (8/21/2023)    PRAPARE - Transportation     Lack of Transportation (Medical): No      Lack of Transportation (Non-Medical): No   Physical Activity: Insufficiently Active (7/24/2023)    Exercise Vital Sign     Days of Exercise per Week: 1 day     Minutes of Exercise per Session: 10 min   Stress: No Stress Concern Present (7/24/2023)    [employer] - Occupational Stress Questionnaire     Feeling of Stress : Only a little   Social Connections: Unknown (8/21/2023)    Social Connection and Isolation Panel [NHANES]     Frequency of Communication with Friends and Family: More than three times a week     Frequency of Social Gatherings with Friends and Family: Three times a week     Active Member of Clubs or Organizations: Yes     Attends Club or Organization Meetings: More than 4 times per year     Marital Status:    Housing Stability: Low Risk  (8/21/2023)    Housing Stability Vital Sign     Unable to Pay for Housing in the Last Year: No     Number of Places Lived in the Last Year: 1     Unstable Housing in the Last Year: No     Current Outpatient Medications on File Prior to Visit   Medication Sig Dispense Refill    albuterol (PROVENTIL/VENTOLIN HFA) 90 mcg/actuation inhaler INHALE 2 PUFFS INTO THE LUNGS EVERY 4 HOURS AS NEEDED FOR WHEEZING OR SHORTNESS OF BREATH 18 g 11    allopurinoL (ZYLOPRIM) 100 MG tablet Take 1 tablet (100 mg total) by mouth once daily. 30 tablet 2    colchicine (COLCRYS) 0.6 mg tablet Take two tablets x 1 and may take one tablet an hour later. Repeat in 3 days if needed. (Patient not taking: Reported on 8/7/2023) 6 tablet 0    furosemide (LASIX) 20 MG tablet Take 1 tablet (20 mg total) by mouth once daily. 30 tablet 1    ibuprofen (ADVIL,MOTRIN) 600 MG tablet Take 1 tablet (600 mg total) by mouth daily as needed for Pain. 20 tablet 2    levocetirizine (XYZAL) 5 MG tablet TAKE 1 TABLET(5 MG) BY MOUTH EVERY EVENING 30 tablet 5     Current Facility-Administered Medications on File Prior to Visit   Medication Dose Route Frequency Provider Last Rate  Last Admin    EPINEPHrine (EPIPEN) 0.3 mg/0.3 mL pen injection 0.3 mg  0.3 mg Intramuscular PRN Leon De La Torre MD        lactated ringers infusion   Intravenous On Call Procedure Mary Anne Johnson PA        nozaseptin (NOZIN) nasal    Each Nostril On Call Procedure Mary Anne Johnson PA   Given at 02/25/21 0613    sodium chloride 0.9% flush 10 mL  10 mL Intravenous PRN Leon De La Torre MD         Review of patient's allergies indicates:   Allergen Reactions    Amoxicillin Rash and Swelling     Rash with throat swelling    Codeine Palpitations, Rash and Swelling     Other reaction(s): Shortness of breath  Other reaction(s): Hives  Rash and throat swelling    Doxycycline Swelling     Other reaction(s): Rash    Hydrocodone Rash and Swelling    Iodine and iodide containing products Rash and Swelling    Lortab [hydrocodone-acetaminophen] Other (See Comments)     Other reaction(s): Hives  Other reaction(s): Difficulty breathing    Medrol [methylprednisolone] Anaphylaxis    Oxycodone Shortness Of Breath, Rash and Swelling    Oxycodone hcl-oxycodone-asa Rash and Swelling    Percodan filipe Shortness Of Breath    Tetracycline Swelling    Tetracyclines Rash and Swelling    Tramadol Shortness Of Breath, Rash and Swelling    Vibramycin [doxycycline calcium] Shortness Of Breath     Other reaction(s): Hives  Other reaction(s): Difficulty breathing    Augmentin [amoxicillin-pot clavulanate] Itching and Rash    Levaquin [levofloxacin] Hives    Sulfa (sulfonamide antibiotics) Itching and Rash    Percocet  [oxycodone-acetaminophen]      Other reaction(s): Shortness of breath    Shellfish containing products      Other reaction(s): Shortness of breath  Other reaction(s): Hives  Other reaction(s): Shortness of breath  Other reaction(s): Hives       Objective:     Physical Exam  Eyes:      Pupils: Pupils are equal, round, and reactive to light.   Neck:      Trachea: No tracheal deviation.   Cardiovascular:      Rate and  Rhythm: Normal rate and regular rhythm.      Pulses: Intact distal pulses.           Carotid pulses are 2+ on the right side and 2+ on the left side.       Radial pulses are 2+ on the right side and 2+ on the left side.        Femoral pulses are 2+ on the right side and 2+ on the left side.       Popliteal pulses are 2+ on the right side and 2+ on the left side.        Dorsalis pedis pulses are 2+ on the right side and 2+ on the left side.        Posterior tibial pulses are 2+ on the right side and 2+ on the left side.      Heart sounds: Normal heart sounds. No murmur heard.     No friction rub. No gallop.   Pulmonary:      Effort: Pulmonary effort is normal. No respiratory distress.      Breath sounds: Normal breath sounds. No stridor. No wheezing or rales.   Chest:      Chest wall: No tenderness.   Abdominal:      General: There is no distension.      Tenderness: There is no abdominal tenderness. There is no rebound.   Musculoskeletal:         General: No tenderness.      Cervical back: Normal range of motion.   Skin:     General: Skin is warm and dry.   Neurological:      Mental Status: She is alert and oriented to person, place, and time.     Assessment:     1. Elevated troponin level    2. Pre-op testing    3. Obstructive sleep apnea    4. Acute deep vein thrombosis (DVT) of left tibial vein    5. DICKSON (dyspnea on exertion)    6. Palpitations    7. Shortness of breath    8. Elevated d-dimer    9. Morbid obesity with BMI of 40.0-44.9, adult    10. KAM on CPAP    11. Heart palpitations    12. Dizziness    13. Claudication of both lower extremities        Plan:     Elevated troponin level    Pre-op testing    Obstructive sleep apnea    Acute deep vein thrombosis (DVT) of left tibial vein    DICKSON (dyspnea on exertion)    Palpitations    Shortness of breath    Elevated d-dimer    Morbid obesity with BMI of 40.0-44.9, adult    KAM on CPAP    Heart palpitations    Dizziness    Claudication of both lower  extremities        Impression 1. Gout in the left foot will continue with allopurinol and will defer to primary care about increasing dose of allopurinol.  2. Peripheral edema will continue with diuretics for the time being  3. Shortness breath elevated D-dimer all checked out with V/Q scan and normal venogram left leg.  No evidence of DVT will continue to follow the patient clinically follow-up evaluation 3-4 months sooner if acute changes.  BNP levels were stable cardiac echo was stable and no evidence of heart failure.

## 2023-08-24 RX ORDER — ALLOPURINOL 200 MG/1
200 TABLET ORAL DAILY
Qty: 30 TABLET | Refills: 2 | Status: SHIPPED | OUTPATIENT
Start: 2023-08-24 | End: 2023-08-28

## 2023-08-28 DIAGNOSIS — Z88.9 MULTIPLE ALLERGIES: ICD-10-CM

## 2023-08-28 DIAGNOSIS — J45.20 MILD INTERMITTENT ASTHMA WITHOUT COMPLICATION: ICD-10-CM

## 2023-08-28 RX ORDER — LEVOCETIRIZINE DIHYDROCHLORIDE 5 MG/1
5 TABLET, FILM COATED ORAL NIGHTLY
Qty: 30 TABLET | Refills: 5 | Status: SHIPPED | OUTPATIENT
Start: 2023-08-28 | End: 2024-01-29

## 2023-08-28 RX ORDER — ALLOPURINOL 100 MG/1
100 TABLET ORAL 2 TIMES DAILY
Qty: 60 TABLET | Refills: 5 | Status: SHIPPED | OUTPATIENT
Start: 2023-08-28 | End: 2023-12-11

## 2023-09-11 ENCOUNTER — OFFICE VISIT (OUTPATIENT)
Dept: INTERNAL MEDICINE | Facility: CLINIC | Age: 63
End: 2023-09-11
Payer: MEDICAID

## 2023-09-11 VITALS
RESPIRATION RATE: 20 BRPM | WEIGHT: 249.13 LBS | HEIGHT: 65 IN | TEMPERATURE: 97 F | BODY MASS INDEX: 41.51 KG/M2 | SYSTOLIC BLOOD PRESSURE: 124 MMHG | OXYGEN SATURATION: 98 % | DIASTOLIC BLOOD PRESSURE: 82 MMHG | HEART RATE: 60 BPM

## 2023-09-11 DIAGNOSIS — K76.0 FATTY LIVER: ICD-10-CM

## 2023-09-11 DIAGNOSIS — N20.0 RIGHT KIDNEY STONE: Primary | ICD-10-CM

## 2023-09-11 LAB
BILIRUB UR QL STRIP: NEGATIVE
CLARITY UR REFRACT.AUTO: CLEAR
COLOR UR AUTO: YELLOW
GLUCOSE UR QL STRIP: NEGATIVE
HGB UR QL STRIP: NEGATIVE
KETONES UR QL STRIP: NEGATIVE
LEUKOCYTE ESTERASE UR QL STRIP: ABNORMAL
NITRITE UR QL STRIP: NEGATIVE
PH UR STRIP: 5 [PH] (ref 5–8)
PROT UR QL STRIP: ABNORMAL
SP GR UR STRIP: 1.02 (ref 1–1.03)
URN SPEC COLLECT METH UR: ABNORMAL

## 2023-09-11 PROCEDURE — 3008F BODY MASS INDEX DOCD: CPT | Mod: CPTII,,, | Performed by: INTERNAL MEDICINE

## 2023-09-11 PROCEDURE — 99215 OFFICE O/P EST HI 40 MIN: CPT | Mod: PBBFAC | Performed by: INTERNAL MEDICINE

## 2023-09-11 PROCEDURE — 3079F DIAST BP 80-89 MM HG: CPT | Mod: CPTII,,, | Performed by: INTERNAL MEDICINE

## 2023-09-11 PROCEDURE — 99213 OFFICE O/P EST LOW 20 MIN: CPT | Mod: S$PBB,,, | Performed by: INTERNAL MEDICINE

## 2023-09-11 PROCEDURE — 1160F PR REVIEW ALL MEDS BY PRESCRIBER/CLIN PHARMACIST DOCUMENTED: ICD-10-PCS | Mod: CPTII,,, | Performed by: INTERNAL MEDICINE

## 2023-09-11 PROCEDURE — 1160F RVW MEDS BY RX/DR IN RCRD: CPT | Mod: CPTII,,, | Performed by: INTERNAL MEDICINE

## 2023-09-11 PROCEDURE — 3008F PR BODY MASS INDEX (BMI) DOCUMENTED: ICD-10-PCS | Mod: CPTII,,, | Performed by: INTERNAL MEDICINE

## 2023-09-11 PROCEDURE — 3074F PR MOST RECENT SYSTOLIC BLOOD PRESSURE < 130 MM HG: ICD-10-PCS | Mod: CPTII,,, | Performed by: INTERNAL MEDICINE

## 2023-09-11 PROCEDURE — 3079F PR MOST RECENT DIASTOLIC BLOOD PRESSURE 80-89 MM HG: ICD-10-PCS | Mod: CPTII,,, | Performed by: INTERNAL MEDICINE

## 2023-09-11 PROCEDURE — 99999 PR PBB SHADOW E&M-EST. PATIENT-LVL V: CPT | Mod: PBBFAC,,, | Performed by: INTERNAL MEDICINE

## 2023-09-11 PROCEDURE — 1159F PR MEDICATION LIST DOCUMENTED IN MEDICAL RECORD: ICD-10-PCS | Mod: CPTII,,, | Performed by: INTERNAL MEDICINE

## 2023-09-11 PROCEDURE — 81001 URINALYSIS AUTO W/SCOPE: CPT | Performed by: INTERNAL MEDICINE

## 2023-09-11 PROCEDURE — 1159F MED LIST DOCD IN RCRD: CPT | Mod: CPTII,,, | Performed by: INTERNAL MEDICINE

## 2023-09-11 PROCEDURE — 99999 PR PBB SHADOW E&M-EST. PATIENT-LVL V: ICD-10-PCS | Mod: PBBFAC,,, | Performed by: INTERNAL MEDICINE

## 2023-09-11 PROCEDURE — 99213 PR OFFICE/OUTPT VISIT, EST, LEVL III, 20-29 MIN: ICD-10-PCS | Mod: S$PBB,,, | Performed by: INTERNAL MEDICINE

## 2023-09-11 PROCEDURE — 3074F SYST BP LT 130 MM HG: CPT | Mod: CPTII,,, | Performed by: INTERNAL MEDICINE

## 2023-09-11 NOTE — PROGRESS NOTES
Monica Joy  62 y.o. White female  [id number]    Chief Complaint:  Chief Complaint   Patient presents with    Hospital Follow Up     JUN Maurer        History of Present Illness:  Presents to the clinic for an ER visit follow up. She went to [facility] ER on 8/30 for right flank and RUQ abdominal pain. Ultrasound showed a 9 mm non obstructing right kidney stone and a fatty liver.   She continues to have some burning with urination.     History:  Past Medical History:   Diagnosis Date    Asthma 09/06/2013    Gout, arthritis     Sleep apnea     Thyroid disease        Medications:  Current Outpatient Medications on File Prior to Visit   Medication Sig Dispense Refill    albuterol (PROVENTIL/VENTOLIN HFA) 90 mcg/actuation inhaler INHALE 2 PUFFS INTO THE LUNGS EVERY 4 HOURS AS NEEDED FOR WHEEZING OR SHORTNESS OF BREATH 18 g 11    allopurinoL (ZYLOPRIM) 100 MG tablet Take 1 tablet (100 mg total) by mouth 2 (two) times daily. 60 tablet 5    furosemide (LASIX) 20 MG tablet Take 1 tablet (20 mg total) by mouth once daily. 30 tablet 1    ibuprofen (ADVIL,MOTRIN) 600 MG tablet Take 1 tablet (600 mg total) by mouth daily as needed for Pain. 20 tablet 2    levocetirizine (XYZAL) 5 MG tablet Take 1 tablet (5 mg total) by mouth every evening. 30 tablet 5     Allergies:  Review of patient's allergies indicates:   Allergen Reactions    Amoxicillin Rash and Swelling     Rash with throat swelling    Codeine Palpitations, Rash and Swelling     Other reaction(s): Shortness of breath  Other reaction(s): Hives  Rash and throat swelling    Doxycycline Swelling     Other reaction(s): Rash    Hydrocodone Rash and Swelling    Iodine and iodide containing products Rash and Swelling    Lortab [hydrocodone-acetaminophen] Other (See Comments)     Other reaction(s): Hives  Other reaction(s): Difficulty breathing    Medrol [methylprednisolone] Anaphylaxis    Oxycodone Shortness Of Breath, Rash and Swelling    Oxycodone hcl-oxycodone-asa Rash and  Swelling    Percodan filipe Shortness Of Breath    Tetracycline Swelling    Tetracyclines Rash and Swelling    Tramadol Shortness Of Breath, Rash and Swelling    Vibramycin [doxycycline calcium] Shortness Of Breath     Other reaction(s): Hives  Other reaction(s): Difficulty breathing    Augmentin [amoxicillin-pot clavulanate] Itching and Rash    Levaquin [levofloxacin] Hives    Sulfa (sulfonamide antibiotics) Itching and Rash    Percocet  [oxycodone-acetaminophen]      Other reaction(s): Shortness of breath    Shellfish containing products      Other reaction(s): Shortness of breath  Other reaction(s): Hives  Other reaction(s): Shortness of breath  Other reaction(s): Hives       Review of Systems   Constitutional:  Negative for fever.   Gastrointestinal:  Positive for abdominal pain.   Genitourinary:  Positive for dysuria and flank pain. Negative for hematuria.   Musculoskeletal:  Positive for joint pain.       Exam:  Vitals:    09/11/23 1040   BP: 124/82   Pulse: 60   Resp: 20   Temp: 97.3 °F (36.3 °C)     Weight: 113 kg (249 lb 1.9 oz)   Body mass index is 41.46 kg/m².      Physical Exam  Vitals reviewed.   Constitutional:       General: She is not in acute distress.     Appearance: She is well-developed. She is not ill-appearing.   Pulmonary:      Effort: Pulmonary effort is normal. No respiratory distress.   Abdominal:      Tenderness: There is abdominal tenderness (mild RUQ). There is no right CVA tenderness, left CVA tenderness or guarding.   Neurological:      Mental Status: She is alert and oriented to person, place, and time.   Psychiatric:         Behavior: Behavior normal.         Thought Content: Thought content normal.         Judgment: Judgment normal.         Assessment:  The primary encounter diagnosis was Right kidney stone. A diagnosis of Fatty liver was also pertinent to this visit.    Plan:  Right kidney stone  -     Urinalysis  -     Ambulatory referral/consult to Urology; Future; Expected date:  09/18/2023    Fatty liver  -     Lifestyle modifications discussed    RTC as needed.

## 2023-09-12 ENCOUNTER — PATIENT MESSAGE (OUTPATIENT)
Dept: INTERNAL MEDICINE | Facility: CLINIC | Age: 63
End: 2023-09-12
Payer: MEDICAID

## 2023-09-12 LAB
BACTERIA #/AREA URNS AUTO: ABNORMAL /HPF
HYALINE CASTS UR QL AUTO: 3 /LPF
MICROSCOPIC COMMENT: ABNORMAL
NON-SQ EPI CELLS #/AREA URNS AUTO: 0 /HPF
SQUAMOUS #/AREA URNS AUTO: 1 /HPF
WBC #/AREA URNS AUTO: 10 /HPF (ref 0–5)

## 2023-09-13 ENCOUNTER — TELEPHONE (OUTPATIENT)
Dept: UROLOGY | Facility: CLINIC | Age: 63
End: 2023-09-13
Payer: MEDICAID

## 2023-09-13 NOTE — TELEPHONE ENCOUNTER
Called the patient, after verification of name and , the patient was informed  that we do not have new pt access at this time. I gave her the number to the medicaid escalation line and the [facility] and she voiced understanding       ----- Message from Karan Ventura sent at 2023  1:59 PM CDT -----  Contact: [facility] rep - Dmitri  Type:  Sooner Apoointment Request    Caller is requesting a sooner appointment.  Caller declined first available appointment listed below.  Caller will not accept being placed on the waitlist and is requesting a message be sent to doctor.  Name of Caller: dmitri  When is the first available appointment?   Symptoms:  9 mm kidney stone/ referred by Dr CHELSEA Villafana   Would the patient rather a call back or a response via [facility]? Phone   Best Call Back Number: [phone]  Additional Information: pls contact the pt to schedule

## 2023-09-14 ENCOUNTER — LAB VISIT (OUTPATIENT)
Dept: LAB | Facility: HOSPITAL | Age: 63
End: 2023-09-14
Attending: INTERNAL MEDICINE
Payer: MEDICAID

## 2023-09-14 DIAGNOSIS — I82.442 ACUTE DEEP VEIN THROMBOSIS (DVT) OF TIBIAL VEIN OF LEFT LOWER EXTREMITY: ICD-10-CM

## 2023-09-14 PROCEDURE — 86146 BETA-2 GLYCOPROTEIN ANTIBODY: CPT | Performed by: INTERNAL MEDICINE

## 2023-09-14 PROCEDURE — 36415 COLL VENOUS BLD VENIPUNCTURE: CPT | Performed by: INTERNAL MEDICINE

## 2023-09-14 PROCEDURE — 86147 CARDIOLIPIN ANTIBODY EA IG: CPT | Performed by: INTERNAL MEDICINE

## 2023-09-14 PROCEDURE — 85300 ANTITHROMBIN III ACTIVITY: CPT | Performed by: INTERNAL MEDICINE

## 2023-09-14 PROCEDURE — 85306 CLOT INHIBIT PROT S FREE: CPT | Performed by: INTERNAL MEDICINE

## 2023-09-14 PROCEDURE — 85303 CLOT INHIBIT PROT C ACTIVITY: CPT | Performed by: INTERNAL MEDICINE

## 2023-09-14 PROCEDURE — 85613 RUSSELL VIPER VENOM DILUTED: CPT | Performed by: INTERNAL MEDICINE

## 2023-09-14 PROCEDURE — 85730 THROMBOPLASTIN TIME PARTIAL: CPT | Performed by: INTERNAL MEDICINE

## 2023-09-15 LAB
PROT C ACT/NOR PPP CHRO: 132 % (ref 70–150)
PROT S ACT/NOR PPP: 130 % (ref 65–150)

## 2023-09-18 LAB — AT III ACT/NOR PPP CHRO: 105 % (ref 83–118)

## 2023-09-20 LAB
APTT IMM NP PPP: NORMAL SEC (ref 32–48)
APTT P HEP NEUT PPP: NORMAL SEC (ref 32–48)
B2 GLYCOPROT1 IGA SER QL: 2 U/ML
B2 GLYCOPROT1 IGG SER QL: 1.1 U/ML
B2 GLYCOPROT1 IGM SER QL: 3.4 U/ML
CARDIOLIPIN IGA SER IA-ACNC: 2.6 APL
CONFIRM APTT STACLOT: NORMAL
DRVVT SCREEN TO CONFIRM RATIO: NORMAL RATIO
LA 3 SCREEN W REFLEX-IMP: NORMAL
LA NT DPL PPP QL: NORMAL
MIXING DRVVT: NORMAL SEC (ref 33–44)
PROTHROMBIN TIME: 12.8 SEC (ref 12–15.5)
REPTILASE TIME: NORMAL SEC
SCREEN APTT: 42 SEC (ref 32–48)
SCREEN DRVVT: 36 SEC (ref 33–44)
THROMBIN TIME: NORMAL SEC (ref 14.7–19.5)

## 2023-09-27 ENCOUNTER — HOSPITAL ENCOUNTER (OUTPATIENT)
Dept: RADIOLOGY | Facility: HOSPITAL | Age: 63
Discharge: HOME | End: 2023-09-27
Attending: INTERNAL MEDICINE
Payer: MEDICAID

## 2023-09-27 DIAGNOSIS — Z12.31 ENCOUNTER FOR SCREENING MAMMOGRAM FOR MALIGNANT NEOPLASM OF BREAST: ICD-10-CM

## 2023-09-27 PROCEDURE — 77067 MAMMO DIGITAL SCREENING BILAT WITH TOMO: ICD-10-PCS | Mod: 26,,, | Performed by: RADIOLOGY

## 2023-09-27 PROCEDURE — 77063 MAMMO DIGITAL SCREENING BILAT WITH TOMO: ICD-10-PCS | Mod: 26,,, | Performed by: RADIOLOGY

## 2023-09-27 PROCEDURE — 77067 SCR MAMMO BI INCL CAD: CPT | Mod: 26,,, | Performed by: RADIOLOGY

## 2023-09-27 PROCEDURE — 77063 BREAST TOMOSYNTHESIS BI: CPT | Mod: 26,,, | Performed by: RADIOLOGY

## 2023-09-27 PROCEDURE — 77067 SCR MAMMO BI INCL CAD: CPT | Mod: TC

## 2023-10-18 ENCOUNTER — TELEPHONE (OUTPATIENT)
Dept: ORTHOPEDICS | Facility: CLINIC | Age: 63
End: 2023-10-18
Payer: MEDICAID

## 2023-10-18 ENCOUNTER — OFFICE VISIT (OUTPATIENT)
Dept: RHEUMATOLOGY | Facility: CLINIC | Age: 63
End: 2023-10-18
Payer: MEDICAID

## 2023-10-18 ENCOUNTER — LAB VISIT (OUTPATIENT)
Dept: LAB | Facility: HOSPITAL | Age: 63
End: 2023-10-18
Attending: INTERNAL MEDICINE
Payer: MEDICAID

## 2023-10-18 VITALS
SYSTOLIC BLOOD PRESSURE: 125 MMHG | HEART RATE: 90 BPM | WEIGHT: 244.5 LBS | DIASTOLIC BLOOD PRESSURE: 81 MMHG | BODY MASS INDEX: 40.73 KG/M2 | HEIGHT: 65 IN

## 2023-10-18 DIAGNOSIS — M10.9 GOUT, UNSPECIFIED CAUSE, UNSPECIFIED CHRONICITY, UNSPECIFIED SITE: Primary | ICD-10-CM

## 2023-10-18 DIAGNOSIS — M10.9 GOUT, UNSPECIFIED CAUSE, UNSPECIFIED CHRONICITY, UNSPECIFIED SITE: ICD-10-CM

## 2023-10-18 DIAGNOSIS — M15.4 EROSIVE OSTEOARTHRITIS: ICD-10-CM

## 2023-10-18 DIAGNOSIS — G89.29 CHRONIC PAIN OF LEFT KNEE: ICD-10-CM

## 2023-10-18 DIAGNOSIS — M79.642 BILATERAL HAND PAIN: Primary | ICD-10-CM

## 2023-10-18 DIAGNOSIS — M17.0 PRIMARY OSTEOARTHRITIS OF BOTH KNEES: ICD-10-CM

## 2023-10-18 DIAGNOSIS — M79.641 BILATERAL HAND PAIN: Primary | ICD-10-CM

## 2023-10-18 DIAGNOSIS — M25.562 CHRONIC PAIN OF LEFT KNEE: ICD-10-CM

## 2023-10-18 DIAGNOSIS — Z51.81 MEDICATION MONITORING ENCOUNTER: ICD-10-CM

## 2023-10-18 LAB
ANION GAP SERPL CALC-SCNC: 12 MMOL/L (ref 8–16)
BUN SERPL-MCNC: 10 MG/DL (ref 8–23)
CALCIUM SERPL-MCNC: 9.2 MG/DL (ref 8.7–10.5)
CHLORIDE SERPL-SCNC: 105 MMOL/L (ref 95–110)
CO2 SERPL-SCNC: 24 MMOL/L (ref 23–29)
CREAT SERPL-MCNC: 0.7 MG/DL (ref 0.5–1.4)
EST. GFR  (NO RACE VARIABLE): >60 ML/MIN/1.73 M^2
GLUCOSE SERPL-MCNC: 164 MG/DL (ref 70–110)
POTASSIUM SERPL-SCNC: 3.7 MMOL/L (ref 3.5–5.1)
SODIUM SERPL-SCNC: 141 MMOL/L (ref 136–145)
URATE SERPL-MCNC: 6.1 MG/DL (ref 2.4–5.7)

## 2023-10-18 PROCEDURE — 99204 OFFICE O/P NEW MOD 45 MIN: CPT | Mod: S$PBB,,, | Performed by: PHYSICIAN ASSISTANT

## 2023-10-18 PROCEDURE — 3074F PR MOST RECENT SYSTOLIC BLOOD PRESSURE < 130 MM HG: ICD-10-PCS | Mod: CPTII,,, | Performed by: PHYSICIAN ASSISTANT

## 2023-10-18 PROCEDURE — 1160F PR REVIEW ALL MEDS BY PRESCRIBER/CLIN PHARMACIST DOCUMENTED: ICD-10-PCS | Mod: CPTII,,, | Performed by: PHYSICIAN ASSISTANT

## 2023-10-18 PROCEDURE — 3008F PR BODY MASS INDEX (BMI) DOCUMENTED: ICD-10-PCS | Mod: CPTII,,, | Performed by: PHYSICIAN ASSISTANT

## 2023-10-18 PROCEDURE — 99999 PR PBB SHADOW E&M-EST. PATIENT-LVL IV: ICD-10-PCS | Mod: PBBFAC,,, | Performed by: PHYSICIAN ASSISTANT

## 2023-10-18 PROCEDURE — 99214 OFFICE O/P EST MOD 30 MIN: CPT | Mod: PBBFAC | Performed by: PHYSICIAN ASSISTANT

## 2023-10-18 PROCEDURE — 1160F RVW MEDS BY RX/DR IN RCRD: CPT | Mod: CPTII,,, | Performed by: PHYSICIAN ASSISTANT

## 2023-10-18 PROCEDURE — 80048 BASIC METABOLIC PNL TOTAL CA: CPT | Performed by: PHYSICIAN ASSISTANT

## 2023-10-18 PROCEDURE — 99204 PR OFFICE/OUTPT VISIT, NEW, LEVL IV, 45-59 MIN: ICD-10-PCS | Mod: S$PBB,,, | Performed by: PHYSICIAN ASSISTANT

## 2023-10-18 PROCEDURE — 99999 PR PBB SHADOW E&M-EST. PATIENT-LVL IV: CPT | Mod: PBBFAC,,, | Performed by: PHYSICIAN ASSISTANT

## 2023-10-18 PROCEDURE — 36415 COLL VENOUS BLD VENIPUNCTURE: CPT | Performed by: PHYSICIAN ASSISTANT

## 2023-10-18 PROCEDURE — 3008F BODY MASS INDEX DOCD: CPT | Mod: CPTII,,, | Performed by: PHYSICIAN ASSISTANT

## 2023-10-18 PROCEDURE — 3074F SYST BP LT 130 MM HG: CPT | Mod: CPTII,,, | Performed by: PHYSICIAN ASSISTANT

## 2023-10-18 PROCEDURE — 1159F PR MEDICATION LIST DOCUMENTED IN MEDICAL RECORD: ICD-10-PCS | Mod: CPTII,,, | Performed by: PHYSICIAN ASSISTANT

## 2023-10-18 PROCEDURE — 3079F PR MOST RECENT DIASTOLIC BLOOD PRESSURE 80-89 MM HG: ICD-10-PCS | Mod: CPTII,,, | Performed by: PHYSICIAN ASSISTANT

## 2023-10-18 PROCEDURE — 84550 ASSAY OF BLOOD/URIC ACID: CPT | Performed by: PHYSICIAN ASSISTANT

## 2023-10-18 PROCEDURE — 3079F DIAST BP 80-89 MM HG: CPT | Mod: CPTII,,, | Performed by: PHYSICIAN ASSISTANT

## 2023-10-18 PROCEDURE — 1159F MED LIST DOCD IN RCRD: CPT | Mod: CPTII,,, | Performed by: PHYSICIAN ASSISTANT

## 2023-10-18 RX ORDER — MELOXICAM 15 MG/1
15 TABLET ORAL DAILY
Qty: 30 TABLET | Refills: 3 | Status: SHIPPED | OUTPATIENT
Start: 2023-10-18 | End: 2024-01-03

## 2023-10-18 NOTE — PROGRESS NOTES
Subjective:      Patient ID: Monica Joy is a 62 y.o. female.    Chief Complaint: Disease Management      HPI   Monica Joy  is a 62 y.o. female seen today for rheumatologic evaluation.  Several months ago started with left ankle pain and swelling.  She had extensive workup for DVT.  Ultimately she was found to be negative.  Even saw Hematology to be evaluated for hypercoagulable state.      Ultimately they did uric acid found her to be elevated.  She was started on allopurinol 100 mg daily.  She had some improvement.  Apparently she was advised to increase to 100 mg daily but they had a lot of trouble getting an approval for that.  She was referred to Rheumatology for long-term management.    Also complaining of significant left knee pain.  In the past has been treated by Orthopedics.  Due to severe reactions to steroids in the past, corticosteroid injection was deferred.  Patient did a home exercise program for strengthening ended OTC NSAIDs.  She has not followed up with Orthopedics in more than 2 years.    Patient denies painful swollen joints (today), fevers, chills, photosensitivity, eye pain, shortness of breath, chest pain, hematuria, blood in the stool, rash, sicca symptoms, raynauds, finger ulcerations.  Rheumatologic systems otherwise negative.    Serologies/Labs:  Neg GOVIND, RF, CCP in past  Current Treatment:  Allopurinol 100 mg daily - just increased to 200 mg daily  Previous Treatment:   Colchicine    Current Outpatient Medications:     albuterol (PROVENTIL/VENTOLIN HFA) 90 mcg/actuation inhaler, INHALE 2 PUFFS INTO THE LUNGS EVERY 4 HOURS AS NEEDED FOR WHEEZING OR SHORTNESS OF BREATH, Disp: 18 g, Rfl: 11    allopurinoL (ZYLOPRIM) 100 MG tablet, Take 1 tablet (100 mg total) by mouth 2 (two) times daily., Disp: 60 tablet, Rfl: 5    furosemide (LASIX) 20 MG tablet, Take 1 tablet (20 mg total) by mouth once daily., Disp: 30 tablet, Rfl: 1    ibuprofen (ADVIL,MOTRIN) 600 MG tablet, Take 1 tablet  (600 mg total) by mouth daily as needed for Pain., Disp: 20 tablet, Rfl: 2    levocetirizine (XYZAL) 5 MG tablet, Take 1 tablet (5 mg total) by mouth every evening., Disp: 30 tablet, Rfl: 5    meloxicam (MOBIC) 15 MG tablet, Take 1 tablet (15 mg total) by mouth once daily., Disp: 30 tablet, Rfl: 3    Current Facility-Administered Medications:     EPINEPHrine (EPIPEN) 0.3 mg/0.3 mL pen injection 0.3 mg, 0.3 mg, Intramuscular, PRN, Leon De La Torre MD    sodium chloride 0.9% flush 10 mL, 10 mL, Intravenous, PRN, Loen De La Torre MD    Facility-Administered Medications Ordered in Other Visits:     lactated ringers infusion, , Intravenous, On Call Procedure, Mary Anne Johnson PA    nozaseptin (NOZIN) nasal , , Each Nostril, On Call Procedure, Mary Anne Johnson PA, Given at 02/25/21 0613    Past Medical History:   Diagnosis Date    Asthma 09/06/2013    Gout, arthritis     Sleep apnea     Thyroid disease      Family History   Problem Relation Age of Onset    Atrial fibrillation Mother     Anuerysm Mother     Diabetes Father     Heart disease Father     Hyperlipidemia Father     Stroke Father     Kidney disease Father     Heart disease Brother     Diabetes Brother     Anuerysm Brother     Breast cancer Neg Hx     Colon cancer Neg Hx     Ovarian cancer Neg Hx      Social History     Socioeconomic History    Marital status:     Number of children: 2   Tobacco Use    Smoking status: Never    Smokeless tobacco: Never   Substance and Sexual Activity    Alcohol use: No     Comment: on rare holidays    Drug use: No    Sexual activity: Yes     Partners: Male     Birth control/protection: None     Social Determinants of Health     Financial Resource Strain: Unknown (9/4/2023)    Overall Financial Resource Strain (CARDIA)     Difficulty of Paying Living Expenses: Patient refused   Food Insecurity: Unknown (9/4/2023)    Hunger Vital Sign     Worried About Running Out of Food in the Last Year: Patient refused      Ran Out of Food in the Last Year: Patient refused   Transportation Needs: Unknown (9/4/2023)    PRAPARE - Transportation     Lack of Transportation (Medical): Patient refused     Lack of Transportation (Non-Medical): Patient refused   Physical Activity: Unknown (9/4/2023)    Exercise Vital Sign     Days of Exercise per Week: Patient refused     Minutes of Exercise per Session: 10 min   Recent Concern: Physical Activity - Insufficiently Active (7/24/2023)    Exercise Vital Sign     Days of Exercise per Week: 1 day     Minutes of Exercise per Session: 10 min   Stress: Unknown (9/4/2023)    [employer] - Occupational Stress Questionnaire     Feeling of Stress : Patient refused   Social Connections: Unknown (9/4/2023)    Social Connection and Isolation Panel [NHANES]     Frequency of Communication with Friends and Family: More than three times a week     Frequency of Social Gatherings with Friends and Family: Three times a week     Active Member of Clubs or Organizations: Patient refused     Attends Club or Organization Meetings: More than 4 times per year     Marital Status: Patient refused   Housing Stability: Unknown (9/4/2023)    Housing Stability Vital Sign     Unable to Pay for Housing in the Last Year: Patient refused     Number of Places Lived in the Last Year: 1     Unstable Housing in the Last Year: Patient refused     Review of patient's allergies indicates:   Allergen Reactions    Amoxicillin Rash and Swelling     Rash with throat swelling    Codeine Palpitations, Rash and Swelling     Other reaction(s): Shortness of breath  Other reaction(s): Hives  Rash and throat swelling    Doxycycline Swelling     Other reaction(s): Rash    Hydrocodone Rash and Swelling    Iodine and iodide containing products Rash and Swelling    Lortab [hydrocodone-acetaminophen] Other (See Comments)     Other reaction(s): Hives  Other reaction(s): Difficulty breathing    Medrol [methylprednisolone]  "Anaphylaxis    Oxycodone Shortness Of Breath, Rash and Swelling    Oxycodone hcl-oxycodone-asa Rash and Swelling    Percodan filipe Shortness Of Breath    Tetracycline Swelling    Tetracyclines Rash and Swelling    Tramadol Shortness Of Breath, Rash and Swelling    Vibramycin [doxycycline calcium] Shortness Of Breath     Other reaction(s): Hives  Other reaction(s): Difficulty breathing    Augmentin [amoxicillin-pot clavulanate] Itching and Rash    Levaquin [levofloxacin] Hives    Sulfa (sulfonamide antibiotics) Itching and Rash    Percocet  [oxycodone-acetaminophen]      Other reaction(s): Shortness of breath    Shellfish containing products      Other reaction(s): Shortness of breath  Other reaction(s): Hives  Other reaction(s): Shortness of breath  Other reaction(s): Hives       Objective:   /81   Pulse 90   Ht 5' 5" (1.651 m)   Wt 110.9 kg (244 lb 7.8 oz)   BMI 40.69 kg/m²   Immunization History   Administered Date(s) Administered    Influenza 10/30/2015, 09/07/2020    Influenza - Quadrivalent - PF *Preferred* (6 months and older) 11/13/2013, 10/30/2015, 12/22/2016, 08/13/2019    Influenza Split 11/13/2013    Pneumococcal Polysaccharide - 23 Valent 11/23/2013    Tdap 03/27/2008, 11/21/2018       Physical Exam   Constitutional: She is oriented to person, place, and time. No distress.   HENT:   Head: Normocephalic and atraumatic.   Pulmonary/Chest: Effort normal.   Abdominal: She exhibits no distension.   Musculoskeletal:         General: Tenderness present. No swelling. Normal range of motion.      Cervical back: Normal range of motion.   Lymphadenopathy:     She has no cervical adenopathy.   Neurological: She is alert and oriented to person, place, and time.   Skin: Skin is warm and dry. No rash noted.   Psychiatric: Mood normal.   Nursing note and vitals reviewed.    No synovitis, no dactylitis, no enthesitis  No effusions of large or small joints  100% fist formation  Well preserved ROM    Left " "knee  JOYCE  ROM 0-110  PF crepitus  TTP med comp    Recent Results (from the past 672 hour(s))   Basic Metabolic Panel    Collection Time: 10/18/23  9:55 AM   Result Value Ref Range    Sodium 141 136 - 145 mmol/L    Potassium 3.7 3.5 - 5.1 mmol/L    Chloride 105 95 - 110 mmol/L    CO2 24 23 - 29 mmol/L    Glucose 164 (H) 70 - 110 mg/dL    BUN 10 8 - 23 mg/dL    Creatinine 0.7 0.5 - 1.4 mg/dL    Calcium 9.2 8.7 - 10.5 mg/dL    Anion Gap 12 8 - 16 mmol/L    eGFR >60 >60 mL/min/1.73 m^2   Uric Acid    Collection Time: 10/18/23  9:55 AM   Result Value Ref Range    Uric Acid 6.1 (H) 2.4 - 5.7 mg/dL       No results found for: "TBGOLDPLUS"   Lab Results   Component Value Date    HEPAIGM Negative 02/10/2014    HEPBIGM Negative 02/10/2014    HEPCAB Negative 02/10/2014        Imaging  I have personally reviewed images and reports as below.  I agree with the interpretation.  X-ray Knee Ortho Left with Flexion  Order: [id number]  Status: Final result     Visible to patient: Yes (seen)     Next appt: 12/05/2023 at 11:15 AM in Orthopedics (Miguel Yoo MD)     Dx: Knee pain, unspecified chronicity, un...     0 Result Notes  Details    Reading Physician Reading Date Result Priority   Jamaal Coyne III, MD  [phone] 5/14/2021 Routine     Narrative & Impression  EXAMINATION:  XR KNEE ORTHO LEFT WITH FLEXION     CLINICAL HISTORY:  . Pain in unspecified knee     TECHNIQUE:  AP standing view of both knees, PA flexion standing views of both knees, and Merchant views of both knees were performed. A lateral view of the left knee was also performed.     COMPARISON:  None     FINDINGS:  Bilateral tricompartment degenerative change with mild increased narrowing medial compartments.  No fracture, dislocation or radiopaque foreign body.  Persistent mild asymmetric lateral positioning of the patella on the AP views.  Equivocal tiny left suprapatellar effusion.  Asymmetric prominence degenerative change right patellofemoral joint with " lateral tilting of the patella.        Electronically signed by: Jamaal Coyne MD  Date:                                            05/14/2021  Time:                                           08:33           Exam Ended: 05/14/21 07:55               Assessment:     1. Gout, unspecified cause, unspecified chronicity, unspecified site    2. Chronic pain of left knee    3. Primary osteoarthritis of both knees    4. Erosive osteoarthritis    5. Medication monitoring encounter            Plan:     Monica was seen today for disease management.    Diagnoses and all orders for this visit:    Gout, unspecified cause, unspecified chronicity, unspecified site  -     Basic Metabolic Panel; Standing  -     Uric Acid; Standing  -     X-Ray Foot Complete Bilateral; Future  -     meloxicam (MOBIC) 15 MG tablet; Take 1 tablet (15 mg total) by mouth once daily.    Chronic pain of left knee  -     Ambulatory referral/consult to Orthopedics; Future  -     meloxicam (MOBIC) 15 MG tablet; Take 1 tablet (15 mg total) by mouth once daily.    Primary osteoarthritis of both knees  -     Ambulatory referral/consult to Orthopedics; Future  -     meloxicam (MOBIC) 15 MG tablet; Take 1 tablet (15 mg total) by mouth once daily.    Erosive osteoarthritis  -     X-Ray Hand 3 View Bilateral; Future  -     X-Ray Foot Complete Bilateral; Future  -     meloxicam (MOBIC) 15 MG tablet; Take 1 tablet (15 mg total) by mouth once daily.    Medication monitoring encounter        New dx of gout  Check uric acid level today  Plan for patient to start allopurinol 200 mg daily if uric acid is still elevated  No current active gout attack  Consider colchicine as an add on for maintenance in the future if needed  Check BMP to ensure adequate renal function  X-ray hands and feet prior to follow-up  Left knee osteoarthritis with left knee pain  Recommend patient follow-up with Orthopedics for consideration of treatment modalities  Mobic 15 mg 1 p.o. daily if renal  function normal  Deferred corticosteroid injection due to history of severe reaction in the past to steroids  Consider viscosupplementation in the future - we will defer to Orthopedics  Drug therapy requiring intensive monitoring for toxicity  High Risk Medication Monitoring encounter  No current medication related issues, no evidence of toxicity  I ordered labs for toxicity monitoring, have personally reviewed the findings, and discussed them with the patient.  Pending labs will be sent via the portal  Return to clinic: 2 mos, VV w uric acid and BMP prior -     No follow-ups on file.    The patient understands, chooses and consents to this plan and accepts all the risks which include but are not limited to the risks mentioned above.     Disclaimer: This note was prepared using a voice recognition system and is likely to have sound alike errors within the text.

## 2023-10-18 NOTE — TELEPHONE ENCOUNTER
Spoke to the patient an was able to get her scheduled for both her hand with Dr. Yoo on 12/5/2023 the patient verbalized understanding       ----- Message from Angie Underwood sent at 10/18/2023  3:51 PM CDT -----  Contact: Monica Rodasa would like a call back at [phone] in regards to needing to schedule a f/u with . the schedule wouldn't populate for me.  Thanks   Am

## 2023-12-05 ENCOUNTER — OFFICE VISIT (OUTPATIENT)
Dept: ORTHOPEDICS | Facility: CLINIC | Age: 63
End: 2023-12-05
Payer: MEDICAID

## 2023-12-05 ENCOUNTER — HOSPITAL ENCOUNTER (OUTPATIENT)
Dept: RADIOLOGY | Facility: HOSPITAL | Age: 63
Discharge: HOME | End: 2023-12-05
Attending: ORTHOPAEDIC SURGERY
Payer: MEDICAID

## 2023-12-05 VITALS — HEIGHT: 65 IN | BODY MASS INDEX: 40.65 KG/M2 | WEIGHT: 244 LBS

## 2023-12-05 DIAGNOSIS — M17.0 PRIMARY OSTEOARTHRITIS OF BOTH KNEES: Primary | ICD-10-CM

## 2023-12-05 DIAGNOSIS — M79.642 BILATERAL HAND PAIN: ICD-10-CM

## 2023-12-05 DIAGNOSIS — M79.641 BILATERAL HAND PAIN: ICD-10-CM

## 2023-12-05 DIAGNOSIS — M15.3 OTHER SECONDARY OSTEOARTHRITIS OF MULTIPLE SITES: ICD-10-CM

## 2023-12-05 PROCEDURE — 1159F MED LIST DOCD IN RCRD: CPT | Mod: CPTII,,, | Performed by: ORTHOPAEDIC SURGERY

## 2023-12-05 PROCEDURE — 3008F BODY MASS INDEX DOCD: CPT | Mod: CPTII,,, | Performed by: ORTHOPAEDIC SURGERY

## 2023-12-05 PROCEDURE — 3008F PR BODY MASS INDEX (BMI) DOCUMENTED: ICD-10-PCS | Mod: CPTII,,, | Performed by: ORTHOPAEDIC SURGERY

## 2023-12-05 PROCEDURE — 99213 OFFICE O/P EST LOW 20 MIN: CPT | Mod: PBBFAC | Performed by: ORTHOPAEDIC SURGERY

## 2023-12-05 PROCEDURE — 1160F RVW MEDS BY RX/DR IN RCRD: CPT | Mod: CPTII,,, | Performed by: ORTHOPAEDIC SURGERY

## 2023-12-05 PROCEDURE — 73130 XR HAND COMPLETE 3 VIEWS BILATERAL: ICD-10-PCS | Mod: 26,50,, | Performed by: RADIOLOGY

## 2023-12-05 PROCEDURE — 73130 X-RAY EXAM OF HAND: CPT | Mod: TC,50

## 2023-12-05 PROCEDURE — 73130 X-RAY EXAM OF HAND: CPT | Mod: 26,50,, | Performed by: RADIOLOGY

## 2023-12-05 PROCEDURE — 1160F PR REVIEW ALL MEDS BY PRESCRIBER/CLIN PHARMACIST DOCUMENTED: ICD-10-PCS | Mod: CPTII,,, | Performed by: ORTHOPAEDIC SURGERY

## 2023-12-05 PROCEDURE — 99213 OFFICE O/P EST LOW 20 MIN: CPT | Mod: S$PBB,,, | Performed by: ORTHOPAEDIC SURGERY

## 2023-12-05 PROCEDURE — 1159F PR MEDICATION LIST DOCUMENTED IN MEDICAL RECORD: ICD-10-PCS | Mod: CPTII,,, | Performed by: ORTHOPAEDIC SURGERY

## 2023-12-05 PROCEDURE — 99999 PR PBB SHADOW E&M-EST. PATIENT-LVL III: CPT | Mod: PBBFAC,,, | Performed by: ORTHOPAEDIC SURGERY

## 2023-12-05 PROCEDURE — 99999 PR PBB SHADOW E&M-EST. PATIENT-LVL III: ICD-10-PCS | Mod: PBBFAC,,, | Performed by: ORTHOPAEDIC SURGERY

## 2023-12-05 PROCEDURE — 99213 PR OFFICE/OUTPT VISIT, EST, LEVL III, 20-29 MIN: ICD-10-PCS | Mod: S$PBB,,, | Performed by: ORTHOPAEDIC SURGERY

## 2023-12-05 NOTE — PROGRESS NOTES
Subjective:     Patient ID: Monica Joy is a 62 y.o. female.    Chief Complaint: Pain of the Left Hand and Pain of the Right Hand      HPI:  The patient is a 62-year-old female status post left total wrist arthroplasty in  by Dr. Leone in [place].  She has no pain in that wrist.  She is status post right wrist scaphoid excision and capitate lunate arthrodesis 2021.  She has better motion in that wrist after surgery and has no pain in the right wrist.  She reports for follow-up visit.    Past Medical History:   Diagnosis Date    Asthma 2013    Gout, arthritis     Sleep apnea     Thyroid disease      Past Surgical History:   Procedure Laterality Date    BONE GRAFT Right 2021    Procedure: BONE GRAFT;  Surgeon: Miguel Yoo MD;  Location: [facility] OR;  Service: Orthopedics;  Laterality: Right;  bone graft to fill the defect in the distal radial epiphysis/Reduction of the four-corner area will be performed with bone graft and fusion     SECTION      COLONOSCOPY N/A 2019    Procedure: COLONOSCOPY;  Surgeon: Scout Rivera MD;  Location: [facility] ENDO;  Service: Endoscopy;  Laterality: N/A;    FRACTURE SURGERY      HAND FUSION Right 2021    Procedure: FUSION, JOINT, HAND;  Surgeon: Miguel Yoo MD;  Location: [facility] OR;  Service: Orthopedics;  Laterality: Right;  dip joint    JOINT REPLACEMENT      Novasure Endometrial Ablation  2016    OSTECTOMY Right 2021    Procedure: OSTECTOMY;  Surgeon: Miguel Yoo MD;  Location: [facility] OR;  Service: Orthopedics;  Laterality: Right;  radial styloidectomy as well as scaphoid excision    THYROIDECTOMY, PARTIAL      TUBAL LIGATION      WRIST SURGERY       Family History   Problem Relation Age of Onset    Atrial fibrillation Mother     Anuerysm Mother     Diabetes Father     Heart disease Father     Hyperlipidemia Father     Stroke Father     Kidney disease Father     Heart disease Brother     Diabetes  Brother     Anuerysm Brother     Breast cancer Neg Hx     Colon cancer Neg Hx     Ovarian cancer Neg Hx      Social History     Socioeconomic History    Marital status:     Number of children: 2   Tobacco Use    Smoking status: Never    Smokeless tobacco: Never   Substance and Sexual Activity    Alcohol use: No     Comment: on rare holidays    Drug use: No    Sexual activity: Yes     Partners: Male     Birth control/protection: None     Social Determinants of Health     Financial Resource Strain: Unknown (9/4/2023)    Overall Financial Resource Strain (CARDIA)     Difficulty of Paying Living Expenses: Patient refused   Food Insecurity: Unknown (9/4/2023)    Hunger Vital Sign     Worried About Running Out of Food in the Last Year: Patient refused     Ran Out of Food in the Last Year: Patient refused   Transportation Needs: Unknown (9/4/2023)    PRAPARE - Transportation     Lack of Transportation (Medical): Patient refused     Lack of Transportation (Non-Medical): Patient refused   Physical Activity: Unknown (9/4/2023)    Exercise Vital Sign     Days of Exercise per Week: Patient refused     Minutes of Exercise per Session: 10 min   Recent Concern: Physical Activity - Insufficiently Active (7/24/2023)    Exercise Vital Sign     Days of Exercise per Week: 1 day     Minutes of Exercise per Session: 10 min   Stress: Unknown (9/4/2023)    [employer] - Occupational Stress Questionnaire     Feeling of Stress : Patient refused   Social Connections: Unknown (9/4/2023)    Social Connection and Isolation Panel [NHANES]     Frequency of Communication with Friends and Family: More than three times a week     Frequency of Social Gatherings with Friends and Family: Three times a week     Active Member of Clubs or Organizations: Patient refused     Attends Club or Organization Meetings: More than 4 times per year     Marital Status: Patient refused   Housing Stability: Unknown (9/4/2023)     Housing Stability Vital Sign     Unable to Pay for Housing in the Last Year: Patient refused     Number of Places Lived in the Last Year: 1     Unstable Housing in the Last Year: Patient refused     Medication List with Changes/Refills   Current Medications    ALBUTEROL (PROVENTIL/VENTOLIN HFA) 90 MCG/ACTUATION INHALER    INHALE 2 PUFFS INTO THE LUNGS EVERY 4 HOURS AS NEEDED FOR WHEEZING OR SHORTNESS OF BREATH    ALLOPURINOL (ZYLOPRIM) 100 MG TABLET    Take 1 tablet (100 mg total) by mouth 2 (two) times daily.    FUROSEMIDE (LASIX) 20 MG TABLET    Take 1 tablet (20 mg total) by mouth once daily.    IBUPROFEN (ADVIL,MOTRIN) 600 MG TABLET    Take 1 tablet (600 mg total) by mouth daily as needed for Pain.    LEVOCETIRIZINE (XYZAL) 5 MG TABLET    Take 1 tablet (5 mg total) by mouth every evening.    MELOXICAM (MOBIC) 15 MG TABLET    Take 1 tablet (15 mg total) by mouth once daily.     Review of patient's allergies indicates:   Allergen Reactions    Amoxicillin Rash and Swelling     Rash with throat swelling    Codeine Palpitations, Rash and Swelling     Other reaction(s): Shortness of breath  Other reaction(s): Hives  Rash and throat swelling    Doxycycline Swelling     Other reaction(s): Rash    Hydrocodone Rash and Swelling    Iodine and iodide containing products Rash and Swelling    Lortab [hydrocodone-acetaminophen] Other (See Comments)     Other reaction(s): Hives  Other reaction(s): Difficulty breathing    Medrol [methylprednisolone] Anaphylaxis    Oxycodone Shortness Of Breath, Rash and Swelling    Oxycodone hcl-oxycodone-asa Rash and Swelling    Percodan filipe Shortness Of Breath    Tetracycline Swelling    Tetracyclines Rash and Swelling    Tramadol Shortness Of Breath, Rash and Swelling    Vibramycin [doxycycline calcium] Shortness Of Breath     Other reaction(s): Hives  Other reaction(s): Difficulty breathing    Augmentin [amoxicillin-pot clavulanate] Itching and Rash    Levaquin [levofloxacin] Hives    Sulfa  (sulfonamide antibiotics) Itching and Rash    Percocet  [oxycodone-acetaminophen]      Other reaction(s): Shortness of breath    Shellfish containing products      Other reaction(s): Shortness of breath  Other reaction(s): Hives  Other reaction(s): Shortness of breath  Other reaction(s): Hives     Review of Systems   Constitutional: Negative for malaise/fatigue.   HENT:  Negative for hearing loss.    Eyes:  Negative for double vision and visual disturbance.   Cardiovascular:  Positive for chest pain.   Respiratory:  Positive for sleep disturbances due to breathing and wheezing. Negative for shortness of breath.    Endocrine: Negative for cold intolerance.   Hematologic/Lymphatic: Does not bruise/bleed easily.   Skin:  Negative for poor wound healing and suspicious lesions.   Musculoskeletal:  Positive for arthritis, gout, joint pain, joint swelling and stiffness.   Gastrointestinal:  Positive for abdominal pain. Negative for nausea and vomiting.   Genitourinary:  Negative for dysuria.   Neurological:  Negative for numbness, paresthesias and sensory change.   Psychiatric/Behavioral:  Negative for depression, memory loss and substance abuse. The patient is not nervous/anxious.    Allergic/Immunologic: Negative for persistent infections.       Objective:   Body mass index is 40.6 kg/m².  There were no vitals filed for this visit.             General    Constitutional: She is oriented to person, place, and time. She appears well-developed and well-nourished. No distress.   HENT:   Head: Normocephalic.   Eyes: EOM are normal.   Pulmonary/Chest: Effort normal.   Neurological: She is oriented to person, place, and time.   Psychiatric: She has a normal mood and affect.             Right Hand/Wrist Exam     Inspection   Scars: Wrist - present Hand -  present  Effusion: Wrist - absent Hand -  absent    Other     Neuorologic Exam    Median Distribution: normal  Ulnar Distribution: normal  Radial Distribution:  normal    Comments:  The patient has well-healed dorsal incision.  She has 30° dorsiflexion 30° palmar flexion 60° supination and 60° palmar flexion.      Left Hand/Wrist Exam     Inspection   Scars: Wrist - present Hand -  present  Effusion: Wrist - absent Hand -  absent    Other     Sensory Exam  Median Distribution: normal  Ulnar Distribution: normal  Radial Distribution: normal    Comments:  The patient has a well-healed dorsal incision scar.  She has 40° dorsiflexion 40° palmar flexion 60° supination 60° palmar flexion.          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  Radiographs of the left wrist showed lysis of the ulnar aspect of the distal radius with a new ulnar tilt of the radius implant.  Assessment:     Encounter Diagnoses   Name Primary?    Chronic pain of left knee     Primary osteoarthritis of both knees     Arthritis both wrists thank you    Plan:     The patient was counseled regarding the diagnosis.  She has no pain on either wrist.  She was told she may benefit from a either screw removal or lunate and triquetral excision to complete a proximal row carpectomy or eventually may need a fusion.  She has no pain at this point she wishes to avoid surgery.  I told her she probably will have her arthritis progress in the right wrist and may need to have something done at that time.  She will return in 1 year for re-x-ray to follow both wrists.  She is on meloxicam for her arthritis with good results.  Her creatinine level was normal.  She is on allopurinol and her primary care physician apparently does not wish to follow that.   Perhaps Rheumatology can follow that.  A new uric acid level was ordered today.  I told her that some primary care physician should follow all of her medications.                Disclaimer: This note was prepared using a voice recognition system and is likely to have sound alike errors within the text.

## 2023-12-10 DIAGNOSIS — M10.9 GOUT, ARTHRITIS: ICD-10-CM

## 2023-12-10 NOTE — TELEPHONE ENCOUNTER
No care due was identified.  [facility] Embedded Care Due Messages. Reference number: [id number].   12/10/2023 8:08:41 AM CST

## 2023-12-11 RX ORDER — ALLOPURINOL 100 MG/1
100 TABLET ORAL 2 TIMES DAILY
Qty: 60 TABLET | Refills: 2 | Status: SHIPPED | OUTPATIENT
Start: 2023-12-11 | End: 2024-02-26 | Stop reason: SDUPTHER

## 2023-12-11 NOTE — TELEPHONE ENCOUNTER
Refill Decision Note   Monica Benita  is requesting a refill authorization.  Brief Assessment and Rationale for Refill:  Approve     Medication Therapy Plan:         Comments:     Note composed:10:46 AM 12/11/2023

## 2024-01-03 DIAGNOSIS — M17.0 PRIMARY OSTEOARTHRITIS OF BOTH KNEES: ICD-10-CM

## 2024-01-03 DIAGNOSIS — M15.4 EROSIVE OSTEOARTHRITIS: ICD-10-CM

## 2024-01-03 DIAGNOSIS — G89.29 CHRONIC PAIN OF LEFT KNEE: ICD-10-CM

## 2024-01-03 DIAGNOSIS — M10.9 GOUT, UNSPECIFIED CAUSE, UNSPECIFIED CHRONICITY, UNSPECIFIED SITE: ICD-10-CM

## 2024-01-03 DIAGNOSIS — M25.562 CHRONIC PAIN OF LEFT KNEE: ICD-10-CM

## 2024-01-03 RX ORDER — MELOXICAM 15 MG/1
15 TABLET ORAL
Qty: 30 TABLET | Refills: 3 | Status: SHIPPED | OUTPATIENT
Start: 2024-01-03 | End: 2024-02-07 | Stop reason: SDUPTHER

## 2024-01-27 DIAGNOSIS — J45.20 MILD INTERMITTENT ASTHMA WITHOUT COMPLICATION: ICD-10-CM

## 2024-01-27 DIAGNOSIS — Z88.9 MULTIPLE ALLERGIES: ICD-10-CM

## 2024-01-29 RX ORDER — LEVOCETIRIZINE DIHYDROCHLORIDE 5 MG/1
5 TABLET, FILM COATED ORAL NIGHTLY
Qty: 30 TABLET | Refills: 5 | Status: SHIPPED | OUTPATIENT
Start: 2024-01-29

## 2024-02-01 ENCOUNTER — PATIENT MESSAGE (OUTPATIENT)
Dept: OTOLARYNGOLOGY | Facility: CLINIC | Age: 64
End: 2024-02-01
Payer: MEDICAID

## 2024-02-01 DIAGNOSIS — H92.12 OTORRHEA, LEFT: Primary | ICD-10-CM

## 2024-02-01 RX ORDER — CIPROFLOXACIN HYDROCHLORIDE 3 MG/ML
SOLUTION/ DROPS OPHTHALMIC
Qty: 5 ML | Refills: 0 | Status: SHIPPED | OUTPATIENT
Start: 2024-02-01 | End: 2024-05-27

## 2024-02-01 NOTE — TELEPHONE ENCOUNTER
Will refill, but she has a tube that hasn't been assess in a while and should have a follow up as well.

## 2024-02-06 ENCOUNTER — OFFICE VISIT (OUTPATIENT)
Dept: OTOLARYNGOLOGY | Facility: CLINIC | Age: 64
End: 2024-02-06
Payer: MEDICAID

## 2024-02-06 DIAGNOSIS — H92.12 OTORRHEA, LEFT: ICD-10-CM

## 2024-02-06 DIAGNOSIS — Z96.22 HISTORY OF TYMPANOSTOMY TUBE PLACEMENT: Primary | ICD-10-CM

## 2024-02-06 PROCEDURE — 99999 PR PBB SHADOW E&M-EST. PATIENT-LVL II: CPT | Mod: PBBFAC,,, | Performed by: STUDENT IN AN ORGANIZED HEALTH CARE EDUCATION/TRAINING PROGRAM

## 2024-02-06 PROCEDURE — 99212 OFFICE O/P EST SF 10 MIN: CPT | Mod: PBBFAC | Performed by: STUDENT IN AN ORGANIZED HEALTH CARE EDUCATION/TRAINING PROGRAM

## 2024-02-06 PROCEDURE — 92504 EAR MICROSCOPY EXAMINATION: CPT | Mod: S$PBB,,, | Performed by: STUDENT IN AN ORGANIZED HEALTH CARE EDUCATION/TRAINING PROGRAM

## 2024-02-06 PROCEDURE — 1159F MED LIST DOCD IN RCRD: CPT | Mod: CPTII,,, | Performed by: STUDENT IN AN ORGANIZED HEALTH CARE EDUCATION/TRAINING PROGRAM

## 2024-02-06 PROCEDURE — 92504 EAR MICROSCOPY EXAMINATION: CPT | Mod: 25,PBBFAC | Performed by: STUDENT IN AN ORGANIZED HEALTH CARE EDUCATION/TRAINING PROGRAM

## 2024-02-06 PROCEDURE — 99213 OFFICE O/P EST LOW 20 MIN: CPT | Mod: 25,S$PBB,, | Performed by: STUDENT IN AN ORGANIZED HEALTH CARE EDUCATION/TRAINING PROGRAM

## 2024-02-06 NOTE — PROGRESS NOTES
Chief complaint:   Chief Complaint   Patient presents with    Ear Drainage     For a week    Ear Fullness          Referring Provider:  No referring provider defined for this encounter.    History of Present Illness:     Ms. Joy is a 63 y.o. female w/hx bilateral tube placement in 2019 presenting for evaluation of left ear drainage.     Right tube extruded 1+ years ago. Since then the right ear has done well    However, left ear has had intermittent drainage over the last couple years. Every few months, despite directed drops.     Started draining again recently. Just started drops. Associated muffled hearing.    History        Past Medical History:   Past Medical History:   Diagnosis Date    Asthma 2013    Gout, arthritis     Sleep apnea     Thyroid disease     .          Past Surgical History:  Past Surgical History:   Procedure Laterality Date    BONE GRAFT Right 2021    Procedure: BONE GRAFT;  Surgeon: Miguel Yoo MD;  Location: [facility];  Service: Orthopedics;  Laterality: Right;  bone graft to fill the defect in the distal radial epiphysis/Reduction of the four-corner area will be performed with bone graft and fusion     SECTION      COLONOSCOPY N/A 2019    Procedure: COLONOSCOPY;  Surgeon: Scout Rivera MD;  Location: [facility] ENDO;  Service: Endoscopy;  Laterality: N/A;    FRACTURE SURGERY      HAND FUSION Right 2021    Procedure: FUSION, JOINT, HAND;  Surgeon: Miguel Yoo MD;  Location: [facility];  Service: Orthopedics;  Laterality: Right;  dip joint    JOINT REPLACEMENT      Novasure Endometrial Ablation  2016    OSTECTOMY Right 2021    Procedure: OSTECTOMY;  Surgeon: Miguel Yoo MD;  Location: [facility];  Service: Orthopedics;  Laterality: Right;  radial styloidectomy as well as scaphoid excision    THYROIDECTOMY, PARTIAL      TUBAL LIGATION      WRIST SURGERY     .         Medications: Medication list was reviewed. She  has a  current medication list which includes the following prescription(s): allopurinol, ciprofloxacin hcl, levocetirizine, meloxicam, albuterol, and furosemide, and the following Facility-Administered Medications: epinephrine, lactated ringers, nozaseptin, and sodium chloride 0.9%.         Allergies:   Review of patient's allergies indicates:   Allergen Reactions    Amoxicillin Rash and Swelling     Rash with throat swelling    Codeine Palpitations, Rash and Swelling     Other reaction(s): Shortness of breath  Other reaction(s): Hives  Rash and throat swelling    Doxycycline Swelling     Other reaction(s): Rash    Hydrocodone Rash and Swelling    Iodine and iodide containing products Rash and Swelling    Lortab [hydrocodone-acetaminophen] Other (See Comments)     Other reaction(s): Hives  Other reaction(s): Difficulty breathing    Medrol [methylprednisolone] Anaphylaxis    Oxycodone Shortness Of Breath, Rash and Swelling    Oxycodone hcl-oxycodone-asa Rash and Swelling    Percodan filipe Shortness Of Breath    Tetracycline Swelling    Tetracyclines Rash and Swelling    Tramadol Shortness Of Breath, Rash and Swelling    Vibramycin [doxycycline calcium] Shortness Of Breath     Other reaction(s): Hives  Other reaction(s): Difficulty breathing    Augmentin [amoxicillin-pot clavulanate] Itching and Rash    Levaquin [levofloxacin] Hives    Sulfa (sulfonamide antibiotics) Itching and Rash    Percocet  [oxycodone-acetaminophen]      Other reaction(s): Shortness of breath    Shellfish containing products      Other reaction(s): Shortness of breath  Other reaction(s): Hives  Other reaction(s): Shortness of breath  Other reaction(s): Hives            Family history: family history includes Anuerysm in her brother and mother; Atrial fibrillation in her mother; Diabetes in her brother and father; Heart disease in her brother and father; Hyperlipidemia in her father; Kidney disease in her father; Stroke in her father.         Social  History          Alcohol use:  reports no history of alcohol use.            Tobacco:  reports that she has never smoked. She has never used smokeless tobacco.         Please see the patient's intake form for full details of past medical history, past surgical history, family history, social history and review of systems. ?This information was reviewed by me and noted.      Physical Examination     General: Well developed, well nourished, well hydrated. Verbal with a strong voice and not dysphonic.     Head/Face: Normocephalic, atraumatic. No scars or lesions. Facial musculature equal.     Eyes: No scleral icterus or conjunctival hemorrhage. EOMI. PERRLA.     Ears:     Right ear: No gross deformity. EAC is clear of debris and erythema. The TM is intact with a pneumatized middle ear. No signs of retraction, fluid or infection.      Left ear: see procedure    Hearing: grossly intact    Nose: No gross deformity or lesions. No purulent discharge. No significant NSD.      Mouth/Oropharynx: Lips without any lesions. No mucosal lesions within the oropharynx. No tonsillar exudate or lesions. Pharyngeal walls symmetrical. Uvula midline. Tongue midline without lesions.     Neck: Trachea midline. No masses. No thyromegaly or nodules palpated.     Lymphatic: No lymphadenopathy in the neck.     Extremities: No cyanosis. Warm and well-perfused.     Skin: No scars or lesions on face or neck.      Neurologic: Moving all extremities without gross abnormality.CN II-XII grossly intact. House-Brackmann 1/6. No signs of nystagmus.      Psych: Alert and oriented to person, place, and time with an appropriate mood and affect.     Data review:    Review of records:      I reviewed records from the referring provider's office visits.  These describe the history, workup, and/or treatment of this problem thus far.    Audiogram     Audiogram was independently reviewed       Culture 2020 and 2019  Component 2 yr ago   Aerobic Bacterial Culture  No growth      Ear Microscopy    Indication: microscopy was required for removal of obstructing pathology and adequate visualization of the tympanic membrane and middle ear    Technique: The patient was placed in a semi-recumbent position.  The left ear was first inspected under the microscope. There was evidence of white drainage. This was removed with suction. The PET was angled and nearly extruded, therefore, an alligator was used to remove the PET. There was a pinpoint residual perforation where the tube had extruded.         Assessment/Plan:      1. History of tympanostomy tube placement    2. Otorrhea, left         Left tube was nearly extruded. It was removed due to recurrent issues with otorrhea over last 1+ years    ciloxan x7d  Repeat exam to see if perforation closes in 1 month  Return to clinic sooner with concerns        Dayron Barnes MD  [facility]   [facility]  [id number] [place] [address].  [place] [id number]  P: [phone]  F: [phone]

## 2024-02-07 DIAGNOSIS — M10.9 GOUT, UNSPECIFIED CAUSE, UNSPECIFIED CHRONICITY, UNSPECIFIED SITE: ICD-10-CM

## 2024-02-07 DIAGNOSIS — M17.0 PRIMARY OSTEOARTHRITIS OF BOTH KNEES: ICD-10-CM

## 2024-02-07 DIAGNOSIS — M25.562 CHRONIC PAIN OF LEFT KNEE: ICD-10-CM

## 2024-02-07 DIAGNOSIS — M15.4 EROSIVE OSTEOARTHRITIS: ICD-10-CM

## 2024-02-07 DIAGNOSIS — G89.29 CHRONIC PAIN OF LEFT KNEE: ICD-10-CM

## 2024-02-08 RX ORDER — MELOXICAM 15 MG/1
15 TABLET ORAL DAILY
Qty: 90 TABLET | Refills: 3 | Status: SHIPPED | OUTPATIENT
Start: 2024-02-08

## 2024-02-09 ENCOUNTER — OFFICE VISIT (OUTPATIENT)
Dept: PULMONOLOGY | Facility: CLINIC | Age: 64
End: 2024-02-09
Payer: MEDICAID

## 2024-02-09 ENCOUNTER — E-VISIT (OUTPATIENT)
Dept: INTERNAL MEDICINE | Facility: CLINIC | Age: 64
End: 2024-02-09
Payer: MEDICAID

## 2024-02-09 VITALS
BODY MASS INDEX: 42.16 KG/M2 | HEIGHT: 65 IN | WEIGHT: 253.06 LBS | OXYGEN SATURATION: 98 % | HEART RATE: 79 BPM | DIASTOLIC BLOOD PRESSURE: 72 MMHG | RESPIRATION RATE: 18 BRPM | SYSTOLIC BLOOD PRESSURE: 130 MMHG

## 2024-02-09 DIAGNOSIS — J45.20 MILD INTERMITTENT ASTHMA WITHOUT COMPLICATION: ICD-10-CM

## 2024-02-09 DIAGNOSIS — G47.33 OSA (OBSTRUCTIVE SLEEP APNEA): Primary | ICD-10-CM

## 2024-02-09 DIAGNOSIS — R30.0 DYSURIA: Primary | ICD-10-CM

## 2024-02-09 PROCEDURE — 3008F BODY MASS INDEX DOCD: CPT | Mod: CPTII,,, | Performed by: PHYSICIAN ASSISTANT

## 2024-02-09 PROCEDURE — 3078F DIAST BP <80 MM HG: CPT | Mod: CPTII,,, | Performed by: PHYSICIAN ASSISTANT

## 2024-02-09 PROCEDURE — 99999 PR PBB SHADOW E&M-EST. PATIENT-LVL III: CPT | Mod: PBBFAC,,, | Performed by: PHYSICIAN ASSISTANT

## 2024-02-09 PROCEDURE — 3075F SYST BP GE 130 - 139MM HG: CPT | Mod: CPTII,,, | Performed by: PHYSICIAN ASSISTANT

## 2024-02-09 PROCEDURE — 99421 OL DIG E/M SVC 5-10 MIN: CPT | Mod: ,,, | Performed by: INTERNAL MEDICINE

## 2024-02-09 PROCEDURE — 1159F MED LIST DOCD IN RCRD: CPT | Mod: CPTII,,, | Performed by: PHYSICIAN ASSISTANT

## 2024-02-09 PROCEDURE — 99213 OFFICE O/P EST LOW 20 MIN: CPT | Mod: S$PBB,,, | Performed by: PHYSICIAN ASSISTANT

## 2024-02-09 PROCEDURE — 99213 OFFICE O/P EST LOW 20 MIN: CPT | Mod: PBBFAC | Performed by: PHYSICIAN ASSISTANT

## 2024-02-09 PROCEDURE — 1160F RVW MEDS BY RX/DR IN RCRD: CPT | Mod: CPTII,,, | Performed by: PHYSICIAN ASSISTANT

## 2024-02-09 NOTE — PROGRESS NOTES
Subjective:       Patient ID: Monica Joy is a 63 y.o. female.    Chief Complaint: KAM on CPAP    2/9/24  Here for KAM on CPAP follow up  Compliance download reviewed, days with usage > 4 hours is 100%, average AHI is 1.7  Patient states improved symptoms with use of CPAP. Sleeping more soundly. Waking up feeling more refreshed. Improved daytime sleepiness.      62yo female here for follow up of KAM and asthma  Using AutoPAP 8-16, [facility], [facility]  Compliant with machine and wants to continue use  Patient states improved symptoms with use of CPAP. Sleeping more soundly. Waking up feeling more refreshed. Improved daytime sleepiness.  Mild intermittent asthma controlled on TAHIRA prn, also taking xyzal which she says controls her asthma symptoms  No signs of infection or exacerbation  Compliance download reviewed, days with usage > 4 hours is 100%  Average APNEA HYPOPNEA INDEX is 2.2  She notes gradual weight increase over the last year, attributes this is lifestyle and needs to get back to walking       EPWORTH SLEEPINESS SCALE 2/9/2023   Sitting and reading 0   Watching TV 0   Sitting, inactive in a public place (e.g. a theatre or a meeting) 0   As a passenger in a car for an hour without a break 0   Lying down to rest in the afternoon when circumstances permit 1   Sitting and talking to someone 0   Sitting quietly after a lunch without alcohol 0   In a car, while stopped for a few minutes in traffic 0   Total score 1     Immunization History   Administered Date(s) Administered    Influenza 10/30/2015, 09/07/2020    Influenza - Quadrivalent - PF *Preferred* (6 months and older) 11/13/2013, 10/30/2015, 12/22/2016, 08/13/2019    Influenza Split 11/13/2013    Pneumococcal Polysaccharide - 23 Valent 11/23/2013    Tdap 03/27/2008, 11/21/2018      Tobacco Use: Low Risk  (2/26/2024)    Patient History     Smoking Tobacco Use: Never     Smokeless Tobacco Use: Never     Passive Exposure: Not on file      Past Medical  History:   Diagnosis Date    Asthma 09/06/2013    Gout, arthritis     Sleep apnea     Thyroid disease       Current Outpatient Medications on File Prior to Visit   Medication Sig Dispense Refill    albuterol (PROVENTIL/VENTOLIN HFA) 90 mcg/actuation inhaler INHALE 2 PUFFS INTO THE LUNGS EVERY 4 HOURS AS NEEDED FOR WHEEZING OR SHORTNESS OF BREATH 18 g 11    ciprofloxacin HCl (CILOXAN) 0.3 % ophthalmic solution 4 drops to both ears twice a day 5 mL 0    levocetirizine (XYZAL) 5 MG tablet TAKE 1 TABLET(5 MG) BY MOUTH EVERY EVENING 30 tablet 5    meloxicam (MOBIC) 15 MG tablet Take 1 tablet (15 mg total) by mouth once daily. 90 tablet 3     Current Facility-Administered Medications on File Prior to Visit   Medication Dose Route Frequency Provider Last Rate Last Admin    EPINEPHrine (EPIPEN) 0.3 mg/0.3 mL pen injection 0.3 mg  0.3 mg Intramuscular PRN Leon De La Torre MD        lactated ringers infusion   Intravenous On Call Procedure Mary Anne Johnson PA        nozaseptin (NOZIN) nasal    Each Nostril On Call Procedure Mary Anne Johnson PA   Given at 02/25/21 0613    sodium chloride 0.9% flush 10 mL  10 mL Intravenous PRN Leon De La Torre MD            Review of Systems   Constitutional:  Negative for fever, weight loss, appetite change, fatigue and weakness.   HENT:  Negative for postnasal drip, rhinorrhea, sinus pressure, trouble swallowing and congestion.    Respiratory:  Negative for cough, sputum production, choking, chest tightness, shortness of breath, wheezing and dyspnea on extertion.    Cardiovascular:  Negative for chest pain and leg swelling.   Musculoskeletal:  Negative for arthralgias, gait problem and joint swelling.   Gastrointestinal:  Negative for nausea, vomiting and abdominal pain.   Neurological:  Negative for dizziness, weakness and headaches.   All other systems reviewed and are negative.      Objective:       Vitals:    02/09/24 1457   BP: 130/72   Pulse: 79   Resp: 18   SpO2:  "98%   Weight: 114.8 kg (253 lb 1.4 oz)   Height: 5' 5" (1.651 m)         Physical Exam   Constitutional: She is oriented to person, place, and time. She appears well-developed and well-nourished. No distress. She is obese.   HENT:   Head: Normocephalic.   Nose: Nose normal.   Mouth/Throat: Oropharynx is clear and moist.   Cardiovascular: Normal rate and regular rhythm.   Pulmonary/Chest: Effort normal and breath sounds normal. No respiratory distress. She has no wheezes. She has no rhonchi. She has no rales.   Musculoskeletal:         General: No edema.      Cervical back: Normal range of motion and neck supple.   Lymphadenopathy: No supraclavicular adenopathy is present.     She has no cervical adenopathy.   Neurological: She is alert and oriented to person, place, and time. Gait normal.   Skin: Skin is warm and dry.   Psychiatric: She has a normal mood and affect.   Vitals reviewed.    Personal Diagnostic Review    EXAMINATION:  XR CHEST AP PORTABLE 7/13/2021     CLINICAL HISTORY:  Chest Pain;     COMPARISON:  February 22, 2021, January 11, 2019     FINDINGS:  The study is lordotic in position.  The lungs are free of new pulmonary opacities.  The cardiac silhouette size is normal. The trachea is midline and the mediastinal width is normal. Negative for focal infiltrate, effusion or pneumothorax. Pulmonary vasculature is normal. Negative for osseous abnormalities. Tortuous aorta.  Marginal spondylosis.  Cardiophrenic fat pads.     Impression:     1.  Negative for acute process involving the chest.     2.  Stable findings as noted above.        Electronically signed by: Ubaldo Toth MD  Date:                                            07/13/2021  Time:                                           13:26    Compliance Summary  12/7/2023 - 2/7/2024 (63 days)  Days with Device Usage 63 days  Days without Device Usage 0 days  Percent Days with Device Usage 100.0%  Cumulative Usage 20 days 11 hrs. 10 mins. 48 secs.  Maximum " Usage (1 Day) 12 hrs. 39 mins. 4 secs.  Average Usage (All Days) 7 hrs. 47 mins. 47 secs.  Average Usage (Days Used) 7 hrs. 47 mins. 47 secs.  Minimum Usage (1 Day) 5 hrs. 22 mins. 50 secs.  Percent of Days with Usage >= 4 Hours 100.0%  Percent of Days with Usage < 4 Hours 0.0%  Date Range  Total Blower Time 20 days 13 hrs. 17 mins. 43 secs.  Average AHI 1.7  Auto-CPAP Summary  Auto-CPAP Mean Pressure 11.2 cmH2O  Auto-CPAP Peak Average Pressure 15.6 cmH2O  Device Pressure <= 90% of Time 14.1 cmH2O  Average Time in Large Leak Per Day 8 secs.      Assessment/Plan:       Problem List Items Addressed This Visit    None  Visit Diagnoses       KAM (obstructive sleep apnea)    -  Primary    Relevant Orders    CPAP/BIPAP SUPPLIES            Follow up in about 1 year (around 2/9/2025) for KAM follow up.    Discussed diagnosis, its evaluation, treatment and usual course. All questions answered.    Patient verbalized understanding of plan and left in no acute distress    Thank you for the courtesy of participating in the care of this patient    Nathalie Ritchie PA-C

## 2024-02-09 NOTE — PROGRESS NOTES
Patient ID: Monica Joy is a 63 y.o. female.    Chief Complaint: Urinary Tract Infection (Entered automatically based on patient selection in Patient Portal.)    The patient initiated a request through [employer] on 2/9/2024 for evaluation and management with a chief complaint of Urinary Tract Infection (Entered automatically based on patient selection in Patient Portal.)     I evaluated the questionnaire submission on 2/9/2024.    [facility] P[facility] Evisit Uti Questionnaire    2/9/2024  1:35 AM CST - Filed by Patient   Do you agree to participate in an E-Visit? Yes   If you have any of the following problems, please present to your local ER or call 911:  I acknowledge   What is the main issue that you would like for your doctor to address today? UTI   Are you able to take your vital signs? No   What symptoms do you currently have? Pain while passing urine;  Difficulty passing urine;  Change in urine appearance or smell   When did your symptoms first appear? 2/5/2024   List what you have done or taken to help your symptoms. Haven't taken any thing   Please indicate whether you have had the following symptoms during the past 24 hours     Urgent urination (a sudden and uncontrollable urge to urinate) Severe   Frequent urination of small amounts of urine (going to the toilet very often) Severe   Burning pain when urinating Moderate   Incomplete bladder emptying (still feel like you need to urinate again after urination) Moderate   Pain not associated with urination in the lower abdomen below the belly button) Mild   What does your urine look like? Cloudy   Blood seen in the urine None   Flank pain (pain in one or both sides of the lower back) None   Abnormal Vaginal Discharge (abnormal amount, color and/or odor) Mild   Discharge from the urethra (urinary opening) without urination Mild   High body temperature/fever? None-<99.5   Please rate how much discomfort you have experience because of the symptoms in the past 24 hours:  Moderate   Please indicate how the symptoms have interfered with your every day activities/work in the past 24 hours: Moderate   Please indicate how these symptoms have interfered with your social activities (visiting people, meeting with friends, etc.) in the past 24 hours? Moderate   Are you a diabetic? No   Please indicate whether you have the following at the time of completion of this questionnaire: None of the above   Provide any information you feel is important to your history not asked above    Please attach any relevant images or files (if you have performed a home test for UTI, please submit a photo of results)          Encounter Diagnosis   Name Primary?    Dysuria Yes        Orders Placed This Encounter   Procedures    Urinalysis     Standing Status:   Future     Standing Expiration Date:   4/9/2025     Order Specific Question:   Collection Type     Answer:   Urine, Clean Catch            No follow-ups on file.      E-Visit Time Tracking:    Day 1 Time (in minutes): 5    Total Time (in minutes): 5

## 2024-02-09 NOTE — Clinical Note
Pt needs a UA but since it is the end of the day, I won't see results until Monday. She may need to go to urgent care. I will send her a message.

## 2024-02-19 ENCOUNTER — LAB VISIT (OUTPATIENT)
Dept: LAB | Facility: HOSPITAL | Age: 64
End: 2024-02-19
Attending: PHYSICIAN ASSISTANT
Payer: MEDICAID

## 2024-02-19 DIAGNOSIS — M10.9 GOUT, UNSPECIFIED CAUSE, UNSPECIFIED CHRONICITY, UNSPECIFIED SITE: ICD-10-CM

## 2024-02-19 LAB
ANION GAP SERPL CALC-SCNC: 11 MMOL/L (ref 8–16)
BUN SERPL-MCNC: 15 MG/DL (ref 8–23)
CALCIUM SERPL-MCNC: 9 MG/DL (ref 8.7–10.5)
CHLORIDE SERPL-SCNC: 106 MMOL/L (ref 95–110)
CO2 SERPL-SCNC: 22 MMOL/L (ref 23–29)
CREAT SERPL-MCNC: 0.8 MG/DL (ref 0.5–1.4)
EST. GFR  (NO RACE VARIABLE): >60 ML/MIN/1.73 M^2
GLUCOSE SERPL-MCNC: 188 MG/DL (ref 70–110)
POTASSIUM SERPL-SCNC: 4.1 MMOL/L (ref 3.5–5.1)
SODIUM SERPL-SCNC: 139 MMOL/L (ref 136–145)
URATE SERPL-MCNC: 4.6 MG/DL (ref 2.4–5.7)

## 2024-02-19 PROCEDURE — 36415 COLL VENOUS BLD VENIPUNCTURE: CPT | Mod: PO | Performed by: PHYSICIAN ASSISTANT

## 2024-02-19 PROCEDURE — 80048 BASIC METABOLIC PNL TOTAL CA: CPT | Performed by: PHYSICIAN ASSISTANT

## 2024-02-19 PROCEDURE — 84550 ASSAY OF BLOOD/URIC ACID: CPT | Performed by: PHYSICIAN ASSISTANT

## 2024-02-26 ENCOUNTER — HOSPITAL ENCOUNTER (OUTPATIENT)
Dept: RADIOLOGY | Facility: HOSPITAL | Age: 64
Discharge: HOME | End: 2024-02-26
Attending: PHYSICIAN ASSISTANT
Payer: MEDICAID

## 2024-02-26 ENCOUNTER — PATIENT MESSAGE (OUTPATIENT)
Dept: RHEUMATOLOGY | Facility: CLINIC | Age: 64
End: 2024-02-26

## 2024-02-26 ENCOUNTER — OFFICE VISIT (OUTPATIENT)
Dept: RHEUMATOLOGY | Facility: CLINIC | Age: 64
End: 2024-02-26
Payer: MEDICAID

## 2024-02-26 ENCOUNTER — TELEPHONE (OUTPATIENT)
Dept: RHEUMATOLOGY | Facility: CLINIC | Age: 64
End: 2024-02-26

## 2024-02-26 DIAGNOSIS — Z79.899 LONG-TERM USE OF PLAQUENIL: ICD-10-CM

## 2024-02-26 DIAGNOSIS — M15.4 EROSIVE OSTEOARTHRITIS: ICD-10-CM

## 2024-02-26 DIAGNOSIS — M15.4 EROSIVE OSTEOARTHRITIS OF BOTH HANDS: ICD-10-CM

## 2024-02-26 DIAGNOSIS — M10.9 GOUT, UNSPECIFIED CAUSE, UNSPECIFIED CHRONICITY, UNSPECIFIED SITE: ICD-10-CM

## 2024-02-26 DIAGNOSIS — M17.0 PRIMARY OSTEOARTHRITIS OF BOTH KNEES: ICD-10-CM

## 2024-02-26 DIAGNOSIS — Z79.899 HIGH RISK MEDICATION USE: ICD-10-CM

## 2024-02-26 DIAGNOSIS — M19.90 INFLAMMATORY ARTHRITIS: ICD-10-CM

## 2024-02-26 DIAGNOSIS — M10.9 GOUT, ARTHRITIS: Primary | ICD-10-CM

## 2024-02-26 DIAGNOSIS — Z51.81 MEDICATION MONITORING ENCOUNTER: ICD-10-CM

## 2024-02-26 PROCEDURE — 73130 X-RAY EXAM OF HAND: CPT | Mod: 26,50,, | Performed by: RADIOLOGY

## 2024-02-26 PROCEDURE — 73630 X-RAY EXAM OF FOOT: CPT | Mod: TC,50,PO

## 2024-02-26 PROCEDURE — 73130 X-RAY EXAM OF HAND: CPT | Mod: TC,50,PO

## 2024-02-26 PROCEDURE — 1159F MED LIST DOCD IN RCRD: CPT | Mod: CPTII,95,, | Performed by: PHYSICIAN ASSISTANT

## 2024-02-26 PROCEDURE — 1160F RVW MEDS BY RX/DR IN RCRD: CPT | Mod: CPTII,95,, | Performed by: PHYSICIAN ASSISTANT

## 2024-02-26 PROCEDURE — 73630 X-RAY EXAM OF FOOT: CPT | Mod: 26,50,, | Performed by: RADIOLOGY

## 2024-02-26 PROCEDURE — 99214 OFFICE O/P EST MOD 30 MIN: CPT | Mod: 95,,, | Performed by: PHYSICIAN ASSISTANT

## 2024-02-26 RX ORDER — HYDROXYCHLOROQUINE SULFATE 200 MG/1
200 TABLET, FILM COATED ORAL 2 TIMES DAILY
Qty: 60 TABLET | Refills: 5 | Status: SHIPPED | OUTPATIENT
Start: 2024-02-26 | End: 2024-05-27

## 2024-02-26 RX ORDER — ALLOPURINOL 100 MG/1
200 TABLET ORAL DAILY
Qty: 180 TABLET | Refills: 3 | Status: SHIPPED | OUTPATIENT
Start: 2024-02-26

## 2024-02-26 NOTE — PROGRESS NOTES
The patient location is: home  The chief complaint leading to consultation is: Gout and hand/foot xrays    Visit type: audiovisual    Face to Face time with patient: 10   25 minutes of total time spent on the encounter, which includes face to face time and non-face to face time preparing to see the patient (eg, review of tests), Obtaining and/or reviewing separately obtained history, Documenting clinical information in the electronic or other health record, Independently interpreting results (not separately reported) and communicating results to the patient/family/caregiver, or Care coordination (not separately reported).     Each patient to whom he or she provides medical services by telemedicine is:  (1) informed of the relationship between the physician and patient and the respective role of any other health care provider with respect to management of the patient; and (2) notified that he or she may decline to receive medical services by telemedicine and may withdraw from such care at any time.    Subjective:      Patient ID: Monica Joy is a 63 y.o. female.    Chief Complaint: No chief complaint on file.    HPI   Monica Joy  is a 63 y.o. female seen today for f/u gout w bilat hand pain.  Several months ago started with left ankle pain and swelling.  She had extensive workup for DVT.  Ultimately she was found to be negative.  Even saw Hematology to be evaluated for hypercoagulable state.  She was eventually found to have elevated Uric acid and started Allopurinol 100 mg daily.  When I saw her, I increased her dose to 200 mg daily.  She is tolerating that well.  No recent gout flare-ups.  Happy with her progress.    Still complains of bilateral hand pain.  She has seen Dr. Yoo in the Orthopedic Department who has recommended removal of hardware in the right wrist.  Apparently they have migrated a bit and are loose.  Patient states she has not interested in scheduling surgery at this time.  Also with  extensive arthritic deformities bilateral hands and wrists.  In the past workup for autoimmune disease has been largely unrevealing otherwise.    Known OA left knee managed by Orthopedics.  Due to severe reactions to steroids in the past, corticosteroid injection not indicated.  Continues w Mobic 15 mg daily which she finds helpful.  May be a candidate for visco inj but I would defer to ortho.    Patient denies painful swollen joints (today), fevers, chills, photosensitivity, eye pain, shortness of breath, chest pain, hematuria, blood in the stool, rash, sicca symptoms, raynauds, finger ulcerations.  Rheumatologic systems otherwise negative.    Serologies/Labs:  Neg GOVIND, RF, CCP in past  Current Treatment:  Allopurinol 200 mg daily   Mobic 15 mg daily  Previous Treatment:   Colchicine    Current Outpatient Medications:     albuterol (PROVENTIL/VENTOLIN HFA) 90 mcg/actuation inhaler, INHALE 2 PUFFS INTO THE LUNGS EVERY 4 HOURS AS NEEDED FOR WHEEZING OR SHORTNESS OF BREATH, Disp: 18 g, Rfl: 11    allopurinoL (ZYLOPRIM) 100 MG tablet, Take 2 tablets (200 mg total) by mouth once daily., Disp: 180 tablet, Rfl: 3    ciprofloxacin HCl (CILOXAN) 0.3 % ophthalmic solution, 4 drops to both ears twice a day, Disp: 5 mL, Rfl: 0    hydroxychloroquine (PLAQUENIL) 200 mg tablet, Take 1 tablet (200 mg total) by mouth 2 (two) times daily., Disp: 60 tablet, Rfl: 5    levocetirizine (XYZAL) 5 MG tablet, TAKE 1 TABLET(5 MG) BY MOUTH EVERY EVENING, Disp: 30 tablet, Rfl: 5    meloxicam (MOBIC) 15 MG tablet, Take 1 tablet (15 mg total) by mouth once daily., Disp: 90 tablet, Rfl: 3    Current Facility-Administered Medications:     EPINEPHrine (EPIPEN) 0.3 mg/0.3 mL pen injection 0.3 mg, 0.3 mg, Intramuscular, PRN, Leon De La Torre MD    sodium chloride 0.9% flush 10 mL, 10 mL, Intravenous, PRN, Leon De La Torre MD    Facility-Administered Medications Ordered in Other Visits:     lactated ringers infusion, , Intravenous, On Call  Procedure, Mary Anne Johnson PA    nozaseptin (NOZIN) nasal , , Each Nostril, On Call Procedure, Mary Anne Johnson PA, Given at 02/25/21 0613    Past Medical History:   Diagnosis Date    Asthma 09/06/2013    Gout, arthritis     Sleep apnea     Thyroid disease      Family History   Problem Relation Age of Onset    Atrial fibrillation Mother     Anuerysm Mother     Diabetes Father     Heart disease Father     Hyperlipidemia Father     Stroke Father     Kidney disease Father     Heart disease Brother     Diabetes Brother     Anuerysm Brother     Breast cancer Neg Hx     Colon cancer Neg Hx     Ovarian cancer Neg Hx      Social History     Socioeconomic History    Marital status:     Number of children: 2   Tobacco Use    Smoking status: Never    Smokeless tobacco: Never   Substance and Sexual Activity    Alcohol use: No     Comment: on rare holidays    Drug use: No    Sexual activity: Yes     Partners: Male     Birth control/protection: None     Social Determinants of Health     Financial Resource Strain: Patient Declined (9/4/2023)    Overall Financial Resource Strain (CARDIA)     Difficulty of Paying Living Expenses: Patient declined   Food Insecurity: Patient Declined (9/4/2023)    Hunger Vital Sign     Worried About Running Out of Food in the Last Year: Patient declined     Ran Out of Food in the Last Year: Patient declined   Transportation Needs: Patient Declined (9/4/2023)    PRAPARE - Transportation     Lack of Transportation (Medical): Patient declined     Lack of Transportation (Non-Medical): Patient declined   Physical Activity: Unknown (9/4/2023)    Exercise Vital Sign     Days of Exercise per Week: Patient declined     Minutes of Exercise per Session: 10 min   Recent Concern: Physical Activity - Insufficiently Active (7/24/2023)    Exercise Vital Sign     Days of Exercise per Week: 1 day     Minutes of Exercise per Session: 10 min   Stress: Patient Declined (9/4/2023)    [employer] - Occupational Stress Questionnaire     Feeling of Stress : Patient declined   Social Connections: Unknown (9/4/2023)    Social Connection and Isolation Panel [NHANES]     Frequency of Communication with Friends and Family: More than three times a week     Frequency of Social Gatherings with Friends and Family: Three times a week     Active Member of Clubs or Organizations: Patient declined     Attends Club or Organization Meetings: More than 4 times per year     Marital Status: Patient declined   Housing Stability: Unknown (9/4/2023)    Housing Stability Vital Sign     Unable to Pay for Housing in the Last Year: Patient refused     Number of Places Lived in the Last Year: 1     Unstable Housing in the Last Year: Patient refused     Review of patient's allergies indicates:   Allergen Reactions    Amoxicillin Rash and Swelling     Rash with throat swelling    Codeine Palpitations, Rash and Swelling     Other reaction(s): Shortness of breath  Other reaction(s): Hives  Rash and throat swelling    Doxycycline Swelling     Other reaction(s): Rash    Hydrocodone Rash and Swelling    Iodine and iodide containing products Rash and Swelling    Lortab [hydrocodone-acetaminophen] Other (See Comments)     Other reaction(s): Hives  Other reaction(s): Difficulty breathing    Medrol [methylprednisolone] Anaphylaxis    Oxycodone Shortness Of Breath, Rash and Swelling    Oxycodone hcl-oxycodone-asa Rash and Swelling    Percodan filipe Shortness Of Breath    Tetracycline Swelling    Tetracyclines Rash and Swelling    Tramadol Shortness Of Breath, Rash and Swelling    Vibramycin [doxycycline calcium] Shortness Of Breath     Other reaction(s): Hives  Other reaction(s): Difficulty breathing    Augmentin [amoxicillin-pot clavulanate] Itching and Rash    Levaquin [levofloxacin] Hives    Sulfa (sulfonamide antibiotics) Itching and Rash    Percocet  [oxycodone-acetaminophen]      Other reaction(s): Shortness  "of breath    Shellfish containing products      Other reaction(s): Shortness of breath  Other reaction(s): Hives  Other reaction(s): Shortness of breath  Other reaction(s): Hives       Objective:   There were no vitals taken for this visit.  Immunization History   Administered Date(s) Administered    Influenza 10/30/2015, 09/07/2020    Influenza - Quadrivalent - PF *Preferred* (6 months and older) 11/13/2013, 10/30/2015, 12/22/2016, 08/13/2019    Influenza Split 11/13/2013    Pneumococcal Polysaccharide - 23 Valent 11/23/2013    Tdap 03/27/2008, 11/21/2018       Physical Exam   Constitutional: She is oriented to person, place, and time. No distress.   HENT:   Head: Normocephalic and atraumatic.   Pulmonary/Chest: Effort normal.   Musculoskeletal:      Cervical back: Normal range of motion.   Neurological: She is alert and oriented to person, place, and time.   Psychiatric: Mood normal.     No synovitis, no dactylitis, no enthesitis  No effusions of large or small joints  100% fist formation  Well preserved ROM    Left knee  JOYCE  ROM 0-110  PF crepitus  TTP med comp    Recent Results (from the past 672 hour(s))   Basic Metabolic Panel    Collection Time: 02/19/24  8:53 AM   Result Value Ref Range    Sodium 139 136 - 145 mmol/L    Potassium 4.1 3.5 - 5.1 mmol/L    Chloride 106 95 - 110 mmol/L    CO2 22 (L) 23 - 29 mmol/L    Glucose 188 (H) 70 - 110 mg/dL    BUN 15 8 - 23 mg/dL    Creatinine 0.8 0.5 - 1.4 mg/dL    Calcium 9.0 8.7 - 10.5 mg/dL    Anion Gap 11 8 - 16 mmol/L    eGFR >60 >60 mL/min/1.73 m^2   Uric Acid    Collection Time: 02/19/24  8:53 AM   Result Value Ref Range    Uric Acid 4.6 2.4 - 5.7 mg/dL       No results found for: "TBGOLDPLUS"   Lab Results   Component Value Date    HEPAIGM Negative 02/10/2014    HEPBIGM Negative 02/10/2014    HEPCAB Negative 02/10/2014        Imaging  I have personally reviewed images and reports as below.  I agree with the interpretation.  X-Ray Foot Complete " Bilateral  Narrative: EXAMINATION:  XR FOOT COMPLETE 3 VIEW BILATERAL    CLINICAL HISTORY:  Gout, unspecified    TECHNIQUE:  AP, lateral, and oblique views of both feet were performed.    COMPARISON:  10/14/2023    FINDINGS:  No obvious erosive changes seen at either 1st MTP joint.  Prominent degenerative changes noted in the region of either midfoot left greater than the right.  Dorsal and plantar calcaneal enthesophytes seen bilaterally.  Mild degenerative changes seen at either 1st MTP joint.  Impression: 1.  As above    Electronically signed by: Nain Woody DO  Date:    02/26/2024  Time:    08:55  X-Ray Hand 3 View Bilateral  Narrative: EXAMINATION:  XR HAND COMPLETE 3 VIEWS BILATERAL    CLINICAL HISTORY:  . Erosive (osteo)arthritis    TECHNIQUE:  PA, lateral, and oblique views of both hands were performed.    COMPARISON:  12/05/2023    FINDINGS:  Left distal radius prosthesis again noted.  Screw seen extending from the prosthesis into the carpal bones.  The appearance is stable when compared to the prior exam.  There is also a screw seen across the 1st MTP joint on the left as well.  There is significant joint space narrowing seen involving the D IP joints bilaterally with some gall wing deformities that are chronic appearing and stable when compared to the prior study from less than 3 months prior.  A screw seen in a retrograde fashion across the D IP joint of the 3rd digit on the right.  There are also 2 screws seen across the carpal bones on the right with significant deformity of the scaphoid and lunate most likely secondary to a SLAC wrist.  Prominent subchondral cystic changes seen within the distal radius on the right.  No new erosive changes are identified.  Stable appearance of chronic deformity of the distal ulna on the left along with lucency seen along the ulnar margin of the radial prosthesis on the left.  Impression: No significant overall change when compared to the prior study from  12/05/2023.    Electronically signed by: Nain Woody DO  Date:    02/26/2024  Time:    08:53      Assessment:     1. Gout, arthritis    2. Inflammatory arthritis    3. Erosive osteoarthritis of both hands    4. Medication monitoring encounter    5. High risk medication use    6. Long-term use of Plaquenil          Plan:     Diagnoses and all orders for this visit:    Gout, arthritis  -     allopurinoL (ZYLOPRIM) 100 MG tablet; Take 2 tablets (200 mg total) by mouth once daily.    Inflammatory arthritis  -     hydroxychloroquine (PLAQUENIL) 200 mg tablet; Take 1 tablet (200 mg total) by mouth 2 (two) times daily.    Erosive osteoarthritis of both hands  -     hydroxychloroquine (PLAQUENIL) 200 mg tablet; Take 1 tablet (200 mg total) by mouth 2 (two) times daily.    Medication monitoring encounter    High risk medication use  -     Ambulatory referral/consult to Ophthalmology; Future    Long-term use of Plaquenil      Gout  Labs reviewed  Uric acid at goal  Renal function stable  C/w allopurinol 200 mg daily  No current active gout attack  Consider colchicine as an add on for maintenance in the future if needed  X-ray hands and feet reviewed as above  Inflammatory erosive osteoarthritis noted  Inflammatory erosive arthritis bilat hands  Start  mg bid  Discussed risks and benefits of the medication  Literature provided to the patient through the patient portal today  Patient aware of the need for baseline Plaquenil monitoring exam in 3-6 months with yearly exams thereafter  Ophthalmology referral placed  Check Reg 4 in 2 weeks  Notify office of any adverse effects  Would consider MTX as alternative if no improvement at follow up  Ok to c/w Mobic 15 mg daily - can add PPI for gastroprotection.  Drug therapy requiring intensive monitoring for toxicity  High Risk Medication Monitoring encounter  No current medication related issues, no evidence of toxicity  I ordered labs for toxicity monitoring, have personally  reviewed the findings, and discussed them with the patient.  Pending labs will be sent via the portal  Return to clinic: 4 mos w reg 4 prior    Follow up in about 4 months (around 6/26/2024).    The patient understands, chooses and consents to this plan and accepts all the risks which include but are not limited to the risks mentioned above.     Disclaimer: This note was prepared using a voice recognition system and is likely to have sound alike errors within the text.

## 2024-03-01 ENCOUNTER — PATIENT MESSAGE (OUTPATIENT)
Dept: RHEUMATOLOGY | Facility: CLINIC | Age: 64
End: 2024-03-01
Payer: MEDICAID

## 2024-03-04 ENCOUNTER — OFFICE VISIT (OUTPATIENT)
Dept: OTOLARYNGOLOGY | Facility: CLINIC | Age: 64
End: 2024-03-04
Payer: MEDICAID

## 2024-03-04 VITALS — BODY MASS INDEX: 41.9 KG/M2 | WEIGHT: 251.75 LBS

## 2024-03-04 DIAGNOSIS — H72.92 PERFORATION OF LEFT TYMPANIC MEMBRANE: ICD-10-CM

## 2024-03-04 DIAGNOSIS — Z96.22 HISTORY OF TYMPANOSTOMY TUBE PLACEMENT: Primary | ICD-10-CM

## 2024-03-04 PROCEDURE — 3008F BODY MASS INDEX DOCD: CPT | Mod: CPTII,,, | Performed by: STUDENT IN AN ORGANIZED HEALTH CARE EDUCATION/TRAINING PROGRAM

## 2024-03-04 PROCEDURE — 99213 OFFICE O/P EST LOW 20 MIN: CPT | Mod: S$PBB,,, | Performed by: STUDENT IN AN ORGANIZED HEALTH CARE EDUCATION/TRAINING PROGRAM

## 2024-03-04 PROCEDURE — 99212 OFFICE O/P EST SF 10 MIN: CPT | Mod: PBBFAC | Performed by: STUDENT IN AN ORGANIZED HEALTH CARE EDUCATION/TRAINING PROGRAM

## 2024-03-04 PROCEDURE — 1159F MED LIST DOCD IN RCRD: CPT | Mod: CPTII,,, | Performed by: STUDENT IN AN ORGANIZED HEALTH CARE EDUCATION/TRAINING PROGRAM

## 2024-03-04 PROCEDURE — 99999 PR PBB SHADOW E&M-EST. PATIENT-LVL II: CPT | Mod: PBBFAC,,, | Performed by: STUDENT IN AN ORGANIZED HEALTH CARE EDUCATION/TRAINING PROGRAM

## 2024-03-04 NOTE — PROGRESS NOTES
Chief complaint:   Chief Complaint   Patient presents with    Follow-up     Pt is coming in today for a 1 month follow up pt states that Dr. Barnes took the tube out of her left ear on her last visit           Referring Provider:  No referring provider defined for this encounter.    History of Present Illness:     Ms. Joy is a 63 y.o. female w/hx bilateral tube placement in 2019 presenting for evaluation of left ear drainage.     Right tube extruded 1+ years ago. Since then the right ear has done well    However, left ear has had intermittent drainage over the last couple years. Every few months, despite directed drops.     Started draining again recently. Just started drops. Associated muffled hearing.    Update 3/4/24  L PET removed at lats visit. Feeling some popping at times. Drainage improved. No pain      History        Past Medical History:   Past Medical History:   Diagnosis Date    Asthma 2013    Gout, arthritis     Sleep apnea     Thyroid disease     .          Past Surgical History:  Past Surgical History:   Procedure Laterality Date    BONE GRAFT Right 2021    Procedure: BONE GRAFT;  Surgeon: Miguel oYo MD;  Location: [facility];  Service: Orthopedics;  Laterality: Right;  bone graft to fill the defect in the distal radial epiphysis/Reduction of the four-corner area will be performed with bone graft and fusion     SECTION      COLONOSCOPY N/A 2019    Procedure: COLONOSCOPY;  Surgeon: Scout Rivera MD;  Location: [facility];  Service: Endoscopy;  Laterality: N/A;    FRACTURE SURGERY      HAND FUSION Right 2021    Procedure: FUSION, JOINT, HAND;  Surgeon: Miguel Yoo MD;  Location: [facility] OR;  Service: Orthopedics;  Laterality: Right;  dip joint    JOINT REPLACEMENT      Novasure Endometrial Ablation  2016    OSTECTOMY Right 2021    Procedure: OSTECTOMY;  Surgeon: Miguel Yoo MD;  Location: [facility];  Service: Orthopedics;   Laterality: Right;  radial styloidectomy as well as scaphoid excision    THYROIDECTOMY, PARTIAL      TUBAL LIGATION      WRIST SURGERY     .         Medications: Medication list was reviewed. She  has a current medication list which includes the following prescription(s): albuterol, allopurinol, ciprofloxacin hcl, hydroxychloroquine, levocetirizine, and meloxicam, and the following Facility-Administered Medications: epinephrine, lactated ringers, nozaseptin, and sodium chloride 0.9%.         Allergies:   Review of patient's allergies indicates:   Allergen Reactions    Amoxicillin Rash and Swelling     Rash with throat swelling    Codeine Palpitations, Rash and Swelling     Other reaction(s): Shortness of breath  Other reaction(s): Hives  Rash and throat swelling    Doxycycline Swelling     Other reaction(s): Rash    Hydrocodone Rash and Swelling    Iodine and iodide containing products Rash and Swelling    Lortab [hydrocodone-acetaminophen] Other (See Comments)     Other reaction(s): Hives  Other reaction(s): Difficulty breathing    Medrol [methylprednisolone] Anaphylaxis    Oxycodone Shortness Of Breath, Rash and Swelling    Oxycodone hcl-oxycodone-asa Rash and Swelling    Percodan filipe Shortness Of Breath    Tetracycline Swelling    Tetracyclines Rash and Swelling    Tramadol Shortness Of Breath, Rash and Swelling    Vibramycin [doxycycline calcium] Shortness Of Breath     Other reaction(s): Hives  Other reaction(s): Difficulty breathing    Augmentin [amoxicillin-pot clavulanate] Itching and Rash    Levaquin [levofloxacin] Hives    Sulfa (sulfonamide antibiotics) Itching and Rash    Percocet  [oxycodone-acetaminophen]      Other reaction(s): Shortness of breath    Shellfish containing products      Other reaction(s): Shortness of breath  Other reaction(s): Hives  Other reaction(s): Shortness of breath  Other reaction(s): Hives            Family history: family history includes Anuerysm in her brother and mother;  Atrial fibrillation in her mother; Diabetes in her brother and father; Heart disease in her brother and father; Hyperlipidemia in her father; Kidney disease in her father; Stroke in her father.         Social History          Alcohol use:  reports no history of alcohol use.            Tobacco:  reports that she has never smoked. She has never used smokeless tobacco.         Please see the patient's intake form for full details of past medical history, past surgical history, family history, social history and review of systems. ?This information was reviewed by me and noted.      Physical Examination     General: Well developed, well nourished, well hydrated. Verbal with a strong voice and not dysphonic.     Head/Face: Normocephalic, atraumatic. No scars or lesions. Facial musculature equal.     Eyes: No scleral icterus or conjunctival hemorrhage. EOMI. PERRLA.     Ears:     Right ear: No gross deformity. EAC is clear of debris and erythema. The TM is intact with a pneumatized middle ear. No signs of retraction, fluid or infection.      Left ear: No gross deformity. EAC is clear of debris and erythema. The TM is intact except an extremely tiny pinpoint perforation at site of prior PET    Hearing: grossly intact    Nose: No gross deformity or lesions. No purulent discharge. No significant NSD.      Mouth/Oropharynx: Lips without any lesions. No mucosal lesions within the oropharynx. No tonsillar exudate or lesions. Pharyngeal walls symmetrical. Uvula midline. Tongue midline without lesions.     Neck: Trachea midline. No masses. No thyromegaly or nodules palpated.     Lymphatic: No lymphadenopathy in the neck.     Extremities: No cyanosis. Warm and well-perfused.     Skin: No scars or lesions on face or neck.      Neurologic: Moving all extremities without gross abnormality.CN II-XII grossly intact. House-Brackmann 1/6. No signs of nystagmus.      Psych: Alert and oriented to person, place, and time with an appropriate  mood and affect.     Data review:    Review of records:      I reviewed records from the referring provider's office visits.  These describe the history, workup, and/or treatment of this problem thus far.    Audiogram     Audiogram was independently reviewed       Culture 2020 and 2019  Component 2 yr ago   Aerobic Bacterial Culture No growth      Ear Microscopy    Indication: microscopy was required for removal of obstructing pathology and adequate visualization of the tympanic membrane and middle ear    Technique: The patient was placed in a semi-recumbent position.  The left ear was first inspected under the microscope. There was evidence of white drainage. This was removed with suction. The PET was angled and nearly extruded, therefore, an alligator was used to remove the PET. There was a pinpoint residual perforation where the tube had extruded.         Assessment/Plan:      No diagnosis found.     Tiny residual perf after PET extraction 1 month prior  We discussed that this might finish healing on its own  Discussed measures to prevent ear barotrauma with flights  Return to clinic 2-3 months        Dayron Barnes MD  [facility]   [facility] - [address]  [place] [address]  P: [phone]  F: [phone]

## 2024-04-03 ENCOUNTER — PATIENT MESSAGE (OUTPATIENT)
Dept: PULMONOLOGY | Facility: CLINIC | Age: 64
End: 2024-04-03
Payer: MEDICAID

## 2024-05-20 ENCOUNTER — LAB VISIT (OUTPATIENT)
Dept: LAB | Facility: HOSPITAL | Age: 64
End: 2024-05-20
Attending: PHYSICIAN ASSISTANT
Payer: MEDICAID

## 2024-05-20 DIAGNOSIS — M15.4 EROSIVE OSTEOARTHRITIS: ICD-10-CM

## 2024-05-20 DIAGNOSIS — M15.4 EROSIVE OSTEOARTHRITIS OF BOTH HANDS: ICD-10-CM

## 2024-05-20 DIAGNOSIS — Z79.899 HIGH RISK MEDICATION USE: ICD-10-CM

## 2024-05-20 DIAGNOSIS — Z79.899 LONG-TERM USE OF PLAQUENIL: ICD-10-CM

## 2024-05-20 DIAGNOSIS — M17.0 PRIMARY OSTEOARTHRITIS OF BOTH KNEES: ICD-10-CM

## 2024-05-20 DIAGNOSIS — M10.9 GOUT, ARTHRITIS: ICD-10-CM

## 2024-05-20 DIAGNOSIS — Z51.81 MEDICATION MONITORING ENCOUNTER: ICD-10-CM

## 2024-05-20 DIAGNOSIS — M19.90 INFLAMMATORY ARTHRITIS: ICD-10-CM

## 2024-05-20 DIAGNOSIS — M10.9 GOUT, UNSPECIFIED CAUSE, UNSPECIFIED CHRONICITY, UNSPECIFIED SITE: ICD-10-CM

## 2024-05-20 LAB
ALBUMIN SERPL BCP-MCNC: 3.5 G/DL (ref 3.5–5.2)
ALP SERPL-CCNC: 95 U/L (ref 55–135)
ALT SERPL W/O P-5'-P-CCNC: 25 U/L (ref 10–44)
ANION GAP SERPL CALC-SCNC: 8 MMOL/L (ref 8–16)
ANION GAP SERPL CALC-SCNC: 8 MMOL/L (ref 8–16)
AST SERPL-CCNC: 17 U/L (ref 10–40)
BASOPHILS # BLD AUTO: 0.03 K/UL (ref 0–0.2)
BASOPHILS NFR BLD: 0.3 % (ref 0–1.9)
BILIRUB SERPL-MCNC: 0.5 MG/DL (ref 0.1–1)
BUN SERPL-MCNC: 14 MG/DL (ref 8–23)
BUN SERPL-MCNC: 14 MG/DL (ref 8–23)
CALCIUM SERPL-MCNC: 9.3 MG/DL (ref 8.7–10.5)
CALCIUM SERPL-MCNC: 9.3 MG/DL (ref 8.7–10.5)
CHLORIDE SERPL-SCNC: 106 MMOL/L (ref 95–110)
CHLORIDE SERPL-SCNC: 106 MMOL/L (ref 95–110)
CO2 SERPL-SCNC: 24 MMOL/L (ref 23–29)
CO2 SERPL-SCNC: 24 MMOL/L (ref 23–29)
CREAT SERPL-MCNC: 0.7 MG/DL (ref 0.5–1.4)
CREAT SERPL-MCNC: 0.7 MG/DL (ref 0.5–1.4)
CRP SERPL-MCNC: 15.7 MG/L (ref 0–8.2)
DIFFERENTIAL METHOD BLD: NORMAL
EOSINOPHIL # BLD AUTO: 0.2 K/UL (ref 0–0.5)
EOSINOPHIL NFR BLD: 2.5 % (ref 0–8)
ERYTHROCYTE [DISTWIDTH] IN BLOOD BY AUTOMATED COUNT: 14 % (ref 11.5–14.5)
ERYTHROCYTE [SEDIMENTATION RATE] IN BLOOD BY WESTERGREN METHOD: 14 MM/HR (ref 0–20)
EST. GFR  (NO RACE VARIABLE): >60 ML/MIN/1.73 M^2
EST. GFR  (NO RACE VARIABLE): >60 ML/MIN/1.73 M^2
GLUCOSE SERPL-MCNC: 166 MG/DL (ref 70–110)
GLUCOSE SERPL-MCNC: 166 MG/DL (ref 70–110)
HCT VFR BLD AUTO: 40.2 % (ref 37–48.5)
HGB BLD-MCNC: 13.6 G/DL (ref 12–16)
IMM GRANULOCYTES # BLD AUTO: 0.03 K/UL (ref 0–0.04)
IMM GRANULOCYTES NFR BLD AUTO: 0.3 % (ref 0–0.5)
LYMPHOCYTES # BLD AUTO: 2 K/UL (ref 1–4.8)
LYMPHOCYTES NFR BLD: 23.2 % (ref 18–48)
MCH RBC QN AUTO: 29.1 PG (ref 27–31)
MCHC RBC AUTO-ENTMCNC: 33.8 G/DL (ref 32–36)
MCV RBC AUTO: 86 FL (ref 82–98)
MONOCYTES # BLD AUTO: 0.6 K/UL (ref 0.3–1)
MONOCYTES NFR BLD: 6.4 % (ref 4–15)
NEUTROPHILS # BLD AUTO: 5.9 K/UL (ref 1.8–7.7)
NEUTROPHILS NFR BLD: 67.3 % (ref 38–73)
NRBC BLD-RTO: 0 /100 WBC
PLATELET # BLD AUTO: 206 K/UL (ref 150–450)
PMV BLD AUTO: 10.4 FL (ref 9.2–12.9)
POTASSIUM SERPL-SCNC: 4.1 MMOL/L (ref 3.5–5.1)
POTASSIUM SERPL-SCNC: 4.1 MMOL/L (ref 3.5–5.1)
PROT SERPL-MCNC: 6.9 G/DL (ref 6–8.4)
RBC # BLD AUTO: 4.67 M/UL (ref 4–5.4)
SODIUM SERPL-SCNC: 138 MMOL/L (ref 136–145)
SODIUM SERPL-SCNC: 138 MMOL/L (ref 136–145)
URATE SERPL-MCNC: 5 MG/DL (ref 2.4–5.7)
WBC # BLD AUTO: 8.78 K/UL (ref 3.9–12.7)

## 2024-05-20 PROCEDURE — 86140 C-REACTIVE PROTEIN: CPT | Performed by: PHYSICIAN ASSISTANT

## 2024-05-20 PROCEDURE — 84550 ASSAY OF BLOOD/URIC ACID: CPT | Performed by: PHYSICIAN ASSISTANT

## 2024-05-20 PROCEDURE — 80053 COMPREHEN METABOLIC PANEL: CPT | Performed by: PHYSICIAN ASSISTANT

## 2024-05-20 PROCEDURE — 36415 COLL VENOUS BLD VENIPUNCTURE: CPT | Mod: PO | Performed by: PHYSICIAN ASSISTANT

## 2024-05-20 PROCEDURE — 85025 COMPLETE CBC W/AUTO DIFF WBC: CPT | Performed by: PHYSICIAN ASSISTANT

## 2024-05-20 PROCEDURE — 85651 RBC SED RATE NONAUTOMATED: CPT | Performed by: PHYSICIAN ASSISTANT

## 2024-05-27 ENCOUNTER — OFFICE VISIT (OUTPATIENT)
Dept: RHEUMATOLOGY | Facility: CLINIC | Age: 64
End: 2024-05-27
Payer: MEDICAID

## 2024-05-27 VITALS
WEIGHT: 249.81 LBS | HEIGHT: 65 IN | SYSTOLIC BLOOD PRESSURE: 133 MMHG | HEART RATE: 80 BPM | DIASTOLIC BLOOD PRESSURE: 84 MMHG | BODY MASS INDEX: 41.62 KG/M2

## 2024-05-27 DIAGNOSIS — Z51.81 MEDICATION MONITORING ENCOUNTER: ICD-10-CM

## 2024-05-27 DIAGNOSIS — Z79.899 HIGH RISK MEDICATION USE: ICD-10-CM

## 2024-05-27 DIAGNOSIS — M15.4 EROSIVE OSTEOARTHRITIS: Primary | ICD-10-CM

## 2024-05-27 DIAGNOSIS — M19.90 INFLAMMATORY ARTHRITIS: ICD-10-CM

## 2024-05-27 DIAGNOSIS — M10.9 GOUT, ARTHRITIS: Primary | ICD-10-CM

## 2024-05-27 DIAGNOSIS — M15.4 EROSIVE OSTEOARTHRITIS OF BOTH HANDS: ICD-10-CM

## 2024-05-27 PROCEDURE — 3079F DIAST BP 80-89 MM HG: CPT | Mod: CPTII,,, | Performed by: PHYSICIAN ASSISTANT

## 2024-05-27 PROCEDURE — 99999 PR PBB SHADOW E&M-EST. PATIENT-LVL IV: CPT | Mod: PBBFAC,,, | Performed by: PHYSICIAN ASSISTANT

## 2024-05-27 PROCEDURE — 1160F RVW MEDS BY RX/DR IN RCRD: CPT | Mod: CPTII,,, | Performed by: PHYSICIAN ASSISTANT

## 2024-05-27 PROCEDURE — 99214 OFFICE O/P EST MOD 30 MIN: CPT | Mod: PBBFAC | Performed by: PHYSICIAN ASSISTANT

## 2024-05-27 PROCEDURE — 3075F SYST BP GE 130 - 139MM HG: CPT | Mod: CPTII,,, | Performed by: PHYSICIAN ASSISTANT

## 2024-05-27 PROCEDURE — 99214 OFFICE O/P EST MOD 30 MIN: CPT | Mod: S$PBB,,, | Performed by: PHYSICIAN ASSISTANT

## 2024-05-27 PROCEDURE — 3008F BODY MASS INDEX DOCD: CPT | Mod: CPTII,,, | Performed by: PHYSICIAN ASSISTANT

## 2024-05-27 PROCEDURE — 1159F MED LIST DOCD IN RCRD: CPT | Mod: CPTII,,, | Performed by: PHYSICIAN ASSISTANT

## 2024-05-27 NOTE — PROGRESS NOTES
Subjective:      Patient ID: Monica Joy is a 63 y.o. female.    Chief Complaint: Gout    HPI   Monica Joy  is a 63 y.o. female seen today for f/u gout w bilat hand pain.  Initially had left ankle pain and swelling.  She had extensive workup for DVT and hypercoaguability.  She was eventually found to have elevated Uric acid and started Allopurinol.  Now taking 200 mg daily and tolerating it well.  No gout attacks.  Uric acid at goal.      Also has significant inflammatory OA bilat hands.  She has had multiple surgeries on her hands.  I recommended trial of Plaquenil.  Unfortunately, patient has a quinine allergy so she was not able to take it.  I put her on Mobic which seems to keep her symptoms manageable.  Recent CRP showed slight elevation.  Also with extensive arthritic deformities bilateral hands and wrists.  In the past workup for autoimmune disease has been largely unrevealing otherwise.    Known OA left knee managed by Orthopedics.  Due to severe reactions to steroids in the past, corticosteroid injection not indicated.  Continues w Mobic 15 mg daily which she finds helpful.  May be a candidate for visco inj but I would defer to ortho.    Patient denies painful swollen joints (today), fevers, chills, photosensitivity, eye pain, shortness of breath, chest pain, hematuria, blood in the stool, rash, sicca symptoms, raynauds, finger ulcerations.  Rheumatologic systems otherwise negative.    Serologies/Labs:  Neg GOVIND, RF, CCP in past  Current Treatment:  Allopurinol 200 mg daily   Mobic 15 mg daily  Previous Treatment:   Colchicine    Current Outpatient Medications:     albuterol (PROVENTIL/VENTOLIN HFA) 90 mcg/actuation inhaler, INHALE 2 PUFFS INTO THE LUNGS EVERY 4 HOURS AS NEEDED FOR WHEEZING OR SHORTNESS OF BREATH, Disp: 18 g, Rfl: 11    allopurinoL (ZYLOPRIM) 100 MG tablet, Take 2 tablets (200 mg total) by mouth once daily., Disp: 180 tablet, Rfl: 3    levocetirizine (XYZAL) 5 MG tablet, TAKE 1  TABLET(5 MG) BY MOUTH EVERY EVENING, Disp: 30 tablet, Rfl: 5    meloxicam (MOBIC) 15 MG tablet, Take 1 tablet (15 mg total) by mouth once daily., Disp: 90 tablet, Rfl: 3    Current Facility-Administered Medications:     EPINEPHrine (EPIPEN) 0.3 mg/0.3 mL pen injection 0.3 mg, 0.3 mg, Intramuscular, PRN, Leon De La Torre MD    sodium chloride 0.9% flush 10 mL, 10 mL, Intravenous, PRN, Leon De La Torre MD    Facility-Administered Medications Ordered in Other Visits:     lactated ringers infusion, , Intravenous, On Call Procedure, Mary Anne Johnson PA    nozaseptin (NOZIN) nasal , , Each Nostril, On Call Procedure, Mary Anne Johnson PA, Given at 02/25/21 0613    Past Medical History:   Diagnosis Date    Asthma 09/06/2013    Gout, arthritis     Sleep apnea     Thyroid disease      Family History   Problem Relation Name Age of Onset    Atrial fibrillation Mother      Anuerysm Mother      Diabetes Father      Heart disease Father      Hyperlipidemia Father      Stroke Father      Kidney disease Father      Heart disease Brother      Diabetes Brother      Anuerysm Brother      Breast cancer Neg Hx      Colon cancer Neg Hx      Ovarian cancer Neg Hx       Social History     Socioeconomic History    Marital status:     Number of children: 2   Tobacco Use    Smoking status: Never    Smokeless tobacco: Never   Substance and Sexual Activity    Alcohol use: No     Comment: on rare holidays    Drug use: No    Sexual activity: Yes     Partners: Male     Birth control/protection: None     Social Determinants of Health     Financial Resource Strain: Patient Declined (9/4/2023)    Overall Financial Resource Strain (CARDIA)     Difficulty of Paying Living Expenses: Patient declined   Food Insecurity: Patient Declined (9/4/2023)    Hunger Vital Sign     Worried About Running Out of Food in the Last Year: Patient declined     Ran Out of Food in the Last Year: Patient declined   Transportation Needs: Patient  Declined (9/4/2023)    PRAPARE - Transportation     Lack of Transportation (Medical): Patient declined     Lack of Transportation (Non-Medical): Patient declined   Physical Activity: Unknown (9/4/2023)    Exercise Vital Sign     Days of Exercise per Week: Patient declined     Minutes of Exercise per Session: 10 min   Recent Concern: Physical Activity - Insufficiently Active (7/24/2023)    Exercise Vital Sign     Days of Exercise per Week: 1 day     Minutes of Exercise per Session: 10 min   Stress: Patient Declined (9/4/2023)    [employer] [place] [employer] - Occupational Stress Questionnaire     Feeling of Stress : Patient declined   Housing Stability: Unknown (9/4/2023)    Housing Stability Vital Sign     Unable to Pay for Housing in the Last Year: Patient refused     Number of Places Lived in the Last Year: 1     Unstable Housing in the Last Year: Patient refused     Review of patient's allergies indicates:   Allergen Reactions    Amoxicillin Rash and Swelling     Rash with throat swelling    Codeine Palpitations, Rash and Swelling     Other reaction(s): Shortness of breath  Other reaction(s): Hives  Rash and throat swelling    Doxycycline Swelling     Other reaction(s): Rash    Hydrocodone Rash and Swelling    Iodine and iodide containing products Rash and Swelling    Lortab [hydrocodone-acetaminophen] Other (See Comments)     Other reaction(s): Hives  Other reaction(s): Difficulty breathing    Medrol [methylprednisolone] Anaphylaxis    Oxycodone Shortness Of Breath, Rash and Swelling    Oxycodone hcl-oxycodone-asa Rash and Swelling    Percodan filipe Shortness Of Breath    Quinidine-quinine analogues (cinchona alkaloids) Shortness Of Breath, Swelling and Rash    Tetracycline Swelling    Tetracyclines Rash and Swelling    Tramadol Shortness Of Breath, Rash and Swelling    Vibramycin [doxycycline calcium] Shortness Of Breath     Other reaction(s): Hives  Other reaction(s): Difficulty breathing     "Augmentin [amoxicillin-pot clavulanate] Itching and Rash    Levaquin [levofloxacin] Hives    Sulfa (sulfonamide antibiotics) Itching and Rash    Percocet  [oxycodone-acetaminophen]      Other reaction(s): Shortness of breath    Shellfish containing products      Other reaction(s): Shortness of breath  Other reaction(s): Hives  Other reaction(s): Shortness of breath  Other reaction(s): Hives       Objective:   /84   Pulse 80   Ht 5' 5" (1.651 m)   Wt 113.3 kg (249 lb 12.5 oz)   BMI 41.57 kg/m²   Immunization History   Administered Date(s) Administered    Influenza 10/30/2015, 09/07/2020    Influenza - Quadrivalent - PF *Preferred* (6 months and older) 11/13/2013, 10/30/2015, 12/22/2016, 08/13/2019    Influenza Split 11/13/2013    Pneumococcal Polysaccharide - 23 Valent 11/23/2013    Tdap 03/27/2008, 11/21/2018       Physical Exam   Constitutional: She is oriented to person, place, and time. No distress.   HENT:   Head: Normocephalic and atraumatic.   Pulmonary/Chest: Effort normal.   Abdominal: She exhibits no distension.   Musculoskeletal:         General: Deformity (DIPs bilat hands) present. No swelling or tenderness. Normal range of motion.      Cervical back: Normal range of motion.   Lymphadenopathy:     She has no cervical adenopathy.   Neurological: She is alert and oriented to person, place, and time.   Skin: Skin is warm and dry. No rash noted.   Multiple healed scars over the hands/wrists wo evidence of infxn     Psychiatric: Mood normal.   Nursing note and vitals reviewed.    No synovitis, no dactylitis, no enthesitis  No effusions of large or small joints  100% fist formation  Well preserved ROM  Generalized puffiness in the hands    Left knee  JOYCE  ROM 0-110  PF crepitus  TTP med comp    Recent Results (from the past 672 hour(s))   Basic Metabolic Panel    Collection Time: 05/20/24 10:12 AM   Result Value Ref Range    Sodium 138 136 - 145 mmol/L    Potassium 4.1 3.5 - 5.1 mmol/L    Chloride " 106 95 - 110 mmol/L    CO2 24 23 - 29 mmol/L    Glucose 166 (H) 70 - 110 mg/dL    BUN 14 8 - 23 mg/dL    Creatinine 0.7 0.5 - 1.4 mg/dL    Calcium 9.3 8.7 - 10.5 mg/dL    Anion Gap 8 8 - 16 mmol/L    eGFR >60 >60 mL/min/1.73 m^2   Uric Acid    Collection Time: 05/20/24 10:12 AM   Result Value Ref Range    Uric Acid 5.0 2.4 - 5.7 mg/dL   CBC Auto Differential    Collection Time: 05/20/24 10:12 AM   Result Value Ref Range    WBC 8.78 3.90 - 12.70 K/uL    RBC 4.67 4.00 - 5.40 M/uL    Hemoglobin 13.6 12.0 - 16.0 g/dL    Hematocrit 40.2 37.0 - 48.5 %    MCV 86 82 - 98 fL    MCH 29.1 27.0 - 31.0 pg    MCHC 33.8 32.0 - 36.0 g/dL    RDW 14.0 11.5 - 14.5 %    Platelets 206 150 - 450 K/uL    MPV 10.4 9.2 - 12.9 fL    Immature Granulocytes 0.3 0.0 - 0.5 %    Gran # (ANC) 5.9 1.8 - 7.7 K/uL    Immature Grans (Abs) 0.03 0.00 - 0.04 K/uL    Lymph # 2.0 1.0 - 4.8 K/uL    Mono # 0.6 0.3 - 1.0 K/uL    Eos # 0.2 0.0 - 0.5 K/uL    Baso # 0.03 0.00 - 0.20 K/uL    nRBC 0 0 /100 WBC    Gran % 67.3 38.0 - 73.0 %    Lymph % 23.2 18.0 - 48.0 %    Mono % 6.4 4.0 - 15.0 %    Eosinophil % 2.5 0.0 - 8.0 %    Basophil % 0.3 0.0 - 1.9 %    Differential Method Automated    Comprehensive Metabolic Panel    Collection Time: 05/20/24 10:12 AM   Result Value Ref Range    Sodium 138 136 - 145 mmol/L    Potassium 4.1 3.5 - 5.1 mmol/L    Chloride 106 95 - 110 mmol/L    CO2 24 23 - 29 mmol/L    Glucose 166 (H) 70 - 110 mg/dL    BUN 14 8 - 23 mg/dL    Creatinine 0.7 0.5 - 1.4 mg/dL    Calcium 9.3 8.7 - 10.5 mg/dL    Total Protein 6.9 6.0 - 8.4 g/dL    Albumin 3.5 3.5 - 5.2 g/dL    Total Bilirubin 0.5 0.1 - 1.0 mg/dL    Alkaline Phosphatase 95 55 - 135 U/L    AST 17 10 - 40 U/L    ALT 25 10 - 44 U/L    eGFR >60 >60 mL/min/1.73 m^2    Anion Gap 8 8 - 16 mmol/L   C-Reactive Protein    Collection Time: 05/20/24 10:12 AM   Result Value Ref Range    CRP 15.7 (H) 0.0 - 8.2 mg/L   Sedimentation rate    Collection Time: 05/20/24 10:12 AM   Result Value Ref Range     "Sed Rate 14 0 - 20 mm/Hr       No results found for: "TBGOLDPLUS"   Lab Results   Component Value Date    HEPAIGM Negative 02/10/2014    HEPBIGM Negative 02/10/2014    HEPCAB Negative 02/10/2014        Imaging  I have personally reviewed images and reports as below.  I agree with the interpretation.  X-Ray Foot Complete Bilateral  Narrative: EXAMINATION:  XR FOOT COMPLETE 3 VIEW BILATERAL    CLINICAL HISTORY:  Gout, unspecified    TECHNIQUE:  AP, lateral, and oblique views of both feet were performed.    COMPARISON:  10/14/2023    FINDINGS:  No obvious erosive changes seen at either 1st MTP joint.  Prominent degenerative changes noted in the region of either midfoot left greater than the right.  Dorsal and plantar calcaneal enthesophytes seen bilaterally.  Mild degenerative changes seen at either 1st MTP joint.  Impression: 1.  As above    Electronically signed by: Nain Woody DO  Date:    02/26/2024  Time:    08:55  X-Ray Hand 3 View Bilateral  Narrative: EXAMINATION:  XR HAND COMPLETE 3 VIEWS BILATERAL    CLINICAL HISTORY:  . Erosive (osteo)arthritis    TECHNIQUE:  PA, lateral, and oblique views of both hands were performed.    COMPARISON:  12/05/2023    FINDINGS:  Left distal radius prosthesis again noted.  Screw seen extending from the prosthesis into the carpal bones.  The appearance is stable when compared to the prior exam.  There is also a screw seen across the 1st MTP joint on the left as well.  There is significant joint space narrowing seen involving the D IP joints bilaterally with some gall wing deformities that are chronic appearing and stable when compared to the prior study from less than 3 months prior.  A screw seen in a retrograde fashion across the D IP joint of the 3rd digit on the right.  There are also 2 screws seen across the carpal bones on the right with significant deformity of the scaphoid and lunate most likely secondary to a SLAC wrist.  Prominent subchondral cystic changes seen " within the distal radius on the right.  No new erosive changes are identified.  Stable appearance of chronic deformity of the distal ulna on the left along with lucency seen along the ulnar margin of the radial prosthesis on the left.  Impression: No significant overall change when compared to the prior study from 12/05/2023.    Electronically signed by: Nain Woody DO  Date:    02/26/2024  Time:    08:53      Assessment:     1. Gout, arthritis    2. Inflammatory arthritis    3. Erosive osteoarthritis of both hands    4. Medication monitoring encounter    5. High risk medication use            Plan:     Monica was seen today for gout.    Diagnoses and all orders for this visit:    Gout, arthritis    Inflammatory arthritis    Erosive osteoarthritis of both hands    Medication monitoring encounter    High risk medication use        Gout  Labs reviewed  Uric acid at goal  Renal function stable  C/w allopurinol 200 mg daily  No current active gout attack  Consider colchicine as an add on for maintenance in the future if needed  X-ray hands and feet reviewed as above  Inflammatory erosive osteoarthritis noted  Inflammatory erosive arthritis bilat hands  C/w Mobic  Consider MTX vs Arave as pt has allergy to quinine.  Pt Deferred for now  Ok to c/w Mobic 15 mg daily - can add PPI for gastroprotection.  Drug therapy requiring intensive monitoring for toxicity  High Risk Medication Monitoring encounter  No current medication related issues, no evidence of toxicity  I ordered labs for toxicity monitoring, have personally reviewed the findings, and discussed them with the patient.  Pending labs will be sent via the portal  Return to clinic: 4 mos w reg 4 prior    Follow up in about 4 months (around 9/27/2024).    The patient understands, chooses and consents to this plan and accepts all the risks which include but are not limited to the risks mentioned above.     Disclaimer: This note was prepared using a voice recognition  system and is likely to have sound alike errors within the text.

## 2024-06-04 ENCOUNTER — OFFICE VISIT (OUTPATIENT)
Dept: OTOLARYNGOLOGY | Facility: CLINIC | Age: 64
End: 2024-06-04
Payer: MEDICAID

## 2024-06-04 VITALS — WEIGHT: 249.81 LBS | BODY MASS INDEX: 41.57 KG/M2

## 2024-06-04 DIAGNOSIS — Z96.22 HISTORY OF TYMPANOSTOMY TUBE PLACEMENT: Primary | ICD-10-CM

## 2024-06-04 DIAGNOSIS — H72.92 PERFORATION OF LEFT TYMPANIC MEMBRANE: ICD-10-CM

## 2024-06-04 DIAGNOSIS — H69.92 ETD (EUSTACHIAN TUBE DYSFUNCTION), LEFT: ICD-10-CM

## 2024-06-04 PROCEDURE — 92504 EAR MICROSCOPY EXAMINATION: CPT | Mod: S$PBB,,, | Performed by: STUDENT IN AN ORGANIZED HEALTH CARE EDUCATION/TRAINING PROGRAM

## 2024-06-04 PROCEDURE — 99212 OFFICE O/P EST SF 10 MIN: CPT | Mod: PBBFAC | Performed by: STUDENT IN AN ORGANIZED HEALTH CARE EDUCATION/TRAINING PROGRAM

## 2024-06-04 PROCEDURE — 92504 EAR MICROSCOPY EXAMINATION: CPT | Mod: 25,PBBFAC | Performed by: STUDENT IN AN ORGANIZED HEALTH CARE EDUCATION/TRAINING PROGRAM

## 2024-06-04 PROCEDURE — 1159F MED LIST DOCD IN RCRD: CPT | Mod: CPTII,,, | Performed by: STUDENT IN AN ORGANIZED HEALTH CARE EDUCATION/TRAINING PROGRAM

## 2024-06-04 PROCEDURE — 99213 OFFICE O/P EST LOW 20 MIN: CPT | Mod: 25,S$PBB,, | Performed by: STUDENT IN AN ORGANIZED HEALTH CARE EDUCATION/TRAINING PROGRAM

## 2024-06-04 PROCEDURE — 99999 PR PBB SHADOW E&M-EST. PATIENT-LVL II: CPT | Mod: PBBFAC,,, | Performed by: STUDENT IN AN ORGANIZED HEALTH CARE EDUCATION/TRAINING PROGRAM

## 2024-06-04 PROCEDURE — 3008F BODY MASS INDEX DOCD: CPT | Mod: CPTII,,, | Performed by: STUDENT IN AN ORGANIZED HEALTH CARE EDUCATION/TRAINING PROGRAM

## 2024-06-04 NOTE — PROGRESS NOTES
Chief complaint:   Chief Complaint   Patient presents with    Follow-up     No pain but still has slight drainage          Referring Provider:  No referring provider defined for this encounter.    History of Present Illness:     Ms. Joy is a 63 y.o. female w/hx bilateral tube placement in 2019 presenting for evaluation of left ear drainage.     Right tube extruded 1+ years ago. Since then the right ear has done well    However, left ear has had intermittent drainage over the last couple years. Every few months, despite directed drops.     Started draining again recently. Just started drops. Associated muffled hearing.    Update 3/4/24  L PET removed at lats visit. Feeling some popping at times. Drainage improved. No pain    Update 24  Overall doing well. Feels sensation of air going through the air at times. Hearing feels stable. No pain, drainage, pressure sensation.     History        Past Medical History:   Past Medical History:   Diagnosis Date    Asthma 2013    Gout, arthritis     Sleep apnea     Thyroid disease     .          Past Surgical History:  Past Surgical History:   Procedure Laterality Date    BONE GRAFT Right 2021    Procedure: BONE GRAFT;  Surgeon: Miguel Yoo MD;  Location: [facility];  Service: Orthopedics;  Laterality: Right;  bone graft to fill the defect in the distal radial epiphysis/Reduction of the four-corner area will be performed with bone graft and fusion     SECTION      COLONOSCOPY N/A 2019    Procedure: COLONOSCOPY;  Surgeon: Scout Rivera MD;  Location: [facility] ENDO;  Service: Endoscopy;  Laterality: N/A;    FRACTURE SURGERY      HAND FUSION Right 2021    Procedure: FUSION, JOINT, HAND;  Surgeon: Miguel Yoo MD;  Location: [facility] OR;  Service: Orthopedics;  Laterality: Right;  dip joint    JOINT REPLACEMENT      Novasure Endometrial Ablation  2016    OSTECTOMY Right 2021    Procedure: OSTECTOMY;  Surgeon: Miguel  Leon Yoo MD;  Location: [facility];  Service: Orthopedics;  Laterality: Right;  radial styloidectomy as well as scaphoid excision    THYROIDECTOMY, PARTIAL      TUBAL LIGATION      WRIST SURGERY     .         Medications: Medication list was reviewed. She  has a current medication list which includes the following prescription(s): albuterol, allopurinol, levocetirizine, and meloxicam, and the following Facility-Administered Medications: epinephrine, lactated ringers, nozaseptin, and sodium chloride 0.9%.         Allergies:   Review of patient's allergies indicates:   Allergen Reactions    Amoxicillin Rash and Swelling     Rash with throat swelling    Codeine Palpitations, Rash and Swelling     Other reaction(s): Shortness of breath  Other reaction(s): Hives  Rash and throat swelling    Doxycycline Swelling     Other reaction(s): Rash    Hydrocodone Rash and Swelling    Iodine and iodide containing products Rash and Swelling    Lortab [hydrocodone-acetaminophen] Other (See Comments)     Other reaction(s): Hives  Other reaction(s): Difficulty breathing    Medrol [methylprednisolone] Anaphylaxis    Oxycodone Shortness Of Breath, Rash and Swelling    Oxycodone hcl-oxycodone-asa Rash and Swelling    Percodan filipe Shortness Of Breath    Quinidine-quinine analogues (cinchona alkaloids) Shortness Of Breath, Swelling and Rash    Tetracycline Swelling    Tetracyclines Rash and Swelling    Tramadol Shortness Of Breath, Rash and Swelling    Vibramycin [doxycycline calcium] Shortness Of Breath     Other reaction(s): Hives  Other reaction(s): Difficulty breathing    Augmentin [amoxicillin-pot clavulanate] Itching and Rash    Levaquin [levofloxacin] Hives    Sulfa (sulfonamide antibiotics) Itching and Rash    Percocet  [oxycodone-acetaminophen]      Other reaction(s): Shortness of breath    Shellfish containing products      Other reaction(s): Shortness of breath  Other reaction(s): Hives  Other reaction(s): Shortness of  breath  Other reaction(s): Hives            Family history: family history includes Anuerysm in her brother and mother; Atrial fibrillation in her mother; Diabetes in her brother and father; Heart disease in her brother and father; Hyperlipidemia in her father; Kidney disease in her father; Stroke in her father.         Social History          Alcohol use:  reports no history of alcohol use.            Tobacco:  reports that she has never smoked. She has never used smokeless tobacco.         Please see the patient's intake form for full details of past medical history, past surgical history, family history, social history and review of systems. ?This information was reviewed by me and noted.      Physical Examination     General: Well developed, well nourished, well hydrated. Verbal with a strong voice and not dysphonic.     Head/Face: Normocephalic, atraumatic. No scars or lesions. Facial musculature equal.     Eyes: No scleral icterus or conjunctival hemorrhage. EOMI. PERRLA.     Ears:     Right ear: No gross deformity. EAC is clear of debris and erythema. The TM is intact with a pneumatized middle ear. No signs of retraction, fluid or infection.      Left ear: No gross deformity. EAC is clear of debris and erythema. The TM is intact except for possibly a small perforation, microscopy was required to confirm    Hearing: grossly intact    Nose: No gross deformity or lesions. No purulent discharge. No significant NSD.      Mouth/Oropharynx: Lips without any lesions. No mucosal lesions within the oropharynx. No tonsillar exudate or lesions. Pharyngeal walls symmetrical. Uvula midline. Tongue midline without lesions.     Neck: Trachea midline. No masses. No thyromegaly or nodules palpated.     Lymphatic: No lymphadenopathy in the neck.     Extremities: No cyanosis. Warm and well-perfused.     Skin: No scars or lesions on face or neck.      Neurologic: Moving all extremities without gross abnormality.CN II-XII grossly  intact. House-Brackmann 1/6. No signs of nystagmus.      Psych: Alert and oriented to person, place, and time with an appropriate mood and affect.     Ear Microscopy    Indication: microscopy was required for removal of obstructing pathology and adequate visualization of the tympanic membrane and middle ear    Technique: The patient was placed in a semi-recumbent position.  The left ear was first inspected under the microscope. There was evidence of prior TM perforation with monomer at site of prior perforation with only an extremely small, pinpoint perforation at the inferior border of the monomer.  The middle ear was clear.       Data review:    Review of records:      I reviewed records from the referring provider's office visits.  These describe the history, workup, and/or treatment of this problem thus far.    Audiogram     Audiogram was independently reviewed       Culture 2020 and 2019  Component 2 yr ago   Aerobic Bacterial Culture No growth           Assessment/Plan:      1. History of tympanostomy tube placement    2. Perforation of left tympanic membrane    3. ETD (Eustachian tube dysfunction), left         Tiny residual perf after PET extraction  She does have history of Eustachian Tube Dysfunction and is doing well with the pinpoint perforation for now  We discussed paper patch, but this could result in return of Eustachian Tube Dysfunction symptoms  She elected for observation, water precautions, and consider closure if she has issues with hearing or infections  Will plan for f/u in 6m with repeat audio, sooner if symptoms worsen        Dayron Barnes MD  [facility]   [facility]  [address] [place] [address]  P: [phone]  F: [phone]

## 2024-07-09 ENCOUNTER — PATIENT MESSAGE (OUTPATIENT)
Dept: OTOLARYNGOLOGY | Facility: CLINIC | Age: 64
End: 2024-07-09
Payer: MEDICAID

## 2024-07-09 DIAGNOSIS — H92.12 OTORRHEA, LEFT: Primary | ICD-10-CM

## 2024-07-10 RX ORDER — CIPROFLOXACIN HYDROCHLORIDE 3 MG/ML
SOLUTION/ DROPS OPHTHALMIC
Qty: 10 ML | Refills: 0 | Status: SHIPPED | OUTPATIENT
Start: 2024-07-10

## 2024-07-29 ENCOUNTER — TELEPHONE (OUTPATIENT)
Dept: OTOLARYNGOLOGY | Facility: CLINIC | Age: 64
End: 2024-07-29
Payer: MEDICAID

## 2024-07-29 ENCOUNTER — NURSE TRIAGE (OUTPATIENT)
Dept: ADMINISTRATIVE | Facility: CLINIC | Age: 64
End: 2024-07-29
Payer: MEDICAID

## 2024-07-29 NOTE — TELEPHONE ENCOUNTER
Spoke with pt about the message on her eye drainage and she said that she has an appt. With the eye doctor on tomorrow and doesn't know why they sent it to the ENT clinic.

## 2024-07-29 NOTE — TELEPHONE ENCOUNTER
"Pt with complaints of clear drainage for 5 days to her left eye.  Pt states not crusty in the morning just the clear drainage.  Feels "like something is in the eye".  Pt was recently treated for a left ear infection last week and still using drops in her left ear as prescribed.  Pt does not feel like she has pink eye but unsure.  Care advice states to go to the office now.  [facility] offered and declined.  No appointments available with pcp for today, pt advised to go [place] [facility] [place] and that this call will be routed to her providers and request to call the pt.    Patient verbally understands, all questions answered, advised to call back for any worsening symptoms or further needs.       Reason for Disposition   MODERATE eye pain (e.g., interferes with normal activities)    Additional Information   Negative: SEVERE pain (e.g., excruciating)   Negative: Patient sounds very sick or weak to the triager    Protocols used: Eye - Pus or [id number]    "

## 2024-08-05 DIAGNOSIS — Z88.9 MULTIPLE ALLERGIES: ICD-10-CM

## 2024-08-05 DIAGNOSIS — J45.20 MILD INTERMITTENT ASTHMA WITHOUT COMPLICATION: ICD-10-CM

## 2024-08-05 RX ORDER — LEVOCETIRIZINE DIHYDROCHLORIDE 5 MG/1
5 TABLET, FILM COATED ORAL NIGHTLY
Qty: 30 TABLET | Refills: 5 | Status: SHIPPED | OUTPATIENT
Start: 2024-08-05

## 2024-09-23 ENCOUNTER — OFFICE VISIT (OUTPATIENT)
Dept: URGENT CARE | Facility: CLINIC | Age: 64
End: 2024-09-23
Payer: MEDICAID

## 2024-09-23 VITALS
HEIGHT: 65 IN | HEART RATE: 68 BPM | TEMPERATURE: 98 F | WEIGHT: 249.69 LBS | DIASTOLIC BLOOD PRESSURE: 81 MMHG | OXYGEN SATURATION: 96 % | RESPIRATION RATE: 20 BRPM | SYSTOLIC BLOOD PRESSURE: 155 MMHG | BODY MASS INDEX: 41.6 KG/M2

## 2024-09-23 DIAGNOSIS — R19.7 DIARRHEA, UNSPECIFIED TYPE: ICD-10-CM

## 2024-09-23 DIAGNOSIS — R11.0 NAUSEA: ICD-10-CM

## 2024-09-23 DIAGNOSIS — R10.11 COLICKY RUQ ABDOMINAL PAIN: ICD-10-CM

## 2024-09-23 LAB
ALBUMIN SERPL BCP-MCNC: 3.8 G/DL (ref 3.5–5.2)
ALP SERPL-CCNC: 101 U/L (ref 55–135)
ALT SERPL W/O P-5'-P-CCNC: 31 U/L (ref 10–44)
ANION GAP SERPL CALC-SCNC: 14 MMOL/L (ref 8–16)
AST SERPL-CCNC: 31 U/L (ref 10–40)
BASOPHILS # BLD AUTO: 0.03 K/UL (ref 0–0.2)
BASOPHILS NFR BLD: 0.3 % (ref 0–1.9)
BILIRUB SERPL-MCNC: 0.4 MG/DL (ref 0.1–1)
BUN SERPL-MCNC: 11 MG/DL (ref 8–23)
CALCIUM SERPL-MCNC: 9.7 MG/DL (ref 8.7–10.5)
CHLORIDE SERPL-SCNC: 109 MMOL/L (ref 95–110)
CO2 SERPL-SCNC: 19 MMOL/L (ref 23–29)
CREAT SERPL-MCNC: 0.7 MG/DL (ref 0.5–1.4)
DIFFERENTIAL METHOD BLD: NORMAL
EOSINOPHIL # BLD AUTO: 0.2 K/UL (ref 0–0.5)
EOSINOPHIL NFR BLD: 1.9 % (ref 0–8)
ERYTHROCYTE [DISTWIDTH] IN BLOOD BY AUTOMATED COUNT: 13.9 % (ref 11.5–14.5)
EST. GFR  (NO RACE VARIABLE): >60 ML/MIN/1.73 M^2
GLUCOSE SERPL-MCNC: 88 MG/DL (ref 70–110)
HCT VFR BLD AUTO: 41.3 % (ref 37–48.5)
HGB BLD-MCNC: 14.1 G/DL (ref 12–16)
IMM GRANULOCYTES # BLD AUTO: 0.02 K/UL (ref 0–0.04)
IMM GRANULOCYTES NFR BLD AUTO: 0.2 % (ref 0–0.5)
LIPASE SERPL-CCNC: 22 U/L (ref 4–60)
LYMPHOCYTES # BLD AUTO: 2.5 K/UL (ref 1–4.8)
LYMPHOCYTES NFR BLD: 23.7 % (ref 18–48)
MCH RBC QN AUTO: 29.9 PG (ref 27–31)
MCHC RBC AUTO-ENTMCNC: 34.1 G/DL (ref 32–36)
MCV RBC AUTO: 88 FL (ref 82–98)
MONOCYTES # BLD AUTO: 0.8 K/UL (ref 0.3–1)
MONOCYTES NFR BLD: 7.4 % (ref 4–15)
NEUTROPHILS # BLD AUTO: 6.9 K/UL (ref 1.8–7.7)
NEUTROPHILS NFR BLD: 66.5 % (ref 38–73)
NRBC BLD-RTO: 0 /100 WBC
PLATELET # BLD AUTO: 212 K/UL (ref 150–450)
PMV BLD AUTO: 11.3 FL (ref 9.2–12.9)
POTASSIUM SERPL-SCNC: 4.5 MMOL/L (ref 3.5–5.1)
PROT SERPL-MCNC: 7.4 G/DL (ref 6–8.4)
RBC # BLD AUTO: 4.71 M/UL (ref 4–5.4)
SODIUM SERPL-SCNC: 142 MMOL/L (ref 136–145)
WBC # BLD AUTO: 10.43 K/UL (ref 3.9–12.7)

## 2024-09-23 PROCEDURE — 83690 ASSAY OF LIPASE: CPT | Performed by: NURSE PRACTITIONER

## 2024-09-23 PROCEDURE — 99214 OFFICE O/P EST MOD 30 MIN: CPT | Mod: S$GLB,,, | Performed by: NURSE PRACTITIONER

## 2024-09-23 PROCEDURE — 80053 COMPREHEN METABOLIC PANEL: CPT | Performed by: NURSE PRACTITIONER

## 2024-09-23 PROCEDURE — 85025 COMPLETE CBC W/AUTO DIFF WBC: CPT | Performed by: NURSE PRACTITIONER

## 2024-09-23 RX ORDER — ONDANSETRON 8 MG/1
8 TABLET, ORALLY DISINTEGRATING ORAL EVERY 8 HOURS PRN
Qty: 12 TABLET | Refills: 1 | Status: SHIPPED | OUTPATIENT
Start: 2024-09-23

## 2024-09-23 NOTE — PROGRESS NOTES
"Subjective:      Patient ID: Monica Joy is a 63 y.o. female.    Vitals:  height is 5' 5" (1.651 m) and weight is 113.3 kg (249 lb 10.7 oz). Her oral temperature is 98.3 °F (36.8 °C). Her blood pressure is 155/81 (abnormal) and her pulse is 68. Her respiration is 20 and oxygen saturation is 96%.     Chief Complaint: Abdominal Pain (Have been having pain on right side gallbladder area - Entered by patient)    Patient presents with RUQ pain through to the back starting last Thursday.   No N/V.  No OTC meds    Abdominal Pain  This is a new problem. The current episode started in the past 7 days. The onset quality is sudden. The problem occurs intermittently. The most recent episode lasted 24 hours. The problem has been unchanged. The pain is located in the RUQ. The pain is at a severity of 5/10. The pain is mild. The quality of the pain is aching. The abdominal pain does not radiate. Associated symptoms include belching, diarrhea and flatus. Pertinent negatives include no anorexia, arthralgias, constipation, dysuria, fever, frequency, headaches, hematochezia, hematuria, melena, myalgias, nausea, vomiting or weight loss. Exacerbated by: eating  position. Relieved by: heat. She has tried acetaminophen for the symptoms. The treatment provided no relief. There is no history of abdominal surgery, colon cancer, Crohn's disease, gallstones, GERD, irritable bowel syndrome, pancreatitis, PUD or ulcerative colitis. Patient's medical history does not include kidney stones and UTI.       Constitution: Negative for fever.   Gastrointestinal:  Positive for abdominal pain and diarrhea. Negative for history of abdominal surgery, nausea, vomiting, constipation and bright red blood in stool.   Genitourinary:  Negative for dysuria, frequency and hematuria.   Musculoskeletal:  Negative for joint pain and muscle ache.   Neurological:  Negative for headaches.      Objective:     Vitals:    09/23/24 1440   BP: (!) 155/81   BP Location: " "Right arm   Patient Position: Sitting   BP Method: Large (Automatic)   Pulse: 68   Resp: 20   Temp: 98.3 °F (36.8 °C)   TempSrc: Oral   SpO2: 96%   Weight: 113.3 kg (249 lb 10.7 oz)   Height: 5' 5" (1.651 m)       Physical Exam   Constitutional: She is oriented to person, place, and time. She appears well-developed.   HENT:   Head: Normocephalic and atraumatic.   Ears:   Right Ear: External ear normal.   Left Ear: External ear normal.   Nose: Nose normal.   Mouth/Throat: Mucous membranes are normal.   Eyes: Conjunctivae and lids are normal.   Neck: Trachea normal. Neck supple.   Cardiovascular: Normal rate, regular rhythm and normal heart sounds.   Pulmonary/Chest: Effort normal and breath sounds normal. No respiratory distress.   Abdominal: Normal appearance and bowel sounds are normal. She exhibits no distension and no mass. Soft. protuberant There is abdominal tenderness in the right upper quadrant. There is positive Bucio's sign. There is no rebound, no guarding, no left CVA tenderness and no right CVA tenderness. No hernia.     Musculoskeletal: Normal range of motion.         General: Normal range of motion.   Neurological: She is alert and oriented to person, place, and time. She has normal strength.   Skin: Skin is warm, dry, intact, not diaphoretic and not pale.   Psychiatric: Her speech is normal and behavior is normal. Judgment and thought content normal.   Nursing note and vitals reviewed.        Assessment:     1. Colicky RUQ abdominal pain    2. Diarrhea, unspecified type    3. Nausea      Referred US gallbladder   Plan:       Gastritis, Gallstones, Cholecystitis, pancreatitis, bile duct disorder   PE: r +BS x 4 quads; no rebound pain or guarding; Review of records patient seen at [facility] 08/27/2024 with negative influenza and covid 19 testing.   Most likely Viral URI syndrome   D/C home supportive measures and f/u as needed        Patient stable for discharge and home management of " condition    Colicky RUQ abdominal pain  -     US Abdomen Limited_Gallbladder; Future; Expected date: 09/23/2024  -     CBC W/ AUTO DIFFERENTIAL; Future; Expected date: 09/23/2024  -     COMPREHENSIVE METABOLIC PANEL; Future; Expected date: 09/23/2024  -     LIPASE; Future; Expected date: 09/23/2024  -     Ambulatory referral/consult to General Surgery  -     Ambulatory referral/consult to Gastroenterology    Diarrhea, unspecified type  -     US Abdomen Limited_Gallbladder; Future; Expected date: 09/23/2024  -     LIPASE; Future; Expected date: 09/23/2024    Nausea  -     ondansetron (ZOFRAN-ODT) 8 MG TbDL; Take 1 tablet (8 mg total) by mouth every 8 (eight) hours as needed (nausea).  Dispense: 12 tablet; Refill: 1        Patient Instructions   Referral surgeon consult if + for gallstones    [facility] can assist with appointment scheduling    [place] Mon-Fri 8-5pm [phone]      SEP    24    2024 Us Abdomen  Tuesday September 24 8:40 AM [facility] - Ultrasound  [address]  [facility] [id number] [phone]    Do not eat or drink anything after midnight (8 hours before appt time for an afternoon appointment). Oral medication with a small amount of water the morning before test is acceptable. Arrive at check-in approximately 30 minutes before your scheduled appointment time. Bring all outside medical records and imaging, along with a list of your current medications and insurance card.         Rest activity ad boone   Small sips of fluids   Clear liquid diet and progress as tolerated to bland diet then full diet   Zofran under the tongue every 8 hours as needed for nausea     If any > abdominal pain with tenderness with or without fever or chills go to er for further evaluation and hospital care       Please arrange follow up with your primary medical clinic as soon as possible. You must understand that you've received an Urgent Care treatment only and that you may be released before all  of your medical problems are known or treated. You, the patient, will arrange for follow up as instructed. If your symptoms worsen or fail to improve you should go to the Emergency Room.       Go to the Emergency Department for any new or worsening symptoms including: worsening abdominal pain, dark\black\bloody bowel movements, vomiting blood, hard abdomen, fever, chest pain, shortness of breath, loss of consciousness or any other concerns.      No follow-ups on file.

## 2024-09-23 NOTE — PATIENT INSTRUCTIONS
Referral surgeon consult if + for gallstones    [facility] can assist with appointment scheduling    [place] Mon-Fri 8-5pm [phone]      SEP    24    2024 Us Abdomen  Tuesday September 24 8:40 AM [facility] - Ultrasound  [address]  [place] [id number] [phone]    Do not eat or drink anything after midnight (8 hours before appt time for an afternoon appointment). Oral medication with a small amount of water the morning before test is acceptable. Arrive at check-in approximately 30 minutes before your scheduled appointment time. Bring all outside medical records and imaging, along with a list of your current medications and insurance card.         Rest activity ad boone   Small sips of fluids   Clear liquid diet and progress as tolerated to bland diet then full diet   Zofran under the tongue every 8 hours as needed for nausea     If any > abdominal pain with tenderness with or without fever or chills go to er for further evaluation and hospital care       Please arrange follow up with your primary medical clinic as soon as possible. You must understand that you've received an Urgent Care treatment only and that you may be released before all of your medical problems are known or treated. You, the patient, will arrange for follow up as instructed. If your symptoms worsen or fail to improve you should go to the Emergency Room.       Go to the Emergency Department for any new or worsening symptoms including: worsening abdominal pain, dark\black\bloody bowel movements, vomiting blood, hard abdomen, fever, chest pain, shortness of breath, loss of consciousness or any other concerns.

## 2024-09-24 ENCOUNTER — APPOINTMENT (OUTPATIENT)
Dept: RADIOLOGY | Facility: HOSPITAL | Age: 64
End: 2024-09-24
Attending: NURSE PRACTITIONER
Payer: MEDICAID

## 2024-09-24 DIAGNOSIS — R19.7 DIARRHEA, UNSPECIFIED TYPE: ICD-10-CM

## 2024-09-24 DIAGNOSIS — R10.11 COLICKY RUQ ABDOMINAL PAIN: ICD-10-CM

## 2024-09-24 PROCEDURE — 76705 ECHO EXAM OF ABDOMEN: CPT | Mod: TC,PO

## 2024-09-24 PROCEDURE — 76705 ECHO EXAM OF ABDOMEN: CPT | Mod: 26,,, | Performed by: RADIOLOGY

## 2024-09-25 ENCOUNTER — TELEPHONE (OUTPATIENT)
Dept: URGENT CARE | Facility: CLINIC | Age: 64
End: 2024-09-25
Payer: MEDICAID

## 2024-09-25 DIAGNOSIS — K76.9 LESION OF LIVER: Primary | ICD-10-CM

## 2024-09-25 NOTE — TELEPHONE ENCOUNTER
Spoke with patient over the phone after confirming her full name and date of birth.  Discussed ultrasound results and need to follow up with an MRI and gastroenterologist.  Patient said that she had been trying to follow up with her PCP but had been unable to make an appointment.

## 2024-09-27 ENCOUNTER — TELEPHONE (OUTPATIENT)
Dept: URGENT CARE | Facility: CLINIC | Age: 64
End: 2024-09-27
Payer: MEDICAID

## 2024-09-30 ENCOUNTER — OFFICE VISIT (OUTPATIENT)
Dept: INTERNAL MEDICINE | Facility: CLINIC | Age: 64
End: 2024-09-30
Payer: MEDICAID

## 2024-09-30 VITALS
DIASTOLIC BLOOD PRESSURE: 80 MMHG | SYSTOLIC BLOOD PRESSURE: 124 MMHG | OXYGEN SATURATION: 96 % | BODY MASS INDEX: 41.69 KG/M2 | TEMPERATURE: 98 F | WEIGHT: 250.25 LBS | RESPIRATION RATE: 20 BRPM | HEART RATE: 72 BPM | HEIGHT: 65 IN

## 2024-09-30 DIAGNOSIS — R73.9 BLOOD GLUCOSE ELEVATED: ICD-10-CM

## 2024-09-30 DIAGNOSIS — R10.11 RIGHT UPPER QUADRANT ABDOMINAL PAIN: Primary | ICD-10-CM

## 2024-09-30 DIAGNOSIS — K83.8 COMMON BILE DUCT DILATATION: ICD-10-CM

## 2024-09-30 DIAGNOSIS — K76.0 FATTY LIVER: ICD-10-CM

## 2024-09-30 DIAGNOSIS — Z12.31 ENCOUNTER FOR SCREENING MAMMOGRAM FOR MALIGNANT NEOPLASM OF BREAST: ICD-10-CM

## 2024-09-30 DIAGNOSIS — Z12.11 COLON CANCER SCREENING: ICD-10-CM

## 2024-09-30 DIAGNOSIS — K76.89 HEPATIC CYST: ICD-10-CM

## 2024-09-30 DIAGNOSIS — E66.01 MORBID OBESITY WITH BMI OF 40.0-44.9, ADULT: ICD-10-CM

## 2024-09-30 PROCEDURE — 1159F MED LIST DOCD IN RCRD: CPT | Mod: CPTII,,, | Performed by: INTERNAL MEDICINE

## 2024-09-30 PROCEDURE — 99999 PR PBB SHADOW E&M-EST. PATIENT-LVL V: CPT | Mod: PBBFAC,,, | Performed by: INTERNAL MEDICINE

## 2024-09-30 PROCEDURE — 99214 OFFICE O/P EST MOD 30 MIN: CPT | Mod: S$PBB,,, | Performed by: INTERNAL MEDICINE

## 2024-09-30 PROCEDURE — 1160F RVW MEDS BY RX/DR IN RCRD: CPT | Mod: CPTII,,, | Performed by: INTERNAL MEDICINE

## 2024-09-30 PROCEDURE — 99215 OFFICE O/P EST HI 40 MIN: CPT | Mod: PBBFAC | Performed by: INTERNAL MEDICINE

## 2024-09-30 PROCEDURE — G2211 COMPLEX E/M VISIT ADD ON: HCPCS | Mod: S$PBB,,, | Performed by: INTERNAL MEDICINE

## 2024-09-30 PROCEDURE — 3074F SYST BP LT 130 MM HG: CPT | Mod: CPTII,,, | Performed by: INTERNAL MEDICINE

## 2024-09-30 PROCEDURE — 3079F DIAST BP 80-89 MM HG: CPT | Mod: CPTII,,, | Performed by: INTERNAL MEDICINE

## 2024-09-30 PROCEDURE — 3008F BODY MASS INDEX DOCD: CPT | Mod: CPTII,,, | Performed by: INTERNAL MEDICINE

## 2024-10-01 ENCOUNTER — HOSPITAL ENCOUNTER (OUTPATIENT)
Dept: PREADMISSION TESTING | Facility: HOSPITAL | Age: 64
Discharge: HOME | End: 2024-10-01
Attending: INTERNAL MEDICINE
Payer: MEDICAID

## 2024-10-01 DIAGNOSIS — Z12.11 COLON CANCER SCREENING: Primary | ICD-10-CM

## 2024-10-01 RX ORDER — POLYETHYLENE GLYCOL 3350, SODIUM SULFATE ANHYDROUS, SODIUM BICARBONATE, SODIUM CHLORIDE, POTASSIUM CHLORIDE 236; 22.74; 6.74; 5.86; 2.97 G/4L; G/4L; G/4L; G/4L; G/4L
4 POWDER, FOR SOLUTION ORAL ONCE
Qty: 4000 ML | Refills: 0 | Status: SHIPPED | OUTPATIENT
Start: 2024-10-01 | End: 2024-10-01

## 2024-10-02 NOTE — PROGRESS NOTES
Monica Joy  63 y.o. White female  [id number]    Chief Complaint:  Chief Complaint   Patient presents with    Follow-up       History of Present Illness:  History of Present Illness    CHIEF COMPLAINT:  Ms. Joy presents today for right-sided abdominal pain.    ABDOMINAL PAIN:  She reports right-sided abdominal pain that began one week ago, initially suspecting gallbladder issues due to its severity. The pain is described as burning, similar to a cut, extending from her side to her back. Currently, the area is sore and tender to touch. She experienced a similar episode about a year ago. The pain was severe enough that she considered going to the ER but waited until Monday to seek medical attention. She denies any current extreme pain but reports ongoing soreness. During the acute phase, the pain was so intense that she had to sleep on her left side. She used a heating pad for relief and initially thought it might be a pulled muscle.    IMAGING RESULTS:  Recent imaging revealed liver cysts instead of gallbladder problems. Current imaging shows a 1.5 cm cyst in the left lobe of the liver and a 1.3 cm cyst in the right lobe. She believes the cysts may have decreased in size compared to the previous year. An ultrasound revealed a dilated common bile duct, measuring 7.8 mm. The ultrasound showed no issues with her gallbladder, which is present and normal with no calculi or wall thickening.    FATTY LIVER:  She is aware of her fatty liver diagnosis and expresses frustration about having non-alcoholic fatty liver disease. She understands the importance of weight loss in managing her condition and potentially preventing progression to cirrhosis. She acknowledges the need to modify her diet and reduce sugar intake, noting difficulty in identifying truly healthy food options.    MEDICAL HISTORY:  She reports a history of elevated blood sugar, with multiple high readings over the past year. She denies being instructed to  fast for previous labs.    LIFESTYLE:  She reports attempting to avoid alcohol consumption, stating she cannot recall the last time she had a drink. She is considering weight loss and expresses difficulty adhering to dietary restrictions for both gallbladder and liver health, finding them similarly restrictive.    UPCOMING APPOINTMENTS:  She has an upcoming gastroenterology appointment on October 21st with Dr. Nathalie Buckley. Blood work is scheduled for Thursday. She is due for a colonoscopy, which will be ordered.         Review of Systems   Constitutional:  Negative for fever.   Gastrointestinal:  Positive for abdominal pain.       Laboratory:  Lab Results   Component Value Date    WBC 10.43 09/23/2024    HGB 14.1 09/23/2024    HCT 41.3 09/23/2024     09/23/2024    CHOL 211 (H) 09/27/2022    TRIG 174 (H) 09/27/2022    HDL 50 09/27/2022    ALT 31 09/23/2024    AST 31 09/23/2024     09/23/2024    K 4.5 09/23/2024     09/23/2024    CREATININE 0.7 09/23/2024    BUN 11 09/23/2024    CO2 19 (L) 09/23/2024    TSH 0.898 09/27/2022    INR 1.0 02/17/2011    HGBA1C 5.6 10/26/2020     Lab Results   Component Value Date    LDLCALC 126.2 09/27/2022       History:  Past Medical History:   Diagnosis Date    Asthma 09/06/2013    Gout, arthritis     Sleep apnea     Thyroid disease        Medications:  Current Outpatient Medications on File Prior to Visit   Medication Sig Dispense Refill    albuterol (PROVENTIL/VENTOLIN HFA) 90 mcg/actuation inhaler INHALE 2 PUFFS INTO THE LUNGS EVERY 4 HOURS AS NEEDED FOR WHEEZING OR SHORTNESS OF BREATH 18 g 11    allopurinoL (ZYLOPRIM) 100 MG tablet Take 2 tablets (200 mg total) by mouth once daily. 180 tablet 3    ciprofloxacin HCl (CILOXAN) 0.3 % ophthalmic solution 3 drops to both ears twice a day. 10 mL 0    levocetirizine (XYZAL) 5 MG tablet TAKE 1 TABLET(5 MG) BY MOUTH EVERY EVENING 30 tablet 5    meloxicam (MOBIC) 15 MG tablet Take 1 tablet (15 mg total) by mouth  once daily. 90 tablet 3    ondansetron (ZOFRAN-ODT) 8 MG TbDL Take 1 tablet (8 mg total) by mouth every 8 (eight) hours as needed (nausea). 12 tablet 1     Allergies:  Review of patient's allergies indicates:   Allergen Reactions    Amoxicillin Rash and Swelling     Rash with throat swelling    Codeine Palpitations, Rash and Swelling     Other reaction(s): Shortness of breath  Other reaction(s): Hives  Rash and throat swelling    Doxycycline Swelling     Other reaction(s): Rash    Hydrocodone Rash and Swelling    Iodine and iodide containing products Rash and Swelling    Lortab [hydrocodone-acetaminophen] Other (See Comments)     Other reaction(s): Hives  Other reaction(s): Difficulty breathing    Medrol [methylprednisolone] Anaphylaxis    Oxycodone Shortness Of Breath, Rash and Swelling    Oxycodone hcl-oxycodone-asa Rash and Swelling    Percodan filipe Shortness Of Breath    Quinidine-quinine analogues (cinchona alkaloids) Shortness Of Breath, Swelling and Rash    Tetracycline Swelling    Tetracyclines Rash and Swelling    Tramadol Shortness Of Breath, Rash and Swelling    Vibramycin [doxycycline calcium] Shortness Of Breath     Other reaction(s): Hives  Other reaction(s): Difficulty breathing    Augmentin [amoxicillin-pot clavulanate] Itching and Rash    Levaquin [levofloxacin] Hives    Sulfa (sulfonamide antibiotics) Itching and Rash    Percocet  [oxycodone-acetaminophen]      Other reaction(s): Shortness of breath    Shellfish containing products      Other reaction(s): Shortness of breath  Other reaction(s): Hives  Other reaction(s): Shortness of breath  Other reaction(s): Hives       Exam:  Vitals:    09/30/24 1510   BP: 124/80   Pulse: 72   Resp: 20   Temp: 98.3 °F (36.8 °C)     Weight: 113.5 kg (250 lb 3.6 oz)   Body mass index is 41.64 kg/m².      Physical Exam    Vitals: Reviewed.  Constitutional: No acute distress. Well-developed. Not ill-appearing.  Eyes: No scleral icterus.  Cardiovascular: Normal rate  and regular rhythm. Normal heart sounds.  Pulmonary: Pulmonary effort is normal. No respiratory distress. Normal breath sounds.  Abdomen: Soft. Nontender. Nondistended. Normoactive bowel sounds.  Extremities: No edema.  Skin: Warm. Dry.  Neurological: Alert and oriented to person, place, and time.  Psychiatric: Behavior normal.         Assessment:  The primary encounter diagnosis was Right upper quadrant abdominal pain. Diagnoses of Hepatic cyst, Common bile duct dilatation, Fatty liver, Blood glucose elevated, Morbid obesity with BMI of 40.0-44.9, adult, Encounter for screening mammogram for malignant neoplasm of breast, and Colon cancer screening were also pertinent to this visit.    Assessment & Plan    ABDOMINAL PAIN:  - Reviewed patient's recent ER visit for right-sided abdominal pain.    LIVER CYSTS:  - Assessed CT results showing: liver cysts (1.5 cm in left lobe, 1.3 cm in right lobe).  - Explained the location and nature of liver cysts.    DILATED BILE DUCT:  - Assessed CT results showing: dilated extrahepatic common bile duct (7.8 mm).  - Considered differential diagnoses for dilated bile duct, including stones or sludge.  - Explained the location and nature of dilated bile duct.    FATTY LIVER DISEASE:  - Assessed CT results showing: fatty liver.  - Discussed non-alcoholic fatty liver disease and its potential progression.  - Recommend weight loss to improve liver health.    ELEVATED BLOOD GLUCOSE:  - Considered need for further evaluation of elevated glucose levels.  - Added A1C test to scheduled labs on Thursday, October 3rd at 9:40 AM.    LABS:  - Evaluated recent lab results, including liver enzymes, which were within normal limits.  - Continue with scheduled labs on Thursday, October 3rd at 9:40 AM.    COLONOSCOPY:  - Ordered colonoscopy.  - Contact the office regarding colonoscopy scheduling.    MAMMOGRAM:  - Proceed with scheduled mammogram.    FOLLOW UP:  - Follow up with gastroenterologist on  October 21st as scheduled.  - Ensure GI specialist sends report to primary care physician.

## 2024-10-03 ENCOUNTER — LAB VISIT (OUTPATIENT)
Dept: LAB | Facility: HOSPITAL | Age: 64
End: 2024-10-03
Attending: STUDENT IN AN ORGANIZED HEALTH CARE EDUCATION/TRAINING PROGRAM
Payer: MEDICAID

## 2024-10-03 DIAGNOSIS — M15.4 EROSIVE OSTEOARTHRITIS: ICD-10-CM

## 2024-10-03 DIAGNOSIS — R73.9 BLOOD GLUCOSE ELEVATED: ICD-10-CM

## 2024-10-03 DIAGNOSIS — M10.9 GOUT, UNSPECIFIED CAUSE, UNSPECIFIED CHRONICITY, UNSPECIFIED SITE: ICD-10-CM

## 2024-10-03 DIAGNOSIS — M79.89 SWELLING OF LEFT FOOT: Primary | ICD-10-CM

## 2024-10-03 LAB
ALBUMIN SERPL BCP-MCNC: 3.7 G/DL (ref 3.5–5.2)
ALP SERPL-CCNC: 101 U/L (ref 55–135)
ALT SERPL W/O P-5'-P-CCNC: 28 U/L (ref 10–44)
ANION GAP SERPL CALC-SCNC: 7 MMOL/L (ref 8–16)
AST SERPL-CCNC: 23 U/L (ref 10–40)
BASOPHILS # BLD AUTO: 0.03 K/UL (ref 0–0.2)
BASOPHILS NFR BLD: 0.4 % (ref 0–1.9)
BILIRUB SERPL-MCNC: 0.4 MG/DL (ref 0.1–1)
BUN SERPL-MCNC: 11 MG/DL (ref 8–23)
CALCIUM SERPL-MCNC: 9.5 MG/DL (ref 8.7–10.5)
CHLORIDE SERPL-SCNC: 107 MMOL/L (ref 95–110)
CO2 SERPL-SCNC: 25 MMOL/L (ref 23–29)
CREAT SERPL-MCNC: 0.7 MG/DL (ref 0.5–1.4)
CRP SERPL-MCNC: 14.2 MG/L (ref 0–8.2)
DIFFERENTIAL METHOD BLD: NORMAL
EOSINOPHIL # BLD AUTO: 0.3 K/UL (ref 0–0.5)
EOSINOPHIL NFR BLD: 3.4 % (ref 0–8)
ERYTHROCYTE [DISTWIDTH] IN BLOOD BY AUTOMATED COUNT: 13.8 % (ref 11.5–14.5)
ERYTHROCYTE [SEDIMENTATION RATE] IN BLOOD BY WESTERGREN METHOD: 19 MM/HR (ref 0–20)
EST. GFR  (NO RACE VARIABLE): >60 ML/MIN/1.73 M^2
ESTIMATED AVG GLUCOSE: 123 MG/DL (ref 68–131)
GLUCOSE SERPL-MCNC: 128 MG/DL (ref 70–110)
HBA1C MFR BLD: 5.9 % (ref 4–5.6)
HCT VFR BLD AUTO: 41.3 % (ref 37–48.5)
HGB BLD-MCNC: 13.8 G/DL (ref 12–16)
IMM GRANULOCYTES # BLD AUTO: 0.01 K/UL (ref 0–0.04)
IMM GRANULOCYTES NFR BLD AUTO: 0.1 % (ref 0–0.5)
LYMPHOCYTES # BLD AUTO: 2.1 K/UL (ref 1–4.8)
LYMPHOCYTES NFR BLD: 27 % (ref 18–48)
MCH RBC QN AUTO: 29.2 PG (ref 27–31)
MCHC RBC AUTO-ENTMCNC: 33.4 G/DL (ref 32–36)
MCV RBC AUTO: 88 FL (ref 82–98)
MONOCYTES # BLD AUTO: 0.6 K/UL (ref 0.3–1)
MONOCYTES NFR BLD: 7.1 % (ref 4–15)
NEUTROPHILS # BLD AUTO: 4.9 K/UL (ref 1.8–7.7)
NEUTROPHILS NFR BLD: 62 % (ref 38–73)
NRBC BLD-RTO: 0 /100 WBC
PLATELET # BLD AUTO: 204 K/UL (ref 150–450)
PMV BLD AUTO: 10.6 FL (ref 9.2–12.9)
POTASSIUM SERPL-SCNC: 4.4 MMOL/L (ref 3.5–5.1)
PROT SERPL-MCNC: 7.1 G/DL (ref 6–8.4)
RBC # BLD AUTO: 4.72 M/UL (ref 4–5.4)
SODIUM SERPL-SCNC: 139 MMOL/L (ref 136–145)
URATE SERPL-MCNC: 5.2 MG/DL (ref 2.4–5.7)
WBC # BLD AUTO: 7.86 K/UL (ref 3.9–12.7)

## 2024-10-03 PROCEDURE — 85651 RBC SED RATE NONAUTOMATED: CPT | Performed by: STUDENT IN AN ORGANIZED HEALTH CARE EDUCATION/TRAINING PROGRAM

## 2024-10-03 PROCEDURE — 84550 ASSAY OF BLOOD/URIC ACID: CPT | Performed by: PHYSICIAN ASSISTANT

## 2024-10-03 PROCEDURE — 80053 COMPREHEN METABOLIC PANEL: CPT | Performed by: STUDENT IN AN ORGANIZED HEALTH CARE EDUCATION/TRAINING PROGRAM

## 2024-10-03 PROCEDURE — 85025 COMPLETE CBC W/AUTO DIFF WBC: CPT | Performed by: STUDENT IN AN ORGANIZED HEALTH CARE EDUCATION/TRAINING PROGRAM

## 2024-10-03 PROCEDURE — 83036 HEMOGLOBIN GLYCOSYLATED A1C: CPT | Performed by: INTERNAL MEDICINE

## 2024-10-03 PROCEDURE — 86140 C-REACTIVE PROTEIN: CPT | Performed by: STUDENT IN AN ORGANIZED HEALTH CARE EDUCATION/TRAINING PROGRAM

## 2024-10-10 ENCOUNTER — OFFICE VISIT (OUTPATIENT)
Dept: RHEUMATOLOGY | Facility: CLINIC | Age: 64
End: 2024-10-10
Payer: MEDICAID

## 2024-10-10 VITALS
BODY MASS INDEX: 41.32 KG/M2 | WEIGHT: 248 LBS | TEMPERATURE: 66 F | HEIGHT: 65 IN | SYSTOLIC BLOOD PRESSURE: 135 MMHG | DIASTOLIC BLOOD PRESSURE: 80 MMHG

## 2024-10-10 DIAGNOSIS — Z51.81 ENCOUNTER FOR MONITORING IMMUNOSUPPRESSIVE MEDICATION THERAPY CAUSING IMMUNODEFICIENCY: ICD-10-CM

## 2024-10-10 DIAGNOSIS — Z79.899 HIGH RISK MEDICATION USE: ICD-10-CM

## 2024-10-10 DIAGNOSIS — M25.562 CHRONIC PAIN OF LEFT KNEE: ICD-10-CM

## 2024-10-10 DIAGNOSIS — Z79.60 ENCOUNTER FOR MONITORING IMMUNOSUPPRESSIVE MEDICATION THERAPY CAUSING IMMUNODEFICIENCY: ICD-10-CM

## 2024-10-10 DIAGNOSIS — M10.9 GOUT, UNSPECIFIED CAUSE, UNSPECIFIED CHRONICITY, UNSPECIFIED SITE: ICD-10-CM

## 2024-10-10 DIAGNOSIS — G89.29 CHRONIC PAIN OF LEFT KNEE: ICD-10-CM

## 2024-10-10 DIAGNOSIS — M15.4 EROSIVE OSTEOARTHRITIS: ICD-10-CM

## 2024-10-10 DIAGNOSIS — Z79.899 IMMUNODEFICIENCY DUE TO DRUG THERAPY: ICD-10-CM

## 2024-10-10 DIAGNOSIS — D84.821 IMMUNODEFICIENCY DUE TO DRUG THERAPY: ICD-10-CM

## 2024-10-10 DIAGNOSIS — M10.9 GOUT, ARTHRITIS: ICD-10-CM

## 2024-10-10 DIAGNOSIS — M06.00 SERONEGATIVE RHEUMATOID ARTHRITIS: Primary | ICD-10-CM

## 2024-10-10 DIAGNOSIS — M17.0 PRIMARY OSTEOARTHRITIS OF BOTH KNEES: ICD-10-CM

## 2024-10-10 DIAGNOSIS — D84.821 ENCOUNTER FOR MONITORING IMMUNOSUPPRESSIVE MEDICATION THERAPY CAUSING IMMUNODEFICIENCY: ICD-10-CM

## 2024-10-10 PROCEDURE — 99999 PR PBB SHADOW E&M-EST. PATIENT-LVL IV: CPT | Mod: PBBFAC,,, | Performed by: STUDENT IN AN ORGANIZED HEALTH CARE EDUCATION/TRAINING PROGRAM

## 2024-10-10 PROCEDURE — 99215 OFFICE O/P EST HI 40 MIN: CPT | Mod: S$PBB,,, | Performed by: STUDENT IN AN ORGANIZED HEALTH CARE EDUCATION/TRAINING PROGRAM

## 2024-10-10 PROCEDURE — 3075F SYST BP GE 130 - 139MM HG: CPT | Mod: CPTII,,, | Performed by: STUDENT IN AN ORGANIZED HEALTH CARE EDUCATION/TRAINING PROGRAM

## 2024-10-10 PROCEDURE — 3079F DIAST BP 80-89 MM HG: CPT | Mod: CPTII,,, | Performed by: STUDENT IN AN ORGANIZED HEALTH CARE EDUCATION/TRAINING PROGRAM

## 2024-10-10 PROCEDURE — 99214 OFFICE O/P EST MOD 30 MIN: CPT | Mod: PBBFAC | Performed by: STUDENT IN AN ORGANIZED HEALTH CARE EDUCATION/TRAINING PROGRAM

## 2024-10-10 PROCEDURE — 1159F MED LIST DOCD IN RCRD: CPT | Mod: CPTII,,, | Performed by: STUDENT IN AN ORGANIZED HEALTH CARE EDUCATION/TRAINING PROGRAM

## 2024-10-10 PROCEDURE — 3008F BODY MASS INDEX DOCD: CPT | Mod: CPTII,,, | Performed by: STUDENT IN AN ORGANIZED HEALTH CARE EDUCATION/TRAINING PROGRAM

## 2024-10-10 PROCEDURE — 3044F HG A1C LEVEL LT 7.0%: CPT | Mod: CPTII,,, | Performed by: STUDENT IN AN ORGANIZED HEALTH CARE EDUCATION/TRAINING PROGRAM

## 2024-10-10 PROCEDURE — 1160F RVW MEDS BY RX/DR IN RCRD: CPT | Mod: CPTII,,, | Performed by: STUDENT IN AN ORGANIZED HEALTH CARE EDUCATION/TRAINING PROGRAM

## 2024-10-10 RX ORDER — FOLIC ACID 1 MG/1
1 TABLET ORAL DAILY
Qty: 90 TABLET | Refills: 3 | Status: SHIPPED | OUTPATIENT
Start: 2024-10-10

## 2024-10-10 RX ORDER — ALLOPURINOL 100 MG/1
200 TABLET ORAL DAILY
Qty: 180 TABLET | Refills: 3 | Status: SHIPPED | OUTPATIENT
Start: 2024-10-10

## 2024-10-10 RX ORDER — METHOTREXATE 2.5 MG/1
10 TABLET ORAL
Qty: 16 TABLET | Refills: 3 | Status: SHIPPED | OUTPATIENT
Start: 2024-10-10

## 2024-10-10 RX ORDER — MELOXICAM 15 MG/1
15 TABLET ORAL DAILY
Qty: 90 TABLET | Refills: 3 | Status: CANCELLED | OUTPATIENT
Start: 2024-10-10

## 2024-10-10 NOTE — PROGRESS NOTES
Subjective:      Patient ID: Monica Joy is a 63 y.o. female.    Chief Complaint: Gout, arthritis    HPI:   Patient presents for Rheumatology follow up for gout and inflammatory arthritis. She has previously been seeing ARTHUR Joy with whom she established care in 2023 for arthralgias and management for gout. She is on allopurinol 200 mg daily for ULT and meloxicam 15 mg daily for arthralgias. She reports long history of arthralgias which were progressive and resulted in damage of the PIPs, DIPs, and wrists.     She broke her left wrist and right lateral foot after a fall many years ago. Now s/p hardware in the left wrist and fusion of left thumb MCP.  Right wrist was surgically fused in the past couple of years due to arthritis. Also had fusion of right 4th DIP.    She doesn't describe any actual gout attacks except maybe in the right ankle in the past. Describes right 4th PIP getting gradually swollen and red sometimes. Feels like allopurinol has helped her arthritis. Meloxicam worked great for a month or so, now not really helping. Endorses pitting edema of left lower leg, sometimes reaches up to the knee. Was on diuretics in the beginning of the year and that helped. She got off the diuretic because she thought the problem was solved but now getting swelling again. Compression socks don't help.    Other history:  Cysts on both kidneys and liver - seen on MRCP.  When she was younger she had hemiplegia of the face. There was concern for aneurysm. She had cerebral angiogram and describes point of access was left femoral artery. No aneurysm was found. She was treated for Bell's Palsy. This resolved.  Had a DVT: age indeterminate thrombosis of the left anterior tibial vein on US 06/2023. She says a thorough workup for DVT and PE was done and no further clot was seen.   Was told she had a 9 mm kidney stone at [facility].    Denies skin rashes, oral ulcers, patchy alopecia, sicca symptoms, cardiopulmonary symptoms, eye  "inflammation, IBD, fevers, weight loss, Raynaud's. Rheumatology review of systems is otherwise negative.    Social Hx: Never smoker. Rare alcohol use.  Family Hx: Mother had deforming arthritis and was on an infusion every 6 months. (Could be rituximab for RA). Maternal grandmother also had a deforming arthritis.      Objective:   /80   Temp (!) 66 °F (18.9 °C)   Ht 5' 5" (1.651 m)   Wt 112.5 kg (248 lb)   LMP  (LMP Unknown)   BMI 41.27 kg/m²   Physical Exam  Constitutional:       General: She is not in acute distress.     Appearance: Normal appearance.   HENT:      Head: Normocephalic and atraumatic.      Mouth/Throat:      Mouth: Mucous membranes are moist.      Pharynx: Oropharynx is clear.   Cardiovascular:      Rate and Rhythm: Normal rate and regular rhythm.   Pulmonary:      Effort: Pulmonary effort is normal.      Breath sounds: Normal breath sounds.   Abdominal:      Palpations: Abdomen is soft.      Tenderness: There is no abdominal tenderness.   Musculoskeletal:      Cervical back: Normal range of motion. No tenderness.      Comments: Tenderness and bony enlargement of PIPs and DIPs with lateral flexion deformities of DIPs. S/p fusion of both wrists. 2+ pitting edema of left lower extremity up to the mid-shin.   Skin:     General: Skin is warm and dry.   Neurological:      Mental Status: She is alert and oriented to person, place, and time. Mental status is at baseline.             Assessment and Plan:     Problem List Items Addressed This Visit          Unprioritized    Chronic pain of left knee    OA (osteoarthritis)     Other Visit Diagnoses       Seronegative rheumatoid arthritis    -  Primary    Relevant Medications    methotrexate 2.5 MG Tab    Other Relevant Orders    CBC Auto Differential    Comprehensive Metabolic Panel    C-Reactive Protein    Sedimentation rate    Uric Acid    Quantiferon Gold TB    Hepatitis B Core Antibody, Total    Hepatitis B Surface Antigen    Gout, arthritis    "     Relevant Medications    allopurinoL (ZYLOPRIM) 100 MG tablet    methotrexate 2.5 MG Tab    Other Relevant Orders    CBC Auto Differential    Comprehensive Metabolic Panel    C-Reactive Protein    Sedimentation rate    Uric Acid    Quantiferon Gold TB    Hepatitis B Core Antibody, Total    Hepatitis B Surface Antigen    Gout, unspecified cause, unspecified chronicity, unspecified site        Erosive osteoarthritis        Relevant Medications    methotrexate 2.5 MG Tab    Other Relevant Orders    CBC Auto Differential    Comprehensive Metabolic Panel    C-Reactive Protein    Sedimentation rate    Uric Acid    Quantiferon Gold TB    Hepatitis B Core Antibody, Total    Hepatitis B Surface Antigen    Immunodeficiency due to drug therapy        Encounter for monitoring immunosuppressive medication therapy causing immunodeficiency        High risk medication use               Latest Reference Range & Units 10/03/24 09:40   WBC 3.90 - 12.70 K/uL 7.86   RBC 4.00 - 5.40 M/uL 4.72   Hemoglobin 12.0 - 16.0 g/dL 13.8   Hematocrit 37.0 - 48.5 % 41.3   MCV 82 - 98 fL 88   MCH 27.0 - 31.0 pg 29.2   MCHC 32.0 - 36.0 g/dL 33.4   RDW 11.5 - 14.5 % 13.8   Platelet Count 150 - 450 K/uL 204   MPV 9.2 - 12.9 fL 10.6   Gran % 38.0 - 73.0 % 62.0   Lymph % 18.0 - 48.0 % 27.0   Mono % 4.0 - 15.0 % 7.1   Eos % 0.0 - 8.0 % 3.4   Basophil % 0.0 - 1.9 % 0.4   Immature Granulocytes 0.0 - 0.5 % 0.1   Gran # (ANC) 1.8 - 7.7 K/uL 4.9   Lymph # 1.0 - 4.8 K/uL 2.1   Mono # 0.3 - 1.0 K/uL 0.6   Eos # 0.0 - 0.5 K/uL 0.3   Baso # 0.00 - 0.20 K/uL 0.03   Immature Grans (Abs) 0.00 - 0.04 K/uL 0.01   nRBC 0 /100 WBC 0   Differential Method  Automated   Sed Rate 0 - 20 mm/Hr 19   Sodium 136 - 145 mmol/L 139   Potassium 3.5 - 5.1 mmol/L 4.4   Chloride 95 - 110 mmol/L 107   CO2 23 - 29 mmol/L 25   Anion Gap 8 - 16 mmol/L 7 (L)   BUN 8 - 23 mg/dL 11   Creatinine 0.5 - 1.4 mg/dL 0.7   eGFR >60 mL/min/1.73 m^2 >60   Glucose 70 - 110 mg/dL 128 (H)   Calcium 8.7  - 10.5 mg/dL 9.5   ALP 55 - 135 U/L 101   PROTEIN TOTAL 6.0 - 8.4 g/dL 7.1   Albumin 3.5 - 5.2 g/dL 3.7   Uric Acid 2.4 - 5.7 mg/dL 5.2   BILIRUBIN TOTAL 0.1 - 1.0 mg/dL 0.4   AST 10 - 40 U/L 23   ALT 10 - 44 U/L 28   CRP 0.0 - 8.2 mg/L 14.2 (H)   (L): Data is abnormally low  (H): Data is abnormally high          Patient presents for Rheumatology follow up for gout and inflammatory arthritis.    Complicated picture of arthritis which is likely from multiple causes. She has never had a true gout attack (except maybe in the right ankle) but uric acid has been elevated and her arthritis feels improved with allopurinol.    I reviewed her labs as above.  I reviewed her XR hands before and after the right wrist surgery, XR feet, XR knees images with her in detail. Looks like gout erosion right ankle. Looks like severe erosive OA in the DIPs and PIPs of the hands. Not sure what happened to the wrists. I can't see if there are gouty erosions in the wrist due to extent of damage and history of surgeries.  Erosive osteoarthritis doesn't affect wrists. Has degenerative arthritis jeannette medial compartment of both knees and right patellofemoral joint.    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      Plan:  Continue allopurinol 200 mg daily for ULT.  Trial of holding meloxicam for a week to see if there is any difference in symptoms. She doesn't think it's helping.  Start MTX 10 mg/wk for seronegative RA.  Start folic acid 1 mg daily.  Safety and disease monitoring labs in 4 weeks and 3 months.  If no improvement with low dose methotrexate, will get MRI hands/wrists RA protocol to see if this is synovitis based disease and if we need to increase the immunosuppression.      High Risk Medication Monitoring encounter  Drug therapy requiring intensive monitoring for toxicity  No current medication related issues, no evidence of  toxicity  Appropriate labs ordered for toxicity monitoring    Compromised immune system secondary to autoimmune disease and/or use of immunosuppressive drugs.  Monitor carefully for infections.  Advised patient to get immediate medical care if any infection arises.  Also advised strict adherence age-appropriate vaccinations and cancer screenings with PCP.    Patient advised to hold DMARD and/or biologic therapy for signs of infection or for surgery. If you are unsure what to do please call our office for instruction.[facility] [phone]          Follow up in about 3 months (around 1/10/2025).

## 2024-10-10 NOTE — PATIENT INSTRUCTIONS
Hold meloxicam for one week and see if you notice any worsening of arthritis.  Start methotrexate 4 tablets ONCE A WEEK.  Start folic acid 1 mg daily.  Continue allopurinol 200 mg daily.  Labs in 1 month and 3 months.

## 2024-10-14 NOTE — PROGRESS NOTES
Hepatology Note    PATIENT: Monica Joy  MRN: [id number]  DATE: 10/15/2024    Provider: Hepatologist - Dr Abdi  Urgency of review: non-urgent  Referring provider: Aaareferral Self    Diagnosis:   1. Liver lesion    2. Osteoarthritis, unspecified osteoarthritis type, unspecified site    3. KAM on CPAP    4. Hx of colonic polyps    5. Morbid obesity with BMI of 40.0-44.9, adult    6. Biliary cyst        Chief complaint:   Chief Complaint   Patient presents with    Cyst       Subjective:    Initial History: Monica Joy is a 63 y.o. female with KAM, OA who was referred to Hepatology Clinic for consultation of liver lesion      10/15/2024   Patient has incidentally detected liver lesion. CT in 2023 reported as  Left hepatic lobe 1.6 cm incompletely characterized lesion. Additional subcentimeter hypodense lesions that are too small to characterize. Patient had MRI in 2017 which reported as There are multiple small hepatic cysts. Largest cyst involves left lobe and measures approximately 10 mm in diameter. Patient does not have any new complains from liver standpoint. Liver enzymes are stable    As regards to liver disease,  - The patient reports no symptoms of hepatitis including malaise or flu-like symptoms to suggest a flare.  - The patient reports no new manifestations of portal hypertension including ascites, edema, GI bleeding, or hepatic encephalopathy to suggest liver decompensation.  - The patient reports no fevers/chills or pruritis to suggest biliary disease.        Prior Relevant History:    She  denies hepatotoxic medication    Review of systems:  A review of 12+ systems was conducted with pertinent positive and negative findings documented in HPI with all other systems reviewed and negative.      PFSH:  Past medical, family, and social history reviewed as documented in chart with pertinent positive medical, family, and social history detailed in HPI.    Past Medical History:   Past Medical History:    Diagnosis Date    Asthma 2013    Gout, arthritis     Sleep apnea     Thyroid disease        Past Surgical HIstory:   Past Surgical History:   Procedure Laterality Date    BONE GRAFT Right 2021    Procedure: BONE GRAFT;  Surgeon: Miguel Yoo MD;  Location: [facility];  Service: Orthopedics;  Laterality: Right;  bone graft to fill the defect in the distal radial epiphysis/Reduction of the four-corner area will be performed with bone graft and fusion     SECTION      COLONOSCOPY N/A 2019    Procedure: COLONOSCOPY;  Surgeon: Scout Rivera MD;  Location: [facility] ENDO;  Service: Endoscopy;  Laterality: N/A;    FRACTURE SURGERY      HAND FUSION Right 2021    Procedure: FUSION, JOINT, HAND;  Surgeon: Miguel Yoo MD;  Location: [facility] OR;  Service: Orthopedics;  Laterality: Right;  dip joint    JOINT REPLACEMENT      Novasure Endometrial Ablation  2016    OSTECTOMY Right 2021    Procedure: OSTECTOMY;  Surgeon: Miguel Yoo MD;  Location: [facility] OR;  Service: Orthopedics;  Laterality: Right;  radial styloidectomy as well as scaphoid excision    THYROIDECTOMY, PARTIAL      TUBAL LIGATION      WRIST SURGERY         Family History:   Family History   Problem Relation Name Age of Onset    Atrial fibrillation Mother      Anuerysm Mother      Diabetes Father      Heart disease Father      Hyperlipidemia Father      Stroke Father      Kidney disease Father      Heart disease Brother      Diabetes Brother      Anuerysm Brother      Breast cancer Neg Hx      Colon cancer Neg Hx      Ovarian cancer Neg Hx       She has no known family history of liver disease.     Social History:  reports that she has never smoked. She has been exposed to tobacco smoke. She has never used smokeless tobacco. She reports that she does not drink alcohol and does not use drugs.    She has no history of Alcohol     She denies history of IV drug use/Tatto  She  denies high-risk sexual  contacts, no raw seafood, no sick contacts      Allergies:  Review of patient's allergies indicates:   Allergen Reactions    Amoxicillin Rash and Swelling     Rash with throat swelling    Codeine Palpitations, Rash and Swelling     Other reaction(s): Shortness of breath  Other reaction(s): Hives  Rash and throat swelling    Doxycycline Swelling     Other reaction(s): Rash    Hydrocodone Rash and Swelling    Iodine and iodide containing products Rash and Swelling    Lortab [hydrocodone-acetaminophen] Other (See Comments)     Other reaction(s): Hives  Other reaction(s): Difficulty breathing    Medrol [methylprednisolone] Anaphylaxis    Oxycodone Shortness Of Breath, Rash and Swelling    Oxycodone hcl-oxycodone-asa Rash and Swelling    Percodan filipe Shortness Of Breath    Quinidine-quinine analogues (cinchona alkaloids) Shortness Of Breath, Swelling and Rash    Tetracycline Swelling    Tetracyclines Rash and Swelling    Tramadol Shortness Of Breath, Rash and Swelling    Vibramycin [doxycycline calcium] Shortness Of Breath     Other reaction(s): Hives  Other reaction(s): Difficulty breathing    Augmentin [amoxicillin-pot clavulanate] Itching and Rash    Levaquin [levofloxacin] Hives    Sulfa (sulfonamide antibiotics) Itching and Rash    Percocet  [oxycodone-acetaminophen]      Other reaction(s): Shortness of breath    Shellfish containing products      Other reaction(s): Shortness of breath  Other reaction(s): Hives  Other reaction(s): Shortness of breath  Other reaction(s): Hives       Medications:  Current Outpatient Medications   Medication Sig Dispense Refill    albuterol (PROVENTIL/VENTOLIN HFA) 90 mcg/actuation inhaler INHALE 2 PUFFS INTO THE LUNGS EVERY 4 HOURS AS NEEDED FOR WHEEZING OR SHORTNESS OF BREATH 18 g 11    allopurinoL (ZYLOPRIM) 100 MG tablet Take 2 tablets (200 mg total) by mouth once daily. 180 tablet 3    folic acid (FOLVITE) 1 MG tablet Take 1 tablet (1 mg total) by mouth once daily. 90 tablet 3     levocetirizine (XYZAL) 5 MG tablet TAKE 1 TABLET(5 MG) BY MOUTH EVERY EVENING 30 tablet 5    methotrexate 2.5 MG Tab Take 4 tablets (10 mg total) by mouth every 7 days. 16 tablet 3    ciprofloxacin HCl (CILOXAN) 0.3 % ophthalmic solution 3 drops to both ears twice a day. (Patient not taking: Reported on 10/15/2024) 10 mL 0    meloxicam (MOBIC) 15 MG tablet Take 1 tablet (15 mg total) by mouth once daily. (Patient not taking: Reported on 10/15/2024) 90 tablet 3    ondansetron (ZOFRAN-ODT) 8 MG TbDL Take 1 tablet (8 mg total) by mouth every 8 (eight) hours as needed (nausea). (Patient not taking: Reported on 10/15/2024) 12 tablet 1     Current Facility-Administered Medications   Medication Dose Route Frequency Provider Last Rate Last Admin    EPINEPHrine (EPIPEN) 0.3 mg/0.3 mL pen injection 0.3 mg  0.3 mg Intramuscular PRN Leon De La Torre MD        sodium chloride 0.9% flush 10 mL  10 mL Intravenous PRN Leon De La Torre MD         Facility-Administered Medications Ordered in Other Visits   Medication Dose Route Frequency Provider Last Rate Last Admin    lactated ringers infusion   Intravenous On Call Procedure Mary Anne Johnson PA        nozaseptin (NOZIN) nasal    Each Nostril On Call Procedure Mary Anne Johnson PA   Given at 02/25/21 0613       Review of Systems   Constitutional:  Negative for fatigue, fever and unexpected weight change.   HENT:  Negative for ear pain, nosebleeds and trouble swallowing.    Eyes:  Negative for discharge and redness.   Respiratory:  Negative for cough and shortness of breath.    Cardiovascular:  Negative for palpitations and leg swelling.   Gastrointestinal:  Negative for abdominal distention, abdominal pain, diarrhea and vomiting.   Endocrine: Negative for cold intolerance and polyuria.   Genitourinary:  Negative for flank pain and hematuria.   Musculoskeletal:  Negative for back pain.   Skin:  Negative for pallor.   Neurological:  Negative for seizures and  "headaches.   Hematological:  Does not bruise/bleed easily.   Psychiatric/Behavioral:  Negative for confusion and hallucinations.           Objective:      Vitals:   Vitals:    10/15/24 0937   BP: 126/82   Pulse: 65   SpO2: 96%   Weight: 111.7 kg (246 lb 2.3 oz)   Height: 5' 4" (1.626 m)       Physical Exam  Constitutional:       Appearance: Normal appearance.   HENT:      Head: Normocephalic and atraumatic.      Right Ear: Tympanic membrane and external ear normal.      Left Ear: Tympanic membrane and external ear normal.      Mouth/Throat:      Mouth: Mucous membranes are moist.   Eyes:      Extraocular Movements: Extraocular movements intact.      Pupils: Pupils are equal, round, and reactive to light.   Cardiovascular:      Rate and Rhythm: Normal rate and regular rhythm.      Pulses: Normal pulses.      Heart sounds: Normal heart sounds.   Pulmonary:      Effort: Pulmonary effort is normal.      Breath sounds: Normal breath sounds.   Abdominal:      General: Bowel sounds are normal. There is no distension.      Palpations: Abdomen is soft. There is no mass.      Tenderness: There is no abdominal tenderness.   Musculoskeletal:         General: No swelling or deformity. Normal range of motion.      Cervical back: Normal range of motion and neck supple.   Skin:     Coloration: Skin is not jaundiced.   Neurological:      General: No focal deficit present.      Mental Status: She is alert and oriented to person, place, and time.      Cranial Nerves: No cranial nerve deficit.   Psychiatric:         Mood and Affect: Mood normal.         Behavior: Behavior normal.         Laboratory Data:  No visits with results within 1 Week(s) from this visit.   Latest known visit with results is:   Lab Visit on 10/03/2024   Component Date Value Ref Range Status    Uric Acid 10/03/2024 5.2  2.4 - 5.7 mg/dL Final    Sodium 10/03/2024 139  136 - 145 mmol/L Final    Potassium 10/03/2024 4.4  3.5 - 5.1 mmol/L Final    Chloride 10/03/2024 " 107  95 - 110 mmol/L Final    CO2 10/03/2024 25  23 - 29 mmol/L Final    Glucose 10/03/2024 128 (H)  70 - 110 mg/dL Final    BUN 10/03/2024 11  8 - 23 mg/dL Final    Creatinine 10/03/2024 0.7  0.5 - 1.4 mg/dL Final    Calcium 10/03/2024 9.5  8.7 - 10.5 mg/dL Final    Total Protein 10/03/2024 7.1  6.0 - 8.4 g/dL Final    Albumin 10/03/2024 3.7  3.5 - 5.2 g/dL Final    Total Bilirubin 10/03/2024 0.4  0.1 - 1.0 mg/dL Final    Comment: For infants and newborns, interpretation of results should be based  on gestational age, weight and in agreement with clinical  observations.    Premature Infant recommended reference ranges:  Up to 24 hours.............<8.0 mg/dL  Up to 48 hours............<12.0 mg/dL  3-5 days..................<15.0 mg/dL  6-29 days.................<15.0 mg/dL      Alkaline Phosphatase 10/03/2024 101  55 - 135 U/L Final    AST 10/03/2024 23  10 - 40 U/L Final    ALT 10/03/2024 28  10 - 44 U/L Final    eGFR 10/03/2024 >60  >60 mL/min/1.73 m^2 Final    Anion Gap 10/03/2024 7 (L)  8 - 16 mmol/L Final    WBC 10/03/2024 7.86  3.90 - 12.70 K/uL Final    RBC 10/03/2024 4.72  4.00 - 5.40 M/uL Final    Hemoglobin 10/03/2024 13.8  12.0 - 16.0 g/dL Final    Hematocrit 10/03/2024 41.3  37.0 - 48.5 % Final    MCV 10/03/2024 88  82 - 98 fL Final    MCH 10/03/2024 29.2  27.0 - 31.0 pg Final    MCHC 10/03/2024 33.4  32.0 - 36.0 g/dL Final    RDW 10/03/2024 13.8  11.5 - 14.5 % Final    Platelets 10/03/2024 204  150 - 450 K/uL Final    MPV 10/03/2024 10.6  9.2 - 12.9 fL Final    Immature Granulocytes 10/03/2024 0.1  0.0 - 0.5 % Final    Gran # (ANC) 10/03/2024 4.9  1.8 - 7.7 K/uL Final    Immature Grans (Abs) 10/03/2024 0.01  0.00 - 0.04 K/uL Final    Comment: Mild elevation in immature granulocytes is non specific and   can be seen in a variety of conditions including stress response,   acute inflammation, trauma and pregnancy. Correlation with other   laboratory and clinical findings is essential.      Lymph #  "10/03/2024 2.1  1.0 - 4.8 K/uL Final    Mono # 10/03/2024 0.6  0.3 - 1.0 K/uL Final    Eos # 10/03/2024 0.3  0.0 - 0.5 K/uL Final    Baso # 10/03/2024 0.03  0.00 - 0.20 K/uL Final    nRBC 10/03/2024 0  0 /100 WBC Final    Gran % 10/03/2024 62.0  38.0 - 73.0 % Final    Lymph % 10/03/2024 27.0  18.0 - 48.0 % Final    Mono % 10/03/2024 7.1  4.0 - 15.0 % Final    Eosinophil % 10/03/2024 3.4  0.0 - 8.0 % Final    Basophil % 10/03/2024 0.4  0.0 - 1.9 % Final    Differential Method 10/03/2024 Automated   Final    CRP 10/03/2024 14.2 (H)  0.0 - 8.2 mg/L Final    Sed Rate 10/03/2024 19  0 - 20 mm/Hr Final    Hemoglobin A1C 10/03/2024 5.9 (H)  4.0 - 5.6 % Final    Comment: ADA Screening Guidelines:  5.7-6.4%  Consistent with prediabetes  >or=6.5%  Consistent with diabetes    High levels of fetal hemoglobin interfere with the HbA1C  assay. Heterozygous hemoglobin variants (HbS, HgC, etc)do  not significantly interfere with this assay.   However, presence of multiple variants may affect accuracy.      Estimated Avg Glucose 10/03/2024 123  68 - 131 mg/dL Final       Lab Results   Component Value Date    INR 1.0 02/17/2011       No results found for: "SMOOTHMUSCAB", "MITOAB"  Lab Results   Component Value Date    FERRITIN 204 07/13/2021     Lab Results   Component Value Date    HEPAIGM Negative 02/10/2014    HEPBIGM Negative 02/10/2014    HEPCAB Negative 02/10/2014     Lab Results   Component Value Date    TSH 0.898 09/27/2022     Lab Results   Component Value Date    GOVIND Negative 06/24/2009       No results found for: "ABORH"        Lab Results   Component Value Date    HGBA1C 5.9 (H) 10/03/2024     Lab Results   Component Value Date    CHOL 211 (H) 09/27/2022    CHOL 180 11/26/2018    CHOL 206 (H) 10/01/2014     Lab Results   Component Value Date    HDL 50 09/27/2022    HDL 56 11/26/2018    HDL 58 10/01/2014     Lab Results   Component Value Date    LDLCALC 126.2 09/27/2022    LDLCALC 99.4 11/26/2018    LDLCALC 126.0 10/01/2014 "     Lab Results   Component Value Date    TRIG 174 (H) 09/27/2022    TRIG 123 11/26/2018    TRIG 110 10/01/2014     Lab Results   Component Value Date    CHOLHDL 23.7 09/27/2022    CHOLHDL 31.1 11/26/2018    CHOLHDL 28.2 10/01/2014         I personally reviewed imaging studies and outside records..      Assessment:       1. Liver lesion    2. Osteoarthritis, unspecified osteoarthritis type, unspecified site    3. KAM on CPAP    4. Hx of colonic polyps    5. Morbid obesity with BMI of 40.0-44.9, adult    6. Biliary cyst           The patient's risk factors for MAFLD include:    Obesity/overweight                     yes Body mass index is 42.25 kg/m².  Dyslipidemia                                yes  Insulin resistance/Diabetes         yes  Lab Results   Component Value Date    HGBA1C 5.9 (H) 10/03/2024         Plan:     Problem List Items Addressed This Visit          Endocrine    Morbid obesity with BMI of 40.0-44.9, adult       GI    Hx of colonic polyps       Orthopedic    OA (osteoarthritis)       Other    KAM on CPAP (Chronic)     Other Visit Diagnoses       Liver lesion    -  Primary    Biliary cyst        Relevant Orders    MRI MRCP Abdomen W WO Contrast 3D WO Independent WS XPD            Monica was seen today for cyst.    Diagnoses and all orders for this visit:    Liver lesion    Osteoarthritis, unspecified osteoarthritis type, unspecified site    KAM on CPAP    Hx of colonic polyps    Morbid obesity with BMI of 40.0-44.9, adult    Biliary cyst  -     MRI MRCP Abdomen W WO Contrast 3D WO Independent WS XPD; Future      Rt Abdominal pain  NSAIDS for few weeks if its costochondritis  Less likely from liver cyst considering size  Rule out Costochondritis ( rib)    Liver lesion  Likely benign being stable over years  Plan MRI  Discuss scans in conference if concern  Tumor markers      MAFLD  Managed on follow up  -Discussed new Medication for MASLD    I recommended her a more pragmatic approach to her medical  problems which would include the following:  - life-style modifications: weight loss, daily exercise (30 mins of HIIT or cardio), low carb/low fat diet         Educated patient on spectrum of fatty liver disease and potential for cirrhosis if MASH present        Explored dietary habits and discussed dietary recommendations- Low calorie, low carbohydrate, high protein diet mediterranean with goal of loosing >3-5% to improve steatosis and >7-10% to improve histological changes if present  - control of diabetes or insulin resistance   - control of high cholesterol, if needed with statin drugs or other cholesterol-lowering agents  - coffee consumption (low caloric): 2-3 cups per day may reduce liver fat  -I will defer the management of the patient's dyslipidemia and diabetes to patient's primary care physician and/or endocrinologist   -Reduction of risk factors for CVD      OA  Follow up rheumatology    KAM  On CPAP    Colon Polyp  Surveillance discussed      Obesity  Consider GLP-1  Patient would benefit from weight loss and has tried to set realistic goals to achieve success. Lifestyle changes were discussed on eating healthy, exercising at least 150 minutes weekly, and reducing sedentary behavior.   Discussed the risk factors associated with obesity: Arthritis/KAM/Diabetes/Fatty Liver/Cardiovascular disease/GERD/HTN/HLP.         Return to clinic in 12 months.    I have sent communication to the referring physician and/or primary care provider.      Time Statement  A total time spent includes time preparing to see patient, reviewing  diagnostic studies and records, direct face-to-face visit, completing orders, medications , reconciliation, prescription management, and care coordination    We discussed in depth the nature of the patient's disease, the management plan in details. I have provided the patient with an opportunity to ask questions and have all questions answered to his satisfaction.     Discussed with  patient that it is likely that she will see results before Myself or my nurse are able to view them and report results due to the Cures Act passed 4/1/21. Results will be sent immediately to the patient who are enrolled in the patient portal. If results come through after business hours or on weekend, we will not see them until the next business day that we are in the office. If resulted during the business day, we will likely not be able to review them until after completing all patient visits in office that day.       Crispin Abdi MD  Transplant Hepatologist and Gastroenterologist  [facility]

## 2024-10-15 ENCOUNTER — OFFICE VISIT (OUTPATIENT)
Dept: HEPATOLOGY | Facility: CLINIC | Age: 64
End: 2024-10-15
Payer: MEDICAID

## 2024-10-15 VITALS
OXYGEN SATURATION: 96 % | HEIGHT: 64 IN | BODY MASS INDEX: 42.02 KG/M2 | DIASTOLIC BLOOD PRESSURE: 82 MMHG | SYSTOLIC BLOOD PRESSURE: 126 MMHG | HEART RATE: 65 BPM | WEIGHT: 246.13 LBS

## 2024-10-15 DIAGNOSIS — G47.33 OSA ON CPAP: Chronic | ICD-10-CM

## 2024-10-15 DIAGNOSIS — Z86.0100 HX OF COLONIC POLYPS: ICD-10-CM

## 2024-10-15 DIAGNOSIS — M19.90 OSTEOARTHRITIS, UNSPECIFIED OSTEOARTHRITIS TYPE, UNSPECIFIED SITE: ICD-10-CM

## 2024-10-15 DIAGNOSIS — K76.9 LIVER LESION: Primary | ICD-10-CM

## 2024-10-15 DIAGNOSIS — K83.5 BILIARY CYST: ICD-10-CM

## 2024-10-15 DIAGNOSIS — E66.01 MORBID OBESITY WITH BMI OF 40.0-44.9, ADULT: ICD-10-CM

## 2024-10-15 PROCEDURE — 99999 PR PBB SHADOW E&M-EST. PATIENT-LVL IV: CPT | Mod: PBBFAC,,, | Performed by: INTERNAL MEDICINE

## 2024-10-15 PROCEDURE — 3074F SYST BP LT 130 MM HG: CPT | Mod: CPTII,,, | Performed by: INTERNAL MEDICINE

## 2024-10-15 PROCEDURE — 1159F MED LIST DOCD IN RCRD: CPT | Mod: CPTII,,, | Performed by: INTERNAL MEDICINE

## 2024-10-15 PROCEDURE — 3044F HG A1C LEVEL LT 7.0%: CPT | Mod: CPTII,,, | Performed by: INTERNAL MEDICINE

## 2024-10-15 PROCEDURE — 99214 OFFICE O/P EST MOD 30 MIN: CPT | Mod: PBBFAC | Performed by: INTERNAL MEDICINE

## 2024-10-15 PROCEDURE — 3008F BODY MASS INDEX DOCD: CPT | Mod: CPTII,,, | Performed by: INTERNAL MEDICINE

## 2024-10-15 PROCEDURE — 99204 OFFICE O/P NEW MOD 45 MIN: CPT | Mod: S$PBB,,, | Performed by: INTERNAL MEDICINE

## 2024-10-15 PROCEDURE — 3079F DIAST BP 80-89 MM HG: CPT | Mod: CPTII,,, | Performed by: INTERNAL MEDICINE

## 2024-10-15 RX ORDER — FUROSEMIDE 20 MG/1
20 TABLET ORAL DAILY
Qty: 30 TABLET | Refills: 5 | Status: SHIPPED | OUTPATIENT
Start: 2024-10-15

## 2024-10-15 NOTE — PATIENT INSTRUCTIONS
What causes liver cysts?  The cause of most liver cysts is unknown. Liver cysts can be present at birth or can develop at a later time. They usually grow slowly and are not detected until adulthood.      What are the symptoms of liver cysts?  Most liver cysts do not cause any symptoms. However, if cysts become large, they can cause bloating and pain in the upper right part of your abdomen. Sometimes, liver cysts become large enough that you can feel them through your abdomen.    What are the complications of liver cysts?  Liver cysts can have rare complications of liver failure and liver cancer.    How are liver cysts diagnosed?  Since most liver cysts do not cause any symptoms, they usually are detected only on ultrasounds or computerized tomography (CT) scans. If symptoms do occur, a doctor may perform an abdominal CT scan to look at the liver.    How are liver cysts treated?  Most liver cysts do not need to be treated. However, if cysts get large and painful, they may need to be drained or surgically removed. Cysts also may be surgically removed if they are stopping bile from reaching your intestine.       MAFLD    Website with information about fatty liver and inflammation related to fatty liver (WOLFF) = [email or web address].[email or web address]  AND www.NASHactually.com     Limit alcohol consumption  2.  Weight loss goal & 7-10%, referral for [facility] if interested. Also, if interested in a dietician visit to create a weight loss plan, contact the dietician team at [facility] at [email or web address] to schedule a visit to you can call [facility] in [place]: [phone] and the  will transfer the call to one of the dieticians to schedule an appointment. Or you can also call [phone] to schedule. They do offer video visits   3. Low carb/sugar, high fiber and protein diet.Try to limit your carb intake to LESS than 30-45 grams of carbs with a meal or LESS than 5-10 grams with any snack  (total of any snack foods eaten during that time). Use MyFitness Pal darby to add up your carbs through the day. Do NOT drink any beverages with calories or carbs (this can lead to high blood sugar and weight gain). Also, some of our patients have been very successful with weight loss using the pre made/planned meal planning services that limit calories and portion size (one example is Sensible Meals but there are many out there)  4. Exercise, 5 days per week, 30 minutes per day, as tolerated  5. Recommend good cholesterol, blood pressure, blood sugar levels .      Management of patients with MASLD includes optimization of blood glucose control in patients with diabetes and treatment of hyperlipidemia. If after a year of adhering to these measures and effectively reducing your Body Mass Index plus decreasing the insulin resistance, you continue to have symptoms, we may need to start medications to treat MASLD if there is liver fibrosis. These medications are used to treat diabetes and work well for insulin resistance. Unfortunately, these medications can have side effects.      In some people, fatty liver can progress to steatohepatitis (inflamatory fatty liver) and possibly to cirrhosis, putting one at increased risk for liver cancer, liver failure, and death. Therefore, the lifestyle changes are very important to decrease this risk.        Once again, we extend our sincere appreciation for giving us the opportunity to serve you. If there is anything else we can do to improve your experience or assist you further, please do not hesitate to reach out to us.       Thanks for trusting us with your healthcare needs and using [facility]. If you want to ask us a question, you can do so by replying to this message or by calling [phone]

## 2024-10-17 ENCOUNTER — HOSPITAL ENCOUNTER (OUTPATIENT)
Dept: RADIOLOGY | Facility: HOSPITAL | Age: 64
Discharge: HOME | End: 2024-10-17
Attending: INTERNAL MEDICINE
Payer: MEDICAID

## 2024-10-17 DIAGNOSIS — Z12.31 ENCOUNTER FOR SCREENING MAMMOGRAM FOR MALIGNANT NEOPLASM OF BREAST: ICD-10-CM

## 2024-10-17 PROCEDURE — 77063 BREAST TOMOSYNTHESIS BI: CPT | Mod: 26,,, | Performed by: RADIOLOGY

## 2024-10-17 PROCEDURE — 77067 SCR MAMMO BI INCL CAD: CPT | Mod: TC

## 2024-10-17 PROCEDURE — 77067 SCR MAMMO BI INCL CAD: CPT | Mod: 26,,, | Performed by: RADIOLOGY

## 2024-10-28 ENCOUNTER — HOSPITAL ENCOUNTER (OUTPATIENT)
Facility: HOSPITAL | Age: 64
Discharge: HOME | End: 2024-10-28
Attending: INTERNAL MEDICINE | Admitting: INTERNAL MEDICINE
Payer: MEDICAID

## 2024-10-28 ENCOUNTER — ANESTHESIA EVENT (OUTPATIENT)
Dept: ENDOSCOPY | Facility: HOSPITAL | Age: 64
End: 2024-10-28
Payer: MEDICAID

## 2024-10-28 ENCOUNTER — ANESTHESIA (OUTPATIENT)
Dept: ENDOSCOPY | Facility: HOSPITAL | Age: 64
End: 2024-10-28
Payer: MEDICAID

## 2024-10-28 DIAGNOSIS — K63.5 POLYP OF COLON: ICD-10-CM

## 2024-10-28 PROCEDURE — 45378 DIAGNOSTIC COLONOSCOPY: CPT | Mod: ,,, | Performed by: INTERNAL MEDICINE

## 2024-10-28 PROCEDURE — 37000008 HC ANESTHESIA 1ST 15 MINUTES: Performed by: INTERNAL MEDICINE

## 2024-10-28 PROCEDURE — 63600175 PHARM REV CODE 636 W HCPCS

## 2024-10-28 PROCEDURE — G0105 COLORECTAL SCRN; HI RISK IND: HCPCS | Performed by: INTERNAL MEDICINE

## 2024-10-28 PROCEDURE — 25000003 PHARM REV CODE 250: Performed by: INTERNAL MEDICINE

## 2024-10-28 RX ORDER — SODIUM CHLORIDE 9 MG/ML
INJECTION, SOLUTION INTRAVENOUS CONTINUOUS
Status: DISCONTINUED | OUTPATIENT
Start: 2024-10-28 | End: 2024-10-28 | Stop reason: HOSPADM

## 2024-10-28 RX ORDER — PROPOFOL 10 MG/ML
VIAL (ML) INTRAVENOUS
Status: DISCONTINUED | OUTPATIENT
Start: 2024-10-28 | End: 2024-10-28

## 2024-10-28 RX ORDER — DEXTROMETHORPHAN/PSEUDOEPHED 2.5-7.5/.8
DROPS ORAL
Status: COMPLETED | OUTPATIENT
Start: 2024-10-28 | End: 2024-10-28

## 2024-10-28 RX ADMIN — PROPOFOL 100 MG: 10 INJECTION, EMULSION INTRAVENOUS at 09:10

## 2024-10-28 RX ADMIN — PROPOFOL 30 MG: 10 INJECTION, EMULSION INTRAVENOUS at 09:10

## 2024-10-28 RX ADMIN — PROPOFOL 50 MG: 10 INJECTION, EMULSION INTRAVENOUS at 09:10

## 2024-10-28 RX ADMIN — PROPOFOL 40 MG: 10 INJECTION, EMULSION INTRAVENOUS at 09:10

## 2024-10-29 VITALS
HEIGHT: 65 IN | SYSTOLIC BLOOD PRESSURE: 114 MMHG | OXYGEN SATURATION: 96 % | BODY MASS INDEX: 39.99 KG/M2 | WEIGHT: 240 LBS | DIASTOLIC BLOOD PRESSURE: 63 MMHG | RESPIRATION RATE: 18 BRPM | HEART RATE: 77 BPM | TEMPERATURE: 98 F

## 2024-11-07 ENCOUNTER — LAB VISIT (OUTPATIENT)
Dept: LAB | Facility: HOSPITAL | Age: 64
End: 2024-11-07
Attending: STUDENT IN AN ORGANIZED HEALTH CARE EDUCATION/TRAINING PROGRAM
Payer: COMMERCIAL

## 2024-11-07 DIAGNOSIS — M15.4 EROSIVE OSTEOARTHRITIS: ICD-10-CM

## 2024-11-07 DIAGNOSIS — M10.9 GOUT, ARTHRITIS: ICD-10-CM

## 2024-11-07 DIAGNOSIS — M06.00 SERONEGATIVE RHEUMATOID ARTHRITIS: ICD-10-CM

## 2024-11-07 LAB
ALBUMIN SERPL BCP-MCNC: 3.7 G/DL (ref 3.5–5.2)
ALP SERPL-CCNC: 100 U/L (ref 40–150)
ALT SERPL W/O P-5'-P-CCNC: 31 U/L (ref 10–44)
ANION GAP SERPL CALC-SCNC: 8 MMOL/L (ref 8–16)
AST SERPL-CCNC: 24 U/L (ref 10–40)
BASOPHILS # BLD AUTO: 0.02 K/UL (ref 0–0.2)
BASOPHILS NFR BLD: 0.2 % (ref 0–1.9)
BILIRUB SERPL-MCNC: 0.5 MG/DL (ref 0.1–1)
BUN SERPL-MCNC: 15 MG/DL (ref 8–23)
CALCIUM SERPL-MCNC: 9.4 MG/DL (ref 8.7–10.5)
CHLORIDE SERPL-SCNC: 108 MMOL/L (ref 95–110)
CO2 SERPL-SCNC: 24 MMOL/L (ref 23–29)
CREAT SERPL-MCNC: 0.6 MG/DL (ref 0.5–1.4)
CRP SERPL-MCNC: 14.1 MG/L (ref 0–8.2)
DIFFERENTIAL METHOD BLD: NORMAL
EOSINOPHIL # BLD AUTO: 0.2 K/UL (ref 0–0.5)
EOSINOPHIL NFR BLD: 2.7 % (ref 0–8)
ERYTHROCYTE [DISTWIDTH] IN BLOOD BY AUTOMATED COUNT: 14.5 % (ref 11.5–14.5)
ERYTHROCYTE [SEDIMENTATION RATE] IN BLOOD BY PHOTOMETRIC METHOD: 24 MM/HR (ref 0–36)
EST. GFR  (NO RACE VARIABLE): >60 ML/MIN/1.73 M^2
GLUCOSE SERPL-MCNC: 102 MG/DL (ref 70–110)
HBV CORE AB SERPL QL IA: NORMAL
HBV SURFACE AG SERPL QL IA: NORMAL
HCT VFR BLD AUTO: 39.8 % (ref 37–48.5)
HGB BLD-MCNC: 13.5 G/DL (ref 12–16)
IMM GRANULOCYTES # BLD AUTO: 0.03 K/UL (ref 0–0.04)
IMM GRANULOCYTES NFR BLD AUTO: 0.3 % (ref 0–0.5)
LYMPHOCYTES # BLD AUTO: 2.1 K/UL (ref 1–4.8)
LYMPHOCYTES NFR BLD: 23.6 % (ref 18–48)
MCH RBC QN AUTO: 29.7 PG (ref 27–31)
MCHC RBC AUTO-ENTMCNC: 33.9 G/DL (ref 32–36)
MCV RBC AUTO: 88 FL (ref 82–98)
MONOCYTES # BLD AUTO: 0.5 K/UL (ref 0.3–1)
MONOCYTES NFR BLD: 5.7 % (ref 4–15)
NEUTROPHILS # BLD AUTO: 5.9 K/UL (ref 1.8–7.7)
NEUTROPHILS NFR BLD: 67.5 % (ref 38–73)
NRBC BLD-RTO: 0 /100 WBC
PLATELET # BLD AUTO: 202 K/UL (ref 150–450)
PMV BLD AUTO: 10.9 FL (ref 9.2–12.9)
POTASSIUM SERPL-SCNC: 4 MMOL/L (ref 3.5–5.1)
PROT SERPL-MCNC: 7.2 G/DL (ref 6–8.4)
RBC # BLD AUTO: 4.55 M/UL (ref 4–5.4)
SODIUM SERPL-SCNC: 140 MMOL/L (ref 136–145)
URATE SERPL-MCNC: 5.7 MG/DL (ref 2.4–5.7)
WBC # BLD AUTO: 8.73 K/UL (ref 3.9–12.7)

## 2024-11-07 PROCEDURE — 80053 COMPREHEN METABOLIC PANEL: CPT | Performed by: STUDENT IN AN ORGANIZED HEALTH CARE EDUCATION/TRAINING PROGRAM

## 2024-11-07 PROCEDURE — 86480 TB TEST CELL IMMUN MEASURE: CPT | Performed by: STUDENT IN AN ORGANIZED HEALTH CARE EDUCATION/TRAINING PROGRAM

## 2024-11-07 PROCEDURE — 36415 COLL VENOUS BLD VENIPUNCTURE: CPT | Mod: PO | Performed by: STUDENT IN AN ORGANIZED HEALTH CARE EDUCATION/TRAINING PROGRAM

## 2024-11-07 PROCEDURE — 85652 RBC SED RATE AUTOMATED: CPT | Performed by: STUDENT IN AN ORGANIZED HEALTH CARE EDUCATION/TRAINING PROGRAM

## 2024-11-07 PROCEDURE — 85025 COMPLETE CBC W/AUTO DIFF WBC: CPT | Performed by: STUDENT IN AN ORGANIZED HEALTH CARE EDUCATION/TRAINING PROGRAM

## 2024-11-07 PROCEDURE — 84550 ASSAY OF BLOOD/URIC ACID: CPT | Performed by: STUDENT IN AN ORGANIZED HEALTH CARE EDUCATION/TRAINING PROGRAM

## 2024-11-07 PROCEDURE — 87340 HEPATITIS B SURFACE AG IA: CPT | Performed by: STUDENT IN AN ORGANIZED HEALTH CARE EDUCATION/TRAINING PROGRAM

## 2024-11-07 PROCEDURE — 86704 HEP B CORE ANTIBODY TOTAL: CPT | Performed by: STUDENT IN AN ORGANIZED HEALTH CARE EDUCATION/TRAINING PROGRAM

## 2024-11-07 PROCEDURE — 86140 C-REACTIVE PROTEIN: CPT | Performed by: STUDENT IN AN ORGANIZED HEALTH CARE EDUCATION/TRAINING PROGRAM

## 2024-11-09 LAB
GAMMA INTERFERON BACKGROUND BLD IA-ACNC: 0.01 IU/ML
M TB IFN-G CD4+ BCKGRND COR BLD-ACNC: 0.01 IU/ML
M TB IFN-G CD4+ BCKGRND COR BLD-ACNC: 0.03 IU/ML
MITOGEN IGNF BCKGRD COR BLD-ACNC: 9.99 IU/ML
TB GOLD PLUS: NEGATIVE

## 2024-12-06 ENCOUNTER — OFFICE VISIT (OUTPATIENT)
Dept: FAMILY MEDICINE | Facility: CLINIC | Age: 64
End: 2024-12-06
Attending: FAMILY MEDICINE
Payer: COMMERCIAL

## 2024-12-06 ENCOUNTER — TELEPHONE (OUTPATIENT)
Dept: FAMILY MEDICINE | Facility: CLINIC | Age: 64
End: 2024-12-06

## 2024-12-06 VITALS
OXYGEN SATURATION: 95 % | WEIGHT: 248.44 LBS | SYSTOLIC BLOOD PRESSURE: 126 MMHG | TEMPERATURE: 97 F | HEIGHT: 65 IN | HEART RATE: 71 BPM | DIASTOLIC BLOOD PRESSURE: 86 MMHG | BODY MASS INDEX: 41.39 KG/M2

## 2024-12-06 DIAGNOSIS — J45.20 MILD INTERMITTENT ASTHMA WITHOUT COMPLICATION: ICD-10-CM

## 2024-12-06 DIAGNOSIS — Z86.718 HISTORY OF DVT (DEEP VEIN THROMBOSIS): Primary | ICD-10-CM

## 2024-12-06 DIAGNOSIS — E66.01 MORBID OBESITY WITH BMI OF 40.0-44.9, ADULT: ICD-10-CM

## 2024-12-06 DIAGNOSIS — Z82.49 FH: ANEURYSM: ICD-10-CM

## 2024-12-06 DIAGNOSIS — Z01.419 WELL WOMAN EXAM: ICD-10-CM

## 2024-12-06 DIAGNOSIS — M10.9 GOUT, UNSPECIFIED CAUSE, UNSPECIFIED CHRONICITY, UNSPECIFIED SITE: ICD-10-CM

## 2024-12-06 DIAGNOSIS — I67.1 CEREBRAL ANEURYSM, NONRUPTURED: ICD-10-CM

## 2024-12-06 DIAGNOSIS — R73.03 PRE-DIABETES: ICD-10-CM

## 2024-12-06 DIAGNOSIS — M06.00 SERONEGATIVE RHEUMATOID ARTHRITIS: ICD-10-CM

## 2024-12-06 PROBLEM — D17.9 LIPOMA: Status: RESOLVED | Noted: 2017-06-22 | Resolved: 2024-12-06

## 2024-12-06 PROBLEM — M25.631 STIFFNESS OF RIGHT WRIST JOINT: Status: RESOLVED | Noted: 2021-06-28 | Resolved: 2024-12-06

## 2024-12-06 PROBLEM — M79.645 PAIN OF FINGER OF LEFT HAND: Status: RESOLVED | Noted: 2020-11-30 | Resolved: 2024-12-06

## 2024-12-06 PROBLEM — I82.442 ACUTE DEEP VEIN THROMBOSIS (DVT) OF LEFT TIBIAL VEIN: Status: RESOLVED | Noted: 2023-06-21 | Resolved: 2024-12-06

## 2024-12-06 PROBLEM — M25.559 HIP PAIN: Status: RESOLVED | Noted: 2017-06-22 | Resolved: 2024-12-06

## 2024-12-06 PROBLEM — R07.89 CHEST PRESSURE: Status: RESOLVED | Noted: 2020-10-29 | Resolved: 2024-12-06

## 2024-12-06 PROCEDURE — 99999 PR PBB SHADOW E&M-EST. PATIENT-LVL V: CPT | Mod: PBBFAC,,, | Performed by: FAMILY MEDICINE

## 2024-12-06 NOTE — PROGRESS NOTES
Subjective:       Patient ID: Monica Joy is a 63 y.o. female.    Chief Complaint: Establish Care    63 y old female with mild intermittent asthma , fam hx of brain and Aortic aneurysm , hx of DVT , Sleep  apnea  , seronegative RA ,gout, Pre dm  and  obesity here to get est . Seldom uses inhaler . She was screened  for AAA. She has 2 brothers with brain aneurysm , will like to be screened  . Compliant with CPAP . Sees Rheum for arthritis . Not an exercise routine, no dietary restriction . Current mmg and colon . Past due for pap . She doesn't take any vaccinations     Review of Systems   Constitutional: Negative.    HENT: Negative.     Eyes: Negative.    Respiratory: Negative.     Cardiovascular: Negative.    Gastrointestinal: Negative.    Genitourinary: Negative.    Musculoskeletal: Negative.    Skin: Negative.    Hematological: Negative.        Objective:      Physical Exam  Constitutional:       General: She is not in acute distress.     Appearance: She is well-developed. She is not diaphoretic.   HENT:      Head: Normocephalic and atraumatic.      Right Ear: External ear normal.      Left Ear: External ear normal.      Mouth/Throat:      Pharynx: No oropharyngeal exudate.   Eyes:      General: No scleral icterus.        Right eye: No discharge.         Left eye: No discharge.      Conjunctiva/sclera: Conjunctivae normal.      Pupils: Pupils are equal, round, and reactive to light.   Neck:      Thyroid: No thyromegaly.      Vascular: No JVD.      Trachea: No tracheal deviation.   Cardiovascular:      Rate and Rhythm: Normal rate and regular rhythm.      Heart sounds: Normal heart sounds. No murmur heard.     No friction rub. No gallop.   Pulmonary:      Effort: Pulmonary effort is normal. No respiratory distress.      Breath sounds: Normal breath sounds. No stridor. No wheezing or rales.   Chest:      Chest wall: No tenderness.   Abdominal:      General: Bowel sounds are normal. There is no distension.       Palpations: Abdomen is soft.      Tenderness: There is no abdominal tenderness. There is no guarding or rebound.   Musculoskeletal:         General: Normal range of motion.      Cervical back: Normal range of motion and neck supple.   Lymphadenopathy:      Cervical: No cervical adenopathy.   Skin:     General: Skin is warm and dry.   Neurological:      Mental Status: She is alert and oriented to person, place, and time.   Psychiatric:         Mood and Affect: Mood normal.         Behavior: Behavior normal.         Thought Content: Thought content normal.         Judgment: Judgment normal.         Assessment:     Monica was seen today for establish care.    Diagnoses and all orders for this visit:    History of DVT (deep vein thrombosis)    Gout, unspecified cause, unspecified chronicity, unspecified site    Pre-diabetes  -     CBC Auto Differential; Future  -     Comprehensive Metabolic Panel; Future  -     Hemoglobin A1C; Future  -     Lipid Panel; Future    Well woman exam  -     Ambulatory referral/consult to Gynecology; Future    Mild intermittent asthma without complication    Morbid obesity with BMI of 40.0-44.9, adult    Seronegative rheumatoid arthritis    FH: aneurysm            Plan:   Resolved   F.u with rheum   Diet and exercise   GYN   Prn albuterol   Diet and exercise   Controlled symptoms. F.u with rheum   CTA? MRA?

## 2024-12-09 ENCOUNTER — CLINICAL SUPPORT (OUTPATIENT)
Dept: AUDIOLOGY | Facility: CLINIC | Age: 64
End: 2024-12-09
Payer: COMMERCIAL

## 2024-12-09 ENCOUNTER — OFFICE VISIT (OUTPATIENT)
Dept: SURGERY | Facility: CLINIC | Age: 64
End: 2024-12-09
Payer: COMMERCIAL

## 2024-12-09 ENCOUNTER — OFFICE VISIT (OUTPATIENT)
Dept: OTOLARYNGOLOGY | Facility: CLINIC | Age: 64
End: 2024-12-09
Payer: COMMERCIAL

## 2024-12-09 VITALS — WEIGHT: 247.81 LBS | BODY MASS INDEX: 41.24 KG/M2

## 2024-12-09 DIAGNOSIS — H69.92 ETD (EUSTACHIAN TUBE DYSFUNCTION), LEFT: ICD-10-CM

## 2024-12-09 DIAGNOSIS — H90.3 SENSORINEURAL HEARING LOSS, BILATERAL: Primary | ICD-10-CM

## 2024-12-09 DIAGNOSIS — R10.11 RIGHT UPPER QUADRANT PAIN: Primary | ICD-10-CM

## 2024-12-09 DIAGNOSIS — H90.3 SENSORINEURAL HEARING LOSS (SNHL), BILATERAL: ICD-10-CM

## 2024-12-09 DIAGNOSIS — H72.92 PERFORATION OF LEFT TYMPANIC MEMBRANE: Primary | ICD-10-CM

## 2024-12-09 DIAGNOSIS — H93.13 TINNITUS, BILATERAL: ICD-10-CM

## 2024-12-09 PROCEDURE — 3044F HG A1C LEVEL LT 7.0%: CPT | Mod: CPTII,S$GLB,, | Performed by: SURGERY

## 2024-12-09 PROCEDURE — 99214 OFFICE O/P EST MOD 30 MIN: CPT | Mod: S$GLB,,, | Performed by: STUDENT IN AN ORGANIZED HEALTH CARE EDUCATION/TRAINING PROGRAM

## 2024-12-09 PROCEDURE — 3008F BODY MASS INDEX DOCD: CPT | Mod: CPTII,S$GLB,, | Performed by: STUDENT IN AN ORGANIZED HEALTH CARE EDUCATION/TRAINING PROGRAM

## 2024-12-09 PROCEDURE — 1159F MED LIST DOCD IN RCRD: CPT | Mod: CPTII,S$GLB,, | Performed by: SURGERY

## 2024-12-09 PROCEDURE — 99203 OFFICE O/P NEW LOW 30 MIN: CPT | Mod: S$GLB,,, | Performed by: SURGERY

## 2024-12-09 PROCEDURE — 92557 COMPREHENSIVE HEARING TEST: CPT | Mod: S$GLB,,, | Performed by: AUDIOLOGIST-HEARING AID FITTER

## 2024-12-09 PROCEDURE — 99999 PR PBB SHADOW E&M-EST. PATIENT-LVL III: CPT | Mod: PBBFAC,,, | Performed by: STUDENT IN AN ORGANIZED HEALTH CARE EDUCATION/TRAINING PROGRAM

## 2024-12-09 PROCEDURE — 99999 PR PBB SHADOW E&M-EST. PATIENT-LVL I: CPT | Mod: PBBFAC,,, | Performed by: AUDIOLOGIST-HEARING AID FITTER

## 2024-12-09 PROCEDURE — 99999 PR PBB SHADOW E&M-EST. PATIENT-LVL II: CPT | Mod: PBBFAC,,, | Performed by: SURGERY

## 2024-12-09 PROCEDURE — 1160F RVW MEDS BY RX/DR IN RCRD: CPT | Mod: CPTII,S$GLB,, | Performed by: SURGERY

## 2024-12-09 PROCEDURE — 3044F HG A1C LEVEL LT 7.0%: CPT | Mod: CPTII,S$GLB,, | Performed by: STUDENT IN AN ORGANIZED HEALTH CARE EDUCATION/TRAINING PROGRAM

## 2024-12-09 PROCEDURE — 92567 TYMPANOMETRY: CPT | Mod: S$GLB,,, | Performed by: AUDIOLOGIST-HEARING AID FITTER

## 2024-12-09 PROCEDURE — 1159F MED LIST DOCD IN RCRD: CPT | Mod: CPTII,S$GLB,, | Performed by: STUDENT IN AN ORGANIZED HEALTH CARE EDUCATION/TRAINING PROGRAM

## 2024-12-09 NOTE — PROGRESS NOTES
Patient ID: Monica Joy is a 63 y.o. female.    Chief Complaint: No chief complaint on file.      HPI:  63-year-old female who several months ago had an episode of right upper quadrant pain that run around to her back.  The pain was worse with activity.  Lasted for about 5 weeks and then resolved.  The pain never occurred after eating.  It was not associated with nausea and vomiting.    The patient underwent a ultrasound which showed no gallstones but a liver cyst was seen.  She was seen by hepatology who ordered an MRI.      The patient states she is no longer having the episodes of pain.  She is tolerating her diet.      She offers no other complaints    Review of Systems   Constitutional:  Negative for appetite change, chills, fatigue, fever and unexpected weight change.   HENT:  Negative for hearing loss and rhinorrhea.    Eyes:  Negative for visual disturbance.   Respiratory:  Negative for apnea, cough, shortness of breath and wheezing.    Cardiovascular:  Negative for chest pain and palpitations.   Gastrointestinal:  Negative for abdominal distention, abdominal pain (right upper quadrant/flank now resolved), blood in stool, constipation, diarrhea, nausea and vomiting.   Genitourinary:  Negative for dysuria, frequency and urgency.   Musculoskeletal:  Negative for arthralgias and neck pain.   Skin:  Negative for rash.   Neurological:  Negative for seizures, weakness, numbness and headaches.   Hematological:  Negative for adenopathy. Does not bruise/bleed easily.   Psychiatric/Behavioral:  Negative for hallucinations. The patient is not nervous/anxious.      Current Outpatient Medications   Medication Sig Dispense Refill    albuterol (PROVENTIL/VENTOLIN HFA) 90 mcg/actuation inhaler INHALE 2 PUFFS INTO THE LUNGS EVERY 4 HOURS AS NEEDED FOR WHEEZING OR SHORTNESS OF BREATH 18 g 11    allopurinoL (ZYLOPRIM) 100 MG tablet Take 2 tablets (200 mg total) by mouth once daily. 180 tablet 3    folic acid (FOLVITE) 1 MG  tablet Take 1 tablet (1 mg total) by mouth once daily. 90 tablet 3    furosemide (LASIX) 20 MG tablet Take 1 tablet (20 mg total) by mouth once daily. 30 tablet 5    levocetirizine (XYZAL) 5 MG tablet TAKE 1 TABLET(5 MG) BY MOUTH EVERY EVENING 30 tablet 5    meloxicam (MOBIC) 15 MG tablet Take 1 tablet (15 mg total) by mouth once daily. 90 tablet 3    methotrexate 2.5 MG Tab Take 4 tablets (10 mg total) by mouth every 7 days. 16 tablet 3     Current Facility-Administered Medications   Medication Dose Route Frequency Provider Last Rate Last Admin    EPINEPHrine (EPIPEN) 0.3 mg/0.3 mL pen injection 0.3 mg  0.3 mg Intramuscular PRN Leon De La Torre MD        sodium chloride 0.9% flush 10 mL  10 mL Intravenous PRN Leon De La Torre MD         Facility-Administered Medications Ordered in Other Visits   Medication Dose Route Frequency Provider Last Rate Last Admin    lactated ringers infusion   Intravenous On Call Procedure Mary Anne Johnson PA        nozaseptin (NOZIN) nasal    Each Nostril On Call Procedure Mary Anne Johnson PA   Given at 02/25/21 0613       Review of patient's allergies indicates:   Allergen Reactions    Amoxicillin Rash and Swelling     Rash with throat swelling    Codeine Palpitations, Rash and Swelling     Other reaction(s): Shortness of breath  Other reaction(s): Hives  Rash and throat swelling    Doxycycline Swelling     Other reaction(s): Rash    Hydrocodone Rash and Swelling    Iodine and iodide containing products Rash and Swelling    Lortab [hydrocodone-acetaminophen] Other (See Comments)     Other reaction(s): Hives  Other reaction(s): Difficulty breathing    Medrol [methylprednisolone] Anaphylaxis    Oxycodone Shortness Of Breath, Rash and Swelling    Oxycodone hcl-oxycodone-asa Rash and Swelling    Percodan filipe Shortness Of Breath    Quinidine-quinine analogues (cinchona alkaloids) Shortness Of Breath, Swelling and Rash    Tetracycline Swelling    Tetracyclines Rash and  Swelling    Tramadol Shortness Of Breath, Rash and Swelling    Vibramycin [doxycycline calcium] Shortness Of Breath     Other reaction(s): Hives  Other reaction(s): Difficulty breathing    Augmentin [amoxicillin-pot clavulanate] Itching and Rash    Levaquin [levofloxacin] Hives    Sulfa (sulfonamide antibiotics) Itching and Rash    Percocet  [oxycodone-acetaminophen]      Other reaction(s): Shortness of breath    Shellfish containing products      Other reaction(s): Shortness of breath  Other reaction(s): Hives  Other reaction(s): Shortness of breath  Other reaction(s): Hives       Past Medical History:   Diagnosis Date    Asthma 2013    Gout, arthritis     Sleep apnea     Thyroid disease        Past Surgical History:   Procedure Laterality Date    BONE GRAFT Right 2021    Procedure: BONE GRAFT;  Surgeon: Miguel Yoo MD;  Location: [facility];  Service: Orthopedics;  Laterality: Right;  bone graft to fill the defect in the distal radial epiphysis/Reduction of the four-corner area will be performed with bone graft and fusion     SECTION      COLONOSCOPY N/A 2019    Procedure: COLONOSCOPY;  Surgeon: Scout Rivera MD;  Location: [facility];  Service: Endoscopy;  Laterality: N/A;    COLONOSCOPY, SCREENING, LOW RISK PATIENT N/A 10/28/2024    Procedure: COLONOSCOPY, SCREENING, LOW RISK PATIENT;  Surgeon: Darlene Marcano MD;  Location: [facility];  Service: Endoscopy;  Laterality: N/A;    FRACTURE SURGERY      HAND FUSION Right 2021    Procedure: FUSION, JOINT, HAND;  Surgeon: Miguel Yoo MD;  Location: [facility] OR;  Service: Orthopedics;  Laterality: Right;  dip joint    JOINT REPLACEMENT      Novasure Endometrial Ablation  2016    OSTECTOMY Right 2021    Procedure: OSTECTOMY;  Surgeon: Miguel Yoo MD;  Location: [facility] OR;  Service: Orthopedics;  Laterality: Right;  radial styloidectomy as well as scaphoid excision    THYROIDECTOMY, PARTIAL       TUBAL LIGATION      WRIST SURGERY         Social History     Socioeconomic History    Marital status:     Number of children: 2   Occupational History    Occupation: Artist , [employer].   Tobacco Use    Smoking status: Never     Passive exposure: Past    Smokeless tobacco: Never   Substance and Sexual Activity    Alcohol use: No     Comment: on rare holidays    Drug use: No    Sexual activity: Yes     Partners: Male     Birth control/protection: None     Social Drivers of Health     Financial Resource Strain: Medium Risk (9/25/2024)    Overall Financial Resource Strain (CARDIA)     Difficulty of Paying Living Expenses: Somewhat hard   Food Insecurity: No Food Insecurity (9/25/2024)    Hunger Vital Sign     Worried About Running Out of Food in the Last Year: Never true     Ran Out of Food in the Last Year: Never true   Transportation Needs: Patient Declined (9/4/2023)    PRAPARE - Transportation     Lack of Transportation (Medical): Patient declined     Lack of Transportation (Non-Medical): Patient declined   Physical Activity: Insufficiently Active (9/25/2024)    Exercise Vital Sign     Days of Exercise per Week: 1 day     Minutes of Exercise per Session: 30 min   Stress: No Stress Concern Present (9/25/2024)    [employer] - Occupational Stress Questionnaire     Feeling of Stress : Not at all   Housing Stability: Unknown (9/25/2024)    Housing Stability Vital Sign     Unable to Pay for Housing in the Last Year: No       There were no vitals filed for this visit.    Physical Exam  Vitals reviewed.   Constitutional:       Appearance: She is well-developed. She is obese.   HENT:      Head: Normocephalic.   Eyes:      Pupils: Pupils are equal, round, and reactive to light.   Neck:      Thyroid: No thyromegaly.      Vascular: No JVD.      Trachea: No tracheal deviation.   Cardiovascular:      Rate and Rhythm: Normal rate and regular rhythm.      Heart sounds: Normal  heart sounds.   Pulmonary:      Breath sounds: Normal breath sounds. No wheezing.   Abdominal:      General: Bowel sounds are normal. There is no distension.      Palpations: Abdomen is soft. Abdomen is not rigid. There is no mass.      Tenderness: There is no abdominal tenderness. There is no guarding or rebound.      Hernia: Hernia: small umbilical, reducible.      Comments: Obese   Musculoskeletal:         General: Normal range of motion.   Lymphadenopathy:      Cervical: No cervical adenopathy.   Skin:     General: Skin is warm and dry.      Findings: No erythema or rash.   Neurological:      Mental Status: She is oriented to person, place, and time.   Psychiatric:         Mood and Affect: Mood normal.         Behavior: Behavior normal.         Thought Content: Thought content normal.         Judgment: Judgment normal.       EXAMINATION:  US ABDOMEN LIMITED_GALLBLADDER     CLINICAL HISTORY:  Right upper quadrant pain     TECHNIQUE:  Limited ultrasound of the right upper quadrant of the abdomen focused on the biliary system was performed.     COMPARISON:  07/31/2020     FINDINGS:  Gallbladder: No calculi, wall thickening, or pericholecystic fluid.  No sonographic Buico's sign.     Liver: Measures 15 cm.  Heterogeneous increased echotexture.  1.5 cm left lobe cyst with 1.3 cm right lobe hypoechoic focus.     Biliary system: The common duct is prominent, measuring 7.8 mm.  No intrahepatic ductal dilatation.     Pancreas: The visualized portions of pancreas appear normal.     Spleen: Measures 12 cm without intrinsic abnormality.     Miscellaneous: No ascites     Impression:     Dilated extrahepatic common bile duct without intrahepatic biliary dilatation.  Heterogeneous increased liver echotexture suggestive of steatosis/intrinsic liver disease.     Nonspecific 1.3 cm hepatic right lobe hypoechoic lesion.  MRI abdomen/MRCP recommended.        Electronically signed by:Carl Elder MD  Date:                                             09/24/2024  Time:                     1. Fatty infiltration of the liver.  2. 9 mm nonobstructing right renal stone.  3. Otherwise negative right upper quadrant abdominal ultrasound.  Narrative    ULTRASOUND ABDOMEN LIMITED    CLINICAL INDICATION:  right upper quadrant abdominal pain    COMPARISON:None    FINDINGS:  Real-time sonographic imaging was obtained of the abdomen in transverse and longitudinal planes. The liver is upper limits of normal in size. Hepatic parenchyma is hyperechoic compatible with fatty infiltration.Small left lobe cyst is noted. There is normal forward flow within the portal vein and IVC demonstrated on color and spectral Doppler. There is no intra or extra hepatic biliary ductal dilatation. The common bile duct measures 4 mm in diameter which is within normal limits. The gallbladder is within normal limits. Visualized portions of the pancreas are within normal limits.  There is a 9 mm nonobstructing right renal stone.  Exam End: 08/30/23 14:13 Last Resulted: 08/30/23 14:16   Received From: [facility]  Result Received: 08/31/23 04:14         Assessment & Plan:      Episode of right-sided abdominal wall pain more likely then biliary colic.      No need for further investigation at this time.      The signs and symptoms of biliary colic were discussed and information was provided.      Should the patient develop right upper quadrant pain that is occurring postprandially then a HIDA scan with cholecystokinin would be recommended.      This was discussed with her.      Surgical follow up as needed

## 2024-12-09 NOTE — PROGRESS NOTES
Chief complaint:   Chief Complaint   Patient presents with    Hearing Loss          Referring Provider:  No referring provider defined for this encounter.    History of Present Illness:     Ms. Joy is a 63 y.o. female w/hx bilateral tube placement in 2019 presenting for evaluation of left ear drainage.     Right tube extruded 1+ years ago. Since then the right ear has done well    However, left ear has had intermittent drainage over the last couple years. Every few months, despite directed drops.     Started draining again recently. Just started drops. Associated muffled hearing.    Update 3/4/24  L PET removed at lats visit. Feeling some popping at times. Drainage improved. No pain    Update 24  Overall doing well. Feels sensation of air going through the air at times. Hearing feels stable. No pain, drainage, pressure sensation.     Update 24  Continued worsening of hearing AU over last 2 years.   No longer any pain, fullness, pressure, or drainage.  Ears did great on a flight recently.     History        Past Medical History:   Past Medical History:   Diagnosis Date    Asthma 2013    Gout, arthritis     Sleep apnea     Thyroid disease     .          Past Surgical History:  Past Surgical History:   Procedure Laterality Date    BONE GRAFT Right 2021    Procedure: BONE GRAFT;  Surgeon: Miguel Yoo MD;  Location: [facility] OR;  Service: Orthopedics;  Laterality: Right;  bone graft to fill the defect in the distal radial epiphysis/Reduction of the four-corner area will be performed with bone graft and fusion     SECTION      COLONOSCOPY N/A 2019    Procedure: COLONOSCOPY;  Surgeon: Scout Rivera MD;  Location: [facility] ENDO;  Service: Endoscopy;  Laterality: N/A;    COLONOSCOPY, SCREENING, LOW RISK PATIENT N/A 10/28/2024    Procedure: COLONOSCOPY, SCREENING, LOW RISK PATIENT;  Surgeon: Darlene Marcano MD;  Location: [facility] ENDO;  Service: Endoscopy;  Laterality: N/A;     FRACTURE SURGERY      HAND FUSION Right 03/19/2021    Procedure: FUSION, JOINT, HAND;  Surgeon: Miguel Yoo MD;  Location: [facility] OR;  Service: Orthopedics;  Laterality: Right;  dip joint    JOINT REPLACEMENT      Novasure Endometrial Ablation  04/13/2016    OSTECTOMY Right 03/19/2021    Procedure: OSTECTOMY;  Surgeon: Miguel Yoo MD;  Location: [facility] OR;  Service: Orthopedics;  Laterality: Right;  radial styloidectomy as well as scaphoid excision    THYROIDECTOMY, PARTIAL      TUBAL LIGATION      WRIST SURGERY     .         Medications: Medication list was reviewed. She  has a current medication list which includes the following prescription(s): albuterol, allopurinol, folic acid, furosemide, levocetirizine, meloxicam, and methotrexate, and the following Facility-Administered Medications: epinephrine, lactated ringers, nozaseptin, and sodium chloride 0.9%.         Allergies:   Review of patient's allergies indicates:   Allergen Reactions    Amoxicillin Rash and Swelling     Rash with throat swelling    Codeine Palpitations, Rash and Swelling     Other reaction(s): Shortness of breath  Other reaction(s): Hives  Rash and throat swelling    Doxycycline Swelling     Other reaction(s): Rash    Hydrocodone Rash and Swelling    Iodine and iodide containing products Rash and Swelling    Lortab [hydrocodone-acetaminophen] Other (See Comments)     Other reaction(s): Hives  Other reaction(s): Difficulty breathing    Medrol [methylprednisolone] Anaphylaxis    Oxycodone Shortness Of Breath, Rash and Swelling    Oxycodone hcl-oxycodone-asa Rash and Swelling    Percodan filipe Shortness Of Breath    Quinidine-quinine analogues (cinchona alkaloids) Shortness Of Breath, Swelling and Rash    Tetracycline Swelling    Tetracyclines Rash and Swelling    Tramadol Shortness Of Breath, Rash and Swelling    Vibramycin [doxycycline calcium] Shortness Of Breath     Other reaction(s): Hives  Other reaction(s): Difficulty  breathing    Augmentin [amoxicillin-pot clavulanate] Itching and Rash    Levaquin [levofloxacin] Hives    Sulfa (sulfonamide antibiotics) Itching and Rash    Percocet  [oxycodone-acetaminophen]      Other reaction(s): Shortness of breath    Shellfish containing products      Other reaction(s): Shortness of breath  Other reaction(s): Hives  Other reaction(s): Shortness of breath  Other reaction(s): Hives            Family history: family history includes Anuerysm in her brother and mother; Atrial fibrillation in her mother; Brain aneurysm in her brother; Diabetes in her brother and father; Heart disease in her father; Heart disease (age of onset: 35) in her brother; Hyperlipidemia in her father; Kidney disease in her father; Stroke in her father.         Social History          Alcohol use:  reports no history of alcohol use.            Tobacco:  reports that she has never smoked. She has been exposed to tobacco smoke. She has never used smokeless tobacco.         Please see the patient's intake form for full details of past medical history, past surgical history, family history, social history and review of systems. ?This information was reviewed by me and noted.      Physical Examination     General: Well developed, well nourished, well hydrated. Verbal with a strong voice and not dysphonic.     Head/Face: Normocephalic, atraumatic. No scars or lesions. Facial musculature equal.     Eyes: No scleral icterus or conjunctival hemorrhage. EOMI. PERRLA.     Ears:     Right ear: No gross deformity. EAC is clear of debris and erythema. The TM is intact with a pneumatized middle ear. No signs of retraction, fluid or infection.      Left ear: No gross deformity. EAC is clear of debris and erythema. TM with posterior monomer with pinpoint perf at its periphery    Hearing: grossly intact        Data review:    Review of records:      I reviewed records from the referring provider's office visits.  These describe the history,  workup, and/or treatment of this problem thus far.    Audiogram     Audiogram was independently reviewed         Audio 12/9/24    Mild sloping to moderately severe Sensorineural Hearing Loss  Good word rec AU  Type A AD,  large volume AS    Culture 2020 and 2019  Component 2 yr ago   Aerobic Bacterial Culture No growth           Assessment/Plan:      1. Perforation of left tympanic membrane    2. ETD (Eustachian tube dysfunction), left    3. Sensorineural hearing loss (SNHL), bilateral        Since left PET removed she has a residual pinpoint perforation, which has been helpful for her Eustachian Tube Dysfunction symptoms and is not causing any Conductive Hearing Loss. Therefore, we will observe the perforation.    She is medically cleared for hearing aids when motivated.               Dayron Barnes MD  [facility]   [facility] - [address]  [place] [id number]  P: [phone]  F: [phone]

## 2024-12-09 NOTE — PROGRESS NOTES
"Referring provider:    Monica Joy was seen 12/09/2024 for an audiological evaluation prior to follow-up with ENT.    Ms. Joy is a 63 y.o. female w/hx bilateral tube placement in 8/2019 presenting for evaluation of left ear drainage.      Right tube extruded 1+ years ago. Since then the right ear has done well     However, left ear has had intermittent drainage over the last couple years. Every few months, despite directed drops.      Started draining again recently. Just started drops. Associated muffled hearing.     Update 3/4/24  L PET removed at lats visit. Feeling some popping at times. Drainage improved. No pain     Update 6/4/24  Overall doing well. Feels sensation of air going through the air at times. Hearing feels stable. No pain, drainage, pressure sensation.      Update 12/9/24  Continued worsening of hearing AU (right more so than left) over last 2 years.   No longer any pain, fullness, pressure, or drainage.  Ears did great on a flight recently.   She has long history of bilateral tinnitus, described as "crickets" (unchanged).  Her last audiogram was March 2022.     Audiology Report:  Results reveal a mild-to-moderately severe sensorineural hearing loss 250-8000 Hz for the right ear, and a mild-to-moderately severe sensorineural hearing loss 250-8000 Hz for the left ear.   Speech Reception Thresholds were 40 dBHL for the right ear and 35 dBHL for the left ear.   Word recognition scores were good for the right ear and good for the left ear.   Tympanograms were Type Ad for the right ear and Type As for the left ear.    Patient was counseled on the above findings.    Recommendations:  ENT  Hearing ads, binaural. Patient is provided copy of her audiograms and hearing aid information packet.               "

## 2024-12-09 NOTE — PATIENT INSTRUCTIONS
The discomfort you had sound more like it was related to musculoskeletal then gallbladder issues.      If the symptoms come back please let us know, especially if they occur after eating.      Gallbladder symptoms are typically a sharp to crampy to achy pain on the the ribs on the right side with radiation around to the back.  There can be associated nausea and vomiting.  They are typically worse after eating fatty food.      I have included some information about gallbladder pain    Our office phone numbers are  [phone] and

## 2025-01-06 ENCOUNTER — OFFICE VISIT (OUTPATIENT)
Dept: OBSTETRICS AND GYNECOLOGY | Facility: CLINIC | Age: 65
End: 2025-01-06
Attending: FAMILY MEDICINE
Payer: COMMERCIAL

## 2025-01-06 VITALS
SYSTOLIC BLOOD PRESSURE: 124 MMHG | DIASTOLIC BLOOD PRESSURE: 66 MMHG | BODY MASS INDEX: 41.58 KG/M2 | WEIGHT: 249.56 LBS | HEIGHT: 65 IN

## 2025-01-06 DIAGNOSIS — Z01.419 WELL WOMAN EXAM: ICD-10-CM

## 2025-01-06 DIAGNOSIS — Z12.4 PAPANICOLAOU SMEAR FOR CERVICAL CANCER SCREENING: ICD-10-CM

## 2025-01-06 DIAGNOSIS — Z01.419 ROUTINE GYNECOLOGICAL EXAMINATION: Primary | ICD-10-CM

## 2025-01-06 DIAGNOSIS — R35.0 FREQUENCY OF URINATION: ICD-10-CM

## 2025-01-06 PROCEDURE — 1160F RVW MEDS BY RX/DR IN RCRD: CPT | Mod: CPTII,S$GLB,, | Performed by: NURSE PRACTITIONER

## 2025-01-06 PROCEDURE — 1159F MED LIST DOCD IN RCRD: CPT | Mod: CPTII,S$GLB,, | Performed by: NURSE PRACTITIONER

## 2025-01-06 PROCEDURE — 3074F SYST BP LT 130 MM HG: CPT | Mod: CPTII,S$GLB,, | Performed by: NURSE PRACTITIONER

## 2025-01-06 PROCEDURE — 87086 URINE CULTURE/COLONY COUNT: CPT | Performed by: NURSE PRACTITIONER

## 2025-01-06 PROCEDURE — 87624 HPV HI-RISK TYP POOLED RSLT: CPT | Performed by: NURSE PRACTITIONER

## 2025-01-06 PROCEDURE — 99999 PR PBB SHADOW E&M-EST. PATIENT-LVL IV: CPT | Mod: PBBFAC,,, | Performed by: NURSE PRACTITIONER

## 2025-01-06 PROCEDURE — 3008F BODY MASS INDEX DOCD: CPT | Mod: CPTII,S$GLB,, | Performed by: NURSE PRACTITIONER

## 2025-01-06 PROCEDURE — 99386 PREV VISIT NEW AGE 40-64: CPT | Mod: 25,S$GLB,, | Performed by: NURSE PRACTITIONER

## 2025-01-06 PROCEDURE — 3078F DIAST BP <80 MM HG: CPT | Mod: CPTII,S$GLB,, | Performed by: NURSE PRACTITIONER

## 2025-01-06 PROCEDURE — 87088 URINE BACTERIA CULTURE: CPT | Performed by: NURSE PRACTITIONER

## 2025-01-06 PROCEDURE — 88175 CYTOPATH C/V AUTO FLUID REDO: CPT | Performed by: NURSE PRACTITIONER

## 2025-01-06 PROCEDURE — 87147 CULTURE TYPE IMMUNOLOGIC: CPT | Performed by: NURSE PRACTITIONER

## 2025-01-06 NOTE — PROGRESS NOTES
"  Subjective:       Patient ID: Monica Joy is a 64 y.o. female.    Chief Complaint:  Annual Exam      History of Present Illness  HPI  Mammogram ordered and scheduled by PCP   Complains of urinary frequency, she has seen urology/nephrology in the past for kidney stones   Health Maintenance   Topic Date Due    COVID-19 Vaccine (1) Never done    Shingles Vaccine (1 of 2) Never done    Pneumococcal Vaccines (Age 50+) (2 of 2 - PCV) 2014    RSV Vaccine (Age 60+ and Pregnant patients) (1 - Risk 60-74 years 1-dose series) Never done    Cervical Cancer Screening  12/10/2023    Influenza Vaccine (1) 2024    HIV Screening  2026 (Originally 1975)    Mammogram  10/17/2025    Lipid Panel  2027    Hemoglobin A1c (Diabetic Prevention Screening)  10/03/2027    Colorectal Cancer Screening  10/28/2027    TETANUS VACCINE  2028    Hepatitis C Screening  Completed     GYN & OB History  No LMP recorded (lmp unknown). Patient is postmenopausal.   Date of Last Pap: today     OB History    Para Term  AB Living   2 2 2     2   SAB IAB Ectopic Multiple Live Births                  # Outcome Date GA Lbr Jadiel/2nd Weight Sex Type Anes PTL Lv   2 Term      CS-Unspec      1 Term      CS-Unspec          Review of Systems  Review of Systems        Objective:   /66   Ht 5' 5" (1.651 m)   Wt 113.2 kg (249 lb 9 oz)   LMP  (LMP Unknown)   BMI 41.53 kg/m²    Physical Exam:   Constitutional: She is oriented to person, place, and time. She appears well-developed and well-nourished.        Pulmonary/Chest: Right breast exhibits no inverted nipple, no mass, no nipple discharge, no skin change, no tenderness and no swelling. Left breast exhibits no inverted nipple, no mass, no nipple discharge, no skin change, no tenderness and no swelling.        Abdominal: Soft.     Genitourinary:    Inguinal canal, vagina, right adnexa and left adnexa normal.      Pelvic exam was performed with patient in the " lithotomy position.   The external female genitalia was normal.   Genitalia hair distrobution normal .     Labial bartholins normal.Cervix is normal. No no adexnal prolapse. Right adnexum displays no mass, no tenderness and no fullness. Left adnexum displays no mass, no tenderness and no fullness. Vagina exhibits no lesion. No erythema, vaginal discharge, tenderness, bleeding, rectocele, cystocele or prolapse of vaginal walls in the vagina.    No foreign body in the vagina.      No signs of injury in the vagina.   Cervix exhibits no motion tenderness, no lesion, no discharge, no friability, no tenderness and no polyp.    pap smear completed              Neurological: She is alert and oriented to person, place, and time.    Skin: Skin is warm and dry.    Psychiatric: She has a normal mood and affect. Her behavior is normal. Judgment and thought content normal.      UA dipstick negative   Assessment:        1. Routine gynecological examination    2. Well woman exam    3. Papanicolaou smear for cervical cancer screening    4. Frequency of urination                Plan:            Monica was seen today for annual exam.    Diagnoses and all orders for this visit:    Routine gynecological examination    Well woman exam  -     Ambulatory referral/consult to Gynecology    Papanicolaou smear for cervical cancer screening  -     Liquid-Based Pap Smear, Screening  -     HPV High Risk Genotypes, PCR    Frequency of urination  -     Urine Culture High Risk      Return to clinic in one year for wwe   If urine culture is negative and symptoms persist recommend see  urology

## 2025-01-08 LAB — BACTERIA UR CULT: ABNORMAL

## 2025-01-09 DIAGNOSIS — M17.0 PRIMARY OSTEOARTHRITIS OF BOTH KNEES: ICD-10-CM

## 2025-01-09 DIAGNOSIS — M10.9 GOUT, UNSPECIFIED CAUSE, UNSPECIFIED CHRONICITY, UNSPECIFIED SITE: ICD-10-CM

## 2025-01-09 DIAGNOSIS — M25.562 CHRONIC PAIN OF LEFT KNEE: ICD-10-CM

## 2025-01-09 DIAGNOSIS — R35.0 FREQUENCY OF URINATION: Primary | ICD-10-CM

## 2025-01-09 DIAGNOSIS — M15.4 EROSIVE OSTEOARTHRITIS: ICD-10-CM

## 2025-01-09 DIAGNOSIS — G89.29 CHRONIC PAIN OF LEFT KNEE: ICD-10-CM

## 2025-01-09 LAB
FINAL PATHOLOGIC DIAGNOSIS: NORMAL
HPV HR 12 DNA SPEC QL NAA+PROBE: NEGATIVE
HPV16 AG SPEC QL: NEGATIVE
HPV18 DNA SPEC QL NAA+PROBE: NEGATIVE
Lab: NORMAL

## 2025-01-10 RX ORDER — MELOXICAM 15 MG/1
15 TABLET ORAL
Qty: 90 TABLET | Refills: 3 | Status: SHIPPED | OUTPATIENT
Start: 2025-01-10

## 2025-01-25 DIAGNOSIS — M10.9 GOUT, ARTHRITIS: ICD-10-CM

## 2025-01-25 DIAGNOSIS — M15.4 EROSIVE OSTEOARTHRITIS: ICD-10-CM

## 2025-01-25 DIAGNOSIS — M06.00 SERONEGATIVE RHEUMATOID ARTHRITIS: ICD-10-CM

## 2025-01-27 RX ORDER — METHOTREXATE 2.5 MG/1
TABLET ORAL
Qty: 16 TABLET | Refills: 3 | Status: SHIPPED | OUTPATIENT
Start: 2025-01-27

## 2025-02-06 DIAGNOSIS — J45.20 MILD INTERMITTENT ASTHMA WITHOUT COMPLICATION: ICD-10-CM

## 2025-02-06 DIAGNOSIS — Z88.9 MULTIPLE ALLERGIES: ICD-10-CM

## 2025-02-06 RX ORDER — LEVOCETIRIZINE DIHYDROCHLORIDE 5 MG/1
5 TABLET, FILM COATED ORAL NIGHTLY
Qty: 30 TABLET | Refills: 5 | Status: SHIPPED | OUTPATIENT
Start: 2025-02-06

## 2025-03-11 ENCOUNTER — LAB VISIT (OUTPATIENT)
Dept: LAB | Facility: HOSPITAL | Age: 65
End: 2025-03-11
Attending: STUDENT IN AN ORGANIZED HEALTH CARE EDUCATION/TRAINING PROGRAM
Payer: COMMERCIAL

## 2025-03-11 DIAGNOSIS — M06.00 SERONEGATIVE RHEUMATOID ARTHRITIS: ICD-10-CM

## 2025-03-11 DIAGNOSIS — M15.4 EROSIVE OSTEOARTHRITIS: ICD-10-CM

## 2025-03-11 DIAGNOSIS — M10.9 GOUT, ARTHRITIS: ICD-10-CM

## 2025-03-11 DIAGNOSIS — E66.01 MORBID OBESITY WITH BMI OF 40.0-44.9, ADULT: ICD-10-CM

## 2025-03-11 DIAGNOSIS — K76.9 LIVER LESION: ICD-10-CM

## 2025-03-11 LAB
ALBUMIN SERPL BCP-MCNC: 3.5 G/DL (ref 3.5–5.2)
ALP SERPL-CCNC: 98 U/L (ref 40–150)
ALT SERPL W/O P-5'-P-CCNC: 26 U/L (ref 10–44)
ANION GAP SERPL CALC-SCNC: 8 MMOL/L (ref 8–16)
AST SERPL-CCNC: 22 U/L (ref 10–40)
BASOPHILS # BLD AUTO: 0.03 K/UL (ref 0–0.2)
BASOPHILS NFR BLD: 0.4 % (ref 0–1.9)
BILIRUB SERPL-MCNC: 0.4 MG/DL (ref 0.1–1)
BUN SERPL-MCNC: 13 MG/DL (ref 8–23)
CALCIUM SERPL-MCNC: 8.7 MG/DL (ref 8.7–10.5)
CHLORIDE SERPL-SCNC: 107 MMOL/L (ref 95–110)
CO2 SERPL-SCNC: 22 MMOL/L (ref 23–29)
CREAT SERPL-MCNC: 0.6 MG/DL (ref 0.5–1.4)
CRP SERPL-MCNC: 13.5 MG/L (ref 0–8.2)
DIFFERENTIAL METHOD BLD: NORMAL
EOSINOPHIL # BLD AUTO: 0.2 K/UL (ref 0–0.5)
EOSINOPHIL NFR BLD: 2.4 % (ref 0–8)
ERYTHROCYTE [DISTWIDTH] IN BLOOD BY AUTOMATED COUNT: 13.5 % (ref 11.5–14.5)
ERYTHROCYTE [SEDIMENTATION RATE] IN BLOOD BY PHOTOMETRIC METHOD: 21 MM/HR (ref 0–36)
EST. GFR  (NO RACE VARIABLE): >60 ML/MIN/1.73 M^2
GLUCOSE SERPL-MCNC: 111 MG/DL (ref 70–110)
HCT VFR BLD AUTO: 40.8 % (ref 37–48.5)
HGB BLD-MCNC: 13.8 G/DL (ref 12–16)
IMM GRANULOCYTES # BLD AUTO: 0.03 K/UL (ref 0–0.04)
IMM GRANULOCYTES NFR BLD AUTO: 0.4 % (ref 0–0.5)
INR PPP: 0.9 (ref 0.8–1.2)
LYMPHOCYTES # BLD AUTO: 1.9 K/UL (ref 1–4.8)
LYMPHOCYTES NFR BLD: 22.4 % (ref 18–48)
MCH RBC QN AUTO: 29.4 PG (ref 27–31)
MCHC RBC AUTO-ENTMCNC: 33.8 G/DL (ref 32–36)
MCV RBC AUTO: 87 FL (ref 82–98)
MONOCYTES # BLD AUTO: 0.5 K/UL (ref 0.3–1)
MONOCYTES NFR BLD: 6.1 % (ref 4–15)
NEUTROPHILS # BLD AUTO: 5.7 K/UL (ref 1.8–7.7)
NEUTROPHILS NFR BLD: 68.3 % (ref 38–73)
NRBC BLD-RTO: 0 /100 WBC
PLATELET # BLD AUTO: 220 K/UL (ref 150–450)
PMV BLD AUTO: 10.7 FL (ref 9.2–12.9)
POTASSIUM SERPL-SCNC: 3.9 MMOL/L (ref 3.5–5.1)
PROT SERPL-MCNC: 6.9 G/DL (ref 6–8.4)
PROTHROMBIN TIME: 10.8 SEC (ref 9–12.5)
RBC # BLD AUTO: 4.69 M/UL (ref 4–5.4)
SODIUM SERPL-SCNC: 137 MMOL/L (ref 136–145)
WBC # BLD AUTO: 8.26 K/UL (ref 3.9–12.7)

## 2025-03-11 PROCEDURE — 82107 ALPHA-FETOPROTEIN L3: CPT | Performed by: INTERNAL MEDICINE

## 2025-03-11 PROCEDURE — 80053 COMPREHEN METABOLIC PANEL: CPT | Performed by: STUDENT IN AN ORGANIZED HEALTH CARE EDUCATION/TRAINING PROGRAM

## 2025-03-11 PROCEDURE — 85025 COMPLETE CBC W/AUTO DIFF WBC: CPT | Performed by: STUDENT IN AN ORGANIZED HEALTH CARE EDUCATION/TRAINING PROGRAM

## 2025-03-11 PROCEDURE — 83951 ONCOPROTEIN DCP: CPT | Performed by: INTERNAL MEDICINE

## 2025-03-11 PROCEDURE — 85652 RBC SED RATE AUTOMATED: CPT | Performed by: STUDENT IN AN ORGANIZED HEALTH CARE EDUCATION/TRAINING PROGRAM

## 2025-03-11 PROCEDURE — 82105 ALPHA-FETOPROTEIN SERUM: CPT | Performed by: INTERNAL MEDICINE

## 2025-03-11 PROCEDURE — 36415 COLL VENOUS BLD VENIPUNCTURE: CPT | Mod: PO | Performed by: INTERNAL MEDICINE

## 2025-03-11 PROCEDURE — 86140 C-REACTIVE PROTEIN: CPT | Performed by: STUDENT IN AN ORGANIZED HEALTH CARE EDUCATION/TRAINING PROGRAM

## 2025-03-11 PROCEDURE — 85610 PROTHROMBIN TIME: CPT | Performed by: INTERNAL MEDICINE

## 2025-03-12 LAB — AFP SERPL-MCNC: 5.6 NG/ML (ref 0–8.4)

## 2025-03-13 ENCOUNTER — PATIENT MESSAGE (OUTPATIENT)
Dept: RHEUMATOLOGY | Facility: CLINIC | Age: 65
End: 2025-03-13
Payer: COMMERCIAL

## 2025-03-13 LAB
ACARBOXYPROTHROMBIN SERPL-MCNC: 0.2 NG/ML
AFP L3 MFR SERPL: <0.5 %
AFP SERPL-MCNC: 3.6 NG/ML
IMMUNOLOGIST REVIEW: NORMAL

## 2025-03-18 ENCOUNTER — OFFICE VISIT (OUTPATIENT)
Dept: RHEUMATOLOGY | Facility: CLINIC | Age: 65
End: 2025-03-18
Payer: COMMERCIAL

## 2025-03-18 VITALS
SYSTOLIC BLOOD PRESSURE: 152 MMHG | WEIGHT: 250.44 LBS | HEART RATE: 88 BPM | HEIGHT: 65 IN | BODY MASS INDEX: 41.73 KG/M2 | DIASTOLIC BLOOD PRESSURE: 88 MMHG

## 2025-03-18 DIAGNOSIS — M15.4 EROSIVE OSTEOARTHRITIS: ICD-10-CM

## 2025-03-18 DIAGNOSIS — D84.821 ENCOUNTER FOR MONITORING IMMUNOSUPPRESSIVE MEDICATION THERAPY CAUSING IMMUNODEFICIENCY: ICD-10-CM

## 2025-03-18 DIAGNOSIS — M06.00 SERONEGATIVE RHEUMATOID ARTHRITIS: Primary | ICD-10-CM

## 2025-03-18 DIAGNOSIS — Z79.899 HIGH RISK MEDICATION USE: ICD-10-CM

## 2025-03-18 DIAGNOSIS — Z51.81 ENCOUNTER FOR MONITORING IMMUNOSUPPRESSIVE MEDICATION THERAPY CAUSING IMMUNODEFICIENCY: ICD-10-CM

## 2025-03-18 DIAGNOSIS — Z79.60 ENCOUNTER FOR MONITORING IMMUNOSUPPRESSIVE MEDICATION THERAPY CAUSING IMMUNODEFICIENCY: ICD-10-CM

## 2025-03-18 DIAGNOSIS — Z79.899 IMMUNODEFICIENCY DUE TO DRUG THERAPY: ICD-10-CM

## 2025-03-18 DIAGNOSIS — M10.9 GOUT, ARTHRITIS: ICD-10-CM

## 2025-03-18 DIAGNOSIS — D84.821 IMMUNODEFICIENCY DUE TO DRUG THERAPY: ICD-10-CM

## 2025-03-18 PROCEDURE — 99999 PR PBB SHADOW E&M-EST. PATIENT-LVL V: CPT | Mod: PBBFAC,,, | Performed by: STUDENT IN AN ORGANIZED HEALTH CARE EDUCATION/TRAINING PROGRAM

## 2025-03-18 PROCEDURE — 3079F DIAST BP 80-89 MM HG: CPT | Mod: CPTII,S$GLB,, | Performed by: STUDENT IN AN ORGANIZED HEALTH CARE EDUCATION/TRAINING PROGRAM

## 2025-03-18 PROCEDURE — 3077F SYST BP >= 140 MM HG: CPT | Mod: CPTII,S$GLB,, | Performed by: STUDENT IN AN ORGANIZED HEALTH CARE EDUCATION/TRAINING PROGRAM

## 2025-03-18 PROCEDURE — 99215 OFFICE O/P EST HI 40 MIN: CPT | Mod: S$GLB,,, | Performed by: STUDENT IN AN ORGANIZED HEALTH CARE EDUCATION/TRAINING PROGRAM

## 2025-03-18 PROCEDURE — 3008F BODY MASS INDEX DOCD: CPT | Mod: CPTII,S$GLB,, | Performed by: STUDENT IN AN ORGANIZED HEALTH CARE EDUCATION/TRAINING PROGRAM

## 2025-03-18 PROCEDURE — 1159F MED LIST DOCD IN RCRD: CPT | Mod: CPTII,S$GLB,, | Performed by: STUDENT IN AN ORGANIZED HEALTH CARE EDUCATION/TRAINING PROGRAM

## 2025-03-18 RX ORDER — METHOTREXATE 2.5 MG/1
15 TABLET ORAL
Qty: 72 TABLET | Refills: 1 | Status: SHIPPED | OUTPATIENT
Start: 2025-03-18

## 2025-03-18 RX ORDER — IBUPROFEN 800 MG/1
TABLET ORAL
Qty: 30 TABLET | Refills: 0 | Status: SHIPPED | OUTPATIENT
Start: 2025-03-18

## 2025-03-18 NOTE — PATIENT INSTRUCTIONS
Increase methotrexate to 6 tablets once a week.  Take folic acid 1 mg daily.  Take allopurinol 200 mg daily.  Meloxicam 15 mg daily as needed. For flares of arthritis with swelling, let me know. Okay to skip meloxicam and take ibuprofen 800 mg daily during the flare.

## 2025-03-18 NOTE — PROGRESS NOTES
Subjective:      Patient ID: Monica Joy is a 64 y.o. female.    Chief Complaint: Rheumatoid Arthritis and Gout    HPI:   Patient presents for Rheumatology follow up for gout and inflammatory arthritis. She has a complicated arthritic picture which likely represents non-tophaceous gouty arthritis, erosive osteoarthritis, and seronegative rheumatoid arthritis. She has also had multiple bone/joint surgeries. She has been taking allopurinol 200 mg daily and meloxicam 15 mg daily. Last visit she thought meloxicam wasn't helping so we stopped it and started MTX 10 mg/wk for seronegative RA. However her joints hurt off meloxicam so she resumed it. She does not think the low dose methotrexate has done anything yet (4 months of use). She reports frequent flares of polyarticular joint pain, warmth, and swelling in the hands (MCPs, PIPs) and recently right knee. No redness. The flares last about a week.     Joint surgeries:  She broke her left wrist and right lateral foot after a fall many years ago. Now s/p hardware in the left wrist and fusion of left thumb MCP.  Right wrist was surgically fused in the past couple of years due to arthritis. Also had fusion of right 4th DIP.    Other history:  Cysts on both kidneys and liver - seen on MRCP.  When she was younger she had hemiplegia of the face. There was concern for aneurysm. She had cerebral angiogram. No aneurysm was found. She was treated for Bell's Palsy. This resolved.  Had a DVT: age indeterminate thrombosis of the left anterior tibial vein on US 06/2023. She says a thorough workup for DVT and PE was done and no further clot was seen.   Was told she had a 9 mm kidney stone at [facility] [place].    Denies skin rashes, oral ulcers, patchy alopecia, sicca symptoms, cardiopulmonary symptoms, eye inflammation, IBD, fevers, weight loss, Raynaud's. Rheumatology review of systems is otherwise negative.    Social Hx: Never smoker. Rare alcohol use.  Family Hx: Mother had deforming  "arthritis and was on an infusion every 6 months. (Could be rituximab for RA). Maternal grandmother also had a deforming arthritis.      Objective:   BP (!) 152/88 (Patient Position: Sitting)   Pulse 88   Ht 5' 5" (1.651 m)   Wt 113.6 kg (250 lb 7.1 oz)   LMP  (LMP Unknown)   BMI 41.68 kg/m²   Physical Exam  Constitutional:       General: She is not in acute distress.     Appearance: Normal appearance.   HENT:      Head: Normocephalic and atraumatic.      Mouth/Throat:      Mouth: Mucous membranes are moist.      Pharynx: Oropharynx is clear.   Cardiovascular:      Rate and Rhythm: Normal rate and regular rhythm.   Pulmonary:      Effort: Pulmonary effort is normal.      Breath sounds: Normal breath sounds.   Abdominal:      Palpations: Abdomen is soft.      Tenderness: There is no abdominal tenderness.   Musculoskeletal:      Cervical back: Normal range of motion. No tenderness.      Comments: Tenderness and bony enlargement of PIPs and DIPs with lateral flexion deformities of DIPs. S/p fusion of both wrists. Soft tissue puffiness remains from recent inflammatory arthritis flare over the right 2nd and 3rd MCPs. Mild extensor tenosynovitis right 5th finger.  2+ pitting edema of left lower extremity up to the mid-shin.   Skin:     General: Skin is warm and dry.   Neurological:      Mental Status: She is alert and oriented to person, place, and time. Mental status is at baseline.             Assessment and Plan:     Problem List Items Addressed This Visit          Unprioritized    Seronegative rheumatoid arthritis - Primary    Relevant Medications    methotrexate 2.5 MG Tab    Other Relevant Orders    MRI Hand Wrist W WO Bilat_Rheumatoid    Ambulatory referral/consult to Rheumatology     Other Visit Diagnoses         Gout, arthritis        Relevant Medications    methotrexate 2.5 MG Tab    Other Relevant Orders    Ambulatory referral/consult to Rheumatology      Erosive osteoarthritis        Relevant Medications "    methotrexate 2.5 MG Tab    Other Relevant Orders    Ambulatory referral/consult to Rheumatology      Immunodeficiency due to drug therapy          Encounter for monitoring immunosuppressive medication therapy causing immunodeficiency          High risk medication use                  Patient presents for Rheumatology follow up for gout and inflammatory arthritis.    Complicated picture of arthritis which is likely from multiple causes. She has never had a true gout attack (except maybe in the right ankle) but uric acid has been elevated and her arthritis feels improved with allopurinol.    I reviewed her labs as above.  I reviewed her XR hands before and after the right wrist surgery, XR feet, XR knees images with her in detail. Looks like gout erosion right ankle. Looks like severe erosive OA in the DIPs and PIPs of the hands. Not sure what happened to the wrists. I can't see if there are gouty erosions in the wrist due to extent of damage and history of surgeries.  Erosive osteoarthritis doesn't affect wrists. Has degenerative arthritis jeannette medial compartment of both knees and right patellofemoral joint.    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 Still having frequent inflammatory arthritis flares. Reviewed current labs, stable.      Plan:  Continue allopurinol 200 mg daily for ULT.  Increase methotrexate to 15 mg/wk.  Continue folic acid 1 mg daily.  Use meloxicam 15 mg daily as needed. For flares of arthritis with swelling, she will let me know. Okay to skip meloxicam and take ibuprofen 800 mg daily during the flare for up to 7 days.  She will let me know if she gets another flare in the hands/wrists and we will get MRI with contrast looking for active synovitis.  Informed patient that I will be leaving [facility] in June and she will have to establish with an external Rheumatologist for follow up in the next 3-4  months.      High Risk Medication Monitoring encounter  Drug therapy requiring intensive monitoring for toxicity  No current medication related issues, no evidence of toxicity  Appropriate labs ordered for toxicity monitoring    Compromised immune system secondary to autoimmune disease and/or use of immunosuppressive drugs.  Monitor carefully for infections.  Advised patient to get immediate medical care if any infection arises.  Also advised strict adherence age-appropriate vaccinations and cancer screenings with PCP.    Patient advised to hold DMARD and/or biologic therapy for signs of infection or for surgery. If you are unsure what to do please call our office for instruction.[facility] [phone]        I spent a total of 42 minutes on the day of the visit.  This includes face to face time and non-face to face time preparing to see the patient (eg, review of tests), obtaining and/or reviewing separately obtained history, documenting clinical information in the electronic or other health record, independently interpreting results and communicating results to the patient/family/caregiver, or care coordinator.

## 2025-04-12 PROBLEM — K83.5 BILIARY CYST: Status: ACTIVE | Noted: 2025-04-12

## 2025-04-12 PROBLEM — K76.9 LIVER LESION: Status: ACTIVE | Noted: 2025-04-12

## 2025-05-02 DIAGNOSIS — M79.89 SWELLING OF LEFT FOOT: ICD-10-CM

## 2025-05-02 RX ORDER — FUROSEMIDE 20 MG/1
20 TABLET ORAL DAILY
Qty: 90 TABLET | Refills: 1 | Status: SHIPPED | OUTPATIENT
Start: 2025-05-02

## 2025-05-02 NOTE — TELEPHONE ENCOUNTER
Refill Routing Note   Medication(s) are not appropriate for processing by [facility] for the following reason(s):        Required vitals abnormal  No active prescription written by provider    [facility] action(s):  Defer               Appointments  past 12m or future 3m with PCP    Date Provider   Last Visit   12/6/2024 Natalia Vaz MD   Next Visit   Visit date not found Natalia Vaz MD   ED visits in past 90 days: 0        Note composed:8:29 AM 05/02/2025

## 2025-05-02 NOTE — TELEPHONE ENCOUNTER
No care due was identified.  [facility] Embedded Care Due Messages. Reference number: [id number].   5/02/2025 8:29:20 AM CDT

## 2025-05-06 ENCOUNTER — HOSPITAL ENCOUNTER (OUTPATIENT)
Dept: RADIOLOGY | Facility: HOSPITAL | Age: 65
Discharge: HOME | End: 2025-05-06
Attending: NURSE PRACTITIONER
Payer: COMMERCIAL

## 2025-05-06 ENCOUNTER — OFFICE VISIT (OUTPATIENT)
Dept: FAMILY MEDICINE | Facility: CLINIC | Age: 65
End: 2025-05-06
Payer: COMMERCIAL

## 2025-05-06 VITALS
HEART RATE: 85 BPM | DIASTOLIC BLOOD PRESSURE: 94 MMHG | TEMPERATURE: 98 F | SYSTOLIC BLOOD PRESSURE: 148 MMHG | BODY MASS INDEX: 41.51 KG/M2 | HEIGHT: 65 IN | WEIGHT: 249.13 LBS

## 2025-05-06 DIAGNOSIS — M25.511 ACUTE PAIN OF RIGHT SHOULDER: ICD-10-CM

## 2025-05-06 DIAGNOSIS — M25.562 ACUTE PAIN OF LEFT KNEE: ICD-10-CM

## 2025-05-06 DIAGNOSIS — M06.00 SERONEGATIVE RHEUMATOID ARTHRITIS: ICD-10-CM

## 2025-05-06 DIAGNOSIS — M25.562 ACUTE PAIN OF LEFT KNEE: Primary | ICD-10-CM

## 2025-05-06 PROCEDURE — 73562 X-RAY EXAM OF KNEE 3: CPT | Mod: 26,LT,, | Performed by: RADIOLOGY

## 2025-05-06 PROCEDURE — 99214 OFFICE O/P EST MOD 30 MIN: CPT | Mod: S$GLB,,, | Performed by: NURSE PRACTITIONER

## 2025-05-06 PROCEDURE — 3079F DIAST BP 80-89 MM HG: CPT | Mod: CPTII,S$GLB,, | Performed by: NURSE PRACTITIONER

## 2025-05-06 PROCEDURE — 3077F SYST BP >= 140 MM HG: CPT | Mod: CPTII,S$GLB,, | Performed by: NURSE PRACTITIONER

## 2025-05-06 PROCEDURE — 73030 X-RAY EXAM OF SHOULDER: CPT | Mod: TC,PO,RT

## 2025-05-06 PROCEDURE — 73030 X-RAY EXAM OF SHOULDER: CPT | Mod: 26,RT,, | Performed by: RADIOLOGY

## 2025-05-06 PROCEDURE — 99999 PR PBB SHADOW E&M-EST. PATIENT-LVL IV: CPT | Mod: PBBFAC,,, | Performed by: NURSE PRACTITIONER

## 2025-05-06 PROCEDURE — 3008F BODY MASS INDEX DOCD: CPT | Mod: CPTII,S$GLB,, | Performed by: NURSE PRACTITIONER

## 2025-05-06 PROCEDURE — 73560 X-RAY EXAM OF KNEE 1 OR 2: CPT | Mod: TC,PO,RT

## 2025-05-06 PROCEDURE — G2211 COMPLEX E/M VISIT ADD ON: HCPCS | Mod: S$GLB,,, | Performed by: NURSE PRACTITIONER

## 2025-05-06 RX ORDER — LIDOCAINE 50 MG/G
2 PATCH TOPICAL DAILY
Qty: 30 PATCH | Refills: 0 | Status: SHIPPED | OUTPATIENT
Start: 2025-05-06

## 2025-05-06 NOTE — PROGRESS NOTES
Subjective:       Patient ID: Monica Joy is a 64 y.o. female.    Chief Complaint: Knee Pain and Shoulder Pain    History of Present Illness    Patient presents today with right shoulder and left knee pain She reports right shoulder pain that started one week ago, exacerbated with movement, particularly when attempting to bend the shoulder. She also has left knee pain that started two days ago, which improves with walking. She has a history of blood clot in 2023, treated briefly with anticoagulants (injection in hospital and three pills) before specialist instructed discontinuation. She denies current knee symptoms being related to blood clot. She is currently taking Meloxicam and Methotrexate weekly. She has a history of steroid allergy with prior anaphylactic reaction to spinal steroid injection. She also had allergic reaction to oral steroids manifesting as generalized pruritus after third dose. She reports seafood allergy but can tolerate fish.      ROS:  General: -fever, -chills, -fatigue, -weight gain, -weight loss  Eyes: -vision changes, -redness, -discharge  ENT: -ear pain, -nasal congestion, -sore throat  Cardiovascular: -chest pain, -palpitations, +lower extremity edema  Respiratory: -cough, -shortness of breath  Gastrointestinal: -abdominal pain, -nausea, -vomiting, -diarrhea, -constipation, -blood in stool  Genitourinary: -dysuria, -hematuria, -frequency  Musculoskeletal: +joint pain, -muscle pain, +joint stiffness, +pain with movement, +limb pain  Skin: -rash, -lesion  Neurological: -headache, -dizziness, -numbness, -tingling  Psychiatric: -anxiety, -depression, -sleep difficulty        Past Medical History:   Diagnosis Date    Asthma 09/06/2013    Gout, arthritis     Sleep apnea     Thyroid disease       Medications Ordered Prior to Encounter[1]       Objective:      Physical Exam    General: In no acute distress.  Head: Normocephalic. Non traumatic.  Eyes: PERRLA. EOMs full. Conjunctivae clear.  Fundi grossly normal.  Ears: EACs clear. TMs normal.  Nose: Mucosa pink. Mucosa moist. No obstruction.  Throat: Clear. No exudates. No lesions.  Neck: Supple. No masses. No thyromegaly. No bruits.  Chest: Lungs clear. No rales. No rhonchi. No wheezes.  Heart: RRR. No murmurs. No rubs. No gallops.  Abdomen: Soft. No tenderness. No masses. BS normal.  : Normal external genitalia. No lesions. No discharge. No hernias  noted.  Back: Normal curvature. No scoliosis. No tenderness.  Extremities: Warm. Well perfused. No upper extremity edema. No lower extremity edema. FROM. No deformities. No joint erythema.  Neuro: No focal deficits appreciated. Good muscle tone. Normal response to visual stimuli. Normal response to auditory stimuli.  Skin: Normal. No rashes. No lesions noted.  Vitals: BLOOD PRESSURE: 148/86.  MSK: Knee - Left: KNEE EDEMA (LEFT). KNEE EFFUSION (LEFT). CREPITUS IN LEFT KNEE.  MSK: Foot/Ankle - Left: FOOT EDEMA (LEFT).  MSK: Shoulder - Right: PAIN WITH LATERAL MOVEMENT OF RIGHT SHOULDER. CLICKING IN RIGHT SHOULDER WITH MOVEMENT.          Assessment/Plan:     1. Acute pain of left knee    2. Acute pain of right shoulder    3. Seronegative rheumatoid arthritis       Assessment & Plan    MORBID (SEVERE) OBESITY DUE TO EXCESS CALORIES:  - Evaluated patient who is currently overweight and has been consuming inflammatory foods including pork for the last 2 days.  - Advised weight loss with a goal of 20 lbs over the next year (approximately 3 lbs per month) and reduction of inflammatory foods such as pork and processed foods.  - Recommend water aerobics to take pressure off joints and aid in weight loss.  - Discussed weight loss medications like Ozempic and Mounjaro, explaining their mechanism of action including delayed gastric emptying and potential side effects.  - Highlighted benefits beyond weight reduction, including cardiovascular risk reduction and potential improvements for sleep apnea and  dementia.    ## RHEUMATOID ARTHRITIS OF RIGHT SHOULDER:  - Patient reports shoulder pain starting a week ago with inability to bend without pain.  - Currently on weekly methotrexate for rheumatoid arthritis.  - Will continue meloxicam 15 mg as prescribed.  - Initiated lidocaine 5% patch with instructions to apply 1 patch daily to shoulder.  - Ordered right shoulder radiograph for further evaluation.  - Will consider consultation with rheumatologist (Dr. Muriel Ventura) for alternative treatment options given complex medical history.    ## RHEUMATOID ARTHRITIS OF LEFT KNEE:  - Patient reports left knee pain starting 2 days ago with swelling and difficulty bending.  - Physical exam confirms definite swelling, suggesting possible joint effusion that may require drainage if fluid is present.  - Ordered left knee radiograph for evaluation.  - Will continue meloxicam 15 mg and initiated lidocaine 5% patch with instructions to apply 1 patch daily to knee.  - Will consider consultation with rheumatologist (Dr. Muriel Ventura) for alternative treatment options given complex medical history.    ## HYPERTENSION:  - Blood pressure today measured at 148/86, previously 152/88.  - Elevation possibly due to current pain.  - Scheduled follow-up with nurse for blood pressure recheck.    ## ANAPHYLACTIC REACTION TO MEDICATION:  - Patient has history of anaphylactic shock from steroid injection in the spine.  - Will avoid prescribing oral steroids due to this history of severe allergic reactions.    ## HISTORY OF SUSPECTED VENOUS THROMBOSIS:  - Patient was previously suspected of having a blood clot in 2023, but specialists later confirmed no clot was present.    ## ALLERGY TO ANALGESICS:  - Patient has extensive allergy history to analgesics, including steroids, which limits medication options.  - This history further supports decision against prescribing oral steroids.    ## SEAFOOD ALLERGY:  - Patient reports allergy to seafood, specifically raw  fish, shrimp, and crabs, and is unable to consume these items.    ## LONG-TERM USE OF NSAIDS:  - Patient is taking meloxicam for arthritis and reports that ibuprofen works better than meloxicam for knee pain.  - Will continue meloxicam 15 mg as prescribed and advised patient to use ibuprofen as needed for supplemental pain manag  ement.    ## GENERAL RECOMMENDATIONS:  - Recommend engaging in water aerobics to reduce pressure on joints; patient can consider using lazy river at local pool for low-impact exercise.  - Patient to follow Mediterranean diet with emphasis on lean proteins (chicken, turkey, fish), fresh fruits and vegetables, whole grains, while limiting red meat.  - Explained inflammatory nature of certain foods, particularly processed foods.               No follow-ups on file.    This note was generated with the assistance of ambient listening technology. Verbal consent was obtained by the patient and accompanying visitor(s) for the recording of patient appointment to facilitate this note. I attest to having reviewed and edited the generated note for accuracy, though some syntax or spelling errors may persist. Please contact the author of this note for any clarification.            [1]   Current Outpatient Medications on File Prior to Visit   Medication Sig Dispense Refill    albuterol (PROVENTIL/VENTOLIN HFA) 90 mcg/actuation inhaler INHALE 2 PUFFS INTO THE LUNGS EVERY 4 HOURS AS NEEDED FOR WHEEZING OR SHORTNESS OF BREATH 18 g 11    allopurinoL (ZYLOPRIM) 100 MG tablet Take 2 tablets (200 mg total) by mouth once daily. 180 tablet 3    cyanocobalamin, vitamin B-12, (VITAMIN B-12 SL) Place under the tongue.      folic acid (FOLVITE) 1 MG tablet Take 1 tablet (1 mg total) by mouth once daily. 90 tablet 3    furosemide (LASIX) 20 MG tablet Take 1 tablet (20 mg total) by mouth once daily. 90 tablet 1    ibuprofen (ADVIL,MOTRIN) 800 MG tablet Take 1 tablet daily as needed for up to 7 days for flares of  inflammatory arthritis. 30 tablet 0    meloxicam (MOBIC) 15 MG tablet TAKE 1 TABLET(15 MG) BY MOUTH EVERY DAY 90 tablet 3    methotrexate 2.5 MG Tab Take 6 tablets (15 mg total) by mouth every 7 days. 72 tablet 1    levocetirizine (XYZAL) 5 MG tablet TAKE 1 TABLET(5 MG) BY MOUTH EVERY EVENING (Patient not taking: Reported on 5/6/2025) 30 tablet 5     Current Facility-Administered Medications on File Prior to Visit   Medication Dose Route Frequency Provider Last Rate Last Admin    EPINEPHrine (EPIPEN) 0.3 mg/0.3 mL pen injection 0.3 mg  0.3 mg Intramuscular PRN Leon De La Torre MD        lactated ringers infusion   Intravenous On Call Procedure Mary Anne Johnson PA        nozaseptin (NOZIN) nasal    Each Nostril On Call Procedure Mary Anne Johnson PA   Given at 02/25/21 0613    sodium chloride 0.9% flush 10 mL  10 mL Intravenous PRN Leon De La Torre MD

## 2025-05-07 ENCOUNTER — RESULTS FOLLOW-UP (OUTPATIENT)
Dept: FAMILY MEDICINE | Facility: CLINIC | Age: 65
End: 2025-05-07

## 2025-05-07 ENCOUNTER — PATIENT MESSAGE (OUTPATIENT)
Dept: FAMILY MEDICINE | Facility: CLINIC | Age: 65
End: 2025-05-07
Payer: COMMERCIAL

## 2025-05-10 ENCOUNTER — HOSPITAL ENCOUNTER (EMERGENCY)
Facility: HOSPITAL | Age: 65
Discharge: HOME | End: 2025-05-10
Attending: EMERGENCY MEDICINE
Payer: COMMERCIAL

## 2025-05-10 VITALS
RESPIRATION RATE: 17 BRPM | DIASTOLIC BLOOD PRESSURE: 88 MMHG | SYSTOLIC BLOOD PRESSURE: 155 MMHG | BODY MASS INDEX: 40.84 KG/M2 | WEIGHT: 245.38 LBS | TEMPERATURE: 99 F | OXYGEN SATURATION: 95 % | HEART RATE: 68 BPM

## 2025-05-10 DIAGNOSIS — I10 HYPERTENSION, UNSPECIFIED TYPE: Primary | ICD-10-CM

## 2025-05-10 DIAGNOSIS — R07.9 CHEST PAIN: ICD-10-CM

## 2025-05-10 LAB
ABSOLUTE EOSINOPHIL (OHS): 0.1 K/UL
ABSOLUTE MONOCYTE (OHS): 0.45 K/UL (ref 0.3–1)
ABSOLUTE NEUTROPHIL COUNT (OHS): 7.7 K/UL (ref 1.8–7.7)
ALBUMIN SERPL BCP-MCNC: 3.9 G/DL (ref 3.5–5.2)
ALP SERPL-CCNC: 104 UNIT/L (ref 40–150)
ALT SERPL W/O P-5'-P-CCNC: 24 UNIT/L (ref 10–44)
ANION GAP (OHS): 11 MMOL/L (ref 8–16)
AST SERPL-CCNC: 18 UNIT/L (ref 11–45)
BASOPHILS # BLD AUTO: 0.02 K/UL
BASOPHILS NFR BLD AUTO: 0.2 %
BILIRUB SERPL-MCNC: 0.6 MG/DL (ref 0.1–1)
BNP SERPL-MCNC: 22 PG/ML (ref 0–99)
BUN SERPL-MCNC: 13 MG/DL (ref 8–23)
CALCIUM SERPL-MCNC: 9.3 MG/DL (ref 8.7–10.5)
CHLORIDE SERPL-SCNC: 108 MMOL/L (ref 95–110)
CO2 SERPL-SCNC: 22 MMOL/L (ref 23–29)
CREAT SERPL-MCNC: 0.7 MG/DL (ref 0.5–1.4)
ERYTHROCYTE [DISTWIDTH] IN BLOOD BY AUTOMATED COUNT: 13.8 % (ref 11.5–14.5)
GFR SERPLBLD CREATININE-BSD FMLA CKD-EPI: >60 ML/MIN/1.73/M2
GLUCOSE SERPL-MCNC: 128 MG/DL (ref 70–110)
HCT VFR BLD AUTO: 42.2 % (ref 37–48.5)
HGB BLD-MCNC: 14.4 GM/DL (ref 12–16)
IMM GRANULOCYTES # BLD AUTO: 0.03 K/UL (ref 0–0.04)
IMM GRANULOCYTES NFR BLD AUTO: 0.3 % (ref 0–0.5)
LYMPHOCYTES # BLD AUTO: 1.33 K/UL (ref 1–4.8)
MCH RBC QN AUTO: 30.1 PG (ref 27–31)
MCHC RBC AUTO-ENTMCNC: 34.1 G/DL (ref 32–36)
MCV RBC AUTO: 88 FL (ref 82–98)
NUCLEATED RBC (/100WBC) (OHS): 0 /100 WBC
PLATELET # BLD AUTO: 214 K/UL (ref 150–450)
PMV BLD AUTO: 10.3 FL (ref 9.2–12.9)
POTASSIUM SERPL-SCNC: 3.7 MMOL/L (ref 3.5–5.1)
PROT SERPL-MCNC: 8 GM/DL (ref 6–8.4)
RBC # BLD AUTO: 4.79 M/UL (ref 4–5.4)
RELATIVE EOSINOPHIL (OHS): 1 %
RELATIVE LYMPHOCYTE (OHS): 13.8 % (ref 18–48)
RELATIVE MONOCYTE (OHS): 4.7 % (ref 4–15)
RELATIVE NEUTROPHIL (OHS): 80 % (ref 38–73)
SODIUM SERPL-SCNC: 141 MMOL/L (ref 136–145)
TROPONIN I SERPL DL<=0.01 NG/ML-MCNC: 0.02 NG/ML
WBC # BLD AUTO: 9.63 K/UL (ref 3.9–12.7)

## 2025-05-10 PROCEDURE — 84484 ASSAY OF TROPONIN QUANT: CPT | Performed by: NURSE PRACTITIONER

## 2025-05-10 PROCEDURE — 80053 COMPREHEN METABOLIC PANEL: CPT | Performed by: NURSE PRACTITIONER

## 2025-05-10 PROCEDURE — 85025 COMPLETE CBC W/AUTO DIFF WBC: CPT | Performed by: NURSE PRACTITIONER

## 2025-05-10 PROCEDURE — 83880 ASSAY OF NATRIURETIC PEPTIDE: CPT | Performed by: NURSE PRACTITIONER

## 2025-05-10 PROCEDURE — 93010 ELECTROCARDIOGRAM REPORT: CPT | Mod: ,,, | Performed by: INTERNAL MEDICINE

## 2025-05-10 PROCEDURE — 99285 EMERGENCY DEPT VISIT HI MDM: CPT | Mod: 25

## 2025-05-10 PROCEDURE — 93005 ELECTROCARDIOGRAM TRACING: CPT

## 2025-05-10 RX ORDER — AMLODIPINE BESYLATE 10 MG/1
10 TABLET ORAL DAILY
Qty: 30 TABLET | Refills: 0 | Status: SHIPPED | OUTPATIENT
Start: 2025-05-10 | End: 2026-05-10

## 2025-05-10 NOTE — FIRST PROVIDER EVALUATION
Medical screening examination initiated.  I have conducted a focused provider triage encounter, findings are as follows:    Brief history of present illness:  Patient presents for chest pain and dizziness    Vitals:    05/10/25 1113   BP: (!) 167/90   BP Location: Right arm   Pulse: 90   Resp: 18   Temp: 98.2 °F (36.8 °C)   TempSrc: Oral   SpO2: 95%   Weight: 111.3 kg (245 lb 6.4 oz)       Pertinent physical exam:  No acute distress    Brief workup plan:  Cardiac workup    Preliminary workup initiated; this workup will be continued and followed by the physician or advanced practice provider that is assigned to the patient when roomed.

## 2025-05-10 NOTE — ED PROVIDER NOTES
SCRIBE #1 NOTE: I, Juany Banda, am scribing for, and in the presence of, Orlando Sultana MD. I have scribed the entire note.       History     Chief Complaint   Patient presents with    Hypertension     No hx of HTN and doesn't take meds at this time. Reports dizziness when she woke up this morning. Chest tightness, shortness of breath also started this morning.     Review of patient's allergies indicates:   Allergen Reactions    Amoxicillin Rash and Swelling     Rash with throat swelling    Codeine Palpitations, Rash and Swelling     Other reaction(s): Shortness of breath  Other reaction(s): Hives  Rash and throat swelling    Doxycycline Swelling     Other reaction(s): Rash    Hydrocodone Rash and Swelling    Iodine and iodide containing products Rash and Swelling    Lortab [hydrocodone-acetaminophen] Other (See Comments)     Other reaction(s): Hives  Other reaction(s): Difficulty breathing    Medrol [methylprednisolone] Anaphylaxis    Oxycodone Shortness Of Breath, Rash and Swelling    Oxycodone hcl-oxycodone-asa Rash and Swelling    Percodan filipe Shortness Of Breath    Quinidine-quinine analogues (cinchona alkaloids) Shortness Of Breath, Swelling and Rash    Tetracycline Swelling    Tetracyclines Rash and Swelling    Tramadol Shortness Of Breath, Rash and Swelling    Vibramycin [doxycycline calcium] Shortness Of Breath     Other reaction(s): Hives  Other reaction(s): Difficulty breathing    Augmentin [amoxicillin-pot clavulanate] Itching and Rash    Levaquin [levofloxacin] Hives    Sulfa (sulfonamide antibiotics) Itching and Rash    Percocet  [oxycodone-acetaminophen]      Other reaction(s): Shortness of breath    Shellfish containing products      Other reaction(s): Shortness of breath  Other reaction(s): Hives  Other reaction(s): Shortness of breath  Other reaction(s): Hives         History of Present Illness     HPI    5/10/2025, 12:41 PM  History obtained from the patient and medical records       History of Present Illness: Monica Joy is a 64 y.o. female patient with a PMHx of asthma, thyroid disease, Gout, and sleep apnea who presents to the Emergency Department for evaluation of HTN which began this morning. Pt woke up this morning and felt chest tightness. She checked her BP and it was high. Pt saw PCP last week and said they would begin treating her for HTN if her BP went too high. Symptoms are constant and moderate in severity. No mitigating or exacerbating factors reported. Associated sxs include dizziness and SOB. Patient denies any N/V/D or headaches. No prior Tx specified. Pt has no hx of HTN and does not take any meds at this time. No further complaints or concerns at this time.       Arrival mode: Personal Transportation    PCP: Natalia Vaz MD        Past Medical History:  Past Medical History:   Diagnosis Date    Asthma 2013    Gout, arthritis     Sleep apnea     Thyroid disease        Past Surgical History:  Past Surgical History:   Procedure Laterality Date    BONE GRAFT Right 2021    Procedure: BONE GRAFT;  Surgeon: Miguel Yoo MD;  Location: [facility];  Service: Orthopedics;  Laterality: Right;  bone graft to fill the defect in the distal radial epiphysis/Reduction of the four-corner area will be performed with bone graft and fusion     SECTION      COLONOSCOPY N/A 2019    Procedure: COLONOSCOPY;  Surgeon: Scout Rivera MD;  Location: [facility];  Service: Endoscopy;  Laterality: N/A;    COLONOSCOPY, SCREENING, LOW RISK PATIENT N/A 10/28/2024    Procedure: COLONOSCOPY, SCREENING, LOW RISK PATIENT;  Surgeon: Darlene Marcano MD;  Location: [facility];  Service: Endoscopy;  Laterality: N/A;    FRACTURE SURGERY      HAND FUSION Right 2021    Procedure: FUSION, JOINT, HAND;  Surgeon: Miguel Yoo MD;  Location: [facility] OR;  Service: Orthopedics;  Laterality: Right;  dip joint    JOINT REPLACEMENT      [employer] Endometrial  64 year old male from home w/ PMHx DM, HTN, ESRD, on HD TTS at [place] (last HD on Thursday),  with Renal Cell Carcioma with known metastatic disease to the lung, currently on active CMT with Dr. Smyth at [facility],  presenting with worsening L sided chest pain.  1.Limited echo-no pericardial effusion.  2.Atypical chest pain.  3.ESRD-HD as per renal.  4.DM-Insulin.  5.HTN-cont bp medication.  6.Cont asa,statin.  7.Metastatic prostate ca-Heme/Onc.  8.GI and DVT prophylaxis. Ablation  04/13/2016    OSTECTOMY Right 03/19/2021    Procedure: OSTECTOMY;  Surgeon: Miguel Yoo MD;  Location: [facility];  Service: Orthopedics;  Laterality: Right;  radial styloidectomy as well as scaphoid excision    THYROIDECTOMY, PARTIAL      TUBAL LIGATION      WRIST SURGERY           Family History:  Family History   Problem Relation Name Age of Onset    Atrial fibrillation Mother      Anuerysm Mother      Diabetes Father      Heart disease Father          CHF    Hyperlipidemia Father      Stroke Father      Kidney disease Father      Diabetes Brother      Brain aneurysm Brother      Anuerysm Brother          AAA    Heart disease Brother  35        CAD    Breast cancer Neg Hx      Colon cancer Neg Hx      Ovarian cancer Neg Hx         Social History:  Social History     Tobacco Use    Smoking status: Never     Passive exposure: Past    Smokeless tobacco: Never   Substance and Sexual Activity    Alcohol use: No     Comment: on rare holidays    Drug use: No    Sexual activity: Yes     Partners: Male     Birth control/protection: None        Review of Systems     Review of Systems   Constitutional:  Negative for fever.   HENT:  Negative for sore throat.    Respiratory:  Positive for chest tightness and shortness of breath.    Cardiovascular:  Negative for chest pain.   Gastrointestinal:  Negative for diarrhea, nausea and vomiting.   Genitourinary:  Negative for dysuria.   Musculoskeletal:  Negative for back pain.   Skin:  Negative for rash.   Neurological:  Positive for dizziness. Negative for weakness and headaches.   Hematological:  Does not bruise/bleed easily.   All other systems reviewed and are negative.     Physical Exam     Initial Vitals [05/10/25 1113]   BP Pulse Resp Temp SpO2   (!) 167/90 90 18 98.2 °F (36.8 °C) 95 %      MAP       --          Physical Exam  Nursing Notes and Vital Signs Reviewed.  Constitutional: Patient is in no acute distress. Well-developed and well-nourished.  Head:  Atraumatic. Normocephalic.  Eyes: PERRL. EOM intact. Conjunctivae are not pale. No scleral icterus.  ENT: Mucous membranes are moist. Oropharynx is clear and symmetric.    Neck: Supple. Full ROM. No lymphadenopathy.  Cardiovascular: Regular rate. Regular rhythm. No murmurs, rubs, or gallops. Distal pulses are 2+ and symmetric.  Pulmonary/Chest: No respiratory distress. Clear to auscultation bilaterally. No wheezing or rales.  Abdominal: Soft and non-distended.  There is no tenderness.  No rebound, guarding, or rigidity. Good bowel sounds.  Genitourinary: No CVA tenderness.  Musculoskeletal: Moves all extremities. No obvious deformities. No edema. No calf tenderness.  Skin: Warm and dry.  Neurological:  Alert, awake, and appropriate.  Normal speech.  No acute focal neurological deficits are appreciated.  Psychiatric: Normal affect. Good eye contact. Appropriate in content.     ED Course   Procedures  ED Vital Signs:  Vitals:    05/10/25 1113 05/10/25 1233 05/10/25 1300   BP: (!) 167/90  (!) 155/88   Pulse: 90 84 68   Resp: 18  17   Temp: 98.2 °F (36.8 °C)  98.6 °F (37 °C)   TempSrc: Oral  Oral   SpO2: 95%  95%   Weight: 111.3 kg (245 lb 6.4 oz)         Abnormal Lab Results:  Labs Reviewed   COMPREHENSIVE METABOLIC PANEL - Abnormal       Result Value    Sodium 141      Potassium 3.7      Chloride 108      CO2 22 (*)     Glucose 128 (*)     BUN 13      Creatinine 0.7      Calcium 9.3      Protein Total 8.0      Albumin 3.9      Bilirubin Total 0.6            AST 18      ALT 24      Anion Gap 11      eGFR >60     CBC WITH DIFFERENTIAL - Abnormal    WBC 9.63      RBC 4.79      HGB 14.4      HCT 42.2      MCV 88      MCH 30.1      MCHC 34.1      RDW 13.8      Platelet Count 214      MPV 10.3      Nucleated RBC 0      Neut % 80.0 (*)     Lymph % 13.8 (*)     Mono % 4.7      Eos % 1.0      Basophil % 0.2      Imm Grans % 0.3      Neut # 7.70      Lymph # 1.33      Mono # 0.45      Eos # 0.10      Baso # 0.02      Imm  Grans # 0.03     TROPONIN I - Normal    Troponin-I 0.017     B-TYPE NATRIURETIC PEPTIDE - Normal    BNP 22     CBC W/ AUTO DIFFERENTIAL    Narrative:     The following orders were created for panel order CBC auto differential.  Procedure                               Abnormality         Status                     ---------                               -----------         ------                     CBC with Differential[[id number]]       Abnormal            Final result                 Please view results for these tests on the individual orders.        All Lab Results:  Results for orders placed or performed during the hospital encounter of 05/10/25   EKG 12-lead    Collection Time: 05/10/25 11:15 AM   Result Value Ref Range    QRS Duration 100 ms    [facility] QTC Calculation 456 ms   Comprehensive metabolic panel    Collection Time: 05/10/25 11:47 AM   Result Value Ref Range    Sodium 141 136 - 145 mmol/L    Potassium 3.7 3.5 - 5.1 mmol/L    Chloride 108 95 - 110 mmol/L    CO2 22 (L) 23 - 29 mmol/L    Glucose 128 (H) 70 - 110 mg/dL    BUN 13 8 - 23 mg/dL    Creatinine 0.7 0.5 - 1.4 mg/dL    Calcium 9.3 8.7 - 10.5 mg/dL    Protein Total 8.0 6.0 - 8.4 gm/dL    Albumin 3.9 3.5 - 5.2 g/dL    Bilirubin Total 0.6 0.1 - 1.0 mg/dL     40 - 150 unit/L    AST 18 11 - 45 unit/L    ALT 24 10 - 44 unit/L    Anion Gap 11 8 - 16 mmol/L    eGFR >60 >60 mL/min/1.73/m2   Troponin I #1    Collection Time: 05/10/25 11:47 AM   Result Value Ref Range    Troponin-I 0.017 <=0.026 ng/mL   BNP    Collection Time: 05/10/25 11:47 AM   Result Value Ref Range    BNP 22 0 - 99 pg/mL   CBC with Differential    Collection Time: 05/10/25 11:47 AM   Result Value Ref Range    WBC 9.63 3.90 - 12.70 K/uL    RBC 4.79 4.00 - 5.40 M/uL    HGB 14.4 12.0 - 16.0 gm/dL    HCT 42.2 37.0 - 48.5 %    MCV 88 82 - 98 fL    MCH 30.1 27.0 - 31.0 pg    MCHC 34.1 32.0 - 36.0 g/dL    RDW 13.8 11.5 - 14.5 %    Platelet Count 214 150 - 450 K/uL    MPV 10.3 9.2 - 12.9  fL    Nucleated RBC 0 <=0 /100 WBC    Neut % 80.0 (H) 38 - 73 %    Lymph % 13.8 (L) 18 - 48 %    Mono % 4.7 4 - 15 %    Eos % 1.0 <=8 %    Basophil % 0.2 <=1.9 %    Imm Grans % 0.3 0.0 - 0.5 %    Neut # 7.70 1.8 - 7.7 K/uL    Lymph # 1.33 1 - 4.8 K/uL    Mono # 0.45 0.3 - 1 K/uL    Eos # 0.10 <=0.5 K/uL    Baso # 0.02 <=0.2 K/uL    Imm Grans # 0.03 0.00 - 0.04 K/uL       Imaging Results:  Imaging Results              X-Ray Chest AP Portable (Final result)  Result time 05/10/25 11:49:50      Final result by Kade Ferguson MD (05/10/25 11:49:50)                   Impression:      No acute cardiopulmonary abnormality.    Finalized on: 5/10/2025 11:49 AM By:  Kade Ferguson MD  [facility]# [id number]      2025-05-10 11:51:57.862     [facility]               Narrative:    EXAM: XR CHEST AP PORTABLE    CLINICAL HISTORY: Chest pain    COMPARISON: 02/09/2023    FINDINGS:    No focal airspace disease process. Unchanged prominence of the right paratracheal stripe.  No pleural effusion or pneumothorax.  Cardiac silhouette size within normal limits.  No acute osseous or soft tissue abnormality. Multilevel degenerative changes of the spine.  Cervicothoracic scoliosis.                                         The EKG was ordered, reviewed, and independently interpreted by the ED provider.  Interpretation time: 11:15  Rate: 76 BPM  Rhythm: normal sinus rhythm  Interpretation: Incomplete right bundle branch block. Possible Anterior infarct. Abnormal ECG . No STEMI.           The Emergency Provider reviewed the vital signs and test results, which are outlined above.     ED Discussion     12:47 PM: Reassessed pt at this time. Discussed with patient and/or family/caretaker all pertinent ED information and results. Discussed pt dx and plan of tx. Gave the patient all f/u and return to the ED instructions. All questions and concerns were addressed at this time. Patient and/or family/caretaker expresses understanding of information and instructions, and  is comfortable with plan to discharge. Pt is stable for discharge.     I discussed with patient and/or family/caretaker that evaluation in the ED does not suggest any emergent or life threatening medical conditions requiring immediate intervention beyond what was provided in the ED, and I believe patient is safe for discharge.  Regardless, an unremarkable evaluation in the ED does not preclude the development or presence of a serious of life threatening condition. As such, I instructed that the patient is to return immediately for any worsening or change in current symptoms.       Medical Decision Making  DDx: htn, weakness    Amount and/or Complexity of Data Reviewed  Labs: ordered. Decision-making details documented in ED Course.  Radiology: ordered. Decision-making details documented in ED Course.  ECG/medicine tests: ordered and independent interpretation performed. Decision-making details documented in ED Course.    Risk  Prescription drug management.                ED Medication(s):  Medications - No data to display    New Prescriptions    AMLODIPINE (NORVASC) 10 MG TABLET    Take 1 tablet (10 mg total) by mouth once daily.        Follow-up Information       Rina-Nataila Ruano MD.    Specialty: Family Medicine  Contact information:  [address] [place] [address] [id number] [phone]                                 Scribe Attestation:   Scribe #1: I performed the above scribed service and the documentation accurately describes the services I performed. I attest to the accuracy of the note.     Attending:   Physician Attestation Statement for Scribe #1: I, Orlando Sultana MD, personally performed the services described in this documentation, as scribed by Juany Banda, in my presence, and it is both accurate and complete.           Clinical Impression       ICD-10-CM ICD-9-CM   1. Hypertension, unspecified type  I10 401.9   2. Chest pain  R07.9 786.50       Disposition:   Disposition:  Discharged  Condition: Stable        Orlando Sultana MD  05/10/25 0296

## 2025-05-11 LAB
OHS QRS DURATION: 100 MS
OHS QTC CALCULATION: 456 MS

## 2025-05-12 ENCOUNTER — PATIENT MESSAGE (OUTPATIENT)
Dept: FAMILY MEDICINE | Facility: CLINIC | Age: 65
End: 2025-05-12
Payer: COMMERCIAL

## 2025-05-12 ENCOUNTER — RESULTS FOLLOW-UP (OUTPATIENT)
Dept: FAMILY MEDICINE | Facility: CLINIC | Age: 65
End: 2025-05-12

## 2025-05-12 DIAGNOSIS — M25.562 ACUTE PAIN OF LEFT KNEE: Primary | ICD-10-CM

## 2025-05-19 ENCOUNTER — OFFICE VISIT (OUTPATIENT)
Dept: ORTHOPEDICS | Facility: CLINIC | Age: 65
End: 2025-05-19
Payer: COMMERCIAL

## 2025-05-19 VITALS
HEART RATE: 79 BPM | DIASTOLIC BLOOD PRESSURE: 81 MMHG | HEIGHT: 65 IN | BODY MASS INDEX: 40.88 KG/M2 | SYSTOLIC BLOOD PRESSURE: 136 MMHG | WEIGHT: 245.38 LBS

## 2025-05-19 DIAGNOSIS — M25.562 ACUTE PAIN OF LEFT KNEE: ICD-10-CM

## 2025-05-19 DIAGNOSIS — Z96.611 PRESENCE OF RIGHT ARTIFICIAL SHOULDER JOINT: Primary | ICD-10-CM

## 2025-05-19 PROCEDURE — 3008F BODY MASS INDEX DOCD: CPT | Mod: CPTII,S$GLB,, | Performed by: ORTHOPAEDIC SURGERY

## 2025-05-19 PROCEDURE — 3079F DIAST BP 80-89 MM HG: CPT | Mod: CPTII,S$GLB,, | Performed by: ORTHOPAEDIC SURGERY

## 2025-05-19 PROCEDURE — 1159F MED LIST DOCD IN RCRD: CPT | Mod: CPTII,S$GLB,, | Performed by: ORTHOPAEDIC SURGERY

## 2025-05-19 PROCEDURE — 3075F SYST BP GE 130 - 139MM HG: CPT | Mod: CPTII,S$GLB,, | Performed by: ORTHOPAEDIC SURGERY

## 2025-05-19 PROCEDURE — 99204 OFFICE O/P NEW MOD 45 MIN: CPT | Mod: S$GLB,,, | Performed by: ORTHOPAEDIC SURGERY

## 2025-05-19 PROCEDURE — 1160F RVW MEDS BY RX/DR IN RCRD: CPT | Mod: CPTII,S$GLB,, | Performed by: ORTHOPAEDIC SURGERY

## 2025-05-19 PROCEDURE — 99999 PR PBB SHADOW E&M-EST. PATIENT-LVL V: CPT | Mod: PBBFAC,,, | Performed by: ORTHOPAEDIC SURGERY

## 2025-05-19 NOTE — PROGRESS NOTES
Patient ID: Monica Joy  YOB: 1960  MRN: [id number]    Chief Complaint: Pain of the Right Shoulder      Referred By: Arianna Rangel NP     History of Present Illness: Monica Joy is a  64 y.o. 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  She was working on a particularly tall 1 was having some prodrome or shoulder pain but then she was shaking a pain can just over a month ago and felt a pop in her shoulder and was unable to lift it for a few days.  Functionally he got better but she is still significantly weak and can not pick anything up away from her body.  She does report a pain radiating down the arm just past the elbow.    Pain  Pertinent negatives include no abdominal pain, chest pain or chills.       Past Medical History:   Past Medical History:   Diagnosis Date    Asthma 2013    Gout, arthritis     Sleep apnea     Thyroid disease      Past Surgical History:   Procedure Laterality Date    BONE GRAFT Right 2021    Procedure: BONE GRAFT;  Surgeon: Miguel Yoo MD;  Location: [facility];  Service: Orthopedics;  Laterality: Right;  bone graft to fill the defect in the distal radial epiphysis/Reduction of the four-corner area will be performed with bone graft and fusion     SECTION      COLONOSCOPY N/A 2019    Procedure: COLONOSCOPY;  Surgeon: Scout Rivera MD;  Location: [facility];  Service: Endoscopy;  Laterality: N/A;    COLONOSCOPY, SCREENING, LOW RISK PATIENT N/A 10/28/2024    Procedure: COLONOSCOPY, SCREENING, LOW RISK PATIENT;  Surgeon: Darlene Marcano MD;  Location: [facility] ENDO;  Service: Endoscopy;  Laterality: N/A;    FRACTURE SURGERY      HAND FUSION Right 2021    Procedure: FUSION, JOINT, HAND;  Surgeon: Miguel Yoo MD;  Location: [facility] OR;  Service: Orthopedics;  Laterality: Right;  dip joint    JOINT  REPLACEMENT      Novasure Endometrial Ablation  04/13/2016    OSTECTOMY Right 03/19/2021    Procedure: OSTECTOMY;  Surgeon: Miguel Yoo MD;  Location: [facility];  Service: Orthopedics;  Laterality: Right;  radial styloidectomy as well as scaphoid excision    THYROIDECTOMY, PARTIAL      TUBAL LIGATION      WRIST SURGERY       Family History   Problem Relation Name Age of Onset    Atrial fibrillation Mother      Anuerysm Mother      Diabetes Father      Heart disease Father          CHF    Hyperlipidemia Father      Stroke Father      Kidney disease Father      Diabetes Brother      Brain aneurysm Brother      Anuerysm Brother          AAA    Heart disease Brother  35        CAD    Breast cancer Neg Hx      Colon cancer Neg Hx      Ovarian cancer Neg Hx       Social History[1]  Medication List with Changes/Refills   Current Medications    ALBUTEROL (PROVENTIL/VENTOLIN HFA) 90 MCG/ACTUATION INHALER    INHALE 2 PUFFS INTO THE LUNGS EVERY 4 HOURS AS NEEDED FOR WHEEZING OR SHORTNESS OF BREATH    ALLOPURINOL (ZYLOPRIM) 100 MG TABLET    Take 2 tablets (200 mg total) by mouth once daily.    AMLODIPINE (NORVASC) 10 MG TABLET    Take 1 tablet (10 mg total) by mouth once daily.    CYANOCOBALAMIN, VITAMIN B-12, (VITAMIN B-12 SL)    Place under the tongue.    FOLIC ACID (FOLVITE) 1 MG TABLET    Take 1 tablet (1 mg total) by mouth once daily.    FUROSEMIDE (LASIX) 20 MG TABLET    Take 1 tablet (20 mg total) by mouth once daily.    IBUPROFEN (ADVIL,MOTRIN) 800 MG TABLET    Take 1 tablet daily as needed for up to 7 days for flares of inflammatory arthritis.    LEVOCETIRIZINE (XYZAL) 5 MG TABLET    TAKE 1 TABLET(5 MG) BY MOUTH EVERY EVENING    LIDOCAINE (LIDODERM) 5 %    Place 2 patches onto the skin once daily. Remove & Discard patch within 12 hours or as directed by MD    MELOXICAM (MOBIC) 15 MG TABLET    TAKE 1 TABLET(15 MG) BY MOUTH EVERY DAY    METHOTREXATE 2.5 MG TAB    Take 6 tablets (15 mg total) by mouth every 7  days.     Review of patient's allergies indicates:   Allergen Reactions    Amoxicillin Rash and Swelling     Rash with throat swelling    Codeine Palpitations, Rash and Swelling     Other reaction(s): Shortness of breath  Other reaction(s): Hives  Rash and throat swelling    Doxycycline Swelling     Other reaction(s): Rash    Hydrocodone Rash and Swelling    Iodine and iodide containing products Rash and Swelling    Lortab [hydrocodone-acetaminophen] Other (See Comments)     Other reaction(s): Hives  Other reaction(s): Difficulty breathing    Medrol [methylprednisolone] Anaphylaxis    Oxycodone Shortness Of Breath, Rash and Swelling    Oxycodone hcl-oxycodone-asa Rash and Swelling    Percodan filipe Shortness Of Breath    Quinidine-quinine analogues (cinchona alkaloids) Shortness Of Breath, Swelling and Rash    Tetracycline Swelling    Tetracyclines Rash and Swelling    Tramadol Shortness Of Breath, Rash and Swelling    Vibramycin [doxycycline calcium] Shortness Of Breath     Other reaction(s): Hives  Other reaction(s): Difficulty breathing    Augmentin [amoxicillin-pot clavulanate] Itching and Rash    Levaquin [levofloxacin] Hives    Sulfa (sulfonamide antibiotics) Itching and Rash    Percocet  [oxycodone-acetaminophen]      Other reaction(s): Shortness of breath    Shellfish containing products      Other reaction(s): Shortness of breath  Other reaction(s): Hives  Other reaction(s): Shortness of breath  Other reaction(s): Hives     Review of Systems   Constitutional: Negative for chills and decreased appetite.   HENT:  Negative for ear pain.    Eyes:  Negative for blurred vision.   Cardiovascular:  Negative for chest pain and claudication.   Respiratory:  Negative for hemoptysis.    Endocrine: Negative for cold intolerance.   Hematologic/Lymphatic: Does not bruise/bleed easily.   Skin:  Negative for dry skin.   Musculoskeletal:  Positive for joint pain.   Gastrointestinal:  Negative for abdominal pain.    Genitourinary:  Negative for flank pain.   Neurological:  Negative for brief paralysis.   Psychiatric/Behavioral:  Negative for depression.    Allergic/Immunologic: Negative for hives.       Physical Exam:   Body mass index is 40.83 kg/m².  Vitals:    05/19/25 1103   BP: 136/81   Pulse: 79      GENERAL: Well appearing, appropriate for stated age, no acute distress.  CARDIOVASCULAR: Pulses regular by peripheral palpation.  PULMONARY: Respirations are even and non-labored.  NEURO: Awake, alert, and oriented x 3.  PSYCH: Mood & affect are appropriate.  HEENT: Head is normocephalic and atraumatic.  Ortho/SPM Exam  Skin is intact about the shoulder no signs of erythema or infection  Actively she has full range of motion of the shoulder that demonstrates severe weakness in the supraspinatus and external rotators  Resisted supination is uncomfortable but not severely so  Impingement maneuver is not painful  Cervical range of motion is limited but Spurling maneuver is negative  Neurovascular exam of the upper extremities grossly intact    Imaging:    X-Ray Chest AP Portable  Narrative: EXAM: XR CHEST AP PORTABLE    CLINICAL HISTORY: Chest pain    COMPARISON: 02/09/2023    FINDINGS:    No focal airspace disease process. Unchanged prominence of the right paratracheal stripe.  No pleural effusion or pneumothorax.  Cardiac silhouette size within normal limits.  No acute osseous or soft tissue abnormality. Multilevel degenerative changes of the spine.  Cervicothoracic scoliosis.  Impression: No acute cardiopulmonary abnormality.    Finalized on: 5/10/2025 11:49 AM By:  Kade Ferguson MD  [facility]# [id number]      2025-05-10 11:51:57.862     [facility]      Relevant imaging results reviewed and interpreted by me, discussed with the patient and / or family today.  Shoulder x-rays may show slight narrowing of the subacromial space with some AC arthritis    Other Tests:     None    There are no Patient Instructions on file for this  visit.  Provider Note/Medical Decision Making:     I am very concerned that she has rotator cuff tear.  She has a history consistent with a jerking motion and she has gross weakness to the supraspinatus and external rotators today without significant impingement.  We are going to get an MRI to further work this up and she will follow up afterwards.  There was a chance she may have SLAP tear    I discussed worrisome and red flag signs and symptoms with the patient. The patient expressed understanding and agreed to alert me immediately or to go to the emergency room if they experience any of these.   Treatment plan was developed with input from the patient/family, and they expressed understanding and agreement with the plan. All questions were answered today.         Maria T Sin MD  Orthopaedic Surgery       Disclaimer: This note was prepared using a voice recognition system and is likely to have sound alike errors within the text.            [1]   Social History  Socioeconomic History    Marital status:     Number of children: 2   Occupational History    Occupation: Artist , [employer].   Tobacco Use    Smoking status: Never     Passive exposure: Past    Smokeless tobacco: Never   Substance and Sexual Activity    Alcohol use: No     Comment: on rare holidays    Drug use: No    Sexual activity: Yes     Partners: Male     Birth control/protection: None     Social Drivers of Health     Financial Resource Strain: Medium Risk (9/25/2024)    Overall Financial Resource Strain (CARDIA)     Difficulty of Paying Living Expenses: Somewhat hard   Food Insecurity: No Food Insecurity (9/25/2024)    Hunger Vital Sign     Worried About Running Out of Food in the Last Year: Never true     Ran Out of Food in the Last Year: Never true   Transportation Needs: Patient Declined (9/4/2023)    PRAPARE - Transportation     Lack of Transportation (Medical): Patient declined     Lack of Transportation  (Non-Medical): Patient declined   Physical Activity: Insufficiently Active (9/25/2024)    Exercise Vital Sign     Days of Exercise per Week: 1 day     Minutes of Exercise per Session: 30 min   Stress: No Stress Concern Present (9/25/2024)    [employer] - Occupational Stress Questionnaire     Feeling of Stress : Not at all   Housing Stability: Unknown (9/25/2024)    Housing Stability Vital Sign     Unable to Pay for Housing in the Last Year: No

## 2025-05-20 ENCOUNTER — CLINICAL SUPPORT (OUTPATIENT)
Dept: FAMILY MEDICINE | Facility: CLINIC | Age: 65
End: 2025-05-20
Payer: COMMERCIAL

## 2025-05-20 VITALS — DIASTOLIC BLOOD PRESSURE: 84 MMHG | SYSTOLIC BLOOD PRESSURE: 120 MMHG

## 2025-05-20 DIAGNOSIS — R03.0 ELEVATED BLOOD PRESSURE READING: Primary | ICD-10-CM

## 2025-05-20 PROCEDURE — 99999 PR PBB SHADOW E&M-EST. PATIENT-LVL I: CPT | Mod: PBBFAC,,,

## 2025-05-26 ENCOUNTER — HOSPITAL ENCOUNTER (OUTPATIENT)
Dept: RADIOLOGY | Facility: HOSPITAL | Age: 65
Discharge: HOME | End: 2025-05-26
Attending: ORTHOPAEDIC SURGERY
Payer: COMMERCIAL

## 2025-05-26 DIAGNOSIS — Z96.611 PRESENCE OF RIGHT ARTIFICIAL SHOULDER JOINT: ICD-10-CM

## 2025-05-26 PROCEDURE — 73221 MRI JOINT UPR EXTREM W/O DYE: CPT | Mod: 26,RT,, | Performed by: RADIOLOGY

## 2025-05-26 PROCEDURE — 73221 MRI JOINT UPR EXTREM W/O DYE: CPT | Mod: TC,PN,RT

## 2025-06-03 ENCOUNTER — OFFICE VISIT (OUTPATIENT)
Dept: ORTHOPEDICS | Facility: CLINIC | Age: 65
End: 2025-06-03
Payer: COMMERCIAL

## 2025-06-03 VITALS
DIASTOLIC BLOOD PRESSURE: 85 MMHG | SYSTOLIC BLOOD PRESSURE: 135 MMHG | WEIGHT: 245.38 LBS | HEART RATE: 60 BPM | BODY MASS INDEX: 40.83 KG/M2

## 2025-06-03 DIAGNOSIS — M12.811 ROTATOR CUFF ARTHROPATHY OF RIGHT SHOULDER: Primary | ICD-10-CM

## 2025-06-03 PROCEDURE — 1160F RVW MEDS BY RX/DR IN RCRD: CPT | Mod: CPTII,S$GLB,, | Performed by: ORTHOPAEDIC SURGERY

## 2025-06-03 PROCEDURE — 3075F SYST BP GE 130 - 139MM HG: CPT | Mod: CPTII,S$GLB,, | Performed by: ORTHOPAEDIC SURGERY

## 2025-06-03 PROCEDURE — 3008F BODY MASS INDEX DOCD: CPT | Mod: CPTII,S$GLB,, | Performed by: ORTHOPAEDIC SURGERY

## 2025-06-03 PROCEDURE — 99999 PR PBB SHADOW E&M-EST. PATIENT-LVL III: CPT | Mod: PBBFAC,,, | Performed by: ORTHOPAEDIC SURGERY

## 2025-06-03 PROCEDURE — 99213 OFFICE O/P EST LOW 20 MIN: CPT | Mod: S$GLB,,, | Performed by: ORTHOPAEDIC SURGERY

## 2025-06-03 PROCEDURE — 1159F MED LIST DOCD IN RCRD: CPT | Mod: CPTII,S$GLB,, | Performed by: ORTHOPAEDIC SURGERY

## 2025-06-03 PROCEDURE — 3079F DIAST BP 80-89 MM HG: CPT | Mod: CPTII,S$GLB,, | Performed by: ORTHOPAEDIC SURGERY

## 2025-06-04 ENCOUNTER — OFFICE VISIT (OUTPATIENT)
Dept: CARDIOLOGY | Facility: CLINIC | Age: 65
End: 2025-06-04
Payer: COMMERCIAL

## 2025-06-04 VITALS
OXYGEN SATURATION: 95 % | SYSTOLIC BLOOD PRESSURE: 134 MMHG | DIASTOLIC BLOOD PRESSURE: 78 MMHG | WEIGHT: 240.19 LBS | HEIGHT: 65 IN | HEART RATE: 82 BPM | BODY MASS INDEX: 40.02 KG/M2

## 2025-06-04 DIAGNOSIS — G47.33 OSA ON CPAP: Chronic | ICD-10-CM

## 2025-06-04 DIAGNOSIS — I10 PRIMARY HYPERTENSION: Primary | ICD-10-CM

## 2025-06-04 DIAGNOSIS — E66.01 MORBID OBESITY WITH BMI OF 40.0-44.9, ADULT: ICD-10-CM

## 2025-06-04 DIAGNOSIS — R94.31 EKG ABNORMALITIES: ICD-10-CM

## 2025-06-04 DIAGNOSIS — I73.9 PVD (PERIPHERAL VASCULAR DISEASE): ICD-10-CM

## 2025-06-04 PROCEDURE — 99999 PR PBB SHADOW E&M-EST. PATIENT-LVL IV: CPT | Mod: PBBFAC,,, | Performed by: INTERNAL MEDICINE

## 2025-06-18 NOTE — PROGRESS NOTES
Orthopaedics Sports Medicine     Shoulder Initial Visit         6/25/2025    Referring MD: Maria T Sin Jr.,*    Chief Complaint   Patient presents with    Right Shoulder - Pain         History of Present Illness:   Monica Joy is a 64 y.o. right-hand dominant female who presents with right shoulder pain and dysfunction. The patient presents today on referral from Dr. Sin who initially saw her for this issue on 5/19/25 at which time she reported that she was working on a particularly tall sculpture and she was shaking a paint can just over a month prior and felt a pop in her shoulder and was unable to lift it for a few days.  Functionally she got better but she was still significantly weak and could not pick anything up away from her body.  She did report a pain radiating down the arm just past the elbow. Her physical exam was consistent with rotator cuff tear and was sent for MRI for further evaluation. Her MRI ultimately confirmed chronic rotator cuff tear and discussed intermittent injections versus reverse total shoulder arthoplasty. She was referred to me for further discussion of treatment options.  Of note she has a history of inflammatory arthritis.  She takes methotrexate for this.    Current symptoms include right shoulder pain, weakness, and limited range of motion. She rates her pain a 9/10 today. She describes her pain as intermittent aching, throbbing, sharp, burning, shooting, and stinging, pain. She denies any history of injury to trauma to the shoulder. She denies neck pain or radicular pain.      Pain is aggravated by overhead movement, reaching, lifting.       Evaluation to date: X-Ray, MRI     Treatment to date: Rest, activity modification. She has allergies to steroids as well as oxycodone and hydrocodone.    Past Medical History:   Past Medical History:   Diagnosis Date    Anemia 2016    Asthma 09/06/2013    Food allergy 2000    Seafood    Gout, arthritis     Hearing loss 11/2021     Notice its hard to hear very low voices    Obesity 2019    Osteoarthritis     Seasonal allergies     Sleep apnea     Thyroid disease        Past Surgical History:   Past Surgical History:   Procedure Laterality Date    BONE GRAFT Right 2021    Procedure: BONE GRAFT;  Surgeon: Miguel Yoo MD;  Location: [facility];  Service: Orthopedics;  Laterality: Right;  bone graft to fill the defect in the distal radial epiphysis/Reduction of the four-corner area will be performed with bone graft and fusion     SECTION      COLONOSCOPY N/A 2019    Procedure: COLONOSCOPY;  Surgeon: Scout Rivera MD;  Location: [facility] ENDO;  Service: Endoscopy;  Laterality: N/A;    COLONOSCOPY, SCREENING, LOW RISK PATIENT N/A 10/28/2024    Procedure: COLONOSCOPY, SCREENING, LOW RISK PATIENT;  Surgeon: Darlene Marcano MD;  Location: [facility];  Service: Endoscopy;  Laterality: N/A;    FRACTURE SURGERY      HAND FUSION Right 2021    Procedure: FUSION, JOINT, HAND;  Surgeon: Miguel Yoo MD;  Location: [facility] OR;  Service: Orthopedics;  Laterality: Right;  dip joint    JOINT REPLACEMENT      Novasure Endometrial Ablation  2016    OSTECTOMY Right 2021    Procedure: OSTECTOMY;  Surgeon: Miguel Yoo MD;  Location: [facility] OR;  Service: Orthopedics;  Laterality: Right;  radial styloidectomy as well as scaphoid excision    THYROIDECTOMY, PARTIAL      TUBAL LIGATION      TYMPANOSTOMY TUBE PLACEMENT      WRIST SURGERY         Medications:  Patient's Medications   New Prescriptions    No medications on file   Previous Medications    ALBUTEROL (PROVENTIL/VENTOLIN HFA) 90 MCG/ACTUATION INHALER    INHALE 2 PUFFS INTO THE LUNGS EVERY 4 HOURS AS NEEDED FOR WHEEZING OR SHORTNESS OF BREATH    ALLOPURINOL (ZYLOPRIM) 100 MG TABLET    Take 2 tablets (200 mg total) by mouth once daily.    CYANOCOBALAMIN, VITAMIN B-12, (VITAMIN B-12 SL)    Place under the tongue.    FOLIC ACID (FOLVITE) 1  MG TABLET    Take 1 tablet (1 mg total) by mouth once daily.    FUROSEMIDE (LASIX) 20 MG TABLET    Take 1 tablet (20 mg total) by mouth once daily.    IBUPROFEN (ADVIL,MOTRIN) 800 MG TABLET    Take 1 tablet daily as needed for up to 7 days for flares of inflammatory arthritis.    LEVOCETIRIZINE (XYZAL) 5 MG TABLET    TAKE 1 TABLET(5 MG) BY MOUTH EVERY EVENING    LIDOCAINE (LIDODERM) 5 %    Place 2 patches onto the skin once daily. Remove & Discard patch within 12 hours or as directed by MD    MELOXICAM (MOBIC) 15 MG TABLET    TAKE 1 TABLET(15 MG) BY MOUTH EVERY DAY    METHOTREXATE 2.5 MG TAB    Take 6 tablets (15 mg total) by mouth every 7 days.   Modified Medications    No medications on file   Discontinued Medications    No medications on file       Allergies:   Review of patient's allergies indicates:   Allergen Reactions    Amoxicillin Rash and Swelling     Rash with throat swelling    Codeine Palpitations, Rash and Swelling     Other reaction(s): Shortness of breath  Other reaction(s): Hives  Rash and throat swelling    Doxycycline Swelling     Other reaction(s): Rash    Hydrocodone Rash and Swelling    Iodine and iodide containing products Rash and Swelling    Lortab [hydrocodone-acetaminophen] Other (See Comments)     Other reaction(s): Hives  Other reaction(s): Difficulty breathing    Medrol [methylprednisolone] Anaphylaxis    Oxycodone Shortness Of Breath, Rash and Swelling    Oxycodone hcl-oxycodone-asa Rash and Swelling    Percodan filipe Shortness Of Breath    Quinidine-quinine analogues (cinchona alkaloids) Shortness Of Breath, Swelling and Rash    Tetracycline Swelling    Tetracyclines Rash and Swelling    Tramadol Shortness Of Breath, Rash and Swelling    Vibramycin [doxycycline calcium] Shortness Of Breath     Other reaction(s): Hives  Other reaction(s): Difficulty breathing    Augmentin [amoxicillin-pot clavulanate] Itching and Rash    Levaquin [levofloxacin] Hives    Sulfa (sulfonamide antibiotics)  "Itching and Rash    Percocet  [oxycodone-acetaminophen]      Other reaction(s): Shortness of breath    Shellfish containing products      Other reaction(s): Shortness of breath  Other reaction(s): Hives  Other reaction(s): Shortness of breath  Other reaction(s): Hives       Social History:   Home town: [place], [place]  Occupation: refurbishes and paints sculptures  Alcohol use: She reports no history of alcohol use.  Tobacco use: She reports that she has never smoked. She has been exposed to tobacco smoke. She has never used smokeless tobacco.    Review of systems:  History of recent illness, fevers, shakes, or chills: no  History of cardiac problems or chest pain: no  History of pulmonary problems or asthma: no  History of diabetes: no  History of prior dvt or clotting problems: no  History of sleep apnea: no      Physical Examination:  Estimated body mass index is 39.99 kg/m² as calculated from the following:    Height as of this encounter: 5' 5" (1.651 m).    Weight as of this encounter: 109 kg (240 lb 4.8 oz).    General  Healthy appearing female in no acute distress  Alert and oriented, normal mood, appropriate affect    Shoulder Examination:  Patient is alert and oriented, no distress. Skin is intact. Neuro is normal with no focal motor or sensory findings.    Cervical exam is unremarkable. Intact cervical ROM. Negative Spurling's test    Physical Exam:  RIGHT    LEFT    Scap Dyskinesis/Winging (-)    (-)    Tenderness:          Greater Tuberosity             +    (-)  Bicipital Groove  (-)    (-)  AC joint   (-)    (-)  Other:     ROM:  Forward Elevation 90    180  Abduction  80    120  ER (at side)  40    80  IR   Hip    L1    Strength:   Supraspinatus  3/5    5/5  Infraspinatus  3/5    5/5  Subscap / IR  3/5    5/5     Special Tests:   Neer:    +    (-)   Wade:   +    (-)   SS Stress:   +    (-)   Bear Hug:   (-)    (-)   Stanford's:   +    (-)   Resisted Thrower's: "   +    (-)   Speed's   +    (-)   Cross Arm Abduction:  (-)    (-)    Neurovascular examination  - Motor grossly intact bilaterally to shoulder abduction, elbow flexion and extension, wrist flexion and extension, and intrinsic hand musculature  - Sensation intact to light touch bilaterally in axillary, median, radial, and ulnar distributions  - Symmetrical radial pulses      Imaging:  XR Results:  Results for orders placed during the hospital encounter of 05/06/25    X-ray Shoulder 2 or More Views Right    Narrative  EXAM:  XR SHOULDER COMPLETE 2 OR MORE VIEWS RIGHT    CLINICAL HISTORY:  Pain    3 views right shoulder.    FINDINGS: The bones, joint spaces and soft tissues are within normal limits. No acute fracture or subluxation.    Impression  No acute fracture or dislocation.    Finalized on: 5/6/2025 4:01 PM By:  Felix Mcnally MD  [facility]# [id number]      2025-05-06 16:03:37.109     [facility]      MRI Results:  Results for orders placed during the hospital encounter of 05/26/25    MRI Shoulder Without Contrast Right    Narrative  EXAM:  MRI SHOULDER WITHOUT CONTRAST RIGHT    CLINICAL INDICATION: Pain in right shoulder.    TECHNIQUE: MR right shoulder performed with multiplanar multisequence imaging.    COMPARISON STUDY:  None.    FINDINGS: There is massive rotator cuff tearing with complete full-thickness tears supraspinatus and infraspinatus tendons, 3.5 cm tendon retraction, and 4 cm AP fluid-filled rotator cuff defect.    There is also a large full-thickness tear involving this distal subscapularis tendon with residual intact inferior fibers.    There is a balanced pattern of mild rotator cuff atrophy with minor fatty streaking.  There is infraspinatus muscle belly edema, intermuscular edema.    Long head biceps tendon is disrupted with fluid-filled empty bicipital groove, approximately 9 cm distal tendon retraction to the musculotendinous junction.    Diffuse markedly diminutive labrum with superimposed SLAP  tear.    There is a large glenohumeral joint effusion with synovitis.  There is glenohumeral osteoarthritis with spurring, superior glenoid subarticular cyst, 5 mm, with overlying full-thickness glenoid chondral defect.  The chondral surfaces are otherwise well preserved.    Normal IGHL.    Type I acromion with lateral tilting.  There is AC joint arthropathy with spurring, tiny subarticular cysts, and joint effusion.  There is a subacromial subdeltoid bursal fluid collection adjustment of the rotator cuff tear.    There is superior migration humeral head narrowing the acromiohumeral interval.    The bone marrow signal intensity is otherwise normal.    Impression  1.  Massive rotator cuff tearing.  Complete full-thickness tears supraspinatus, infraspinatus tendons.  Large full-thickness tear subscapularis tendon.  Symmetric pattern of mild muscle belly atrophy with fatty streaking infraspinatus muscle belly edema.  Superior migration humeral head.  2.  Long head biceps tendon disruption, 9 cm distal retraction.  3.  Diffuse markedly diminutive labrum with SLAP tear.  4.  Large glenohumeral joint effusion and synovitis.  5.  Mild to moderate glenohumeral and AC joint arthropathy.    Finalized on: 5/26/2025 9:32 AM By:  Herber Araujo MD  [facility]# [id number]      2025-05-26 09:34:[id number]     [facility]      CT Results:  No results found for this or any previous visit.      Physician Read: I agree with the above impression.      Impression:  64 y.o. female with right shoulder rotator cuff tear arthropathy        Plan:  Discussed diagnosis and treatment options with the patient today. Her symptoms, physical exam, and imaging are most consistent with right shoulder rotator cuff tear arthropathy.  She has chronic appearing rotator cuff tear along with glenohumeral arthritis.  She also has significant joint synovitis from inflammatory arthritis.  Discussed non-operative treatment options in the form of rest, activity modifications, oral  anti-inflammatories, corticosteroid injections, and physical therapy/physician directed home exercise program. Discussed that definitive management of this condition consists of operative treatment in the form of reverse total shoulder arthroplasty as I do not think her rotator cuff could be reliably repaired. We discussed the expected recovery timeline following surgery as well.   She is unable to take any type of steroid type injection is due to allergic reaction.  I recommend proceeding with right shoulder reverse total shoulder arthroplasty.  We discussed all the risks, benefits, limitations, and alternatives of surgery today.  We went over the postoperative rehab and recovery protocol in detail.  Despite the risks, she elected proceed for the surgery the consent was freely signed.    We will plan to perform surgery in September.  She has multiple upcoming projects that need to get finished 1st.  If her symptoms significantly worsen and she would like to move her date up and she will let us know.  Follow-up with me 10-14 days after surgery.           Riki Cnatu MD    I, Bishnu Castro, acted as a scribe for Riki Cantu MD for the duration of this office visit.

## 2025-06-25 ENCOUNTER — OFFICE VISIT (OUTPATIENT)
Dept: SPORTS MEDICINE | Facility: CLINIC | Age: 65
End: 2025-06-25
Payer: COMMERCIAL

## 2025-06-25 VITALS — WEIGHT: 240.31 LBS | BODY MASS INDEX: 40.04 KG/M2 | HEIGHT: 65 IN

## 2025-06-25 DIAGNOSIS — Z01.818 PREOPERATIVE CLEARANCE: ICD-10-CM

## 2025-06-25 DIAGNOSIS — M75.101 ROTATOR CUFF TEAR ARTHROPATHY OF RIGHT SHOULDER: Primary | ICD-10-CM

## 2025-06-25 DIAGNOSIS — M12.811 ROTATOR CUFF TEAR ARTHROPATHY OF RIGHT SHOULDER: Primary | ICD-10-CM

## 2025-06-25 PROCEDURE — 99999 PR PBB SHADOW E&M-EST. PATIENT-LVL V: CPT | Mod: PBBFAC,,, | Performed by: STUDENT IN AN ORGANIZED HEALTH CARE EDUCATION/TRAINING PROGRAM

## 2025-06-25 NOTE — PATIENT INSTRUCTIONS
In preparation for you upcoming surgery, here are some things to keep in mind leading up to and after your surgery:    PRE-ADMIT APPOINTMENT  We have a department that will review your chart for any health conditions or other issues to make sure that it is safe from an anesthesia standpoint to undergo surgery.   If they have any concerns they may schedule an appointment for you to be evaluated and have any further testing done. This may include but is not limited to bloodwork, EKG, chest X-ray, referral to cardiologist for additional testing/clearance, referral to pulmonologist for additional testing/clearance, or referral to any other needed specialties for additional testing/clearance.  If only basic testing is needed this appointment may be scheduled a few days before your actually surgery. Unless any new concerns or issues arise, this typically does not affect the date of your surgery.   If you are on any medications, at this appointment they will also review and discuss/provide instructions on when to stop or start taking these medications before and after surgery.   INSTRUCTIONS FOR SURGERY   The day before your surgery (usually between 1-3 PM), someone will call you to give you the scheduled time for you surgery, what time you need to arrive, what time to not eat/drink past, and any other final instructions  If your surgery is scheduled for Monday then they will call you on Friday afternoon.   If you do not receive this call please reach out to our office before the end of the day (4 PM) so that we may assist you.   HOME EXERCISES AFTER SURGERY        PHYSICAL THERAPY  A referral for physical therapy will be placed to the location we discussed today. If you would like to make changes to this, please give us a call or send us a [employer] message as soon as possible so we may coordinate these changes.   We will send that referral to the desired location and also provide them with the rehabilitation protocol that  will be followed to make sure you are progressed appropriately after surgery.   They will also be provided with the start date of your PT after surgery. You will likely start PT prior to you first post-op appointment. Depending on the procedure you have performed this may be as soon as 3 days after surgery or up to 2 weeks after surgery. Below are a few examples of some common time frames for certain procedures:  Rotator cuff surgery- 10-11 days after surgery  Shoulder labrum surgery- 3-5 days after surgery   Shoulder replacement- 3-5 days after surgery  ACL Reconstruction- 3-5 days after surgery  Hip scope- 3-5 days after surgery  Distal biceps tendon repair- once post-op splint is removed/per Dr. Cantu recommendation  Fracture- once post-op splint is removed/per Dr. Cantu recommendation   If you ever have any problems or issues with your physical therapy or wish to change locations at any point, please let us know and we are happy to assist with that change.   POST-OP APPOINTMENTS  Post-op appointments will be scheduled at 2 weeks, 6 weeks, and 3 months from the date of your surgery. We will schedule these appointments prior to your surgery and they will be able to be viewed in [employer].  2 week post-op appointment  This appointment will be with Dacia Jj who is Dr. Cantu's physician assistant (PA). At this appointment we will be removing your sutures, checking for any signs of infection or other concerning issues, and checking to make sure your range of motion is appropriate.   Dr. Cantu will also be in clinic on the same day as this appointment and can step in to see you if there is anything of concern that needs to be addressed.   You may also have X-rays scheduled at this appointment, depending on the procedure you had performed (shoulder replacement, ACL surgery, surgery for a fracture, etc.)  6 week post-op appointment  This appointment will be with Dr. Cantu. He will make sure  everything is progressing well and may also review your pictures from surgery if any were taken.   You may also have X-rays at this appointment, depending on the procedure you had performed (shoulder replacement, surgery for a fracture, etc.)  3 month post-op appointment  This appointment will be with Dr. Cantu. He will make sure you continue to progress appropriately.  Any follow-ups after this visit will be at the discretion of Dr. Cantu based upon your recovery/progress, procedure performed, etc.   FMLA OR SHORT TERM DISABILITY PAPERWORK  If you have any paperwork that needs to be filled out in regards to FMLA leave or short term disability leave, you may drop these forms off at the [place] or attachment them to a patient message via [employer].   These forms will be filled out within a few days of your actual surgery being performed in case your surgery date is rescheduled or changed and the forms would need to potentially be filled out again.   Please try to provide these forms to our office in a timely manner, as we ask for 5-7 business days for them to be completed.       REVERSE TOTAL SHOULDER REPLACMENT    This information does not include all information related to shoulder instablity. For more information please visit the American Academy [employer] website using the following link: Reverse Total Shoulder Replacement    Every year, thousands of conventional total shoulder replacements are successfully done in the [place] for patients with shoulder arthritis.    However, this type of procedure is not as beneficial for patients with large rotator cuff tears who have developed a complex type of shoulder arthritis called cuff tear arthropathy. For these patients, conventional total shoulder replacement may result in pain and limited motion, and reverse total shoulder replacement is a better option.    Description  A conventional shoulder replacement device mimics the normal anatomy of the shoulder:  A plastic cup is fitted into the shoulder socket (glenoid), and a metal ball is attached to the top of the upper arm bone (humerus).    In a reverse total shoulder replacement, the socket and metal ball are switched. The metal ball is fixed to the socket, and the plastic cup is fixed to the upper end of the humerus. A reverse total shoulder replacement works better for people with cuff tear arthropathy because it relies on different muscles to move the arm.    In a healthy shoulder, the rotator cuff muscles help position and power the arm during range of motion. A conventional replacement device also uses the rotator cuff muscles to function properly.In a patient with a large rotator cuff tear and cuff tear arthropathy, these muscles no longer function. The reverse total shoulder replacement relies on the deltoid muscle, instead of the rotator cuff, to power and position the arm. It essentially re-creates the function of the torn rotator cuff.    This surgery was originally designed in the 1980s in [place]. The Food and Drug Administration (FDA) approved its use in the [place] in 2003.        Candidates for Surgery  Reverse total shoulder replacement may be recommended if you have:  A completely torn rotator cuff that cannot be repaired  Cuff tear arthropathy  A previous shoulder replacement that was unsuccessful  Severe shoulder pain and difficulty lifting your arm away from your side or over your head  A complex fracture of the shoulder joint  A chronic shoulder dislocation  A tumor of the shoulder joint    Your doctor may also recommend surgery if nonsurgical treatments, such as rest, medications, cortisone injections, and/or physical therapy, have not relieved your shoulder pain    Preparing for Surgery  Your orthopaedic surgeon will help you plan and prepare for your shoulder surgery.    Medical Evaluation  Most patients must have a complete physical by their primary care doctor or internist before surgery. This is  needed to make sure you are healthy enough to have the surgery and complete the recovery.    Many patients with chronic medical conditions, like heart disease, must also be evaluated by a specialist, such a cardiologist, before the surgery.    Medications  Be sure to talk to your orthopaedic surgeon about the medications you take. Some medications may need to be stopped before surgery. For example, the following over-the-counter medicines may cause excessive bleeding and should be stopped 2 weeks before surgery:    Non-steroidal anti-inflammatory medications, such as aspirin, ibuprofen, and naproxen  Some arthritis medications    If you take blood thinners, either your primary care doctor or cardiologist will advise you about stopping these medications before surgery. Additionally, certain types of rheumatoid arthritis medication may need to be stopped for a period of time.     Home Planning  Making simple changes in your home before surgery can make your recovery period easier.For the first several weeks after your surgery, it will be hard to reach high shelves and cupboards. Before your surgery, be sure to go through your home and place any items you may need afterwards on low shelves. When you come home from the hospital, you will need help for a few weeks with some daily tasks like dressing, bathing, cooking, and laundry. If you will not have any support at home immediately after surgery, you may need a short stay in a rehabilitation facility until you become more independent.    Learn more: Preparing for Joint Replacement Surgery    Your Surgery    Before Your Operation  Wear loose-fitting clothes and a button-front shirt when you go to the hospital for your surgery. After surgery, you will be wearing a sling and will have limited use of your arm.    Nerve Block  Will receive a nerve block for your surgery to help control your pain after surgery. This cab cause your arm (down to your hand) to feel numb for up  to 3 days after surgery. However, it may wear off sooner.      Surgical Procedure  The procedure to replace your shoulder joint with an artificial device usually takes about 2-2.5 hours. After surgery, you will be moved to the recovery room, where you will remain  while your recovery from anesthesia is monitored. Barring any complications you will go home the day of your surgery.   A video of what reverse total shoulder replacement looks like can be found at the following link: Reverse Total Shoulder Replacement Video        After Your Surgery    Immobilization  When you leave the hospital, your arm will be in a sling. You will need the sling to support and protect your shoulder for the first 2 to 6 weeks after surgery, depending on the complexity of your surgery and your surgeon's preference.    Dressing and Wound Care  Keep the dressing clean and dry. It is normal for there to be some drainage after surgery since the shoulder was irrigated with large amounts of fluid. Reinforce with additional gauze as necessary.  Remove the dressing the 7th day after surgery and begin changing daily with clean gauze or Band-Aids®. Keep your incisions covered until you follow up in clinic.  If you have Steri-Strips in place of stitches, allow them to stay in place as long as possible. Steri-Strips are made of a fabric material that can get wet in the shower and pat dry with a towel. They usually fall off on their own within 7 to 10 days. You may trim the edges as they begin to curl.    You may bathe or shower on the 7th day after surgery, but do not scrub or soak the incisions. Dry the area by gently blotting it with a gauze or towel. After it is completely dry, cover the wound with clean gauze or Band-Aids®. Do NOT submerge the incisions (bath/swim) until after the sutures are removed and the wound has completely healed.     Post-Op Medications    Pain Control  After surgery, you will feel pain. However, it is important to stay  ahead of pain as it becomes challenging to get under control if you fall behind. Ice and elevation can help and should be used as much as possible in the first few days.   Narcotic pain medications, such as tramadol and oxycodone, should be taken as prescribed. The Tramadol is intended to be taken first as the primary medicine and then oxycodone taken for breakthrough pain. Wean off as soon as possible. Take these with food to decrease the chances of nausea and vomiting. Do not drink alcohol, drive a vehicle, or use heavy machinery while taking narcotic pain medications.   NSAID medications are used for pain control and to decrease inflammation. You may be prescribed an NSAID such as celebrex. Take as instructed. Other NSAID medications such as ibuprofen, Motrin, Advil, naproxen, or Aleve can be used once you have finished the celebrex, or if a prescription for celebrex was not provided.   Acetaminophen (Tylenol) is an effective over-the-counter pain medication that can be used with NSAID medications and non-acetaminophen containing narcotics such as plain oxycodone.     Blood Clot Prevention  You should take one 81 mg baby aspirin twice daily for two weeks starting the evening of the day you have surgery unless instructed otherwise or taking a different blood thinner such as enoxaparin or warfarin. If you are aware that you are at high risk for a blood clot, notify your physician as soon as possible.   Take aspirin at least 30 minutes before taking ibuprofen or Toradol.    Constipation Prevention  Anesthesia and pain medications, changes in eating and drinking, and less activity can all lead to constipation after surgery. To prevent or reduce constipation, take an over-the-counter stool softener (brands include Colace and Miralax). Follow the directions on the bottle. Drink plenty of water and eat high fiber foods including whole grains, fresh fruits, vegetables, beans, prunes or prune juice.    Physical  Therapy  Exercise is a critical component of home care, particularly during the first few weeks after surgery and this will include physical therapy, which will play a vital role in getting you back to your daily activities by helping you regain shoulder strength and motion. Physical therapy will start 3-4 days after your surgery (it can start before your first post-op visit with your doctor). It will progress as follows  Passive exercise: Even though your tear has been repaired, the muscles around your arm remain weak. Once your surgeon decides it is safe for you to move your arm and shoulder, a therapist will help you with passive exercises to improve range of motion in your shoulder. With passive exercise, your therapist supports your arm and moves it in different positions. In most cases, passive exercise is begun within the first 4 to 6 weeks after surgery.  Active exercise: After 6 weeks, you will progress to doing active exercises without the help of your therapist. Moving your muscles on your own will gradually increase your strength and improve your arm control. At 8 to 12 weeks, your therapist will start you on a strengthening exercise program.    Driving  Your physician will provide recommendations on when it is safe to drive after surgery. At minimum you must NOT be taking any narcotic/opoid medications and feel you are capable of driving. It is NOT recommended that you drive while wearing a sling.     Expectations  Your surgeon will address what appropriate expectations following the surgery will be. You can expect to have functional range of motion which includes the ability to reach the back of your head to be able to wash or scratch it and to be able to pull up your pants. You WILL NOT be able to reach and scratch or wash your back following shoulder replacement surgery.     Reverse total shoulder replacement provides outstanding pain relief and patient satisfaction is typically very high. Early and  mid-term studies of the results of this surgery have been very promising.    Complications  Your orthopaedic surgeon will explain the potential risks and complications of shoulder joint replacement, including those related to the surgery itself and those that can occur over time after your surgery.When complications occur, most are successfully treatable. Possible complications include the following.    Infection: Infection is a complication of any surgery. In shoulder joint replacement, infection may occur in the wound or deep around the prosthesis. It may happen while in the hospital or after you go home. It may even occur years later. Minor infections in the wound area are generally treated with antibiotics. Major or deep infections may require more surgery and removal of the prosthesis. Any infection in your body can spread to your joint replacement.  Prosthesis Problems: Although prosthesis designs and materials, as well as surgical techniques, continue to advance, the prosthesis may wear down and the components may loosen. The components of a shoulder replacement may also dislocate. Excessive wear, loosening, or dislocation may require additional surgery (revision procedure).  Nerve damage: Nerves in the vicinity of the joint replacement may be damaged during surgery, although this type of injury is infrequent. Over time, these nerve injuries often improve and may completely recover.    Do's and Don'ts  The success of your surgery will depend largely on how well you follow your orthopaedic surgeon's instructions at home during the first few weeks after surgery. Here are some common do's and don'ts for when you return home:    DON'T use the arm to push yourself up in bed or from a chair because this requires forceful contraction of muscles.  Do follow the program of exercises prescribed for you by your surgeon and/or physical therapist.. You may need to do the exercises 2 to 3 times a day for a month or  more.  DON'T overdo it! If your shoulder pain was severe before the surgery, the experience of pain-free motion may lull you into thinking that you can do more than is prescribed. Early overuse of the shoulder may result in severe limitations in motion.  DON'T lift anything heavier than a glass of water for the first 2 to 4 weeks after surgery.  Do ask for assistance. Your physician may be able to recommend an agency or facility to help if you do not have home support.  DON'T participate in contact sports or do any repetitive heavy lifting after your shoulder replacement.  Do avoid placing your arm in any extreme position, such as straight out to the side or behind your body for the first 6 weeks after surgery.    Many thousands of patients have experienced an improved quality of life after shoulder joint replacement surgery. They experience less pain, improved motion and strength, and better function.    Links      Reverse Total Shoulder Replacement  Preparing for Joint Replacement Surgery  Reverse Total Shoulder Replacement Video  AAOS Clinical Practice Guideline on the Management of Glenohumeral Joint Arthritis

## 2025-06-26 ENCOUNTER — OFFICE VISIT (OUTPATIENT)
Dept: OTOLARYNGOLOGY | Facility: CLINIC | Age: 65
End: 2025-06-26
Payer: COMMERCIAL

## 2025-06-26 VITALS — WEIGHT: 244.06 LBS | BODY MASS INDEX: 40.61 KG/M2

## 2025-06-26 DIAGNOSIS — H92.12 OTORRHEA, LEFT: Primary | ICD-10-CM

## 2025-06-26 DIAGNOSIS — H72.92 PERFORATION OF LEFT TYMPANIC MEMBRANE: ICD-10-CM

## 2025-06-26 PROCEDURE — 99214 OFFICE O/P EST MOD 30 MIN: CPT | Mod: 25,S$GLB,, | Performed by: STUDENT IN AN ORGANIZED HEALTH CARE EDUCATION/TRAINING PROGRAM

## 2025-06-26 PROCEDURE — 3008F BODY MASS INDEX DOCD: CPT | Mod: CPTII,S$GLB,, | Performed by: STUDENT IN AN ORGANIZED HEALTH CARE EDUCATION/TRAINING PROGRAM

## 2025-06-26 PROCEDURE — 99999 PR PBB SHADOW E&M-EST. PATIENT-LVL III: CPT | Mod: PBBFAC,,, | Performed by: STUDENT IN AN ORGANIZED HEALTH CARE EDUCATION/TRAINING PROGRAM

## 2025-06-26 PROCEDURE — 92504 EAR MICROSCOPY EXAMINATION: CPT | Mod: S$GLB,,, | Performed by: STUDENT IN AN ORGANIZED HEALTH CARE EDUCATION/TRAINING PROGRAM

## 2025-06-26 PROCEDURE — 1159F MED LIST DOCD IN RCRD: CPT | Mod: CPTII,S$GLB,, | Performed by: STUDENT IN AN ORGANIZED HEALTH CARE EDUCATION/TRAINING PROGRAM

## 2025-06-26 RX ORDER — CIPROFLOXACIN HYDROCHLORIDE 3 MG/ML
4 SOLUTION/ DROPS OPHTHALMIC 2 TIMES DAILY
Qty: 10 ML | Refills: 0 | Status: SHIPPED | OUTPATIENT
Start: 2025-06-26 | End: 2025-07-01

## 2025-06-26 NOTE — PROGRESS NOTES
Chief complaint:   Chief Complaint   Patient presents with    Ear Fullness     Started as sinus congestion now both ears are full.  L also leaking sticky white liquid.  Denies any pain at this time.          Referring Provider:  No referring provider defined for this encounter.    History of Present Illness:     Ms. Joy is a 64 y.o. female w/hx bilateral tube placement in 8/2019 presenting for evaluation of left ear drainage.     Right tube extruded 1+ years ago. Since then the right ear has done well    However, left ear has had intermittent drainage over the last couple years. Every few months, despite directed drops.     Started draining again recently. Just started drops. Associated muffled hearing.    Update 3/4/24  L PET removed at lats visit. Feeling some popping at times. Drainage improved. No pain    Update 6/4/24  Overall doing well. Feels sensation of air going through the air at times. Hearing feels stable. No pain, drainage, pressure sensation.     Update 12/9/24  Continued worsening of hearing AU over last 2 years.   No longer any pain, fullness, pressure, or drainage.  Ears did great on a flight recently.     Update 6/26/25  Returns today left ear drainage and fullness  Started 2 weeks ago after getting cold symptoms after a cruise        History        Past Medical History:   Past Medical History:   Diagnosis Date    Anemia 2016    Asthma 09/06/2013    Food allergy 2000    Seafood    Gout, arthritis     Hearing loss 11/2021    Notice its hard to hear very low voices    Obesity 2019    Osteoarthritis 2000    Seasonal allergies 1991    Sleep apnea     Thyroid disease     .          Past Surgical History:  Past Surgical History:   Procedure Laterality Date    BONE GRAFT Right 03/19/2021    Procedure: BONE GRAFT;  Surgeon: Miguel Yoo MD;  Location: [facility];  Service: Orthopedics;  Laterality: Right;  bone graft to fill the defect in the distal radial epiphysis/Reduction of the  four-corner area will be performed with bone graft and fusion     SECTION      COLONOSCOPY N/A 2019    Procedure: COLONOSCOPY;  Surgeon: Scout Rivera MD;  Location: [facility] ENDO;  Service: Endoscopy;  Laterality: N/A;    COLONOSCOPY, SCREENING, LOW RISK PATIENT N/A 10/28/2024    Procedure: COLONOSCOPY, SCREENING, LOW RISK PATIENT;  Surgeon: Darlene Marcano MD;  Location: [facility] ENDO;  Service: Endoscopy;  Laterality: N/A;    FRACTURE SURGERY      HAND FUSION Right 2021    Procedure: FUSION, JOINT, HAND;  Surgeon: Miguel Yoo MD;  Location: [facility] OR;  Service: Orthopedics;  Laterality: Right;  dip joint    JOINT REPLACEMENT      Novasure Endometrial Ablation  2016    OSTECTOMY Right 2021    Procedure: OSTECTOMY;  Surgeon: Miguel Yoo MD;  Location: [facility] OR;  Service: Orthopedics;  Laterality: Right;  radial styloidectomy as well as scaphoid excision    THYROIDECTOMY, PARTIAL      TUBAL LIGATION      TYMPANOSTOMY TUBE PLACEMENT  2020    WRIST SURGERY     .         Medications: Medication list was reviewed. She  has a current medication list which includes the following prescription(s): albuterol, allopurinol, cyanocobalamin (vitamin b-12), folic acid, furosemide, ibuprofen, levocetirizine, lidocaine, meloxicam, and methotrexate, and the following Facility-Administered Medications: epinephrine, lactated ringers, nozaseptin, and sodium chloride 0.9%.         Allergies:   Review of patient's allergies indicates:   Allergen Reactions    Amoxicillin Rash and Swelling     Rash with throat swelling    Codeine Palpitations, Rash and Swelling     Other reaction(s): Shortness of breath  Other reaction(s): Hives  Rash and throat swelling    Doxycycline Swelling     Other reaction(s): Rash    Hydrocodone Rash and Swelling    Iodine and iodide containing products Rash and Swelling    Lortab [hydrocodone-acetaminophen] Other (See Comments)     Other reaction(s): Hives  Other  reaction(s): Difficulty breathing    Medrol [methylprednisolone] Anaphylaxis    Oxycodone Shortness Of Breath, Rash and Swelling    Oxycodone hcl-oxycodone-asa Rash and Swelling    Percodan filipe Shortness Of Breath    Quinidine-quinine analogues (cinchona alkaloids) Shortness Of Breath, Swelling and Rash    Tetracycline Swelling    Tetracyclines Rash and Swelling    Tramadol Shortness Of Breath, Rash and Swelling    Vibramycin [doxycycline calcium] Shortness Of Breath     Other reaction(s): Hives  Other reaction(s): Difficulty breathing    Augmentin [amoxicillin-pot clavulanate] Itching and Rash    Levaquin [levofloxacin] Hives    Sulfa (sulfonamide antibiotics) Itching and Rash    Percocet  [oxycodone-acetaminophen]      Other reaction(s): Shortness of breath    Shellfish containing products      Other reaction(s): Shortness of breath  Other reaction(s): Hives  Other reaction(s): Shortness of breath  Other reaction(s): Hives            Family history: family history includes Anuerysm in her brother and mother; Arthritis in her brother, brother, brother, brother, father, and mother; Asthma in her brother and daughter; Atrial fibrillation in her mother; Brain aneurysm in her brother; COPD in her brother; Cancer in her paternal aunt and paternal grandmother; Diabetes in her brother, brother, brother, and father; Early death in her brother; Heart disease in her brother, brother, father, and mother; Heart disease (age of onset: 35) in her brother; Hyperlipidemia in her father; Hypertension in her brother, brother, father, and son; Kidney disease in her brother and father; Miscarriages / Stillbirths in her daughter and mother; Osteoarthritis in her brother and mother; Stroke in her brother, father, and mother.         Social History          Alcohol use:  reports no history of alcohol use.            Tobacco:  reports that she has never smoked. She has been exposed to tobacco smoke. She has never used smokeless tobacco.          Please see the patient's intake form for full details of past medical history, past surgical history, family history, social history and review of systems. ?This information was reviewed by me and noted.      Physical Examination     General: Well developed, well nourished, well hydrated. Verbal with a strong voice and not dysphonic.     Head/Face: Normocephalic, atraumatic. No scars or lesions. Facial musculature equal.     Eyes: No scleral icterus or conjunctival hemorrhage. EOMI. PERRLA.     Ears:     Right ear: No gross deformity. EAC is clear of debris and erythema. The TM is intact with a pneumatized middle ear. No signs of retraction, fluid or infection.      Left ear: No gross deformity. EAC is clear of debris and erythema. TM with posterior monomer with pinpoint perf at its periphery    Hearing: grossly intact      Ear Microscopy    Indication: microscopy was required for removal of obstructing pathology and adequate visualization of the tympanic membrane and middle ear    Technique: The patient was placed in a semi-recumbent position.  The left ear was first inspected under the microscope. There was evidence of white drainage through the pinpoint perforation, erythema of the TM, effusion in middle ear.  This was removed with suction.        Data review:    Review of records:      I reviewed records from the referring provider's office visits.  These describe the history, workup, and/or treatment of this problem thus far.    Audiogram     Audiogram was independently reviewed         Audio 12/9/24    Mild sloping to moderately severe Sensorineural Hearing Loss  Good word rec AU  Type A AD,  large volume AS    Culture 2020 and 2019  Component 2 yr ago   Aerobic Bacterial Culture No growth           Assessment/Plan:      1. Otorrhea, left    2. Perforation of left tympanic membrane          Since left PET removed she has a residual pinpoint perforation, which has been helpful for her Eustachian Tube  Dysfunction symptoms and is not causing any Conductive Hearing Loss.     This is first episode of drainage and started after URI  Will treat with drops and dry ear precautions  Return to clinic if not resolved or recurs in coming months               Dayron Barnes MD  [facility]   [facility]  [id number] [place] [address].  [place] [address]  P: [phone]  F: [phone]

## 2025-07-01 ENCOUNTER — PATIENT MESSAGE (OUTPATIENT)
Dept: OTOLARYNGOLOGY | Facility: CLINIC | Age: 65
End: 2025-07-01
Payer: COMMERCIAL

## 2025-07-14 ENCOUNTER — OFFICE VISIT (OUTPATIENT)
Dept: OTOLARYNGOLOGY | Facility: CLINIC | Age: 65
End: 2025-07-14
Payer: COMMERCIAL

## 2025-07-14 VITALS — HEIGHT: 65 IN | BODY MASS INDEX: 41 KG/M2 | WEIGHT: 246.06 LBS

## 2025-07-14 DIAGNOSIS — H69.92 ETD (EUSTACHIAN TUBE DYSFUNCTION), LEFT: ICD-10-CM

## 2025-07-14 DIAGNOSIS — Z88.9 MULTIPLE ALLERGIES: ICD-10-CM

## 2025-07-14 DIAGNOSIS — H61.21 IMPACTED CERUMEN OF RIGHT EAR: ICD-10-CM

## 2025-07-14 DIAGNOSIS — H90.3 SENSORINEURAL HEARING LOSS (SNHL), BILATERAL: ICD-10-CM

## 2025-07-14 DIAGNOSIS — H92.12 OTORRHEA, LEFT: ICD-10-CM

## 2025-07-14 DIAGNOSIS — H72.92 PERFORATION OF LEFT TYMPANIC MEMBRANE: ICD-10-CM

## 2025-07-14 DIAGNOSIS — Z96.22 HISTORY OF TYMPANOSTOMY TUBE PLACEMENT: Primary | ICD-10-CM

## 2025-07-14 PROCEDURE — 99999 PR PBB SHADOW E&M-EST. PATIENT-LVL III: CPT | Mod: PBBFAC,,, | Performed by: STUDENT IN AN ORGANIZED HEALTH CARE EDUCATION/TRAINING PROGRAM

## 2025-07-14 PROCEDURE — 3008F BODY MASS INDEX DOCD: CPT | Mod: CPTII,S$GLB,, | Performed by: STUDENT IN AN ORGANIZED HEALTH CARE EDUCATION/TRAINING PROGRAM

## 2025-07-14 PROCEDURE — 1159F MED LIST DOCD IN RCRD: CPT | Mod: CPTII,S$GLB,, | Performed by: STUDENT IN AN ORGANIZED HEALTH CARE EDUCATION/TRAINING PROGRAM

## 2025-07-14 PROCEDURE — 99214 OFFICE O/P EST MOD 30 MIN: CPT | Mod: 25,S$GLB,, | Performed by: STUDENT IN AN ORGANIZED HEALTH CARE EDUCATION/TRAINING PROGRAM

## 2025-07-14 PROCEDURE — 69210 REMOVE IMPACTED EAR WAX UNI: CPT | Mod: S$GLB,,, | Performed by: STUDENT IN AN ORGANIZED HEALTH CARE EDUCATION/TRAINING PROGRAM

## 2025-07-14 PROCEDURE — 87070 CULTURE OTHR SPECIMN AEROBIC: CPT | Performed by: STUDENT IN AN ORGANIZED HEALTH CARE EDUCATION/TRAINING PROGRAM

## 2025-07-14 RX ORDER — AZELASTINE 1 MG/ML
1 SPRAY, METERED NASAL 2 TIMES DAILY
Qty: 30 ML | Refills: 3 | Status: SHIPPED | OUTPATIENT
Start: 2025-07-14 | End: 2026-07-14

## 2025-07-14 RX ORDER — CIPROFLOXACIN HYDROCHLORIDE 3 MG/ML
4 SOLUTION/ DROPS OPHTHALMIC 2 TIMES DAILY
Qty: 10 ML | Refills: 1 | Status: SHIPPED | OUTPATIENT
Start: 2025-07-14 | End: 2025-07-19

## 2025-07-14 RX ORDER — CLINDAMYCIN HYDROCHLORIDE 300 MG/1
300 CAPSULE ORAL EVERY 8 HOURS
Qty: 21 CAPSULE | Refills: 0 | Status: SHIPPED | OUTPATIENT
Start: 2025-07-14 | End: 2025-07-21

## 2025-07-14 NOTE — PROGRESS NOTES
Chief complaint:   Chief Complaint   Patient presents with    Ear Problem     Pt  has come in for a ear recheck still having slight drainage           Referring Provider:  No referring provider defined for this encounter.    History of Present Illness:     Ms. Joy is a 64 y.o. female w/hx bilateral tube placement in 8/2019 presenting for evaluation of left ear drainage.     Right tube extruded 1+ years ago. Since then the right ear has done well    However, left ear has had intermittent drainage over the last couple years. Every few months, despite directed drops.     Started draining again recently. Just started drops. Associated muffled hearing.    Update 3/4/24  L PET removed at lats visit. Feeling some popping at times. Drainage improved. No pain    Update 6/4/24  Overall doing well. Feels sensation of air going through the air at times. Hearing feels stable. No pain, drainage, pressure sensation.     Update 12/9/24  Continued worsening of hearing AU over last 2 years.   No longer any pain, fullness, pressure, or drainage.  Ears did great on a flight recently.     Update 6/26/25  Returns today left ear drainage and fullness  Started 2 weeks ago after getting cold symptoms after a cruise    Update 7/14/25  At last visit pinpoint perf noted with minimal drainage. Started on drops.   Since then, ear has not improved. Still some pain and fullness. Drainage has continued  Right ear feels full too and popping at times       History        Past Medical History:   Past Medical History:   Diagnosis Date    Anemia 2016    Asthma 09/06/2013    Food allergy 2000    Seafood    Gout, arthritis     Hearing loss 11/2021    Notice its hard to hear very low voices    Obesity 2019    Osteoarthritis 2000    Seasonal allergies 1991    Sleep apnea     Thyroid disease     .          Past Surgical History:  Past Surgical History:   Procedure Laterality Date    BONE GRAFT Right 03/19/2021    Procedure: BONE GRAFT;  Surgeon:  Miguel Yoo MD;  Location: [facility] OR;  Service: Orthopedics;  Laterality: Right;  bone graft to fill the defect in the distal radial epiphysis/Reduction of the four-corner area will be performed with bone graft and fusion     SECTION      COLONOSCOPY N/A 2019    Procedure: COLONOSCOPY;  Surgeon: Scout Rivera MD;  Location: [facility] ENDO;  Service: Endoscopy;  Laterality: N/A;    COLONOSCOPY, SCREENING, LOW RISK PATIENT N/A 10/28/2024    Procedure: COLONOSCOPY, SCREENING, LOW RISK PATIENT;  Surgeon: Darlene Marcano MD;  Location: [facility] ENDO;  Service: Endoscopy;  Laterality: N/A;    FRACTURE SURGERY      HAND FUSION Right 2021    Procedure: FUSION, JOINT, HAND;  Surgeon: Miguel Yoo MD;  Location: [facility] OR;  Service: Orthopedics;  Laterality: Right;  dip joint    JOINT REPLACEMENT      Novasure Endometrial Ablation  2016    OSTECTOMY Right 2021    Procedure: OSTECTOMY;  Surgeon: Miguel Yoo MD;  Location: [facility] OR;  Service: Orthopedics;  Laterality: Right;  radial styloidectomy as well as scaphoid excision    THYROIDECTOMY, PARTIAL      TUBAL LIGATION      TYMPANOSTOMY TUBE PLACEMENT  2020    WRIST SURGERY     .         Medications: Medication list was reviewed. She  has a current medication list which includes the following prescription(s): furosemide, albuterol, allopurinol, cyanocobalamin (vitamin b-12), folic acid, ibuprofen, levocetirizine, lidocaine, meloxicam, and methotrexate, and the following Facility-Administered Medications: epinephrine, lactated ringers, nozaseptin, and sodium chloride 0.9%.         Allergies:   Review of patient's allergies indicates:   Allergen Reactions    Amoxicillin Rash and Swelling     Rash with throat swelling    Codeine Palpitations, Rash and Swelling     Other reaction(s): Shortness of breath  Other reaction(s): Hives  Rash and throat swelling    Doxycycline Swelling     Other reaction(s): Rash    Hydrocodone  Rash and Swelling    Iodine and iodide containing products Rash and Swelling    Lortab [hydrocodone-acetaminophen] Other (See Comments)     Other reaction(s): Hives  Other reaction(s): Difficulty breathing    Medrol [methylprednisolone] Anaphylaxis    Oxycodone Shortness Of Breath, Rash and Swelling    Oxycodone hcl-oxycodone-asa Rash and Swelling    Percodan filipe Shortness Of Breath    Quinidine-quinine analogues (cinchona alkaloids) Shortness Of Breath, Swelling and Rash    Tetracycline Swelling    Tetracyclines Rash and Swelling    Tramadol Shortness Of Breath, Rash and Swelling    Vibramycin [doxycycline calcium] Shortness Of Breath     Other reaction(s): Hives  Other reaction(s): Difficulty breathing    Augmentin [amoxicillin-pot clavulanate] Itching and Rash    Levaquin [levofloxacin] Hives    Sulfa (sulfonamide antibiotics) Itching and Rash    Percocet  [oxycodone-acetaminophen]      Other reaction(s): Shortness of breath    Shellfish containing products      Other reaction(s): Shortness of breath  Other reaction(s): Hives  Other reaction(s): Shortness of breath  Other reaction(s): Hives            Family history: family history includes Anuerysm in her brother and mother; Arthritis in her brother, brother, brother, brother, father, and mother; Asthma in her brother and daughter; Atrial fibrillation in her mother; Brain aneurysm in her brother; COPD in her brother; Cancer in her paternal aunt and paternal grandmother; Diabetes in her brother, brother, brother, and father; Early death in her brother; Heart disease in her brother, brother, father, and mother; Heart disease (age of onset: 35) in her brother; Hyperlipidemia in her father; Hypertension in her brother, brother, father, and son; Kidney disease in her brother and father; Miscarriages / Stillbirths in her daughter and mother; Osteoarthritis in her brother and mother; Stroke in her brother, father, and mother.         Social History          Alcohol  use:  reports no history of alcohol use.            Tobacco:  reports that she has never smoked. She has been exposed to tobacco smoke. She has never used smokeless tobacco.         Please see the patient's intake form for full details of past medical history, past surgical history, family history, social history and review of systems. ?This information was reviewed by me and noted.      Physical Examination     General: Well developed, well nourished, well hydrated. Verbal with a strong voice and not dysphonic.     Head/Face: Normocephalic, atraumatic. No scars or lesions. Facial musculature equal.     Eyes: No scleral icterus or conjunctival hemorrhage. EOMI. PERRLA.     Ears:     Right ear: No gross deformity. EAC is clear of debris and erythema (after disimpaction). The TM is intact with a pneumatized middle ear. No signs of retraction, fluid or infection.      Left ear: No gross deformity. EAC is clear of debris. TM with posterior monomer with pinpoint perf at its periphery, with white drainage, and mild erythema. Culture taken    Hearing: grossly intact    Procedure: ear microscopy with removal of cerumen    Description: The patient was in agreement with the examination and debridement of the ears. Removal of the cerumen required use of an operating microscope and multiple micro-instruments.     With the patient in the supine position, we used the operating microscope to examine both ears with the appropriate sized ear speculum.  A variety of sterile, micro-instruments were utilized to remove the cerumen atraumatically.  I performed the procedure which required a significant amount of time and effort. The tympanic membrane was then well visualized.  The patient tolerated the procedure well and there were no complications.    Findings:   Right ear had significant wax along the TM, the EAC was normal, and the tympanic membrane was intact with no evidence of middle ear fluid.          Data review:    Review of  records:      I reviewed records from the referring provider's office visits.  These describe the history, workup, and/or treatment of this problem thus far.    Culture 2023  No growth    Audiogram     Audiogram was independently reviewed         Audio 12/9/24    Mild sloping to moderately severe Sensorineural Hearing Loss  Good word rec AU  Type A AD,  large volume AS         Assessment/Plan:      1. History of tympanostomy tube placement    2. Sensorineural hearing loss (SNHL), bilateral    3. ETD (Eustachian tube dysfunction), left    4. Multiple allergies    5. Perforation of left tympanic membrane    6. Otorrhea, left            Since left PET removed she has a residual pinpoint perforation, which has been helpful for her Eustachian Tube Dysfunction symptoms and was not causing any significant Conductive Hearing Loss on last audio 1 year prior.     However, she is now having issues with recurrent drainage and pain. Culture was taken today. Will change drops as indicated and add clindamycin (significant allergies).    F/u in a couple weeks. We may consider paper patch if drainage recurs, understanding risk of return of the Eustachian Tube Dysfunction symptoms and need for repeat PET vs ET dilation             Dayron Barnes MD  [facility]   [facility] [address]  [place] [address]  P: [phone]  F: [phone]

## 2025-07-17 LAB — BACTERIA SPEC AEROBE CULT: NORMAL

## 2025-08-04 ENCOUNTER — E-CONSULT (OUTPATIENT)
Dept: CARDIOLOGY | Facility: CLINIC | Age: 65
End: 2025-08-04
Payer: COMMERCIAL

## 2025-08-04 ENCOUNTER — TELEPHONE (OUTPATIENT)
Dept: SPORTS MEDICINE | Facility: CLINIC | Age: 65
End: 2025-08-04
Payer: COMMERCIAL

## 2025-08-04 DIAGNOSIS — M75.101 ROTATOR CUFF TEAR ARTHROPATHY OF RIGHT SHOULDER: ICD-10-CM

## 2025-08-04 DIAGNOSIS — M12.811 ROTATOR CUFF TEAR ARTHROPATHY OF RIGHT SHOULDER: ICD-10-CM

## 2025-08-04 DIAGNOSIS — Z01.810 PREOP CARDIOVASCULAR EXAM: Primary | ICD-10-CM

## 2025-08-04 DIAGNOSIS — Z01.818 PREOPERATIVE CLEARANCE: Primary | ICD-10-CM

## 2025-08-04 NOTE — TELEPHONE ENCOUNTER
Post-op PT referral and rehab protocol successfully faxed to SUNITHA Villalba at [phone] on 8/4/25. STEPHAN

## 2025-08-05 NOTE — CONSULTS
[facility]  Response for E-Consult     Patient Name: Monica Joy  MRN: [id number]  Primary Care Provider: Natalia Vaz MD   Requesting Provider: Riki Cantu*  E-Consult to General Cardiology  Consult performed by: Sam Workman MD  Consult ordered by: Riki Cantu MD      E consult for preop clearance of right shoulder replacement   The chart reviewed.  [facility] PVD  05/25 EKG  NSR nonspecific STT change    Plan  Elevated periop risk of CV events for non-high risk procedure.  Ok to proceed the scheduled procedure without further cardiac study.    Total time of Consultation: 10 minute    I did not speak to the requesting provider verbally about this.     *This eConsult is based on the clinical data available to me and is furnished without benefit of a physical examination. The eConsult will need to be interpreted in light of any clinical issues or changes in patient status not available to me at the time of filing this eConsults. Significant changes in patient condition or level of acuity should result in immediate formal consultation and reevaluation. Please alert me if you have further questions.    Thank you for this eConsult referral.     Sam Workman MD  [facility]

## 2025-08-06 ENCOUNTER — LAB VISIT (OUTPATIENT)
Dept: LAB | Facility: HOSPITAL | Age: 65
End: 2025-08-06
Attending: STUDENT IN AN ORGANIZED HEALTH CARE EDUCATION/TRAINING PROGRAM
Payer: COMMERCIAL

## 2025-08-06 DIAGNOSIS — Z01.818 PREOPERATIVE CLEARANCE: ICD-10-CM

## 2025-08-06 LAB
ABSOLUTE EOSINOPHIL (OHS): 0.23 K/UL
ABSOLUTE MONOCYTE (OHS): 0.65 K/UL (ref 0.3–1)
ABSOLUTE NEUTROPHIL COUNT (OHS): 6.04 K/UL (ref 1.8–7.7)
ALBUMIN SERPL BCP-MCNC: 3.7 G/DL (ref 3.5–5.2)
ALP SERPL-CCNC: 91 UNIT/L (ref 40–150)
ALT SERPL W/O P-5'-P-CCNC: 27 UNIT/L (ref 0–55)
ANION GAP (OHS): 8 MMOL/L (ref 8–16)
AST SERPL-CCNC: 24 UNIT/L (ref 0–50)
BASOPHILS # BLD AUTO: 0.03 K/UL
BASOPHILS NFR BLD AUTO: 0.3 %
BILIRUB SERPL-MCNC: 0.5 MG/DL (ref 0.1–1)
BUN SERPL-MCNC: 14 MG/DL (ref 8–23)
CALCIUM SERPL-MCNC: 9.1 MG/DL (ref 8.7–10.5)
CHLORIDE SERPL-SCNC: 107 MMOL/L (ref 95–110)
CO2 SERPL-SCNC: 24 MMOL/L (ref 23–29)
CREAT SERPL-MCNC: 0.6 MG/DL (ref 0.5–1.4)
ERYTHROCYTE [DISTWIDTH] IN BLOOD BY AUTOMATED COUNT: 14.5 % (ref 11.5–14.5)
GFR SERPLBLD CREATININE-BSD FMLA CKD-EPI: >60 ML/MIN/1.73/M2
GLUCOSE SERPL-MCNC: 118 MG/DL (ref 70–110)
HCT VFR BLD AUTO: 38.9 % (ref 37–48.5)
HGB BLD-MCNC: 12.9 GM/DL (ref 12–16)
IMM GRANULOCYTES # BLD AUTO: 0.03 K/UL (ref 0–0.04)
IMM GRANULOCYTES NFR BLD AUTO: 0.3 % (ref 0–0.5)
LYMPHOCYTES # BLD AUTO: 2.15 K/UL (ref 1–4.8)
MCH RBC QN AUTO: 29.5 PG (ref 27–31)
MCHC RBC AUTO-ENTMCNC: 33.2 G/DL (ref 32–36)
MCV RBC AUTO: 89 FL (ref 82–98)
NUCLEATED RBC (/100WBC) (OHS): 0 /100 WBC
PLATELET # BLD AUTO: 217 K/UL (ref 150–450)
PMV BLD AUTO: 11.3 FL (ref 9.2–12.9)
POTASSIUM SERPL-SCNC: 4 MMOL/L (ref 3.5–5.1)
PROT SERPL-MCNC: 6.9 GM/DL (ref 6–8.4)
RBC # BLD AUTO: 4.38 M/UL (ref 4–5.4)
RELATIVE EOSINOPHIL (OHS): 2.5 %
RELATIVE LYMPHOCYTE (OHS): 23.5 % (ref 18–48)
RELATIVE MONOCYTE (OHS): 7.1 % (ref 4–15)
RELATIVE NEUTROPHIL (OHS): 66.3 % (ref 38–73)
SODIUM SERPL-SCNC: 139 MMOL/L (ref 136–145)
WBC # BLD AUTO: 9.13 K/UL (ref 3.9–12.7)

## 2025-08-06 PROCEDURE — 85025 COMPLETE CBC W/AUTO DIFF WBC: CPT

## 2025-08-06 PROCEDURE — 82040 ASSAY OF SERUM ALBUMIN: CPT

## 2025-08-06 PROCEDURE — 36415 COLL VENOUS BLD VENIPUNCTURE: CPT | Mod: PO

## 2025-08-11 ENCOUNTER — OFFICE VISIT (OUTPATIENT)
Dept: OTOLARYNGOLOGY | Facility: CLINIC | Age: 65
End: 2025-08-11
Payer: COMMERCIAL

## 2025-08-11 VITALS — WEIGHT: 246.06 LBS | BODY MASS INDEX: 40.94 KG/M2

## 2025-08-11 DIAGNOSIS — H72.92 PERFORATION OF LEFT TYMPANIC MEMBRANE: ICD-10-CM

## 2025-08-11 DIAGNOSIS — H92.12 OTORRHEA, LEFT: Primary | ICD-10-CM

## 2025-08-11 PROBLEM — Z01.810 PREOP CARDIOVASCULAR EXAM: Status: RESOLVED | Noted: 2025-08-04 | Resolved: 2025-08-11

## 2025-08-11 PROCEDURE — 87070 CULTURE OTHR SPECIMN AEROBIC: CPT | Performed by: STUDENT IN AN ORGANIZED HEALTH CARE EDUCATION/TRAINING PROGRAM

## 2025-08-11 PROCEDURE — 99999 PR PBB SHADOW E&M-EST. PATIENT-LVL III: CPT | Mod: PBBFAC,,, | Performed by: STUDENT IN AN ORGANIZED HEALTH CARE EDUCATION/TRAINING PROGRAM

## 2025-08-11 RX ORDER — SULFACETAMIDE SODIUM 100 MG/ML
SOLUTION/ DROPS OPHTHALMIC
Qty: 15 ML | Refills: 1 | Status: SHIPPED | OUTPATIENT
Start: 2025-08-11

## 2025-08-14 ENCOUNTER — RESULTS FOLLOW-UP (OUTPATIENT)
Dept: OTOLARYNGOLOGY | Facility: CLINIC | Age: 65
End: 2025-08-14
Payer: COMMERCIAL

## 2025-08-14 LAB — BACTERIA SPEC AEROBE CULT: ABNORMAL

## 2025-08-15 ENCOUNTER — TELEPHONE (OUTPATIENT)
Dept: OTOLARYNGOLOGY | Facility: CLINIC | Age: 65
End: 2025-08-15
Payer: COMMERCIAL

## 2025-08-15 ENCOUNTER — PATIENT MESSAGE (OUTPATIENT)
Dept: OTOLARYNGOLOGY | Facility: CLINIC | Age: 65
End: 2025-08-15
Payer: COMMERCIAL

## 2025-08-15 RX ORDER — CLOTRIMAZOLE 1 G/ML
SOLUTION TOPICAL 2 TIMES DAILY
Qty: 30 ML | Refills: 0 | Status: SHIPPED | OUTPATIENT
Start: 2025-08-15 | End: 2025-08-25

## 2025-08-18 DIAGNOSIS — Z01.818 PRE-OP TESTING: Primary | ICD-10-CM

## 2025-08-25 ENCOUNTER — OFFICE VISIT (OUTPATIENT)
Dept: OTOLARYNGOLOGY | Facility: CLINIC | Age: 65
End: 2025-08-25
Payer: COMMERCIAL

## 2025-08-25 VITALS — BODY MASS INDEX: 40.94 KG/M2 | HEIGHT: 65 IN

## 2025-08-25 DIAGNOSIS — H72.92 PERFORATION OF LEFT TYMPANIC MEMBRANE: ICD-10-CM

## 2025-08-25 DIAGNOSIS — H92.12 OTORRHEA, LEFT: Primary | ICD-10-CM

## 2025-08-25 PROCEDURE — 99999 PR PBB SHADOW E&M-EST. PATIENT-LVL III: CPT | Mod: PBBFAC,,, | Performed by: STUDENT IN AN ORGANIZED HEALTH CARE EDUCATION/TRAINING PROGRAM

## 2025-08-25 RX ORDER — CLINDAMYCIN HYDROCHLORIDE 300 MG/1
300 CAPSULE ORAL EVERY 6 HOURS
Qty: 28 CAPSULE | Refills: 0 | Status: SHIPPED | OUTPATIENT
Start: 2025-08-25 | End: 2025-09-01

## 2025-08-27 ENCOUNTER — TELEPHONE (OUTPATIENT)
Dept: OTOLARYNGOLOGY | Facility: CLINIC | Age: 65
End: 2025-08-27
Payer: COMMERCIAL

## 2025-09-02 ENCOUNTER — OFFICE VISIT (OUTPATIENT)
Dept: OTOLARYNGOLOGY | Facility: CLINIC | Age: 65
End: 2025-09-02
Payer: COMMERCIAL

## 2025-09-02 VITALS — BODY MASS INDEX: 40.52 KG/M2 | HEIGHT: 65 IN | WEIGHT: 243.19 LBS

## 2025-09-02 DIAGNOSIS — H72.92 PERFORATION OF LEFT TYMPANIC MEMBRANE: Primary | ICD-10-CM

## 2025-09-02 DIAGNOSIS — H92.12 OTORRHEA, LEFT: ICD-10-CM

## 2025-09-02 PROCEDURE — 99999 PR PBB SHADOW E&M-EST. PATIENT-LVL III: CPT | Mod: PBBFAC,,, | Performed by: STUDENT IN AN ORGANIZED HEALTH CARE EDUCATION/TRAINING PROGRAM

## 2025-09-02 PROCEDURE — 99213 OFFICE O/P EST LOW 20 MIN: CPT | Mod: S$GLB,,, | Performed by: STUDENT IN AN ORGANIZED HEALTH CARE EDUCATION/TRAINING PROGRAM

## 2025-09-02 PROCEDURE — 1159F MED LIST DOCD IN RCRD: CPT | Mod: CPTII,S$GLB,, | Performed by: STUDENT IN AN ORGANIZED HEALTH CARE EDUCATION/TRAINING PROGRAM

## 2025-09-02 PROCEDURE — 3008F BODY MASS INDEX DOCD: CPT | Mod: CPTII,S$GLB,, | Performed by: STUDENT IN AN ORGANIZED HEALTH CARE EDUCATION/TRAINING PROGRAM

## 2025-09-04 ENCOUNTER — OFFICE VISIT (OUTPATIENT)
Dept: INTERNAL MEDICINE | Facility: CLINIC | Age: 65
End: 2025-09-04
Payer: COMMERCIAL

## 2025-09-04 VITALS
TEMPERATURE: 98 F | HEART RATE: 70 BPM | RESPIRATION RATE: 20 BRPM | SYSTOLIC BLOOD PRESSURE: 159 MMHG | OXYGEN SATURATION: 95 % | DIASTOLIC BLOOD PRESSURE: 76 MMHG

## 2025-09-04 DIAGNOSIS — J45.20 MILD INTERMITTENT ASTHMA WITHOUT COMPLICATION: Primary | ICD-10-CM

## 2025-09-04 DIAGNOSIS — D51.0 PERNICIOUS ANEMIA: ICD-10-CM

## 2025-09-04 DIAGNOSIS — M06.00 SERONEGATIVE RHEUMATOID ARTHRITIS: ICD-10-CM

## 2025-09-04 DIAGNOSIS — G47.33 OSA ON CPAP: Chronic | ICD-10-CM

## 2025-09-04 DIAGNOSIS — M75.101 ROTATOR CUFF TEAR ARTHROPATHY OF RIGHT SHOULDER: ICD-10-CM

## 2025-09-04 DIAGNOSIS — M12.811 ROTATOR CUFF TEAR ARTHROPATHY OF RIGHT SHOULDER: ICD-10-CM

## 2025-09-04 DIAGNOSIS — H92.12 OTORRHEA, LEFT: ICD-10-CM

## 2025-09-04 DIAGNOSIS — Z86.718 HISTORY OF DVT (DEEP VEIN THROMBOSIS): ICD-10-CM

## 2025-09-04 DIAGNOSIS — M10.9 GOUT, UNSPECIFIED CAUSE, UNSPECIFIED CHRONICITY, UNSPECIFIED SITE: ICD-10-CM

## 2025-09-04 PROCEDURE — 99999 PR PBB SHADOW E&M-EST. PATIENT-LVL IV: CPT | Mod: PBBFAC,,,

## 2025-09-04 PROCEDURE — 3077F SYST BP >= 140 MM HG: CPT | Mod: CPTII,S$GLB,, | Performed by: SPECIALIST

## 2025-09-04 PROCEDURE — 3078F DIAST BP <80 MM HG: CPT | Mod: CPTII,S$GLB,, | Performed by: SPECIALIST

## 2025-09-04 PROCEDURE — 99204 OFFICE O/P NEW MOD 45 MIN: CPT | Mod: S$GLB,,, | Performed by: PHYSICIAN ASSISTANT

## 2025-09-04 PROCEDURE — 1160F RVW MEDS BY RX/DR IN RCRD: CPT | Mod: CPTII,S$GLB,, | Performed by: SPECIALIST

## 2025-09-04 PROCEDURE — 1159F MED LIST DOCD IN RCRD: CPT | Mod: CPTII,S$GLB,, | Performed by: SPECIALIST

## (undated) DEVICE — PAD CAST SPECIALIST STRL 4

## (undated) DEVICE — APPLICATOR CHLORAPREP ORN 26ML

## (undated) DEVICE — SPONGE GAUZE 16PLY 4X4

## (undated) DEVICE — SOL NACL IRR 1000ML BTL

## (undated) DEVICE — DRESSING XEROFORM FOIL PK 1X8

## (undated) DEVICE — GLOVE SURGICAL LATEX SZ 7

## (undated) DEVICE — SUCTION FRAZIER TIP SURG 12FR

## (undated) DEVICE — PADDING CAST 4IN SPECIALIST

## (undated) DEVICE — CAUTERY TIP 2 3/4

## (undated) DEVICE — SCRUB HIBICLENS 4% CHG 4OZ

## (undated) DEVICE — TOURNIQUET SB QC SP 24X4IN

## (undated) DEVICE — SOL NS 1000CC

## (undated) DEVICE — GOWN SURG 2XL DISP TIE BACK

## (undated) DEVICE — SUT 4-0 ETHILON 18 PS-2

## (undated) DEVICE — SEE MEDLINE ITEM 157117

## (undated) DEVICE — STOCKINETTE TUBULAR 2PL 6 X 4

## (undated) DEVICE — BANDAGE ELASTIC 3X5 VELCRO ST

## (undated) DEVICE — GLOVE BIOGEL SZ 8 1/2

## (undated) DEVICE — UNDERGLOVES BIOGEL PI SIZE 7.5

## (undated) DEVICE — COVER LIGHT HANDLE 80/CA

## (undated) DEVICE — Device

## (undated) DEVICE — STAPLER SKIN PROXIMATE WIDE

## (undated) DEVICE — DRAPE MOBILE C-ARM

## (undated) DEVICE — NDL SAFETY 25G X 1.5 ECLIPSE

## (undated) DEVICE — SEE MEDLINE ITEM 152622

## (undated) DEVICE — SEE MEDLINE ITEM 152522

## (undated) DEVICE — GAUZE SPONGE 4X4 12PLY

## (undated) DEVICE — SEE MEDLINE ITEM 157131

## (undated) DEVICE — PAD ABD 8X10 STERILE

## (undated) DEVICE — ALCOHOL 70% ISOP RUBBING 4OZ

## (undated) DEVICE — SEE MEDLINE ITEM 157027

## (undated) DEVICE — SEE MEDLINE ITEM 157173

## (undated) DEVICE — COVER CAMERA OPERATING ROOM

## (undated) DEVICE — MANIFOLD 4 PORT

## (undated) DEVICE — UNDERGLOVES BIOGEL PI SIZE 8.5

## (undated) DEVICE — WIRE C TROCAR TIP .062
Type: IMPLANTABLE DEVICE | Site: FINGER | Status: NON-FUNCTIONAL
Removed: 2021-03-19

## (undated) DEVICE — SEE MEDLINE ITEM 152514

## (undated) DEVICE — CAST PLSTR SPLINT X-FAST 3X15

## (undated) DEVICE — CORD BIPOLAR ELECTROSURGICAL

## (undated) DEVICE — TIP SUCTION YANKAUER

## (undated) DEVICE — SYR 10CC LUER LOCK

## (undated) DEVICE — SLING ARM BUCKLE CLOSURE LG

## (undated) DEVICE — ELECTRODE REM PLYHSV RETURN 9

## (undated) DEVICE — SEE MEDLINE ITEM 146308